# Patient Record
Sex: MALE | Race: WHITE | Employment: OTHER | URBAN - METROPOLITAN AREA
[De-identification: names, ages, dates, MRNs, and addresses within clinical notes are randomized per-mention and may not be internally consistent; named-entity substitution may affect disease eponyms.]

---

## 2017-01-31 PROBLEM — K72.00 ACUTE LIVER FAILURE WITHOUT HEPATIC COMA: Status: ACTIVE | Noted: 2017-01-31

## 2017-01-31 PROBLEM — A41.9 SEPSIS (HCC): Status: ACTIVE | Noted: 2017-01-31

## 2017-02-01 PROBLEM — B16.9 ACUTE HEPATITIS B: Status: ACTIVE | Noted: 2017-02-01

## 2018-02-11 PROBLEM — L03.90 CELLULITIS: Status: ACTIVE | Noted: 2018-02-11

## 2018-02-11 PROBLEM — L02.619 ABSCESS OF FOOT: Status: ACTIVE | Noted: 2018-02-11

## 2018-03-12 ENCOUNTER — HOSPITAL ENCOUNTER (OUTPATIENT)
Age: 53
Discharge: HOME | End: 2018-03-14
Payer: COMMERCIAL

## 2018-03-12 LAB
ALBUMIN SERPL-MCNC: 3.7 G/DL (ref 3.5–5.2)
ALP BLD-CCNC: 47 U/L (ref 40–129)
ALT SERPL-CCNC: 11 U/L (ref 0–40)
ANION GAP SERPL CALCULATED.3IONS-SCNC: 12 MMOL/L (ref 7–16)
AST SERPL-CCNC: 25 U/L (ref 0–39)
BASOPHILS ABSOLUTE: 0.06 E9/L (ref 0–0.2)
BASOPHILS RELATIVE PERCENT: 0.8 % (ref 0–2)
BILIRUB SERPL-MCNC: 0.3 MG/DL (ref 0–1.2)
BUN BLDV-MCNC: 11 MG/DL (ref 6–20)
C-REACTIVE PROTEIN: 0.7 MG/DL (ref 0–0.4)
CALCIUM SERPL-MCNC: 8.5 MG/DL (ref 8.6–10.2)
CHLORIDE BLD-SCNC: 100 MMOL/L (ref 98–107)
CO2: 25 MMOL/L (ref 22–29)
CREAT SERPL-MCNC: 0.7 MG/DL (ref 0.7–1.2)
EOSINOPHILS ABSOLUTE: 0.56 E9/L (ref 0.05–0.5)
EOSINOPHILS RELATIVE PERCENT: 7.2 % (ref 0–6)
GFR AFRICAN AMERICAN: >60
GFR NON-AFRICAN AMERICAN: >60 ML/MIN/1.73
GLUCOSE BLD-MCNC: 172 MG/DL (ref 74–109)
HCT VFR BLD CALC: 38.4 % (ref 37–54)
HEMOGLOBIN: 12.9 G/DL (ref 12.5–16.5)
IMMATURE GRANULOCYTES #: 0.05 E9/L
IMMATURE GRANULOCYTES %: 0.6 % (ref 0–5)
LYMPHOCYTES ABSOLUTE: 2.67 E9/L (ref 1.5–4)
LYMPHOCYTES RELATIVE PERCENT: 34.5 % (ref 20–42)
MCH RBC QN AUTO: 31.1 PG (ref 26–35)
MCHC RBC AUTO-ENTMCNC: 33.6 % (ref 32–34.5)
MCV RBC AUTO: 92.5 FL (ref 80–99.9)
MONOCYTES ABSOLUTE: 0.97 E9/L (ref 0.1–0.95)
MONOCYTES RELATIVE PERCENT: 12.5 % (ref 2–12)
NEUTROPHILS ABSOLUTE: 3.42 E9/L (ref 1.8–7.3)
NEUTROPHILS RELATIVE PERCENT: 44.4 % (ref 43–80)
PDW BLD-RTO: 12.8 FL (ref 11.5–15)
PLATELET # BLD: 173 E9/L (ref 130–450)
PMV BLD AUTO: 11.7 FL (ref 7–12)
POTASSIUM SERPL-SCNC: 3.9 MMOL/L (ref 3.5–5)
RBC # BLD: 4.15 E12/L (ref 3.8–5.8)
SEDIMENTATION RATE, ERYTHROCYTE: 31 MM/HR (ref 0–15)
SODIUM BLD-SCNC: 137 MMOL/L (ref 132–146)
TOTAL PROTEIN: 6.4 G/DL (ref 6.4–8.3)
WBC # BLD: 7.7 E9/L (ref 4.5–11.5)

## 2018-03-12 PROCEDURE — 86140 C-REACTIVE PROTEIN: CPT

## 2018-03-12 PROCEDURE — 85025 COMPLETE CBC W/AUTO DIFF WBC: CPT

## 2018-03-12 PROCEDURE — 85651 RBC SED RATE NONAUTOMATED: CPT

## 2018-03-12 PROCEDURE — 80053 COMPREHEN METABOLIC PANEL: CPT

## 2018-03-13 ENCOUNTER — HOSPITAL ENCOUNTER (OUTPATIENT)
Dept: WOUND CARE | Age: 53
Discharge: HOME | End: 2018-03-13
Payer: COMMERCIAL

## 2018-03-13 VITALS
SYSTOLIC BLOOD PRESSURE: 118 MMHG | RESPIRATION RATE: 18 BRPM | DIASTOLIC BLOOD PRESSURE: 80 MMHG | TEMPERATURE: 98.3 F | HEART RATE: 72 BPM

## 2018-03-13 PROCEDURE — 11042 DBRDMT SUBQ TIS 1ST 20SQCM/<: CPT

## 2018-03-13 ASSESSMENT — PAIN DESCRIPTION - LOCATION: LOCATION: TOE (COMMENT WHICH ONE)

## 2018-03-13 ASSESSMENT — PAIN DESCRIPTION - ONSET: ONSET: GRADUAL

## 2018-03-13 ASSESSMENT — PAIN DESCRIPTION - DESCRIPTORS: DESCRIPTORS: SHARP;STABBING

## 2018-03-13 ASSESSMENT — PAIN DESCRIPTION - ORIENTATION: ORIENTATION: RIGHT

## 2018-03-13 ASSESSMENT — PAIN DESCRIPTION - PAIN TYPE: TYPE: CHRONIC PAIN

## 2018-03-13 ASSESSMENT — PAIN DESCRIPTION - FREQUENCY: FREQUENCY: INTERMITTENT

## 2018-03-13 ASSESSMENT — PAIN DESCRIPTION - PROGRESSION: CLINICAL_PROGRESSION: NOT CHANGED

## 2018-03-15 ENCOUNTER — HOSPITAL ENCOUNTER (OUTPATIENT)
Age: 53
Discharge: HOME | End: 2018-03-17
Payer: COMMERCIAL

## 2018-03-15 LAB
ALBUMIN SERPL-MCNC: 4.1 G/DL (ref 3.5–5.2)
ALP BLD-CCNC: 53 U/L (ref 40–129)
ALT SERPL-CCNC: 11 U/L (ref 0–40)
ANION GAP SERPL CALCULATED.3IONS-SCNC: 13 MMOL/L (ref 7–16)
AST SERPL-CCNC: 24 U/L (ref 0–39)
BASOPHILS ABSOLUTE: 0.08 E9/L (ref 0–0.2)
BASOPHILS RELATIVE PERCENT: 0.9 % (ref 0–2)
BILIRUB SERPL-MCNC: 0.4 MG/DL (ref 0–1.2)
BUN BLDV-MCNC: 8 MG/DL (ref 6–20)
CALCIUM SERPL-MCNC: 9.3 MG/DL (ref 8.6–10.2)
CHLORIDE BLD-SCNC: 95 MMOL/L (ref 98–107)
CO2: 27 MMOL/L (ref 22–29)
CREAT SERPL-MCNC: 0.6 MG/DL (ref 0.7–1.2)
EOSINOPHILS ABSOLUTE: 0.56 E9/L (ref 0.05–0.5)
EOSINOPHILS RELATIVE PERCENT: 6.4 % (ref 0–6)
GFR AFRICAN AMERICAN: >60
GFR NON-AFRICAN AMERICAN: >60 ML/MIN/1.73
GLUCOSE BLD-MCNC: 223 MG/DL (ref 74–109)
HCT VFR BLD CALC: 38.9 % (ref 37–54)
HEMOGLOBIN: 13.2 G/DL (ref 12.5–16.5)
IMMATURE GRANULOCYTES #: 0.05 E9/L
IMMATURE GRANULOCYTES %: 0.6 % (ref 0–5)
LYMPHOCYTES ABSOLUTE: 1.36 E9/L (ref 1.5–4)
LYMPHOCYTES RELATIVE PERCENT: 15.7 % (ref 20–42)
MCH RBC QN AUTO: 30.8 PG (ref 26–35)
MCHC RBC AUTO-ENTMCNC: 33.9 % (ref 32–34.5)
MCV RBC AUTO: 90.9 FL (ref 80–99.9)
MONOCYTES ABSOLUTE: 1.11 E9/L (ref 0.1–0.95)
MONOCYTES RELATIVE PERCENT: 12.8 % (ref 2–12)
NEUTROPHILS ABSOLUTE: 5.53 E9/L (ref 1.8–7.3)
NEUTROPHILS RELATIVE PERCENT: 63.6 % (ref 43–80)
PDW BLD-RTO: 12.6 FL (ref 11.5–15)
PLATELET # BLD: 165 E9/L (ref 130–450)
PMV BLD AUTO: 11.4 FL (ref 7–12)
POTASSIUM SERPL-SCNC: 3.8 MMOL/L (ref 3.5–5)
RBC # BLD: 4.28 E12/L (ref 3.8–5.8)
SODIUM BLD-SCNC: 135 MMOL/L (ref 132–146)
TOTAL PROTEIN: 6.9 G/DL (ref 6.4–8.3)
WBC # BLD: 8.7 E9/L (ref 4.5–11.5)

## 2018-03-15 PROCEDURE — 85025 COMPLETE CBC W/AUTO DIFF WBC: CPT

## 2018-03-15 PROCEDURE — 80053 COMPREHEN METABOLIC PANEL: CPT

## 2018-03-18 NOTE — PROGRESS NOTES
Note  Indications:  Based on my examination of this patient's wound(s)/ulcer(s) today, debridement is required to promote healing and evaluate the wound base. Performed by: Ysabel Buckley DPM    Consent obtained:  Yes    Time out taken:  Yes    Pain Control:       Debridement:Excisional Debridement    Using curette the wound(s)/ulcer(s) was/were sharply debrided down through and including the removal of subcutaneous tissue. Devitalized Tissue Debrided:  fibrin, biofilm, slough and necrotic/eschar    Pre Debridement Measurements:  Are located in the [place]  Documentation Flow Sheet    Wound/Ulcer #: 1    Post Debridement Measurements:  Wound/Ulcer Descriptions are Pre Debridement except measurements:    Wound 02/27/18 Toe (Comment  which one) Right #1 RT BIG TOE SURGERY AQUIRED 2/17/18 (Active)   Wound Type Wound 2/27/2018  3:42 PM   Wound Diabetic Watkins 2 3/6/2018  9:20 AM   Dressing Status Intact; Changed 3/13/2018 10:20 AM   Dressing Changed Changed/New 3/13/2018 10:20 AM   Dressing/Treatment Dry dressing; Other (Comment) 3/6/2018  9:30 AM   Wound Cleansed Rinsed/Irrigated with saline 3/13/2018 10:20 AM   Dressing Change Due 03/07/18 3/6/2018  9:30 AM   Wound Length (cm) 0.6 cm 3/13/2018  9:31 AM   Wound Width (cm) 2.2 cm 3/13/2018  9:31 AM   Wound Depth (cm)  0.2 3/13/2018  9:31 AM   Calculated Wound Size (cm^2) (l*w) 1.32 cm^2 3/13/2018  9:31 AM   Change in Wound Size % (l*w) 24.57 3/13/2018  9:31 AM   Wound Assessment Red;Yellow 3/13/2018  9:31 AM   Drainage Amount Small 3/13/2018  9:31 AM   Drainage Description Serosanguinous 3/13/2018  9:31 AM   Odor None 3/13/2018  9:31 AM   Mima-wound Assessment Dry; Intact; Pink 3/13/2018  9:31 AM   Debridement per physician Subcutaneous 3/13/2018 10:05 AM   Time out Yes 3/13/2018 10:05 AM   Procedural Pain 0 3/13/2018 10:05 AM   Post procedural Pain 0 3/13/2018 10:05 AM   Number of days: 18       Percent of Wound/Ulcer Debrided: 100%    Total Surface Area

## 2018-03-19 ENCOUNTER — HOSPITAL ENCOUNTER (OUTPATIENT)
Age: 53
Discharge: HOME | End: 2018-03-21
Payer: COMMERCIAL

## 2018-03-19 ENCOUNTER — HOSPITAL ENCOUNTER (OUTPATIENT)
Dept: WOUND CARE | Age: 53
Discharge: HOME | End: 2018-03-19
Payer: COMMERCIAL

## 2018-03-19 VITALS
DIASTOLIC BLOOD PRESSURE: 70 MMHG | RESPIRATION RATE: 16 BRPM | SYSTOLIC BLOOD PRESSURE: 120 MMHG | HEART RATE: 92 BPM | TEMPERATURE: 98 F

## 2018-03-19 LAB
ALBUMIN SERPL-MCNC: 3.6 G/DL (ref 3.5–5.2)
ALP BLD-CCNC: 59 U/L (ref 40–129)
ALT SERPL-CCNC: 10 U/L (ref 0–40)
ANION GAP SERPL CALCULATED.3IONS-SCNC: 14 MMOL/L (ref 7–16)
AST SERPL-CCNC: 17 U/L (ref 0–39)
BASOPHILS ABSOLUTE: 0.04 E9/L (ref 0–0.2)
BASOPHILS RELATIVE PERCENT: 0.5 % (ref 0–2)
BILIRUB SERPL-MCNC: 0.4 MG/DL (ref 0–1.2)
BUN BLDV-MCNC: 12 MG/DL (ref 6–20)
C-REACTIVE PROTEIN: 1.5 MG/DL (ref 0–0.4)
CALCIUM SERPL-MCNC: 9.5 MG/DL (ref 8.6–10.2)
CHLORIDE BLD-SCNC: 93 MMOL/L (ref 98–107)
CO2: 26 MMOL/L (ref 22–29)
CREAT SERPL-MCNC: 0.6 MG/DL (ref 0.7–1.2)
EOSINOPHILS ABSOLUTE: 0.42 E9/L (ref 0.05–0.5)
EOSINOPHILS RELATIVE PERCENT: 5.3 % (ref 0–6)
GFR AFRICAN AMERICAN: >60
GFR NON-AFRICAN AMERICAN: >60 ML/MIN/1.73
GLUCOSE BLD-MCNC: 380 MG/DL (ref 74–109)
HCT VFR BLD CALC: 39.2 % (ref 37–54)
HEMOGLOBIN: 13.3 G/DL (ref 12.5–16.5)
IMMATURE GRANULOCYTES #: 0.04 E9/L
IMMATURE GRANULOCYTES %: 0.5 % (ref 0–5)
LYMPHOCYTES ABSOLUTE: 1.77 E9/L (ref 1.5–4)
LYMPHOCYTES RELATIVE PERCENT: 22.2 % (ref 20–42)
MCH RBC QN AUTO: 30.8 PG (ref 26–35)
MCHC RBC AUTO-ENTMCNC: 33.9 % (ref 32–34.5)
MCV RBC AUTO: 90.7 FL (ref 80–99.9)
MONOCYTES ABSOLUTE: 0.58 E9/L (ref 0.1–0.95)
MONOCYTES RELATIVE PERCENT: 7.3 % (ref 2–12)
NEUTROPHILS ABSOLUTE: 5.12 E9/L (ref 1.8–7.3)
NEUTROPHILS RELATIVE PERCENT: 64.2 % (ref 43–80)
PDW BLD-RTO: 12.6 FL (ref 11.5–15)
PLATELET # BLD: 153 E9/L (ref 130–450)
PMV BLD AUTO: 11.6 FL (ref 7–12)
POTASSIUM SERPL-SCNC: 4.1 MMOL/L (ref 3.5–5)
RBC # BLD: 4.32 E12/L (ref 3.8–5.8)
SEDIMENTATION RATE, ERYTHROCYTE: 48 MM/HR (ref 0–15)
SODIUM BLD-SCNC: 133 MMOL/L (ref 132–146)
TOTAL PROTEIN: 6.6 G/DL (ref 6.4–8.3)
WBC # BLD: 8 E9/L (ref 4.5–11.5)

## 2018-03-19 PROCEDURE — 85651 RBC SED RATE NONAUTOMATED: CPT

## 2018-03-19 PROCEDURE — 11042 DBRDMT SUBQ TIS 1ST 20SQCM/<: CPT

## 2018-03-19 PROCEDURE — 86140 C-REACTIVE PROTEIN: CPT

## 2018-03-19 PROCEDURE — 85025 COMPLETE CBC W/AUTO DIFF WBC: CPT

## 2018-03-19 PROCEDURE — 80053 COMPREHEN METABOLIC PANEL: CPT

## 2018-03-19 ASSESSMENT — PAIN DESCRIPTION - ORIENTATION: ORIENTATION: RIGHT

## 2018-03-19 ASSESSMENT — PAIN DESCRIPTION - PAIN TYPE: TYPE: CHRONIC PAIN

## 2018-03-19 ASSESSMENT — PAIN DESCRIPTION - LOCATION: LOCATION: TOE (COMMENT WHICH ONE)

## 2018-03-19 ASSESSMENT — PAIN DESCRIPTION - DESCRIPTORS: DESCRIPTORS: ACHING

## 2018-03-26 ENCOUNTER — HOSPITAL ENCOUNTER (OUTPATIENT)
Dept: WOUND CARE | Age: 53
Discharge: HOME | End: 2018-03-26
Payer: COMMERCIAL

## 2018-03-26 VITALS
HEART RATE: 72 BPM | RESPIRATION RATE: 18 BRPM | DIASTOLIC BLOOD PRESSURE: 64 MMHG | TEMPERATURE: 98.4 F | SYSTOLIC BLOOD PRESSURE: 124 MMHG

## 2018-03-26 PROCEDURE — 11042 DBRDMT SUBQ TIS 1ST 20SQCM/<: CPT

## 2018-03-26 NOTE — PROGRESS NOTES
Patient voiced that he \"thinks his appointment is April 20 with Dr. Pooja Goddard in her office\".  clarified that his appointment is April 18th not the 20th. Patient made aware.  Je Clement

## 2018-03-26 NOTE — PROGRESS NOTES
[facility]  History and Physical/Consultation  Podiatry    Referring Physician : Penelope Badillo MD  [facility] RECORD NUMBER:  [id number]  AGE: 48 y.o. GENDER: male  : 1965  EPISODE DATE:  3/19/2018  Subjective:     Chief Complaint   Patient presents with    Wound Check         HISTORY of PRESENT ILLNESS ALEYDA Gonzalez is a 46 y.o. male who presents today for wound/ulcer evaluation. History of Wound Context:  The patient has had a wound of Left great toe which was the result of surgical incision and drainage, bone biopsy. This has been treated for about 4 weeks. The patient has noted that the wound has been improving. The patient has not had wounds similar to this in the past. Patient states that he is eager to get back to work      Pt is currently on abx.       Wound/Ulcer Pain Timing/Severity: waxing and waning  Quality of pain: aching, throbbing  Modifying Factors: Pain worsens with pressure/palpation of the ulceration  Associated Signs/Symptoms: none     Ulcer Identification:  Ulcer Type: diabetic and non-healing surgical  Contributing Factors: diabetes     Diabetic/Pressure/Non Pressure Ulcers only:  Ulcer: Non-Pressure ulcer, fat layer exposed     If patient has diabetic lower extremity wounds  Watkins Classification of diabetic lower extremity wounds:     Grade Description   []  0 No open wound   []  1 Superficial ulcer involving the full skin thickness   []  2 Deep ulcer involves ligament, tendon, joint capsule, or fascia  No bone involvement or abscess presence   []  3 Deep Ulcer with abcess formation and/or osteomyelitis   []  4 Localized gangrene   []  5 Extensive gangrene of the foot      Wound: Surgical incision                                        PAST MEDICAL HISTORY  Past Medical History             Diagnosis Date    Atrial fibrillation [facility])      Atrial fibrillation ([facility] [address] 2018     lexiscan stress test    Diabetes mellitus ([facility] [address]      breakfast) 30 tablet 0    metoprolol (TOPROL-XL) 25 MG XL tablet Take 1 tablet by mouth daily 30 tablet 0    Multiple Vitamins-Minerals (THERAPEUTIC MULTIVITAMIN-MINERALS) tablet Take 1 tablet by mouth daily        aspirin 81 MG tablet Take 81 mg by mouth daily        glucose monitoring kit (FREESTYLE) monitoring kit 1 kit by Does not apply route daily as needed 1 kit 0      No current facility-administered medications on file prior to encounter.          REVIEW OF SYSTEMS   ROS : All others Negative if blank [], Positive if [x]  General Vascular   [] Fevers [] Claudication   [] Chills [] Rest Pain   Skin Neurologic   [] Tissue Loss [] Lower extremity neuropathy      Objective:    /80   Pulse 72   Temp 98.3 °F (36.8 °C) (Oral)   Resp 18       Wt Readings from Last 3 Encounters:   03/06/18 265 lb (120.2 kg)   02/27/18 265 lb (120.2 kg)   02/15/18 265 lb 8 oz (120.4 kg)         PHYSICAL EXAM   CONSTITUTIONAL:   Awake, alert, cooperative   PSYCHIATRIC :          Oriented to time, place and person                                       normal insight to disease process  EXTREMITIES:   R LE    Open wounds are not noted              Skin color is normal              Edema is not noted              Sensation deficit noted - mildly diminihsed to the right and left foot               Palpation of the foot does cause pain              5/5 strength DF/PF  L LE     Open wounds are not noted              Skin color is normal              Edema is not noted              Sensation intact to light touch              Palpation of the foot does not cause pain              5/5 strength DF/PF  R dorsalis pedis 2/4 L dorsalis pedis 2/4   R posterior tibial 2/4 L posterior tibial 2/4      Assessment:           Patient Active Problem List   Diagnosis Code    TIA (transient ischemic attack) G45.9    New onset atrial fibrillation (HCC) I48.91    Acute renal failure (ARF) (HCC) N17.9    Diverticulitis K57.92    Atrial

## 2018-03-26 NOTE — PLAN OF CARE
Problem: Pain:  Goal: Pain level will decrease  Pain level will decrease   Outcome: Ongoing    Goal: Control of acute pain  Control of acute pain   Outcome: Ongoing    Goal: Control of chronic pain  Control of chronic pain   Outcome: Ongoing      Problem: Wound:  Goal: Will show signs of wound healing; wound closure and no evidence of infection  Will show signs of wound healing; wound closure and no evidence of infection   Outcome: Ongoing      Problem: Nutritional:  Goal: Nutritional status will improve  Nutritional status will improve   Outcome: Ongoing

## 2018-03-26 NOTE — PROGRESS NOTES
tablet by mouth daily (with breakfast) 30 tablet 0    metoprolol (TOPROL-XL) 25 MG XL tablet Take 1 tablet by mouth daily 30 tablet 0    Multiple Vitamins-Minerals (THERAPEUTIC MULTIVITAMIN-MINERALS) tablet Take 1 tablet by mouth daily        aspirin 81 MG tablet Take 81 mg by mouth daily        glucose monitoring kit (FREESTYLE) monitoring kit 1 kit by Does not apply route daily as needed 1 kit 0      No current facility-administered medications on file prior to encounter.          REVIEW OF SYSTEMS   ROS : All others Negative if blank [], Positive if [x]  General Vascular   [] Fevers [] Claudication   [] Chills [] Rest Pain   Skin Neurologic   [] Tissue Loss [] Lower extremity neuropathy      Objective:    /80   Pulse 72   Temp 98.3 °F (36.8 °C) (Oral)   Resp 18         Wt Readings from Last 3 Encounters:   03/06/18 265 lb (120.2 kg)   02/27/18 265 lb (120.2 kg)   02/15/18 265 lb 8 oz (120.4 kg)         PHYSICAL EXAM   CONSTITUTIONAL:   Awake, alert, cooperative   PSYCHIATRIC :          Oriented to time, place and person                                       normal insight to disease process  EXTREMITIES:   R LE    Open wounds are not noted              Skin color is normal              Edema is not noted              Sensation deficit noted - mildly diminihsed to the right and left foot               Palpation of the foot does cause pain              5/5 strength DF/PF  L LE     Open wounds are not noted              Skin color is normal              Edema is not noted              Sensation intact to light touch              Palpation of the foot does not cause pain              5/5 strength DF/PF  R dorsalis pedis 2/4 L dorsalis pedis 2/4   R posterior tibial 2/4 L posterior tibial 2/4      Assessment:              Patient Active Problem List   Diagnosis Code    TIA (transient ischemic attack) G45.9    New onset atrial fibrillation (HCC) I48.91    Acute renal failure (ARF) ([facility] [address] N17.9    Serosanguinous 3/26/2018  9:30 AM   Odor None 3/26/2018  9:30 AM   Mima-wound Assessment Dry; Intact 3/26/2018  9:30 AM   Debridement per physician Subcutaneous 3/19/2018 10:03 AM   Time out Yes 3/26/2018 10:10 AM   Procedural Pain 0 3/26/2018 10:10 AM   Post procedural Pain 0 3/26/2018 10:10 AM   Number of days: 26       Percent of Wound/Ulcer Debrided: 100%    Total Surface Area Debrided:  <20 sq cm     Estimated Blood Loss:  Minimal    Hemostasis Achieved:  by pressure    Response to treatment:  Well tolerated by patient. A culture was not done. Plan:     Pt is not currently a smoker   - Discussed relationship of smoking and negative affects on wound healing   - Emphasized importance of tobacco avoidace/cessation      In my professional opinion this patient would benefit from HBO Therapy: No    Treatment Note please see attached Discharge Instructions    Written patient dismissal instructions given to patient and signed by patient or POA. Discharge Instructions       Visit Discharge/Physician Orders     Discharge condition: Stable     Assessment of pain at discharge:  NONE     Anesthetic used: 2% LIDOCAINE     Discharge to: Home     Left via:Private automobile     Accompanied by: accompanied by SELF     ECF/HHA: [facility]     Dressing Orders:  CLEANSE RIGHT FOOT WITH NORMAL SALINE.  APPLY TO WOUND WITH [place], SLIGHTLY MOISTENED WITH NORMAL SALINE. COVER WITH DRY DRESSING TO SECURE. CHANGE EVERY other DAY.    PATIENT TO GET AM-LACTIN LOTION FOR DRY INTACT SKIN TWICE A DAY OF FOOT.     Treatment Orders: NO WEIGHT BEARING RESTRICTIONS.  CONTINUE WEARING SURGICAL SHOE  Continue Keflex three times a day per Dr. Beth Vivar     Follow up with Dr. Beth Vivar  April 18th  Phone: [phone]        [address] [place] [address],[address] followup visit ____________ONE WEEK_________________  (Please note your next appointment above and if you are unable to keep, kindly give a 24 hour notice.  Thank you.)    Physician

## 2018-04-02 ENCOUNTER — HOSPITAL ENCOUNTER (OUTPATIENT)
Dept: WOUND CARE | Age: 53
Discharge: HOME | End: 2018-04-02
Payer: COMMERCIAL

## 2018-04-02 VITALS
RESPIRATION RATE: 16 BRPM | SYSTOLIC BLOOD PRESSURE: 132 MMHG | HEART RATE: 64 BPM | DIASTOLIC BLOOD PRESSURE: 90 MMHG | BODY MASS INDEX: 37.1 KG/M2 | TEMPERATURE: 98 F | HEIGHT: 71 IN | WEIGHT: 265 LBS

## 2018-04-02 PROCEDURE — 99213 OFFICE O/P EST LOW 20 MIN: CPT

## 2018-04-02 ASSESSMENT — PAIN DESCRIPTION - ONSET: ONSET: ON-GOING

## 2018-04-02 ASSESSMENT — PAIN DESCRIPTION - DESCRIPTORS: DESCRIPTORS: THROBBING;SHARP

## 2018-04-02 ASSESSMENT — PAIN DESCRIPTION - PAIN TYPE: TYPE: CHRONIC PAIN

## 2018-04-02 ASSESSMENT — PAIN DESCRIPTION - PROGRESSION: CLINICAL_PROGRESSION: NOT CHANGED

## 2018-04-02 ASSESSMENT — PAIN DESCRIPTION - ORIENTATION: ORIENTATION: RIGHT

## 2018-04-02 ASSESSMENT — PAIN DESCRIPTION - LOCATION: LOCATION: TOE (COMMENT WHICH ONE)

## 2018-04-02 ASSESSMENT — PAIN DESCRIPTION - FREQUENCY: FREQUENCY: INTERMITTENT

## 2018-04-02 ASSESSMENT — PAIN SCALES - GENERAL: PAINLEVEL_OUTOF10: 6

## 2018-06-15 ENCOUNTER — HOSPITAL ENCOUNTER (INPATIENT)
Age: 53
LOS: 6 days | Discharge: HOME | DRG: 310 | End: 2018-06-22
Attending: EMERGENCY MEDICINE | Admitting: INTERNAL MEDICINE
Payer: COMMERCIAL

## 2018-06-15 ENCOUNTER — APPOINTMENT (OUTPATIENT)
Dept: GENERAL RADIOLOGY | Age: 53
DRG: 310 | End: 2018-06-15
Payer: COMMERCIAL

## 2018-06-15 DIAGNOSIS — I48.19 PERSISTENT ATRIAL FIBRILLATION (HCC): Primary | ICD-10-CM

## 2018-06-15 LAB
ANION GAP SERPL CALCULATED.3IONS-SCNC: 14 MMOL/L (ref 7–16)
APTT: 34.6 SEC (ref 24.5–35.1)
BASOPHILS ABSOLUTE: 0.05 E9/L (ref 0–0.2)
BASOPHILS RELATIVE PERCENT: 0.4 % (ref 0–2)
BUN BLDV-MCNC: 23 MG/DL (ref 6–20)
CALCIUM SERPL-MCNC: 9.2 MG/DL (ref 8.6–10.2)
CHLORIDE BLD-SCNC: 103 MMOL/L (ref 98–107)
CO2: 20 MMOL/L (ref 22–29)
CREAT SERPL-MCNC: 1.1 MG/DL (ref 0.7–1.2)
EOSINOPHILS ABSOLUTE: 0.1 E9/L (ref 0.05–0.5)
EOSINOPHILS RELATIVE PERCENT: 0.9 % (ref 0–6)
GFR AFRICAN AMERICAN: >60
GFR NON-AFRICAN AMERICAN: >60 ML/MIN/1.73
GLUCOSE BLD-MCNC: 256 MG/DL (ref 74–109)
HCT VFR BLD CALC: 40.7 % (ref 37–54)
HEMOGLOBIN: 13.7 G/DL (ref 12.5–16.5)
IMMATURE GRANULOCYTES #: 0.09 E9/L
IMMATURE GRANULOCYTES %: 0.8 % (ref 0–5)
INR BLD: 1.5
LYMPHOCYTES ABSOLUTE: 4.11 E9/L (ref 1.5–4)
LYMPHOCYTES RELATIVE PERCENT: 35.2 % (ref 20–42)
MCH RBC QN AUTO: 31.2 PG (ref 26–35)
MCHC RBC AUTO-ENTMCNC: 33.7 % (ref 32–34.5)
MCV RBC AUTO: 92.7 FL (ref 80–99.9)
MONOCYTES ABSOLUTE: 0.84 E9/L (ref 0.1–0.95)
MONOCYTES RELATIVE PERCENT: 7.2 % (ref 2–12)
NEUTROPHILS ABSOLUTE: 6.47 E9/L (ref 1.8–7.3)
NEUTROPHILS RELATIVE PERCENT: 55.5 % (ref 43–80)
PDW BLD-RTO: 13.8 FL (ref 11.5–15)
PLATELET # BLD: 231 E9/L (ref 130–450)
PMV BLD AUTO: 11.5 FL (ref 7–12)
POTASSIUM REFLEX MAGNESIUM: 4.6 MMOL/L (ref 3.5–5)
PROTHROMBIN TIME: 17.2 SEC (ref 9.3–12.4)
RBC # BLD: 4.39 E12/L (ref 3.8–5.8)
SODIUM BLD-SCNC: 137 MMOL/L (ref 132–146)
TROPONIN: <0.01 NG/ML (ref 0–0.03)
WBC # BLD: 11.7 E9/L (ref 4.5–11.5)

## 2018-06-15 PROCEDURE — 6360000002 HC RX W HCPCS: Performed by: EMERGENCY MEDICINE

## 2018-06-15 PROCEDURE — 2580000003 HC RX 258

## 2018-06-15 PROCEDURE — 85610 PROTHROMBIN TIME: CPT

## 2018-06-15 PROCEDURE — 2580000003 HC RX 258: Performed by: EMERGENCY MEDICINE

## 2018-06-15 PROCEDURE — 85025 COMPLETE CBC W/AUTO DIFF WBC: CPT

## 2018-06-15 PROCEDURE — 99285 EMERGENCY DEPT VISIT HI MDM: CPT

## 2018-06-15 PROCEDURE — 84484 ASSAY OF TROPONIN QUANT: CPT

## 2018-06-15 PROCEDURE — 96365 THER/PROPH/DIAG IV INF INIT: CPT

## 2018-06-15 PROCEDURE — 36415 COLL VENOUS BLD VENIPUNCTURE: CPT

## 2018-06-15 PROCEDURE — 2500000003 HC RX 250 WO HCPCS

## 2018-06-15 PROCEDURE — 80048 BASIC METABOLIC PNL TOTAL CA: CPT

## 2018-06-15 PROCEDURE — 71045 X-RAY EXAM CHEST 1 VIEW: CPT

## 2018-06-15 PROCEDURE — 2500000003 HC RX 250 WO HCPCS: Performed by: EMERGENCY MEDICINE

## 2018-06-15 PROCEDURE — 85730 THROMBOPLASTIN TIME PARTIAL: CPT

## 2018-06-15 RX ORDER — DEXTROSE MONOHYDRATE 50 MG/ML
INJECTION, SOLUTION INTRAVENOUS
Status: COMPLETED
Start: 2018-06-15 | End: 2018-06-15

## 2018-06-15 RX ORDER — DILTIAZEM HYDROCHLORIDE 5 MG/ML
INJECTION INTRAVENOUS
Status: COMPLETED
Start: 2018-06-15 | End: 2018-06-15

## 2018-06-15 RX ORDER — SODIUM CHLORIDE 9 MG/ML
INJECTION, SOLUTION INTRAVENOUS
Status: DISPENSED
Start: 2018-06-15 | End: 2018-06-16

## 2018-06-15 RX ORDER — 0.9 % SODIUM CHLORIDE 0.9 %
1000 INTRAVENOUS SOLUTION INTRAVENOUS ONCE
Status: DISCONTINUED | OUTPATIENT
Start: 2018-06-15 | End: 2018-06-22 | Stop reason: HOSPADM

## 2018-06-15 RX ORDER — MORPHINE SULFATE 10 MG/ML
4 INJECTION, SOLUTION INTRAMUSCULAR; INTRAVENOUS ONCE
Status: COMPLETED | OUTPATIENT
Start: 2018-06-15 | End: 2018-06-15

## 2018-06-15 RX ORDER — DEXTROSE MONOHYDRATE 50 MG/ML
INJECTION, SOLUTION INTRAVENOUS
Status: DISPENSED
Start: 2018-06-15 | End: 2018-06-16

## 2018-06-15 RX ADMIN — DEXTROSE MONOHYDRATE 50 ML: 50 INJECTION, SOLUTION INTRAVENOUS at 21:23

## 2018-06-15 RX ADMIN — MORPHINE SULFATE 4 MG: 10 INJECTION INTRAVENOUS at 23:59

## 2018-06-15 RX ADMIN — DILTIAZEM HYDROCHLORIDE 5 MG/HR: 5 INJECTION INTRAVENOUS at 22:55

## 2018-06-15 RX ADMIN — DILTIAZEM HYDROCHLORIDE 25 MG: 5 INJECTION INTRAVENOUS at 21:22

## 2018-06-15 ASSESSMENT — PAIN DESCRIPTION - LOCATION: LOCATION: ARM;LEG

## 2018-06-15 ASSESSMENT — PAIN DESCRIPTION - DESCRIPTORS: DESCRIPTORS: SORE

## 2018-06-15 ASSESSMENT — PAIN DESCRIPTION - ORIENTATION: ORIENTATION: RIGHT;LEFT;LOWER

## 2018-06-15 ASSESSMENT — PAIN SCALES - GENERAL: PAINLEVEL_OUTOF10: 8

## 2018-06-16 PROBLEM — I48.92 EPISODIC ATRIAL FLUTTER (HCC): Status: ACTIVE | Noted: 2018-06-16

## 2018-06-16 LAB
ANION GAP SERPL CALCULATED.3IONS-SCNC: 9 MMOL/L (ref 7–16)
BUN BLDV-MCNC: 22 MG/DL (ref 6–20)
CALCIUM SERPL-MCNC: 8.9 MG/DL (ref 8.6–10.2)
CHLORIDE BLD-SCNC: 106 MMOL/L (ref 98–107)
CO2: 25 MMOL/L (ref 22–29)
CREAT SERPL-MCNC: 1 MG/DL (ref 0.7–1.2)
D DIMER: 486 NG/ML DDU
GFR AFRICAN AMERICAN: >60
GFR NON-AFRICAN AMERICAN: >60 ML/MIN/1.73
GLUCOSE BLD-MCNC: 180 MG/DL (ref 74–109)
HCT VFR BLD CALC: 41.7 % (ref 37–54)
HEMOGLOBIN: 13.7 G/DL (ref 12.5–16.5)
MCH RBC QN AUTO: 31.4 PG (ref 26–35)
MCHC RBC AUTO-ENTMCNC: 32.9 % (ref 32–34.5)
MCV RBC AUTO: 95.4 FL (ref 80–99.9)
METER GLUCOSE: 168 MG/DL (ref 70–110)
METER GLUCOSE: 178 MG/DL (ref 70–110)
METER GLUCOSE: 212 MG/DL (ref 70–110)
METER GLUCOSE: 224 MG/DL (ref 70–110)
PDW BLD-RTO: 14.3 FL (ref 11.5–15)
PLATELET # BLD: 222 E9/L (ref 130–450)
PMV BLD AUTO: 11.5 FL (ref 7–12)
POTASSIUM REFLEX MAGNESIUM: 4.8 MMOL/L (ref 3.5–5)
PRO-BNP: 1100 PG/ML (ref 0–125)
RBC # BLD: 4.37 E12/L (ref 3.8–5.8)
SODIUM BLD-SCNC: 140 MMOL/L (ref 132–146)
WBC # BLD: 12.3 E9/L (ref 4.5–11.5)

## 2018-06-16 PROCEDURE — 2060000000 HC ICU INTERMEDIATE R&B

## 2018-06-16 PROCEDURE — 36415 COLL VENOUS BLD VENIPUNCTURE: CPT

## 2018-06-16 PROCEDURE — 6360000002 HC RX W HCPCS: Performed by: INTERNAL MEDICINE

## 2018-06-16 PROCEDURE — 83880 ASSAY OF NATRIURETIC PEPTIDE: CPT

## 2018-06-16 PROCEDURE — 6370000000 HC RX 637 (ALT 250 FOR IP): Performed by: INTERNAL MEDICINE

## 2018-06-16 PROCEDURE — 2580000003 HC RX 258: Performed by: EMERGENCY MEDICINE

## 2018-06-16 PROCEDURE — 2500000003 HC RX 250 WO HCPCS: Performed by: EMERGENCY MEDICINE

## 2018-06-16 PROCEDURE — 85027 COMPLETE CBC AUTOMATED: CPT

## 2018-06-16 PROCEDURE — 85378 FIBRIN DEGRADE SEMIQUANT: CPT

## 2018-06-16 PROCEDURE — 80048 BASIC METABOLIC PNL TOTAL CA: CPT

## 2018-06-16 PROCEDURE — 82962 GLUCOSE BLOOD TEST: CPT

## 2018-06-16 PROCEDURE — 94660 CPAP INITIATION&MGMT: CPT

## 2018-06-16 RX ORDER — HYDROCHLOROTHIAZIDE 12.5 MG/1
12.5 TABLET ORAL DAILY
Status: DISCONTINUED | OUTPATIENT
Start: 2018-06-16 | End: 2018-06-22

## 2018-06-16 RX ORDER — SOTALOL HYDROCHLORIDE 80 MG/1
40 TABLET ORAL EVERY 12 HOURS
Status: DISCONTINUED | OUTPATIENT
Start: 2018-06-16 | End: 2018-06-17

## 2018-06-16 RX ORDER — NICOTINE POLACRILEX 4 MG
15 LOZENGE BUCCAL PRN
Status: DISCONTINUED | OUTPATIENT
Start: 2018-06-16 | End: 2018-06-22 | Stop reason: HOSPADM

## 2018-06-16 RX ORDER — PREGABALIN 75 MG/1
75 CAPSULE ORAL 2 TIMES DAILY
Status: DISCONTINUED | OUTPATIENT
Start: 2018-06-16 | End: 2018-06-22 | Stop reason: HOSPADM

## 2018-06-16 RX ORDER — LANOLIN ALCOHOL/MO/W.PET/CERES
1000 CREAM (GRAM) TOPICAL DAILY
Status: DISCONTINUED | OUTPATIENT
Start: 2018-06-16 | End: 2018-06-22 | Stop reason: HOSPADM

## 2018-06-16 RX ORDER — ALBUTEROL SULFATE 90 UG/1
2 AEROSOL, METERED RESPIRATORY (INHALATION) EVERY 6 HOURS PRN
Status: DISCONTINUED | OUTPATIENT
Start: 2018-06-16 | End: 2018-06-22 | Stop reason: HOSPADM

## 2018-06-16 RX ORDER — LISINOPRIL 20 MG/1
20 TABLET ORAL DAILY
Status: DISCONTINUED | OUTPATIENT
Start: 2018-06-16 | End: 2018-06-22 | Stop reason: HOSPADM

## 2018-06-16 RX ORDER — ASPIRIN 81 MG/1
81 TABLET, CHEWABLE ORAL DAILY
Status: DISCONTINUED | OUTPATIENT
Start: 2018-06-16 | End: 2018-06-22 | Stop reason: HOSPADM

## 2018-06-16 RX ORDER — DEXTROSE MONOHYDRATE 50 MG/ML
100 INJECTION, SOLUTION INTRAVENOUS PRN
Status: DISCONTINUED | OUTPATIENT
Start: 2018-06-16 | End: 2018-06-22 | Stop reason: HOSPADM

## 2018-06-16 RX ORDER — M-VIT,TX,IRON,MINS/CALC/FOLIC 27MG-0.4MG
1 TABLET ORAL DAILY
Status: DISCONTINUED | OUTPATIENT
Start: 2018-06-16 | End: 2018-06-22 | Stop reason: HOSPADM

## 2018-06-16 RX ORDER — PANTOPRAZOLE SODIUM 40 MG/1
40 TABLET, DELAYED RELEASE ORAL
Status: DISCONTINUED | OUTPATIENT
Start: 2018-06-16 | End: 2018-06-22 | Stop reason: HOSPADM

## 2018-06-16 RX ORDER — ASCORBIC ACID 500 MG
1000 TABLET ORAL DAILY
Status: DISCONTINUED | OUTPATIENT
Start: 2018-06-16 | End: 2018-06-22 | Stop reason: HOSPADM

## 2018-06-16 RX ORDER — DEXTROSE MONOHYDRATE 25 G/50ML
12.5 INJECTION, SOLUTION INTRAVENOUS PRN
Status: DISCONTINUED | OUTPATIENT
Start: 2018-06-16 | End: 2018-06-22 | Stop reason: HOSPADM

## 2018-06-16 RX ORDER — METOPROLOL SUCCINATE 50 MG/1
50 TABLET, EXTENDED RELEASE ORAL DAILY
Status: DISCONTINUED | OUTPATIENT
Start: 2018-06-16 | End: 2018-06-16

## 2018-06-16 RX ORDER — LORAZEPAM 2 MG/ML
1 INJECTION INTRAMUSCULAR EVERY 4 HOURS PRN
Status: DISCONTINUED | OUTPATIENT
Start: 2018-06-16 | End: 2018-06-18

## 2018-06-16 RX ORDER — INSULIN GLARGINE 100 [IU]/ML
10 INJECTION, SOLUTION SUBCUTANEOUS 2 TIMES DAILY
Status: DISCONTINUED | OUTPATIENT
Start: 2018-06-16 | End: 2018-06-22 | Stop reason: HOSPADM

## 2018-06-16 RX ORDER — GLYBURIDE 5 MG/1
5 TABLET ORAL
Status: DISCONTINUED | OUTPATIENT
Start: 2018-06-16 | End: 2018-06-22 | Stop reason: HOSPADM

## 2018-06-16 RX ADMIN — LISINOPRIL 20 MG: 20 TABLET ORAL at 09:11

## 2018-06-16 RX ADMIN — INSULIN LISPRO 4 UNITS: 100 INJECTION, SOLUTION INTRAVENOUS; SUBCUTANEOUS at 11:14

## 2018-06-16 RX ADMIN — ALBUTEROL SULFATE 2 PUFF: 90 AEROSOL, METERED RESPIRATORY (INHALATION) at 12:10

## 2018-06-16 RX ADMIN — INSULIN LISPRO 2 UNITS: 100 INJECTION, SOLUTION INTRAVENOUS; SUBCUTANEOUS at 09:12

## 2018-06-16 RX ADMIN — INSULIN LISPRO 4 UNITS: 100 INJECTION, SOLUTION INTRAVENOUS; SUBCUTANEOUS at 16:35

## 2018-06-16 RX ADMIN — MULTIPLE VITAMINS W/ MINERALS TAB 1 TABLET: TAB at 09:11

## 2018-06-16 RX ADMIN — ASPIRIN 81 MG 81 MG: 81 TABLET ORAL at 09:12

## 2018-06-16 RX ADMIN — INSULIN GLARGINE 10 UNITS: 100 INJECTION, SOLUTION SUBCUTANEOUS at 09:12

## 2018-06-16 RX ADMIN — METOPROLOL SUCCINATE 50 MG: 50 TABLET, EXTENDED RELEASE ORAL at 09:11

## 2018-06-16 RX ADMIN — CYANOCOBALAMIN TAB 1000 MCG 1000 MCG: 1000 TAB at 11:14

## 2018-06-16 RX ADMIN — RIVAROXABAN 20 MG: 20 TABLET, FILM COATED ORAL at 16:32

## 2018-06-16 RX ADMIN — GLYBURIDE 5 MG: 5 TABLET ORAL at 11:14

## 2018-06-16 RX ADMIN — ALBUTEROL SULFATE 2 PUFF: 90 AEROSOL, METERED RESPIRATORY (INHALATION) at 05:57

## 2018-06-16 RX ADMIN — INSULIN LISPRO 1 UNITS: 100 INJECTION, SOLUTION INTRAVENOUS; SUBCUTANEOUS at 20:47

## 2018-06-16 RX ADMIN — PREGABALIN 75 MG: 75 CAPSULE ORAL at 20:47

## 2018-06-16 RX ADMIN — SOTALOL HYDROCHLORIDE 40 MG: 80 TABLET ORAL at 16:32

## 2018-06-16 RX ADMIN — INSULIN GLARGINE 10 UNITS: 100 INJECTION, SOLUTION SUBCUTANEOUS at 20:47

## 2018-06-16 RX ADMIN — OXYCODONE HYDROCHLORIDE AND ACETAMINOPHEN 1000 MG: 500 TABLET ORAL at 09:11

## 2018-06-16 RX ADMIN — LORAZEPAM 1 MG: 2 INJECTION INTRAMUSCULAR at 20:47

## 2018-06-16 RX ADMIN — LORAZEPAM 1 MG: 2 INJECTION INTRAMUSCULAR at 16:32

## 2018-06-16 RX ADMIN — METFORMIN HYDROCHLORIDE 1000 MG: 1000 TABLET, FILM COATED ORAL at 16:31

## 2018-06-16 RX ADMIN — METFORMIN HYDROCHLORIDE 1000 MG: 1000 TABLET, FILM COATED ORAL at 09:11

## 2018-06-16 RX ADMIN — DILTIAZEM HYDROCHLORIDE 15 MG/HR: 5 INJECTION INTRAVENOUS at 14:33

## 2018-06-16 RX ADMIN — PREGABALIN 75 MG: 75 CAPSULE ORAL at 09:11

## 2018-06-16 RX ADMIN — HYDROCHLOROTHIAZIDE 12.5 MG: 12.5 TABLET ORAL at 09:11

## 2018-06-16 RX ADMIN — PANTOPRAZOLE SODIUM 40 MG: 40 TABLET, DELAYED RELEASE ORAL at 06:45

## 2018-06-16 ASSESSMENT — PAIN SCALES - GENERAL
PAINLEVEL_OUTOF10: 10
PAINLEVEL_OUTOF10: 0

## 2018-06-16 ASSESSMENT — PAIN DESCRIPTION - DESCRIPTORS: DESCRIPTORS: ACHING;CONSTANT;SORE

## 2018-06-16 ASSESSMENT — PAIN DESCRIPTION - ORIENTATION: ORIENTATION: RIGHT;LEFT;LOWER;UPPER

## 2018-06-17 PROBLEM — I48.91 ATRIAL FIBRILLATION WITH RVR (HCC): Status: ACTIVE | Noted: 2018-06-17

## 2018-06-17 LAB
ANION GAP SERPL CALCULATED.3IONS-SCNC: 9 MMOL/L (ref 7–16)
BUN BLDV-MCNC: 30 MG/DL (ref 6–20)
CALCIUM SERPL-MCNC: 9.2 MG/DL (ref 8.6–10.2)
CHLORIDE BLD-SCNC: 105 MMOL/L (ref 98–107)
CO2: 24 MMOL/L (ref 22–29)
CREAT SERPL-MCNC: 1.2 MG/DL (ref 0.7–1.2)
GFR AFRICAN AMERICAN: >60
GFR NON-AFRICAN AMERICAN: >60 ML/MIN/1.73
GLUCOSE BLD-MCNC: 155 MG/DL (ref 74–109)
HCT VFR BLD CALC: 41.5 % (ref 37–54)
HEMOGLOBIN: 13.7 G/DL (ref 12.5–16.5)
MCH RBC QN AUTO: 31.6 PG (ref 26–35)
MCHC RBC AUTO-ENTMCNC: 33 % (ref 32–34.5)
MCV RBC AUTO: 95.8 FL (ref 80–99.9)
METER GLUCOSE: 106 MG/DL (ref 70–110)
METER GLUCOSE: 110 MG/DL (ref 70–110)
METER GLUCOSE: 159 MG/DL (ref 70–110)
METER GLUCOSE: 207 MG/DL (ref 70–110)
PDW BLD-RTO: 14.3 FL (ref 11.5–15)
PLATELET # BLD: 219 E9/L (ref 130–450)
PMV BLD AUTO: 11.9 FL (ref 7–12)
POTASSIUM REFLEX MAGNESIUM: 5 MMOL/L (ref 3.5–5)
RBC # BLD: 4.33 E12/L (ref 3.8–5.8)
SODIUM BLD-SCNC: 138 MMOL/L (ref 132–146)
TSH SERPL DL<=0.05 MIU/L-ACNC: 3.69 UIU/ML (ref 0.27–4.2)
WBC # BLD: 14.5 E9/L (ref 4.5–11.5)

## 2018-06-17 PROCEDURE — 82962 GLUCOSE BLOOD TEST: CPT

## 2018-06-17 PROCEDURE — 6370000000 HC RX 637 (ALT 250 FOR IP): Performed by: INTERNAL MEDICINE

## 2018-06-17 PROCEDURE — 94660 CPAP INITIATION&MGMT: CPT

## 2018-06-17 PROCEDURE — 85027 COMPLETE CBC AUTOMATED: CPT

## 2018-06-17 PROCEDURE — 36415 COLL VENOUS BLD VENIPUNCTURE: CPT

## 2018-06-17 PROCEDURE — 2060000000 HC ICU INTERMEDIATE R&B

## 2018-06-17 PROCEDURE — 2580000003 HC RX 258: Performed by: EMERGENCY MEDICINE

## 2018-06-17 PROCEDURE — 6360000002 HC RX W HCPCS: Performed by: INTERNAL MEDICINE

## 2018-06-17 PROCEDURE — 2500000003 HC RX 250 WO HCPCS: Performed by: EMERGENCY MEDICINE

## 2018-06-17 PROCEDURE — 80048 BASIC METABOLIC PNL TOTAL CA: CPT

## 2018-06-17 PROCEDURE — 84443 ASSAY THYROID STIM HORMONE: CPT

## 2018-06-17 RX ORDER — SOTALOL HYDROCHLORIDE 80 MG/1
80 TABLET ORAL EVERY 12 HOURS
Status: DISCONTINUED | OUTPATIENT
Start: 2018-06-18 | End: 2018-06-18

## 2018-06-17 RX ADMIN — LORAZEPAM 1 MG: 2 INJECTION INTRAMUSCULAR at 05:06

## 2018-06-17 RX ADMIN — MULTIPLE VITAMINS W/ MINERALS TAB 1 TABLET: TAB at 09:02

## 2018-06-17 RX ADMIN — METFORMIN HYDROCHLORIDE 1000 MG: 1000 TABLET, FILM COATED ORAL at 08:30

## 2018-06-17 RX ADMIN — CYANOCOBALAMIN TAB 1000 MCG 1000 MCG: 1000 TAB at 09:02

## 2018-06-17 RX ADMIN — DILTIAZEM HYDROCHLORIDE 5 MG/HR: 5 INJECTION INTRAVENOUS at 06:11

## 2018-06-17 RX ADMIN — LORAZEPAM 1 MG: 2 INJECTION INTRAMUSCULAR at 21:22

## 2018-06-17 RX ADMIN — LORAZEPAM 1 MG: 2 INJECTION INTRAMUSCULAR at 16:58

## 2018-06-17 RX ADMIN — LISINOPRIL 20 MG: 20 TABLET ORAL at 09:02

## 2018-06-17 RX ADMIN — GLYBURIDE 5 MG: 5 TABLET ORAL at 09:02

## 2018-06-17 RX ADMIN — INSULIN LISPRO 2 UNITS: 100 INJECTION, SOLUTION INTRAVENOUS; SUBCUTANEOUS at 09:03

## 2018-06-17 RX ADMIN — INSULIN GLARGINE 10 UNITS: 100 INJECTION, SOLUTION SUBCUTANEOUS at 20:17

## 2018-06-17 RX ADMIN — ASPIRIN 81 MG 81 MG: 81 TABLET ORAL at 09:02

## 2018-06-17 RX ADMIN — INSULIN GLARGINE 10 UNITS: 100 INJECTION, SOLUTION SUBCUTANEOUS at 09:03

## 2018-06-17 RX ADMIN — HYDROCHLOROTHIAZIDE 12.5 MG: 12.5 TABLET ORAL at 09:02

## 2018-06-17 RX ADMIN — INSULIN LISPRO 4 UNITS: 100 INJECTION, SOLUTION INTRAVENOUS; SUBCUTANEOUS at 13:19

## 2018-06-17 RX ADMIN — DILTIAZEM HYDROCHLORIDE 12.5 MG/HR: 5 INJECTION INTRAVENOUS at 22:44

## 2018-06-17 RX ADMIN — LORAZEPAM 1 MG: 2 INJECTION INTRAMUSCULAR at 09:10

## 2018-06-17 RX ADMIN — SOTALOL HYDROCHLORIDE 40 MG: 80 TABLET ORAL at 06:11

## 2018-06-17 RX ADMIN — OXYCODONE HYDROCHLORIDE AND ACETAMINOPHEN 1000 MG: 500 TABLET ORAL at 09:02

## 2018-06-17 RX ADMIN — LORAZEPAM 1 MG: 2 INJECTION INTRAMUSCULAR at 13:19

## 2018-06-17 RX ADMIN — PREGABALIN 75 MG: 75 CAPSULE ORAL at 09:02

## 2018-06-17 RX ADMIN — RIVAROXABAN 20 MG: 20 TABLET, FILM COATED ORAL at 18:01

## 2018-06-17 RX ADMIN — SOTALOL HYDROCHLORIDE 40 MG: 80 TABLET ORAL at 16:57

## 2018-06-17 RX ADMIN — METFORMIN HYDROCHLORIDE 1000 MG: 1000 TABLET, FILM COATED ORAL at 16:57

## 2018-06-17 ASSESSMENT — PAIN SCALES - GENERAL
PAINLEVEL_OUTOF10: 0

## 2018-06-18 PROBLEM — G47.33 OSA (OBSTRUCTIVE SLEEP APNEA): Status: ACTIVE | Noted: 2018-06-18

## 2018-06-18 LAB
ANION GAP SERPL CALCULATED.3IONS-SCNC: 11 MMOL/L (ref 7–16)
BUN BLDV-MCNC: 20 MG/DL (ref 6–20)
CALCIUM SERPL-MCNC: 9.7 MG/DL (ref 8.6–10.2)
CHLORIDE BLD-SCNC: 104 MMOL/L (ref 98–107)
CO2: 25 MMOL/L (ref 22–29)
CREAT SERPL-MCNC: 0.9 MG/DL (ref 0.7–1.2)
GFR AFRICAN AMERICAN: >60
GFR NON-AFRICAN AMERICAN: >60 ML/MIN/1.73
GLUCOSE BLD-MCNC: 150 MG/DL (ref 74–109)
HCT VFR BLD CALC: 43.3 % (ref 37–54)
HEMOGLOBIN: 14.3 G/DL (ref 12.5–16.5)
MCH RBC QN AUTO: 31.2 PG (ref 26–35)
MCHC RBC AUTO-ENTMCNC: 33 % (ref 32–34.5)
MCV RBC AUTO: 94.3 FL (ref 80–99.9)
METER GLUCOSE: 105 MG/DL (ref 70–110)
METER GLUCOSE: 146 MG/DL (ref 70–110)
METER GLUCOSE: 148 MG/DL (ref 70–110)
METER GLUCOSE: 160 MG/DL (ref 70–110)
PDW BLD-RTO: 14.1 FL (ref 11.5–15)
PLATELET # BLD: 247 E9/L (ref 130–450)
PMV BLD AUTO: 11.6 FL (ref 7–12)
POTASSIUM REFLEX MAGNESIUM: 5.1 MMOL/L (ref 3.5–5)
RBC # BLD: 4.59 E12/L (ref 3.8–5.8)
SODIUM BLD-SCNC: 140 MMOL/L (ref 132–146)
WBC # BLD: 12 E9/L (ref 4.5–11.5)

## 2018-06-18 PROCEDURE — 6370000000 HC RX 637 (ALT 250 FOR IP): Performed by: INTERNAL MEDICINE

## 2018-06-18 PROCEDURE — 80048 BASIC METABOLIC PNL TOTAL CA: CPT

## 2018-06-18 PROCEDURE — 6360000002 HC RX W HCPCS: Performed by: INTERNAL MEDICINE

## 2018-06-18 PROCEDURE — 82962 GLUCOSE BLOOD TEST: CPT

## 2018-06-18 PROCEDURE — 36415 COLL VENOUS BLD VENIPUNCTURE: CPT

## 2018-06-18 PROCEDURE — 94660 CPAP INITIATION&MGMT: CPT

## 2018-06-18 PROCEDURE — 2060000000 HC ICU INTERMEDIATE R&B

## 2018-06-18 PROCEDURE — 85027 COMPLETE CBC AUTOMATED: CPT

## 2018-06-18 RX ORDER — SOTALOL HYDROCHLORIDE 80 MG/1
40 TABLET ORAL ONCE
Status: COMPLETED | OUTPATIENT
Start: 2018-06-18 | End: 2018-06-18

## 2018-06-18 RX ORDER — SOTALOL HYDROCHLORIDE 80 MG/1
120 TABLET ORAL 2 TIMES DAILY
Status: DISCONTINUED | OUTPATIENT
Start: 2018-06-19 | End: 2018-06-21

## 2018-06-18 RX ORDER — DILTIAZEM HYDROCHLORIDE 120 MG/1
120 CAPSULE, COATED, EXTENDED RELEASE ORAL DAILY
Status: DISCONTINUED | OUTPATIENT
Start: 2018-06-18 | End: 2018-06-20

## 2018-06-18 RX ORDER — DILTIAZEM HYDROCHLORIDE 120 MG/1
120 CAPSULE, COATED, EXTENDED RELEASE ORAL DAILY
Status: DISCONTINUED | OUTPATIENT
Start: 2018-06-19 | End: 2018-06-18

## 2018-06-18 RX ORDER — CLONAZEPAM 0.5 MG/1
0.5 TABLET, ORALLY DISINTEGRATING ORAL 3 TIMES DAILY PRN
Status: DISCONTINUED | OUTPATIENT
Start: 2018-06-18 | End: 2018-06-22 | Stop reason: HOSPADM

## 2018-06-18 RX ADMIN — OXYCODONE HYDROCHLORIDE AND ACETAMINOPHEN 1000 MG: 500 TABLET ORAL at 08:49

## 2018-06-18 RX ADMIN — METFORMIN HYDROCHLORIDE 1000 MG: 1000 TABLET, FILM COATED ORAL at 08:49

## 2018-06-18 RX ADMIN — LORAZEPAM 1 MG: 2 INJECTION INTRAMUSCULAR at 18:14

## 2018-06-18 RX ADMIN — HYDROCHLOROTHIAZIDE 12.5 MG: 12.5 TABLET ORAL at 08:49

## 2018-06-18 RX ADMIN — PANTOPRAZOLE SODIUM 40 MG: 40 TABLET, DELAYED RELEASE ORAL at 07:05

## 2018-06-18 RX ADMIN — GLYBURIDE 5 MG: 5 TABLET ORAL at 08:49

## 2018-06-18 RX ADMIN — PREGABALIN 75 MG: 75 CAPSULE ORAL at 08:49

## 2018-06-18 RX ADMIN — ASPIRIN 81 MG 81 MG: 81 TABLET ORAL at 08:49

## 2018-06-18 RX ADMIN — INSULIN LISPRO 2 UNITS: 100 INJECTION, SOLUTION INTRAVENOUS; SUBCUTANEOUS at 17:50

## 2018-06-18 RX ADMIN — SOTALOL HYDROCHLORIDE 80 MG: 80 TABLET ORAL at 03:58

## 2018-06-18 RX ADMIN — RIVAROXABAN 20 MG: 20 TABLET, FILM COATED ORAL at 17:47

## 2018-06-18 RX ADMIN — LISINOPRIL 20 MG: 20 TABLET ORAL at 08:49

## 2018-06-18 RX ADMIN — LORAZEPAM 1 MG: 2 INJECTION INTRAMUSCULAR at 02:24

## 2018-06-18 RX ADMIN — METFORMIN HYDROCHLORIDE 1000 MG: 1000 TABLET, FILM COATED ORAL at 17:47

## 2018-06-18 RX ADMIN — PREGABALIN 75 MG: 75 CAPSULE ORAL at 20:26

## 2018-06-18 RX ADMIN — INSULIN LISPRO 1 UNITS: 100 INJECTION, SOLUTION INTRAVENOUS; SUBCUTANEOUS at 20:26

## 2018-06-18 RX ADMIN — INSULIN LISPRO 2 UNITS: 100 INJECTION, SOLUTION INTRAVENOUS; SUBCUTANEOUS at 08:52

## 2018-06-18 RX ADMIN — INSULIN GLARGINE 10 UNITS: 100 INJECTION, SOLUTION SUBCUTANEOUS at 08:52

## 2018-06-18 RX ADMIN — LORAZEPAM 1 MG: 2 INJECTION INTRAMUSCULAR at 14:06

## 2018-06-18 RX ADMIN — SOTALOL HYDROCHLORIDE 40 MG: 80 TABLET ORAL at 20:26

## 2018-06-18 RX ADMIN — CLONAZEPAM 0.5 MG: 0.5 TABLET, ORALLY DISINTEGRATING ORAL at 20:44

## 2018-06-18 RX ADMIN — MULTIPLE VITAMINS W/ MINERALS TAB 1 TABLET: TAB at 08:49

## 2018-06-18 RX ADMIN — SOTALOL HYDROCHLORIDE 80 MG: 80 TABLET ORAL at 17:47

## 2018-06-18 RX ADMIN — LORAZEPAM 1 MG: 2 INJECTION INTRAMUSCULAR at 07:05

## 2018-06-18 RX ADMIN — INSULIN GLARGINE 10 UNITS: 100 INJECTION, SOLUTION SUBCUTANEOUS at 20:27

## 2018-06-18 RX ADMIN — DILTIAZEM HYDROCHLORIDE 120 MG: 120 CAPSULE, COATED, EXTENDED RELEASE ORAL at 22:18

## 2018-06-18 RX ADMIN — CYANOCOBALAMIN TAB 1000 MCG 1000 MCG: 1000 TAB at 08:49

## 2018-06-18 ASSESSMENT — PAIN SCALES - GENERAL
PAINLEVEL_OUTOF10: 0

## 2018-06-19 LAB
ANION GAP SERPL CALCULATED.3IONS-SCNC: 13 MMOL/L (ref 7–16)
BUN BLDV-MCNC: 19 MG/DL (ref 6–20)
CALCIUM SERPL-MCNC: 9.8 MG/DL (ref 8.6–10.2)
CHLORIDE BLD-SCNC: 104 MMOL/L (ref 98–107)
CO2: 22 MMOL/L (ref 22–29)
CREAT SERPL-MCNC: 0.8 MG/DL (ref 0.7–1.2)
D DIMER: 475 NG/ML DDU
GFR AFRICAN AMERICAN: >60
GFR NON-AFRICAN AMERICAN: >60 ML/MIN/1.73
GLUCOSE BLD-MCNC: 162 MG/DL (ref 74–109)
HCT VFR BLD CALC: 43.1 % (ref 37–54)
HEMOGLOBIN: 14.4 G/DL (ref 12.5–16.5)
MCH RBC QN AUTO: 30.8 PG (ref 26–35)
MCHC RBC AUTO-ENTMCNC: 33.4 % (ref 32–34.5)
MCV RBC AUTO: 92.3 FL (ref 80–99.9)
METER GLUCOSE: 121 MG/DL (ref 70–110)
METER GLUCOSE: 154 MG/DL (ref 70–110)
METER GLUCOSE: 201 MG/DL (ref 70–110)
METER GLUCOSE: 234 MG/DL (ref 70–110)
PDW BLD-RTO: 14 FL (ref 11.5–15)
PLATELET # BLD: 279 E9/L (ref 130–450)
PMV BLD AUTO: 11.8 FL (ref 7–12)
POTASSIUM REFLEX MAGNESIUM: 4.5 MMOL/L (ref 3.5–5)
RBC # BLD: 4.67 E12/L (ref 3.8–5.8)
SODIUM BLD-SCNC: 139 MMOL/L (ref 132–146)
WBC # BLD: 12.4 E9/L (ref 4.5–11.5)

## 2018-06-19 PROCEDURE — 94660 CPAP INITIATION&MGMT: CPT

## 2018-06-19 PROCEDURE — 85378 FIBRIN DEGRADE SEMIQUANT: CPT

## 2018-06-19 PROCEDURE — 82962 GLUCOSE BLOOD TEST: CPT

## 2018-06-19 PROCEDURE — 6370000000 HC RX 637 (ALT 250 FOR IP): Performed by: INTERNAL MEDICINE

## 2018-06-19 PROCEDURE — 85027 COMPLETE CBC AUTOMATED: CPT

## 2018-06-19 PROCEDURE — 2060000000 HC ICU INTERMEDIATE R&B

## 2018-06-19 PROCEDURE — 36415 COLL VENOUS BLD VENIPUNCTURE: CPT

## 2018-06-19 PROCEDURE — 6360000002 HC RX W HCPCS: Performed by: INTERNAL MEDICINE

## 2018-06-19 PROCEDURE — 80048 BASIC METABOLIC PNL TOTAL CA: CPT

## 2018-06-19 RX ORDER — DIGOXIN 0.25 MG/ML
125 INJECTION INTRAMUSCULAR; INTRAVENOUS ONCE
Status: COMPLETED | OUTPATIENT
Start: 2018-06-19 | End: 2018-06-19

## 2018-06-19 RX ADMIN — PREGABALIN 75 MG: 75 CAPSULE ORAL at 08:56

## 2018-06-19 RX ADMIN — GLYBURIDE 5 MG: 5 TABLET ORAL at 08:56

## 2018-06-19 RX ADMIN — ASPIRIN 81 MG 81 MG: 81 TABLET ORAL at 08:56

## 2018-06-19 RX ADMIN — INSULIN GLARGINE 10 UNITS: 100 INJECTION, SOLUTION SUBCUTANEOUS at 20:27

## 2018-06-19 RX ADMIN — CLONAZEPAM 0.5 MG: 0.5 TABLET, ORALLY DISINTEGRATING ORAL at 13:19

## 2018-06-19 RX ADMIN — PANTOPRAZOLE SODIUM 40 MG: 40 TABLET, DELAYED RELEASE ORAL at 06:12

## 2018-06-19 RX ADMIN — CLONAZEPAM 0.5 MG: 0.5 TABLET, ORALLY DISINTEGRATING ORAL at 22:31

## 2018-06-19 RX ADMIN — INSULIN LISPRO 4 UNITS: 100 INJECTION, SOLUTION INTRAVENOUS; SUBCUTANEOUS at 13:18

## 2018-06-19 RX ADMIN — HYDROCHLOROTHIAZIDE 12.5 MG: 12.5 TABLET ORAL at 08:56

## 2018-06-19 RX ADMIN — MULTIPLE VITAMINS W/ MINERALS TAB 1 TABLET: TAB at 08:56

## 2018-06-19 RX ADMIN — RIVAROXABAN 20 MG: 20 TABLET, FILM COATED ORAL at 19:15

## 2018-06-19 RX ADMIN — CLONAZEPAM 0.5 MG: 0.5 TABLET, ORALLY DISINTEGRATING ORAL at 04:54

## 2018-06-19 RX ADMIN — DIGOXIN 125 MCG: 250 INJECTION, SOLUTION INTRAMUSCULAR; INTRAVENOUS; PARENTERAL at 13:19

## 2018-06-19 RX ADMIN — OXYCODONE HYDROCHLORIDE AND ACETAMINOPHEN 1000 MG: 500 TABLET ORAL at 08:55

## 2018-06-19 RX ADMIN — INSULIN LISPRO 2 UNITS: 100 INJECTION, SOLUTION INTRAVENOUS; SUBCUTANEOUS at 09:00

## 2018-06-19 RX ADMIN — METFORMIN HYDROCHLORIDE 1000 MG: 1000 TABLET, FILM COATED ORAL at 16:58

## 2018-06-19 RX ADMIN — INSULIN GLARGINE 10 UNITS: 100 INJECTION, SOLUTION SUBCUTANEOUS at 09:01

## 2018-06-19 RX ADMIN — INSULIN LISPRO 2 UNITS: 100 INJECTION, SOLUTION INTRAVENOUS; SUBCUTANEOUS at 20:27

## 2018-06-19 RX ADMIN — CYANOCOBALAMIN TAB 1000 MCG 1000 MCG: 1000 TAB at 08:56

## 2018-06-19 RX ADMIN — DILTIAZEM HYDROCHLORIDE 120 MG: 120 CAPSULE, COATED, EXTENDED RELEASE ORAL at 08:56

## 2018-06-19 RX ADMIN — METFORMIN HYDROCHLORIDE 1000 MG: 1000 TABLET, FILM COATED ORAL at 08:55

## 2018-06-19 RX ADMIN — SOTALOL HYDROCHLORIDE 120 MG: 120 TABLET ORAL at 20:27

## 2018-06-19 RX ADMIN — LISINOPRIL 20 MG: 20 TABLET ORAL at 08:56

## 2018-06-19 RX ADMIN — SOTALOL HYDROCHLORIDE 120 MG: 120 TABLET ORAL at 04:54

## 2018-06-19 ASSESSMENT — PAIN SCALES - GENERAL
PAINLEVEL_OUTOF10: 0
PAINLEVEL_OUTOF10: 0

## 2018-06-20 ENCOUNTER — ANESTHESIA EVENT (OUTPATIENT)
Dept: POSTOP/PACU | Age: 53
DRG: 310 | End: 2018-06-20
Payer: COMMERCIAL

## 2018-06-20 ENCOUNTER — ANESTHESIA (OUTPATIENT)
Dept: POSTOP/PACU | Age: 53
DRG: 310 | End: 2018-06-20
Payer: COMMERCIAL

## 2018-06-20 LAB
ANION GAP SERPL CALCULATED.3IONS-SCNC: 14 MMOL/L (ref 7–16)
BUN BLDV-MCNC: 23 MG/DL (ref 6–20)
CALCIUM SERPL-MCNC: 9.9 MG/DL (ref 8.6–10.2)
CHLORIDE BLD-SCNC: 101 MMOL/L (ref 98–107)
CO2: 24 MMOL/L (ref 22–29)
CREAT SERPL-MCNC: 1 MG/DL (ref 0.7–1.2)
GFR AFRICAN AMERICAN: >60
GFR NON-AFRICAN AMERICAN: >60 ML/MIN/1.73
GLUCOSE BLD-MCNC: 166 MG/DL (ref 74–109)
HCT VFR BLD CALC: 43.2 % (ref 37–54)
HEMOGLOBIN: 14.6 G/DL (ref 12.5–16.5)
MCH RBC QN AUTO: 31.2 PG (ref 26–35)
MCHC RBC AUTO-ENTMCNC: 33.8 % (ref 32–34.5)
MCV RBC AUTO: 92.3 FL (ref 80–99.9)
METER GLUCOSE: 163 MG/DL (ref 70–110)
METER GLUCOSE: 189 MG/DL (ref 70–110)
PDW BLD-RTO: 13.9 FL (ref 11.5–15)
PLATELET # BLD: 284 E9/L (ref 130–450)
PMV BLD AUTO: 11.1 FL (ref 7–12)
POTASSIUM REFLEX MAGNESIUM: 4.8 MMOL/L (ref 3.5–5)
RBC # BLD: 4.68 E12/L (ref 3.8–5.8)
SODIUM BLD-SCNC: 139 MMOL/L (ref 132–146)
WBC # BLD: 13.9 E9/L (ref 4.5–11.5)

## 2018-06-20 PROCEDURE — 85027 COMPLETE CBC AUTOMATED: CPT

## 2018-06-20 PROCEDURE — 2060000000 HC ICU INTERMEDIATE R&B

## 2018-06-20 PROCEDURE — 80048 BASIC METABOLIC PNL TOTAL CA: CPT

## 2018-06-20 PROCEDURE — 6370000000 HC RX 637 (ALT 250 FOR IP): Performed by: INTERNAL MEDICINE

## 2018-06-20 PROCEDURE — 36415 COLL VENOUS BLD VENIPUNCTURE: CPT

## 2018-06-20 PROCEDURE — 82962 GLUCOSE BLOOD TEST: CPT

## 2018-06-20 PROCEDURE — 94660 CPAP INITIATION&MGMT: CPT

## 2018-06-20 PROCEDURE — 6370000000 HC RX 637 (ALT 250 FOR IP): Performed by: NURSE PRACTITIONER

## 2018-06-20 PROCEDURE — 6370000000 HC RX 637 (ALT 250 FOR IP): Performed by: SPECIALIST

## 2018-06-20 PROCEDURE — 6360000002 HC RX W HCPCS: Performed by: SPECIALIST

## 2018-06-20 PROCEDURE — 6360000002 HC RX W HCPCS: Performed by: INTERNAL MEDICINE

## 2018-06-20 RX ORDER — NADOLOL 20 MG/1
10 TABLET ORAL 2 TIMES DAILY
Status: DISCONTINUED | OUTPATIENT
Start: 2018-06-20 | End: 2018-06-21

## 2018-06-20 RX ORDER — HYDROCODONE BITARTRATE AND ACETAMINOPHEN 5; 325 MG/1; MG/1
1 TABLET ORAL ONCE
Status: COMPLETED | OUTPATIENT
Start: 2018-06-20 | End: 2018-06-20

## 2018-06-20 RX ORDER — DIGOXIN 0.25 MG/ML
250 INJECTION INTRAMUSCULAR; INTRAVENOUS ONCE
Status: COMPLETED | OUTPATIENT
Start: 2018-06-20 | End: 2018-06-20

## 2018-06-20 RX ORDER — HYDROCODONE BITARTRATE AND ACETAMINOPHEN 5; 325 MG/1; MG/1
1 TABLET ORAL EVERY 6 HOURS PRN
Status: DISCONTINUED | OUTPATIENT
Start: 2018-06-20 | End: 2018-06-22 | Stop reason: HOSPADM

## 2018-06-20 RX ORDER — DIGOXIN 0.25 MG/ML
125 INJECTION INTRAMUSCULAR; INTRAVENOUS ONCE
Status: COMPLETED | OUTPATIENT
Start: 2018-06-20 | End: 2018-06-20

## 2018-06-20 RX ORDER — DIGOXIN 0.25 MG/ML
125 INJECTION INTRAMUSCULAR; INTRAVENOUS ONCE
Status: DISCONTINUED | OUTPATIENT
Start: 2018-06-20 | End: 2018-06-20

## 2018-06-20 RX ADMIN — ASPIRIN 81 MG 81 MG: 81 TABLET ORAL at 10:08

## 2018-06-20 RX ADMIN — OXYCODONE HYDROCHLORIDE AND ACETAMINOPHEN 1000 MG: 500 TABLET ORAL at 10:08

## 2018-06-20 RX ADMIN — CLONAZEPAM 0.5 MG: 0.5 TABLET, ORALLY DISINTEGRATING ORAL at 16:27

## 2018-06-20 RX ADMIN — DILTIAZEM HYDROCHLORIDE 30 MG: 30 TABLET, FILM COATED ORAL at 20:27

## 2018-06-20 RX ADMIN — CLONAZEPAM 0.5 MG: 0.5 TABLET, ORALLY DISINTEGRATING ORAL at 06:41

## 2018-06-20 RX ADMIN — HYDROCODONE BITARTRATE AND ACETAMINOPHEN 1 TABLET: 5; 325 TABLET ORAL at 17:42

## 2018-06-20 RX ADMIN — PREGABALIN 75 MG: 75 CAPSULE ORAL at 10:08

## 2018-06-20 RX ADMIN — MULTIPLE VITAMINS W/ MINERALS TAB 1 TABLET: TAB at 10:08

## 2018-06-20 RX ADMIN — INSULIN LISPRO 2 UNITS: 100 INJECTION, SOLUTION INTRAVENOUS; SUBCUTANEOUS at 16:31

## 2018-06-20 RX ADMIN — HYDROCODONE BITARTRATE AND ACETAMINOPHEN 1 TABLET: 5; 325 TABLET ORAL at 04:27

## 2018-06-20 RX ADMIN — DIGOXIN 250 MCG: 250 INJECTION, SOLUTION INTRAMUSCULAR; INTRAVENOUS; PARENTERAL at 19:41

## 2018-06-20 RX ADMIN — LISINOPRIL 20 MG: 20 TABLET ORAL at 10:08

## 2018-06-20 RX ADMIN — NADOLOL 10 MG: 20 TABLET ORAL at 19:50

## 2018-06-20 RX ADMIN — DIGOXIN 125 MCG: 250 INJECTION, SOLUTION INTRAMUSCULAR; INTRAVENOUS; PARENTERAL at 06:36

## 2018-06-20 RX ADMIN — METFORMIN HYDROCHLORIDE 1000 MG: 1000 TABLET, FILM COATED ORAL at 16:30

## 2018-06-20 RX ADMIN — SOTALOL HYDROCHLORIDE 120 MG: 120 TABLET ORAL at 10:10

## 2018-06-20 RX ADMIN — PREGABALIN 75 MG: 75 CAPSULE ORAL at 20:27

## 2018-06-20 RX ADMIN — RIVAROXABAN 20 MG: 20 TABLET, FILM COATED ORAL at 17:42

## 2018-06-20 RX ADMIN — CYANOCOBALAMIN TAB 1000 MCG 1000 MCG: 1000 TAB at 10:10

## 2018-06-20 RX ADMIN — METFORMIN HYDROCHLORIDE 1000 MG: 1000 TABLET, FILM COATED ORAL at 10:08

## 2018-06-20 RX ADMIN — INSULIN GLARGINE 10 UNITS: 100 INJECTION, SOLUTION SUBCUTANEOUS at 20:36

## 2018-06-20 RX ADMIN — PANTOPRAZOLE SODIUM 40 MG: 40 TABLET, DELAYED RELEASE ORAL at 06:41

## 2018-06-20 RX ADMIN — DILTIAZEM HYDROCHLORIDE 120 MG: 120 CAPSULE, COATED, EXTENDED RELEASE ORAL at 10:08

## 2018-06-20 RX ADMIN — SOTALOL HYDROCHLORIDE 120 MG: 120 TABLET ORAL at 20:28

## 2018-06-20 RX ADMIN — HYDROCHLOROTHIAZIDE 12.5 MG: 12.5 TABLET ORAL at 10:08

## 2018-06-20 ASSESSMENT — PAIN SCALES - GENERAL
PAINLEVEL_OUTOF10: 10
PAINLEVEL_OUTOF10: 4
PAINLEVEL_OUTOF10: 7

## 2018-06-21 ENCOUNTER — HOSPITAL ENCOUNTER (INPATIENT)
Dept: POSTOP/PACU | Age: 53
Discharge: HOME | DRG: 310 | End: 2018-06-21
Payer: COMMERCIAL

## 2018-06-21 ENCOUNTER — APPOINTMENT (OUTPATIENT)
Dept: ULTRASOUND IMAGING | Age: 53
DRG: 310 | End: 2018-06-21
Payer: COMMERCIAL

## 2018-06-21 VITALS
OXYGEN SATURATION: 98 % | SYSTOLIC BLOOD PRESSURE: 100 MMHG | DIASTOLIC BLOOD PRESSURE: 68 MMHG | HEART RATE: 94 BPM | RESPIRATION RATE: 29 BRPM

## 2018-06-21 VITALS
SYSTOLIC BLOOD PRESSURE: 95 MMHG | OXYGEN SATURATION: 93 % | DIASTOLIC BLOOD PRESSURE: 75 MMHG | RESPIRATION RATE: 18 BRPM

## 2018-06-21 LAB
ANION GAP SERPL CALCULATED.3IONS-SCNC: 13 MMOL/L (ref 7–16)
BUN BLDV-MCNC: 26 MG/DL (ref 6–20)
CALCIUM SERPL-MCNC: 9.4 MG/DL (ref 8.6–10.2)
CHLORIDE BLD-SCNC: 101 MMOL/L (ref 98–107)
CO2: 21 MMOL/L (ref 22–29)
CREAT SERPL-MCNC: 0.8 MG/DL (ref 0.7–1.2)
EKG ATRIAL RATE: 91 BPM
EKG ATRIAL RATE: 98 BPM
EKG P AXIS: 42 DEGREES
EKG P AXIS: 42 DEGREES
EKG P-R INTERVAL: 204 MS
EKG P-R INTERVAL: 206 MS
EKG Q-T INTERVAL: 348 MS
EKG Q-T INTERVAL: 348 MS
EKG QRS DURATION: 74 MS
EKG QRS DURATION: 80 MS
EKG QTC CALCULATION (BAZETT): 428 MS
EKG QTC CALCULATION (BAZETT): 444 MS
EKG R AXIS: -17 DEGREES
EKG R AXIS: 4 DEGREES
EKG T AXIS: 75 DEGREES
EKG T AXIS: 80 DEGREES
EKG VENTRICULAR RATE: 91 BPM
EKG VENTRICULAR RATE: 98 BPM
GFR AFRICAN AMERICAN: >60
GFR NON-AFRICAN AMERICAN: >60 ML/MIN/1.73
GLUCOSE BLD-MCNC: 167 MG/DL (ref 74–109)
HCT VFR BLD CALC: 45.5 % (ref 37–54)
HEMOGLOBIN: 15.4 G/DL (ref 12.5–16.5)
MCH RBC QN AUTO: 31.2 PG (ref 26–35)
MCHC RBC AUTO-ENTMCNC: 33.8 % (ref 32–34.5)
MCV RBC AUTO: 92.1 FL (ref 80–99.9)
METER GLUCOSE: 134 MG/DL (ref 70–110)
METER GLUCOSE: 155 MG/DL (ref 70–110)
METER GLUCOSE: 174 MG/DL (ref 70–110)
METER GLUCOSE: 239 MG/DL (ref 70–110)
PDW BLD-RTO: 13.8 FL (ref 11.5–15)
PLATELET # BLD: 261 E9/L (ref 130–450)
PMV BLD AUTO: 11.4 FL (ref 7–12)
POTASSIUM REFLEX MAGNESIUM: 4.5 MMOL/L (ref 3.5–5)
RBC # BLD: 4.94 E12/L (ref 3.8–5.8)
SODIUM BLD-SCNC: 135 MMOL/L (ref 132–146)
WBC # BLD: 11.2 E9/L (ref 4.5–11.5)

## 2018-06-21 PROCEDURE — 7100000000 HC PACU RECOVERY - FIRST 15 MIN

## 2018-06-21 PROCEDURE — 6370000000 HC RX 637 (ALT 250 FOR IP): Performed by: SPECIALIST

## 2018-06-21 PROCEDURE — 6370000000 HC RX 637 (ALT 250 FOR IP): Performed by: NURSE PRACTITIONER

## 2018-06-21 PROCEDURE — 2580000003 HC RX 258: Performed by: NURSE ANESTHETIST, CERTIFIED REGISTERED

## 2018-06-21 PROCEDURE — 6370000000 HC RX 637 (ALT 250 FOR IP): Performed by: INTERNAL MEDICINE

## 2018-06-21 PROCEDURE — 93005 ELECTROCARDIOGRAM TRACING: CPT | Performed by: SPECIALIST

## 2018-06-21 PROCEDURE — 7100000001 HC PACU RECOVERY - ADDTL 15 MIN

## 2018-06-21 PROCEDURE — 3700000000 HC ANESTHESIA ATTENDED CARE

## 2018-06-21 PROCEDURE — 5A2204Z RESTORATION OF CARDIAC RHYTHM, SINGLE: ICD-10-PCS | Performed by: SPECIALIST

## 2018-06-21 PROCEDURE — 80048 BASIC METABOLIC PNL TOTAL CA: CPT

## 2018-06-21 PROCEDURE — 85027 COMPLETE CBC AUTOMATED: CPT

## 2018-06-21 PROCEDURE — 93971 EXTREMITY STUDY: CPT

## 2018-06-21 PROCEDURE — 92960 CARDIOVERSION ELECTRIC EXT: CPT

## 2018-06-21 PROCEDURE — 36415 COLL VENOUS BLD VENIPUNCTURE: CPT

## 2018-06-21 PROCEDURE — 1200000000 HC SEMI PRIVATE

## 2018-06-21 PROCEDURE — 6360000002 HC RX W HCPCS: Performed by: NURSE ANESTHETIST, CERTIFIED REGISTERED

## 2018-06-21 PROCEDURE — 82962 GLUCOSE BLOOD TEST: CPT

## 2018-06-21 RX ORDER — SOTALOL HYDROCHLORIDE 80 MG/1
120 TABLET ORAL ONCE
Status: COMPLETED | OUTPATIENT
Start: 2018-06-21 | End: 2018-06-21

## 2018-06-21 RX ORDER — SODIUM CHLORIDE, SODIUM LACTATE, POTASSIUM CHLORIDE, CALCIUM CHLORIDE 600; 310; 30; 20 MG/100ML; MG/100ML; MG/100ML; MG/100ML
INJECTION, SOLUTION INTRAVENOUS CONTINUOUS PRN
Status: DISCONTINUED | OUTPATIENT
Start: 2018-06-21 | End: 2018-06-21 | Stop reason: SDUPTHER

## 2018-06-21 RX ORDER — SOTALOL HYDROCHLORIDE 80 MG/1
120 TABLET ORAL EVERY 12 HOURS
Status: DISCONTINUED | OUTPATIENT
Start: 2018-06-22 | End: 2018-06-22 | Stop reason: HOSPADM

## 2018-06-21 RX ORDER — PROPOFOL 10 MG/ML
INJECTION, EMULSION INTRAVENOUS PRN
Status: DISCONTINUED | OUTPATIENT
Start: 2018-06-21 | End: 2018-06-21 | Stop reason: SDUPTHER

## 2018-06-21 RX ADMIN — ASPIRIN 81 MG 81 MG: 81 TABLET ORAL at 09:55

## 2018-06-21 RX ADMIN — SODIUM CHLORIDE, POTASSIUM CHLORIDE, SODIUM LACTATE AND CALCIUM CHLORIDE: 600; 310; 30; 20 INJECTION, SOLUTION INTRAVENOUS at 08:30

## 2018-06-21 RX ADMIN — SOTALOL HYDROCHLORIDE 120 MG: 120 TABLET ORAL at 09:54

## 2018-06-21 RX ADMIN — RIVAROXABAN 20 MG: 20 TABLET, FILM COATED ORAL at 16:33

## 2018-06-21 RX ADMIN — INSULIN LISPRO 2 UNITS: 100 INJECTION, SOLUTION INTRAVENOUS; SUBCUTANEOUS at 12:04

## 2018-06-21 RX ADMIN — INSULIN LISPRO 2 UNITS: 100 INJECTION, SOLUTION INTRAVENOUS; SUBCUTANEOUS at 22:32

## 2018-06-21 RX ADMIN — PREGABALIN 75 MG: 75 CAPSULE ORAL at 19:59

## 2018-06-21 RX ADMIN — PROPOFOL 120 MG: 10 INJECTION, EMULSION INTRAVENOUS at 08:30

## 2018-06-21 RX ADMIN — HYDROCODONE BITARTRATE AND ACETAMINOPHEN 1 TABLET: 5; 325 TABLET ORAL at 16:34

## 2018-06-21 RX ADMIN — HYDROCODONE BITARTRATE AND ACETAMINOPHEN 1 TABLET: 5; 325 TABLET ORAL at 09:53

## 2018-06-21 RX ADMIN — CLONAZEPAM 0.5 MG: 0.5 TABLET, ORALLY DISINTEGRATING ORAL at 09:53

## 2018-06-21 RX ADMIN — DILTIAZEM HYDROCHLORIDE 30 MG: 30 TABLET, FILM COATED ORAL at 12:02

## 2018-06-21 RX ADMIN — SOTALOL HYDROCHLORIDE 120 MG: 80 TABLET ORAL at 19:59

## 2018-06-21 RX ADMIN — MULTIPLE VITAMINS W/ MINERALS TAB 1 TABLET: TAB at 09:53

## 2018-06-21 RX ADMIN — CLONAZEPAM 0.5 MG: 0.5 TABLET, ORALLY DISINTEGRATING ORAL at 00:41

## 2018-06-21 RX ADMIN — METFORMIN HYDROCHLORIDE 1000 MG: 1000 TABLET, FILM COATED ORAL at 09:55

## 2018-06-21 RX ADMIN — INSULIN GLARGINE 10 UNITS: 100 INJECTION, SOLUTION SUBCUTANEOUS at 09:55

## 2018-06-21 RX ADMIN — OXYCODONE HYDROCHLORIDE AND ACETAMINOPHEN 1000 MG: 500 TABLET ORAL at 09:53

## 2018-06-21 RX ADMIN — METFORMIN HYDROCHLORIDE 1000 MG: 1000 TABLET, FILM COATED ORAL at 16:33

## 2018-06-21 RX ADMIN — INSULIN GLARGINE 10 UNITS: 100 INJECTION, SOLUTION SUBCUTANEOUS at 22:31

## 2018-06-21 RX ADMIN — GLYBURIDE 5 MG: 5 TABLET ORAL at 09:53

## 2018-06-21 RX ADMIN — CYANOCOBALAMIN TAB 1000 MCG 1000 MCG: 1000 TAB at 09:54

## 2018-06-21 RX ADMIN — CLONAZEPAM 0.5 MG: 0.5 TABLET, ORALLY DISINTEGRATING ORAL at 22:35

## 2018-06-21 RX ADMIN — HYDROCODONE BITARTRATE AND ACETAMINOPHEN 1 TABLET: 5; 325 TABLET ORAL at 22:35

## 2018-06-21 RX ADMIN — HYDROCHLOROTHIAZIDE 12.5 MG: 12.5 TABLET ORAL at 09:53

## 2018-06-21 RX ADMIN — INSULIN LISPRO 2 UNITS: 100 INJECTION, SOLUTION INTRAVENOUS; SUBCUTANEOUS at 09:56

## 2018-06-21 RX ADMIN — LISINOPRIL 20 MG: 20 TABLET ORAL at 09:55

## 2018-06-21 RX ADMIN — DILTIAZEM HYDROCHLORIDE 30 MG: 30 TABLET, FILM COATED ORAL at 00:41

## 2018-06-21 RX ADMIN — PREGABALIN 75 MG: 75 CAPSULE ORAL at 09:55

## 2018-06-21 RX ADMIN — PANTOPRAZOLE SODIUM 40 MG: 40 TABLET, DELAYED RELEASE ORAL at 09:55

## 2018-06-21 RX ADMIN — DILTIAZEM HYDROCHLORIDE 30 MG: 30 TABLET, FILM COATED ORAL at 16:34

## 2018-06-21 RX ADMIN — HYDROCODONE BITARTRATE AND ACETAMINOPHEN 1 TABLET: 5; 325 TABLET ORAL at 00:41

## 2018-06-21 ASSESSMENT — PAIN SCALES - GENERAL
PAINLEVEL_OUTOF10: 8
PAINLEVEL_OUTOF10: 8
PAINLEVEL_OUTOF10: 7
PAINLEVEL_OUTOF10: 0
PAINLEVEL_OUTOF10: 0
PAINLEVEL_OUTOF10: 7

## 2018-06-21 ASSESSMENT — PAIN - FUNCTIONAL ASSESSMENT: PAIN_FUNCTIONAL_ASSESSMENT: 0-10

## 2018-06-21 ASSESSMENT — PAIN DESCRIPTION - LOCATION: LOCATION: ABDOMEN;ARM

## 2018-06-21 ASSESSMENT — PAIN DESCRIPTION - DESCRIPTORS: DESCRIPTORS: ACHING;DISCOMFORT

## 2018-06-22 VITALS
DIASTOLIC BLOOD PRESSURE: 63 MMHG | HEIGHT: 71 IN | SYSTOLIC BLOOD PRESSURE: 102 MMHG | WEIGHT: 269.4 LBS | OXYGEN SATURATION: 98 % | BODY MASS INDEX: 37.72 KG/M2 | HEART RATE: 77 BPM | RESPIRATION RATE: 16 BRPM | TEMPERATURE: 98.4 F

## 2018-06-22 LAB
EKG ATRIAL RATE: 312 BPM
EKG ATRIAL RATE: 78 BPM
EKG ATRIAL RATE: 94 BPM
EKG P AXIS: 18 DEGREES
EKG P AXIS: 43 DEGREES
EKG P AXIS: 60 DEGREES
EKG P-R INTERVAL: 148 MS
EKG P-R INTERVAL: 204 MS
EKG Q-T INTERVAL: 326 MS
EKG Q-T INTERVAL: 346 MS
EKG Q-T INTERVAL: 408 MS
EKG QRS DURATION: 68 MS
EKG QRS DURATION: 74 MS
EKG QRS DURATION: 92 MS
EKG QTC CALCULATION (BAZETT): 432 MS
EKG QTC CALCULATION (BAZETT): 465 MS
EKG QTC CALCULATION (BAZETT): 516 MS
EKG R AXIS: -15 DEGREES
EKG R AXIS: -4 DEGREES
EKG T AXIS: 69 DEGREES
EKG T AXIS: 71 DEGREES
EKG T AXIS: 77 DEGREES
EKG VENTRICULAR RATE: 151 BPM
EKG VENTRICULAR RATE: 78 BPM
EKG VENTRICULAR RATE: 94 BPM
METER GLUCOSE: 155 MG/DL (ref 70–110)
METER GLUCOSE: 194 MG/DL (ref 70–110)
METER GLUCOSE: 201 MG/DL (ref 70–110)

## 2018-06-22 PROCEDURE — 6370000000 HC RX 637 (ALT 250 FOR IP): Performed by: NURSE PRACTITIONER

## 2018-06-22 PROCEDURE — 94660 CPAP INITIATION&MGMT: CPT

## 2018-06-22 PROCEDURE — 93005 ELECTROCARDIOGRAM TRACING: CPT | Performed by: SPECIALIST

## 2018-06-22 PROCEDURE — 82962 GLUCOSE BLOOD TEST: CPT

## 2018-06-22 PROCEDURE — 6370000000 HC RX 637 (ALT 250 FOR IP): Performed by: SPECIALIST

## 2018-06-22 PROCEDURE — 6370000000 HC RX 637 (ALT 250 FOR IP): Performed by: INTERNAL MEDICINE

## 2018-06-22 RX ORDER — DILTIAZEM HYDROCHLORIDE 120 MG/1
120 CAPSULE, COATED, EXTENDED RELEASE ORAL DAILY
Status: DISCONTINUED | OUTPATIENT
Start: 2018-06-22 | End: 2018-06-22 | Stop reason: HOSPADM

## 2018-06-22 RX ORDER — SOTALOL HYDROCHLORIDE 120 MG/1
120 TABLET ORAL EVERY 12 HOURS
Qty: 60 TABLET | Refills: 0 | Status: ON HOLD
Start: 2018-06-23 | End: 2020-06-07 | Stop reason: HOSPADM

## 2018-06-22 RX ORDER — DILTIAZEM HYDROCHLORIDE 120 MG/1
120 CAPSULE, COATED, EXTENDED RELEASE ORAL DAILY
Qty: 30 CAPSULE | Refills: 0 | Status: ON HOLD | OUTPATIENT
Start: 2018-06-23 | End: 2020-05-19 | Stop reason: HOSPADM

## 2018-06-22 RX ADMIN — RIVAROXABAN 20 MG: 20 TABLET, FILM COATED ORAL at 16:19

## 2018-06-22 RX ADMIN — SOTALOL HYDROCHLORIDE 120 MG: 80 TABLET ORAL at 05:56

## 2018-06-22 RX ADMIN — MULTIPLE VITAMINS W/ MINERALS TAB 1 TABLET: TAB at 09:20

## 2018-06-22 RX ADMIN — METFORMIN HYDROCHLORIDE 1000 MG: 1000 TABLET, FILM COATED ORAL at 09:20

## 2018-06-22 RX ADMIN — INSULIN LISPRO 2 UNITS: 100 INJECTION, SOLUTION INTRAVENOUS; SUBCUTANEOUS at 16:19

## 2018-06-22 RX ADMIN — PANTOPRAZOLE SODIUM 40 MG: 40 TABLET, DELAYED RELEASE ORAL at 05:56

## 2018-06-22 RX ADMIN — INSULIN LISPRO 2 UNITS: 100 INJECTION, SOLUTION INTRAVENOUS; SUBCUTANEOUS at 09:21

## 2018-06-22 RX ADMIN — CYANOCOBALAMIN TAB 1000 MCG 1000 MCG: 1000 TAB at 09:19

## 2018-06-22 RX ADMIN — METFORMIN HYDROCHLORIDE 1000 MG: 1000 TABLET, FILM COATED ORAL at 16:19

## 2018-06-22 RX ADMIN — HYDROCODONE BITARTRATE AND ACETAMINOPHEN 1 TABLET: 5; 325 TABLET ORAL at 05:02

## 2018-06-22 RX ADMIN — INSULIN LISPRO 4 UNITS: 100 INJECTION, SOLUTION INTRAVENOUS; SUBCUTANEOUS at 11:26

## 2018-06-22 RX ADMIN — DILTIAZEM HYDROCHLORIDE 30 MG: 30 TABLET, FILM COATED ORAL at 00:27

## 2018-06-22 RX ADMIN — PREGABALIN 75 MG: 75 CAPSULE ORAL at 09:20

## 2018-06-22 RX ADMIN — GLYBURIDE 5 MG: 5 TABLET ORAL at 09:20

## 2018-06-22 RX ADMIN — DILTIAZEM HYDROCHLORIDE 30 MG: 30 TABLET, FILM COATED ORAL at 11:26

## 2018-06-22 RX ADMIN — LISINOPRIL 20 MG: 20 TABLET ORAL at 09:20

## 2018-06-22 RX ADMIN — OXYCODONE HYDROCHLORIDE AND ACETAMINOPHEN 1000 MG: 500 TABLET ORAL at 09:19

## 2018-06-22 RX ADMIN — CLONAZEPAM 0.5 MG: 0.5 TABLET, ORALLY DISINTEGRATING ORAL at 07:00

## 2018-06-22 RX ADMIN — ASPIRIN 81 MG 81 MG: 81 TABLET ORAL at 09:20

## 2018-06-22 RX ADMIN — DILTIAZEM HYDROCHLORIDE 30 MG: 30 TABLET, FILM COATED ORAL at 05:55

## 2018-06-22 RX ADMIN — HYDROCHLOROTHIAZIDE 12.5 MG: 12.5 TABLET ORAL at 09:20

## 2018-06-22 RX ADMIN — DILTIAZEM HYDROCHLORIDE 120 MG: 120 CAPSULE, COATED, EXTENDED RELEASE ORAL at 16:19

## 2018-06-22 RX ADMIN — SOTALOL HYDROCHLORIDE 120 MG: 80 TABLET ORAL at 16:19

## 2018-06-22 RX ADMIN — CLONAZEPAM 0.5 MG: 0.5 TABLET, ORALLY DISINTEGRATING ORAL at 16:20

## 2018-06-22 RX ADMIN — INSULIN GLARGINE 10 UNITS: 100 INJECTION, SOLUTION SUBCUTANEOUS at 09:21

## 2018-06-22 RX ADMIN — HYDROCODONE BITARTRATE AND ACETAMINOPHEN 1 TABLET: 5; 325 TABLET ORAL at 11:28

## 2018-06-22 ASSESSMENT — PAIN SCALES - GENERAL
PAINLEVEL_OUTOF10: 8
PAINLEVEL_OUTOF10: 7

## 2018-06-22 ASSESSMENT — PAIN DESCRIPTION - DESCRIPTORS: DESCRIPTORS: ACHING;DISCOMFORT

## 2018-06-22 ASSESSMENT — PAIN DESCRIPTION - LOCATION: LOCATION: ARM

## 2018-06-23 LAB
EKG ATRIAL RATE: 140 BPM
EKG P AXIS: 71 DEGREES
EKG P-R INTERVAL: 134 MS
EKG Q-T INTERVAL: 298 MS
EKG QRS DURATION: 74 MS
EKG QTC CALCULATION (BAZETT): 454 MS
EKG R AXIS: -18 DEGREES
EKG T AXIS: 82 DEGREES
EKG VENTRICULAR RATE: 140 BPM

## 2018-07-06 ENCOUNTER — HOSPITAL ENCOUNTER (INPATIENT)
Age: 53
LOS: 2 days | Discharge: HOME | DRG: 310 | End: 2018-07-09
Attending: EMERGENCY MEDICINE | Admitting: INTERNAL MEDICINE
Payer: COMMERCIAL

## 2018-07-06 DIAGNOSIS — I48.91 ATRIAL FIBRILLATION WITH RAPID VENTRICULAR RESPONSE (HCC): Primary | ICD-10-CM

## 2018-07-06 LAB
ANION GAP SERPL CALCULATED.3IONS-SCNC: 17 MMOL/L (ref 7–16)
BASOPHILS ABSOLUTE: 0.09 E9/L (ref 0–0.2)
BASOPHILS RELATIVE PERCENT: 0.5 % (ref 0–2)
BUN BLDV-MCNC: 17 MG/DL (ref 6–20)
CALCIUM SERPL-MCNC: 9.5 MG/DL (ref 8.6–10.2)
CHLORIDE BLD-SCNC: 98 MMOL/L (ref 98–107)
CO2: 22 MMOL/L (ref 22–29)
CREAT SERPL-MCNC: 0.9 MG/DL (ref 0.7–1.2)
EOSINOPHILS ABSOLUTE: 0.12 E9/L (ref 0.05–0.5)
EOSINOPHILS RELATIVE PERCENT: 0.6 % (ref 0–6)
GFR AFRICAN AMERICAN: >60
GFR NON-AFRICAN AMERICAN: >60 ML/MIN/1.73
GLUCOSE BLD-MCNC: 235 MG/DL (ref 74–109)
HCT VFR BLD CALC: 45.9 % (ref 37–54)
HEMOGLOBIN: 15.8 G/DL (ref 12.5–16.5)
IMMATURE GRANULOCYTES #: 0.14 E9/L
IMMATURE GRANULOCYTES %: 0.7 % (ref 0–5)
LYMPHOCYTES ABSOLUTE: 5.64 E9/L (ref 1.5–4)
LYMPHOCYTES RELATIVE PERCENT: 30.1 % (ref 20–42)
MCH RBC QN AUTO: 31 PG (ref 26–35)
MCHC RBC AUTO-ENTMCNC: 34.4 % (ref 32–34.5)
MCV RBC AUTO: 90 FL (ref 80–99.9)
MONOCYTES ABSOLUTE: 1.16 E9/L (ref 0.1–0.95)
MONOCYTES RELATIVE PERCENT: 6.2 % (ref 2–12)
NEUTROPHILS ABSOLUTE: 11.61 E9/L (ref 1.8–7.3)
NEUTROPHILS RELATIVE PERCENT: 61.9 % (ref 43–80)
PDW BLD-RTO: 13 FL (ref 11.5–15)
PLATELET # BLD: 299 E9/L (ref 130–450)
PMV BLD AUTO: 11.3 FL (ref 7–12)
POTASSIUM SERPL-SCNC: 4.9 MMOL/L (ref 3.5–5)
PRO-BNP: 4347 PG/ML (ref 0–125)
RBC # BLD: 5.1 E12/L (ref 3.8–5.8)
SODIUM BLD-SCNC: 137 MMOL/L (ref 132–146)
TROPONIN: <0.01 NG/ML (ref 0–0.03)
WBC # BLD: 18.8 E9/L (ref 4.5–11.5)

## 2018-07-06 PROCEDURE — 99291 CRITICAL CARE FIRST HOUR: CPT

## 2018-07-06 PROCEDURE — 96365 THER/PROPH/DIAG IV INF INIT: CPT

## 2018-07-06 PROCEDURE — 85025 COMPLETE CBC W/AUTO DIFF WBC: CPT

## 2018-07-06 PROCEDURE — 84484 ASSAY OF TROPONIN QUANT: CPT

## 2018-07-06 PROCEDURE — 83880 ASSAY OF NATRIURETIC PEPTIDE: CPT

## 2018-07-06 PROCEDURE — 93005 ELECTROCARDIOGRAM TRACING: CPT | Performed by: EMERGENCY MEDICINE

## 2018-07-06 PROCEDURE — 2580000003 HC RX 258: Performed by: EMERGENCY MEDICINE

## 2018-07-06 PROCEDURE — 2500000003 HC RX 250 WO HCPCS: Performed by: EMERGENCY MEDICINE

## 2018-07-06 PROCEDURE — 80048 BASIC METABOLIC PNL TOTAL CA: CPT

## 2018-07-06 PROCEDURE — 36415 COLL VENOUS BLD VENIPUNCTURE: CPT

## 2018-07-06 RX ORDER — IBUPROFEN 800 MG/1
800 TABLET ORAL ONCE
Status: COMPLETED | OUTPATIENT
Start: 2018-07-07 | End: 2018-07-07

## 2018-07-06 RX ORDER — SODIUM CHLORIDE 9 MG/ML
INJECTION, SOLUTION INTRAVENOUS
Status: DISPENSED
Start: 2018-07-06 | End: 2018-07-07

## 2018-07-06 RX ORDER — 0.9 % SODIUM CHLORIDE 0.9 %
1000 INTRAVENOUS SOLUTION INTRAVENOUS ONCE
Status: COMPLETED | OUTPATIENT
Start: 2018-07-06 | End: 2018-07-06

## 2018-07-06 RX ADMIN — DILTIAZEM HYDROCHLORIDE 25 MG: 5 INJECTION INTRAVENOUS at 22:55

## 2018-07-06 RX ADMIN — SODIUM CHLORIDE 1000 ML: 9 INJECTION, SOLUTION INTRAVENOUS at 23:10

## 2018-07-07 ENCOUNTER — APPOINTMENT (OUTPATIENT)
Dept: GENERAL RADIOLOGY | Age: 53
DRG: 310 | End: 2018-07-07
Payer: COMMERCIAL

## 2018-07-07 LAB
BILIRUBIN URINE: NEGATIVE
BLOOD, URINE: NEGATIVE
CK MB: 2.8 NG/ML (ref 0–7.7)
CK MB: 2.9 NG/ML (ref 0–7.7)
CLARITY: CLEAR
COLOR: YELLOW
EKG ATRIAL RATE: 100 BPM
EKG ATRIAL RATE: 312 BPM
EKG P AXIS: 1 DEGREES
EKG P AXIS: 160 DEGREES
EKG P-R INTERVAL: 134 MS
EKG Q-T INTERVAL: 242 MS
EKG Q-T INTERVAL: 356 MS
EKG QRS DURATION: 72 MS
EKG QRS DURATION: 76 MS
EKG QTC CALCULATION (BAZETT): 390 MS
EKG QTC CALCULATION (BAZETT): 459 MS
EKG R AXIS: 85 DEGREES
EKG R AXIS: 85 DEGREES
EKG T AXIS: -2 DEGREES
EKG T AXIS: -54 DEGREES
EKG VENTRICULAR RATE: 100 BPM
EKG VENTRICULAR RATE: 156 BPM
GLUCOSE URINE: NEGATIVE MG/DL
KETONES, URINE: NEGATIVE MG/DL
LEUKOCYTE ESTERASE, URINE: NEGATIVE
METER GLUCOSE: 135 MG/DL (ref 70–110)
METER GLUCOSE: 141 MG/DL (ref 70–110)
METER GLUCOSE: 74 MG/DL (ref 70–110)
NITRITE, URINE: NEGATIVE
PH UA: 6 (ref 5–9)
PROTEIN UA: NEGATIVE MG/DL
SPECIFIC GRAVITY UA: 1.01 (ref 1–1.03)
TROPONIN: <0.01 NG/ML (ref 0–0.03)
UROBILINOGEN, URINE: 0.2 E.U./DL

## 2018-07-07 PROCEDURE — 87088 URINE BACTERIA CULTURE: CPT

## 2018-07-07 PROCEDURE — 2060000000 HC ICU INTERMEDIATE R&B

## 2018-07-07 PROCEDURE — 6370000000 HC RX 637 (ALT 250 FOR IP): Performed by: EMERGENCY MEDICINE

## 2018-07-07 PROCEDURE — 1200000000 HC SEMI PRIVATE

## 2018-07-07 PROCEDURE — 82553 CREATINE MB FRACTION: CPT

## 2018-07-07 PROCEDURE — 82962 GLUCOSE BLOOD TEST: CPT

## 2018-07-07 PROCEDURE — 6370000000 HC RX 637 (ALT 250 FOR IP): Performed by: FAMILY MEDICINE

## 2018-07-07 PROCEDURE — 81003 URINALYSIS AUTO W/O SCOPE: CPT

## 2018-07-07 PROCEDURE — 6370000000 HC RX 637 (ALT 250 FOR IP): Performed by: INTERNAL MEDICINE

## 2018-07-07 PROCEDURE — 36415 COLL VENOUS BLD VENIPUNCTURE: CPT

## 2018-07-07 PROCEDURE — 2580000003 HC RX 258: Performed by: EMERGENCY MEDICINE

## 2018-07-07 PROCEDURE — 71045 X-RAY EXAM CHEST 1 VIEW: CPT

## 2018-07-07 PROCEDURE — 2500000003 HC RX 250 WO HCPCS: Performed by: EMERGENCY MEDICINE

## 2018-07-07 PROCEDURE — 6360000002 HC RX W HCPCS: Performed by: FAMILY MEDICINE

## 2018-07-07 PROCEDURE — 93005 ELECTROCARDIOGRAM TRACING: CPT | Performed by: EMERGENCY MEDICINE

## 2018-07-07 PROCEDURE — 96366 THER/PROPH/DIAG IV INF ADDON: CPT

## 2018-07-07 PROCEDURE — 84484 ASSAY OF TROPONIN QUANT: CPT

## 2018-07-07 RX ORDER — LORAZEPAM 2 MG/ML
1 INJECTION INTRAMUSCULAR EVERY 6 HOURS PRN
Status: DISCONTINUED | OUTPATIENT
Start: 2018-07-07 | End: 2018-07-09 | Stop reason: HOSPADM

## 2018-07-07 RX ORDER — SOTALOL HYDROCHLORIDE 80 MG/1
120 TABLET ORAL 2 TIMES DAILY
Status: DISCONTINUED | OUTPATIENT
Start: 2018-07-07 | End: 2018-07-09 | Stop reason: HOSPADM

## 2018-07-07 RX ORDER — HYDROCHLOROTHIAZIDE 12.5 MG/1
12.5 TABLET ORAL DAILY
Status: DISCONTINUED | OUTPATIENT
Start: 2018-07-07 | End: 2018-07-09 | Stop reason: HOSPADM

## 2018-07-07 RX ORDER — ASPIRIN 81 MG/1
324 TABLET, CHEWABLE ORAL ONCE
Status: COMPLETED | OUTPATIENT
Start: 2018-07-07 | End: 2018-07-07

## 2018-07-07 RX ORDER — INSULIN GLARGINE 100 [IU]/ML
10 INJECTION, SOLUTION SUBCUTANEOUS 2 TIMES DAILY
Status: DISCONTINUED | OUTPATIENT
Start: 2018-07-07 | End: 2018-07-09 | Stop reason: HOSPADM

## 2018-07-07 RX ORDER — DEXTROSE MONOHYDRATE 50 MG/ML
100 INJECTION, SOLUTION INTRAVENOUS PRN
Status: DISCONTINUED | OUTPATIENT
Start: 2018-07-07 | End: 2018-07-07

## 2018-07-07 RX ORDER — ASPIRIN 81 MG/1
81 TABLET, CHEWABLE ORAL DAILY
Status: DISCONTINUED | OUTPATIENT
Start: 2018-07-07 | End: 2018-07-09 | Stop reason: HOSPADM

## 2018-07-07 RX ORDER — ALBUTEROL SULFATE 90 UG/1
2 AEROSOL, METERED RESPIRATORY (INHALATION) EVERY 6 HOURS PRN
Status: DISCONTINUED | OUTPATIENT
Start: 2018-07-07 | End: 2018-07-09 | Stop reason: HOSPADM

## 2018-07-07 RX ORDER — PREGABALIN 100 MG/1
100 CAPSULE ORAL 2 TIMES DAILY
Status: DISCONTINUED | OUTPATIENT
Start: 2018-07-07 | End: 2018-07-09 | Stop reason: HOSPADM

## 2018-07-07 RX ORDER — NICOTINE POLACRILEX 4 MG
15 LOZENGE BUCCAL PRN
Status: DISCONTINUED | OUTPATIENT
Start: 2018-07-07 | End: 2018-07-07

## 2018-07-07 RX ORDER — DEXTROSE MONOHYDRATE 25 G/50ML
12.5 INJECTION, SOLUTION INTRAVENOUS PRN
Status: DISCONTINUED | OUTPATIENT
Start: 2018-07-07 | End: 2018-07-07

## 2018-07-07 RX ORDER — LORAZEPAM 2 MG/ML
1 INJECTION INTRAMUSCULAR ONCE
Status: DISCONTINUED | OUTPATIENT
Start: 2018-07-07 | End: 2018-07-07

## 2018-07-07 RX ORDER — NICOTINE POLACRILEX 4 MG
15 LOZENGE BUCCAL PRN
Status: DISCONTINUED | OUTPATIENT
Start: 2018-07-07 | End: 2018-07-09 | Stop reason: HOSPADM

## 2018-07-07 RX ORDER — PANTOPRAZOLE SODIUM 40 MG/1
40 TABLET, DELAYED RELEASE ORAL
Status: DISCONTINUED | OUTPATIENT
Start: 2018-07-07 | End: 2018-07-09 | Stop reason: HOSPADM

## 2018-07-07 RX ORDER — DEXTROSE MONOHYDRATE 50 MG/ML
100 INJECTION, SOLUTION INTRAVENOUS PRN
Status: DISCONTINUED | OUTPATIENT
Start: 2018-07-07 | End: 2018-07-09 | Stop reason: HOSPADM

## 2018-07-07 RX ORDER — LORAZEPAM 2 MG/ML
1 INJECTION INTRAMUSCULAR ONCE
Status: COMPLETED | OUTPATIENT
Start: 2018-07-07 | End: 2018-07-07

## 2018-07-07 RX ORDER — DEXTROSE MONOHYDRATE 25 G/50ML
12.5 INJECTION, SOLUTION INTRAVENOUS PRN
Status: DISCONTINUED | OUTPATIENT
Start: 2018-07-07 | End: 2018-07-09 | Stop reason: HOSPADM

## 2018-07-07 RX ORDER — LISINOPRIL 20 MG/1
20 TABLET ORAL DAILY
Status: DISCONTINUED | OUTPATIENT
Start: 2018-07-07 | End: 2018-07-09 | Stop reason: HOSPADM

## 2018-07-07 RX ORDER — SOTALOL HYDROCHLORIDE 120 MG/1
120 TABLET ORAL EVERY 12 HOURS
Status: CANCELLED | OUTPATIENT
Start: 2018-07-07

## 2018-07-07 RX ORDER — ACETAMINOPHEN 325 MG/1
650 TABLET ORAL EVERY 4 HOURS PRN
Status: DISCONTINUED | OUTPATIENT
Start: 2018-07-07 | End: 2018-07-09 | Stop reason: HOSPADM

## 2018-07-07 RX ORDER — GLYBURIDE 5 MG/1
5 TABLET ORAL
Status: DISCONTINUED | OUTPATIENT
Start: 2018-07-07 | End: 2018-07-09 | Stop reason: HOSPADM

## 2018-07-07 RX ORDER — LISINOPRIL AND HYDROCHLOROTHIAZIDE 20; 12.5 MG/1; MG/1
1 TABLET ORAL DAILY
Status: DISCONTINUED | OUTPATIENT
Start: 2018-07-07 | End: 2018-07-07 | Stop reason: CLARIF

## 2018-07-07 RX ADMIN — LORAZEPAM 1 MG: 2 INJECTION INTRAMUSCULAR at 16:57

## 2018-07-07 RX ADMIN — LISINOPRIL 20 MG: 20 TABLET ORAL at 09:22

## 2018-07-07 RX ADMIN — ASPIRIN 81 MG 81 MG: 81 TABLET ORAL at 09:22

## 2018-07-07 RX ADMIN — SOTALOL HYDROCHLORIDE 120 MG: 80 TABLET ORAL at 13:29

## 2018-07-07 RX ADMIN — LORAZEPAM 1 MG: 2 INJECTION INTRAMUSCULAR; INTRAVENOUS at 02:33

## 2018-07-07 RX ADMIN — DILTIAZEM HYDROCHLORIDE 5 MG/HR: 5 INJECTION INTRAVENOUS at 00:10

## 2018-07-07 RX ADMIN — ASPIRIN 81 MG 324 MG: 81 TABLET ORAL at 01:43

## 2018-07-07 RX ADMIN — SOTALOL HYDROCHLORIDE 120 MG: 80 TABLET ORAL at 21:10

## 2018-07-07 RX ADMIN — IBUPROFEN 800 MG: 800 TABLET ORAL at 00:08

## 2018-07-07 RX ADMIN — LORAZEPAM 1 MG: 2 INJECTION INTRAMUSCULAR at 10:26

## 2018-07-07 RX ADMIN — INSULIN LISPRO 1 UNITS: 100 INJECTION, SOLUTION INTRAVENOUS; SUBCUTANEOUS at 21:17

## 2018-07-07 RX ADMIN — HYDROCHLOROTHIAZIDE 12.5 MG: 12.5 TABLET ORAL at 09:22

## 2018-07-07 RX ADMIN — GLYBURIDE 5 MG: 5 TABLET ORAL at 10:26

## 2018-07-07 RX ADMIN — LORAZEPAM 1 MG: 2 INJECTION INTRAMUSCULAR at 04:26

## 2018-07-07 RX ADMIN — METFORMIN HYDROCHLORIDE 1000 MG: 1000 TABLET, FILM COATED ORAL at 09:22

## 2018-07-07 RX ADMIN — ALBUTEROL SULFATE 2 PUFF: 90 AEROSOL, METERED RESPIRATORY (INHALATION) at 10:26

## 2018-07-07 RX ADMIN — LORAZEPAM 1 MG: 2 INJECTION INTRAMUSCULAR at 23:08

## 2018-07-07 RX ADMIN — PANTOPRAZOLE SODIUM 40 MG: 40 TABLET, DELAYED RELEASE ORAL at 06:32

## 2018-07-07 RX ADMIN — RIVAROXABAN 20 MG: 20 TABLET, FILM COATED ORAL at 16:56

## 2018-07-07 RX ADMIN — INSULIN GLARGINE 10 UNITS: 100 INJECTION, SOLUTION SUBCUTANEOUS at 09:35

## 2018-07-07 RX ADMIN — INSULIN GLARGINE 10 UNITS: 100 INJECTION, SOLUTION SUBCUTANEOUS at 21:11

## 2018-07-07 ASSESSMENT — PAIN DESCRIPTION - LOCATION: LOCATION: FOOT

## 2018-07-07 ASSESSMENT — ENCOUNTER SYMPTOMS
NAUSEA: 0
RHINORRHEA: 0
ABDOMINAL PAIN: 0
COUGH: 0
SHORTNESS OF BREATH: 1
WHEEZING: 0
SORE THROAT: 0
SINUS PRESSURE: 0
DIARRHEA: 0
VOMITING: 0
BACK PAIN: 0

## 2018-07-07 ASSESSMENT — PAIN DESCRIPTION - ONSET: ONSET: ON-GOING

## 2018-07-07 ASSESSMENT — PAIN SCALES - GENERAL
PAINLEVEL_OUTOF10: 0
PAINLEVEL_OUTOF10: 8
PAINLEVEL_OUTOF10: 0
PAINLEVEL_OUTOF10: 8

## 2018-07-07 ASSESSMENT — PAIN DESCRIPTION - FREQUENCY: FREQUENCY: CONTINUOUS

## 2018-07-07 ASSESSMENT — PAIN DESCRIPTION - PAIN TYPE: TYPE: CHRONIC PAIN;NEUROPATHIC PAIN

## 2018-07-07 ASSESSMENT — PAIN DESCRIPTION - DESCRIPTORS: DESCRIPTORS: ACHING;TINGLING

## 2018-07-07 ASSESSMENT — PAIN DESCRIPTION - ORIENTATION: ORIENTATION: RIGHT;LEFT

## 2018-07-07 NOTE — PROGRESS NOTES
Patient continues to ask for pain medication for his neuropathy, patient was informed Dr. Matt Frye will not be ordering pain medication, I suggested patient take the Lyrica for his neuropathy, he refused. Patient is upset, cursing at staff.      Karli Bourgeois RN, BSN

## 2018-07-07 NOTE — ED NOTES
Bed: 09  Expected date:   Expected time:   Means of arrival:   Comments:  [address], [address] [facility]  07/06/18 3413

## 2018-07-07 NOTE — PROGRESS NOTES
Dr. Mc Arroyo on unit, spoke with him regarding patient's request for pain medication for his neuropathy as well as permission to leave the floor to visit his daughter on another unit. Dr. Mc Arroyo stated he will not order pain medications, and with patient being here for A. Fib and on cardizem he is unable to leave the floor.      Tavo Davis RN, BSN

## 2018-07-07 NOTE — PROGRESS NOTES
Patient yelling for nurse. Upon entering patient's room he became belligerent, cursing stating \"My f-- pain medication was due and you never brought it in\"   I stated he has PRN tylenol that he did not request and Lyrica that he had refused. Pt stated \"No you idiot my ativan! \" Education was provided that Ativan is not for pain it is for anxiety and is PRN, patient must ask for this medication as well as implement need for it. Patient became increasingly agitated. Ativan given.      Karli Bourgeois RN, BSN

## 2018-07-07 NOTE — ED NOTES
The patient was complaining of chest tightness on the right side. Dr. Garcia López notified.      Army Mayda RN  07/07/18 0316

## 2018-07-07 NOTE — PROGRESS NOTES
organomegaly or masses. No pain on palpation    Extremities:  Peripheral pulses present. No peripheral edema. No ulcers. Neurologic:  Alert x 3. No focal deficit. Cranial nerves grossly intact. Skin:  No petechia. No hemorrhage. No wounds. CBC with Differential:    Lab Results   Component Value Date    WBC 18.8 07/06/2018    RBC 5.10 07/06/2018    HGB 15.8 07/06/2018    HCT 45.9 07/06/2018     07/06/2018    MCV 90.0 07/06/2018    MCH 31.0 07/06/2018    MCHC 34.4 07/06/2018    RDW 13.0 07/06/2018    METASPCT 1 07/15/2016    LYMPHOPCT 30.1 07/06/2018    MONOPCT 6.2 07/06/2018    BASOPCT 0.5 07/06/2018    MONOSABS 1.16 07/06/2018    LYMPHSABS 5.64 07/06/2018    EOSABS 0.12 07/06/2018    BASOSABS 0.09 07/06/2018     BMP:    Lab Results   Component Value Date     07/06/2018    K 4.9 07/06/2018    K 4.5 06/21/2018    CL 98 07/06/2018    CO2 22 07/06/2018    BUN 17 07/06/2018    LABALBU 3.6 03/19/2018    CREATININE 0.9 07/06/2018    CALCIUM 9.5 07/06/2018    GFRAA >60 07/06/2018    LABGLOM >60 07/06/2018    GLUCOSE 235 07/06/2018     Last 3 Troponin:    Lab Results   Component Value Date    TROPONINI <0.01 07/07/2018    TROPONINI <0.01 07/07/2018    TROPONINI <0.01 07/06/2018       Other Data:    No intake or output data in the 24 hours ending 07/07/18 1301      Scheduled Medications:   aspirin  81 mg Oral Daily    glyBURIDE  5 mg Oral Daily with breakfast    insulin glargine  10 Units Subcutaneous BID    metFORMIN  1,000 mg Oral BID WC    pantoprazole  40 mg Oral QAM AC    pregabalin  100 mg Oral BID    lisinopril  20 mg Oral Daily    And    hydrochlorothiazide  12.5 mg Oral Daily    rivaroxaban  20 mg Oral Daily    sotalol  120 mg Oral BID    insulin lispro  0-6 Units Subcutaneous TID WC    insulin lispro  0-3 Units Subcutaneous Nightly       Assessment:   1. Refractory atrial fibrillation with rapid ventricular response  2.  Essential

## 2018-07-07 NOTE — ED NOTES
The patient is complaining of shortness of breath and anxiety, Dr. Carlos Jim notified.       Aramis Rdz RN  07/07/18 1941

## 2018-07-07 NOTE — ED PROVIDER NOTES
Patient is a 80-year-old male presenting to the emergency department for irregular heart rate. He has a history of atrial fibrillation and atrial flutter. He was recently admitted and had to have electrocardioversion due to refractory atrial flutter. He is on Eliquis and takes metoprolol 25 mg twice a day. He states that he began to feel some fatigue and palpitations yesterday. The sensation became significantly worse today and he suspected that he was back in atrial fibrillation or atrial flutter. He denies any chest pain. He does admit to some mild shortness of breath. The history is provided by the patient. Review of Systems   Constitutional: Positive for fatigue. Negative for chills, diaphoresis and fever. HENT: Negative for congestion, rhinorrhea, sinus pressure and sore throat. Respiratory: Positive for shortness of breath. Negative for cough and wheezing. Cardiovascular: Positive for palpitations. Negative for chest pain and leg swelling. Gastrointestinal: Negative for abdominal pain, diarrhea, nausea and vomiting. Genitourinary: Negative for dysuria and hematuria. Musculoskeletal: Negative for back pain and neck pain. Skin: Negative for rash and wound. Neurological: Negative for dizziness, syncope, weakness, light-headedness and headaches. Physical Exam   Constitutional: He is oriented to person, place, and time. He appears well-developed and well-nourished. No distress. HENT:   Head: Normocephalic and atraumatic. Mouth/Throat: Oropharynx is clear and moist. No oropharyngeal exudate. Eyes: Conjunctivae and EOM are normal. Pupils are equal, round, and reactive to light. Neck: Normal range of motion. Neck supple. No JVD present. No tracheal deviation present. Cardiovascular: Normal heart sounds. An irregularly irregular rhythm present. Tachycardia present. Exam reveals no gallop and no friction rub. No murmur heard.   Pulmonary/Chest: Effort normal and breath sounds normal. No respiratory distress. He has no wheezes. He has no rales. Abdominal: Soft. He exhibits no distension. There is no tenderness. Musculoskeletal: He exhibits no edema, tenderness or deformity. Neurological: He is alert and oriented to person, place, and time. Skin: Skin is warm and dry. No rash noted. He is not diaphoretic. No erythema. No pallor. Nursing note and vitals reviewed. Procedures    MDM  Number of Diagnoses or Management Options  Diagnosis management comments: Patient presents with an irregular, rapid heart rate which appears to be atrial fibrillation versus atrial flutter. Heart rate is 150-170. He does admit to some shortness of breath and fatigue as well as a sensation of palpitations. He has required electrical cardioversion for his abnormal heart rhythm in the past. Patient is not hypotensive and will be given Cardizem in an attempt to break his rhythm or at least rate control. He will also have baseline blood work drawn. Patient will likely need admission. EKG Interpretation    Interpreted by emergency department physician    Rhythm: atrial flutter  Rate: 150-160  Axis: normal  Ectopy: none  Conduction: normal  ST Segments: nonspecific changes  T Waves: non specific changes  Q Waves: none    Clinical Impression: Atrial flutter with variable block versus atrial fibrillation with rapid ventricular response, ventricular rate of 156, normal axis, normal conduction, nonspecific ST segment and T-wave changes likely related to D ventricular rate, no specific ST segment elevations, no Q waves. No evidence of acute infarction. Brianne Kilpatrick    ED Course as of Jul 07 0143   Sat Jul 07, 2018   0030 Patient's heart rate has improved significantly after Cardizem bolus. His blood pressure initially dropped to as low as 90 systolic. He was given a liter of normal saline bolus and his blood pressure has improved. He still has a labile heart rate, between 90 and 120.  He'll be Granulocytes % 0.7 0.0 - 5.0 %    Lymphocytes % 30.1 20.0 - 42.0 %    Monocytes % 6.2 2.0 - 12.0 %    Eosinophils % 0.6 0.0 - 6.0 %    Basophils % 0.5 0.0 - 2.0 %    Neutrophils # 11.61 (H) 1.80 - 7.30 E9/L    Immature Granulocytes # 0.14 E9/L    Lymphocytes # 5.64 (H) 1.50 - 4.00 E9/L    Monocytes # 1.16 (H) 0.10 - 0.95 E9/L    Eosinophils # 0.12 0.05 - 0.50 E9/L    Basophils # 0.09 0.00 - 0.20 S8/C   Basic Metabolic Panel   Result Value Ref Range    Sodium 137 132 - 146 mmol/L    Potassium 4.9 3.5 - 5.0 mmol/L    Chloride 98 98 - 107 mmol/L    CO2 22 22 - 29 mmol/L    Anion Gap 17 (H) 7 - 16 mmol/L    Glucose 235 (H) 74 - 109 mg/dL    BUN 17 6 - 20 mg/dL    CREATININE 0.9 0.7 - 1.2 mg/dL    GFR Non-African American >60 >=60 mL/min/1.73    GFR African American >60     Calcium 9.5 8.6 - 10.2 mg/dL   Troponin   Result Value Ref Range    Troponin <0.01 0.00 - 0.03 ng/mL   Brain Natriuretic Peptide   Result Value Ref Range    Pro-BNP 4,347 (H) 0 - 125 pg/mL   EKG 12 Lead   Result Value Ref Range    Ventricular Rate 100 BPM    Atrial Rate 100 BPM    P-R Interval 134 ms    QRS Duration 76 ms    Q-T Interval 356 ms    QTc Calculation (Bazett) 459 ms    P Axis 1 degrees    R Axis 85 degrees    T Axis -54 degrees       RADIOLOGY:  XR CHEST PORTABLE    (Results Pending)     ------------------------- NURSING NOTES AND VITALS REVIEWED ---------------------------  Date / Time Roomed:  7/6/2018 10:27 PM  ED Bed Assignment:  09/09    The nursing notes within the ED encounter and vital signs as below have been reviewed.      Patient Vitals for the past 24 hrs:   BP Temp Temp src Pulse Resp SpO2 Height Weight   07/07/18 0115 124/70 - - 115 22 95 % - -   07/07/18 0102 113/77 - - 116 27 - - -   07/07/18 0045 (!) 99/55 - - 96 24 - - -   07/07/18 0030 104/61 - - 102 24 97 % - -   07/07/18 0015 116/65 - - 121 22 - - -   07/07/18 0012 115/65 98 °F (36.7 °C) Oral 108 22 95 % - -   07/06/18 2345 115/75 - - 96 20 97 % - -   07/06/18 2315

## 2018-07-07 NOTE — H&P
lexiscan stress test    Diabetes mellitus ([facility] [address]     Hyperlipidemia     Hypertension     Psychiatric problem     TIA (transient ischemic attack)     2015       Past Surgical History:   Past Surgical History:   Procedure Laterality Date    COSMETIC SURGERY      ECHOCARDIOGRAM COMPLETE WITH BUBBLE STUDY  7/7/2015         FRACTURE SURGERY      OTHER SURGICAL HISTORY  02/15/2018    incision and drainage bone biopsy of right great toe       Social History:  Social History     Social History    Marital status:      Spouse name: N/A    Number of children: N/A    Years of education: N/A     Social History Main Topics    Smoking status: Heavy Tobacco Smoker     Packs/day: 0.50     Years: 35.00     Types: Cigarettes    Smokeless tobacco: Never Used    Alcohol use No      Comment: moderate    Drug use: No    Sexual activity: Not Asked     Other Topics Concern    None     Social History Narrative    None       Family History:   Family History   Problem Relation Age of Onset    Cancer Mother     Heart Disease Father        Allergies: Allergies   Allergen Reactions    Other      pollen       Medications Prior to Admission:   Prior to Admission medications    Medication Sig Start Date End Date Taking?  Authorizing Provider   sotalol (BETAPACE) 120 MG tablet Take 1 tablet by mouth every 12 hours 6/23/18   Shea Fry MD   diltiazem (CARDIZEM CD) 120 MG extended release capsule Take 1 capsule by mouth daily 6/23/18   Shea Fry MD   metFORMIN (GLUCOPHAGE) 500 MG tablet Take 1,000 mg by mouth 2 times daily (with meals)     Historical Provider, MD   Rivaroxaban (XARELTO PO) Take by mouth    Historical Provider, MD   pregabalin (LYRICA) 100 MG capsule Take 100 mg by mouth 2 times daily    Historical Provider, MD   albuterol sulfate  (90 BASE) MCG/ACT inhaler Inhale 2 puffs into the lungs every 6 hours as needed for Wheezing or Shortness of Breath    Historical Provider, MD   insulin glargine (LANTUS) 100 UNIT/ML injection vial Inject 10 Units into the skin 2 times daily     Historical Provider, MD   pantoprazole (PROTONIX) 40 MG tablet Take 1 tablet by mouth every morning (before breakfast) 7/17/16   Colton Trotter MD   lisinopril-hydrochlorothiazide (PRINZIDE;ZESTORETIC) 20-12.5 MG per tablet Take 1 tablet by mouth daily    Historical Provider, MD   vitamin B-12 (CYANOCOBALAMIN) 1000 MCG tablet Take 1,000 mcg by mouth daily    Historical Provider, MD   Ascorbic Acid (VITAMIN C) 250 MG tablet Take 1,000 mg by mouth daily    Historical Provider, MD   glyBURIDE (DIABETA) 5 MG tablet Take 1 tablet by mouth daily (with breakfast) 7/8/15   Nadja Rabago MD   glucose monitoring kit (FREESTYLE) monitoring kit 1 kit by Does not apply route daily as needed 7/8/15   Nadja Rabago MD   Multiple Vitamins-Minerals (THERAPEUTIC MULTIVITAMIN-MINERALS) tablet Take 1 tablet by mouth daily    Historical Provider, MD   aspirin 81 MG tablet Take 81 mg by mouth daily    Historical Provider, MD       REVIEW OF SYSTEMS:  General ROS: positive for  - chills and fatigue  Hematological and Lymphatic ROS: negative  Respiratory ROS: positive for - shortness of breath  Cardiovascular ROS: positive for - chest pain, dyspnea on exertion, irregular heartbeat, palpitations, rapid heart rate and shortness of breath  Gastrointestinal ROS: negative  Genito-Urinary ROS: negative  Musculoskeletal ROS: negative  ENT ROS: negative  Endocrine ROS: negative  Dermatological ROS: negative    PHYSICAL EXAM:  Vitals:    07/07/18 0144   BP: 120/70   Pulse: 98   Resp: 28   Temp:    SpO2: 96%       General Appearance:  awake, alert, oriented, in no acute distress and obese  Skin:  Skin color, texture, turgor normal. No rashes or lesions. Head/face:  NCAT  Eyes:  No gross abnormalities.  and PERRL  Lungs:  RA, non labored, equal chest rise  Heart:  irreg irreg, no murmur, no JVD, peripheral pulses intact, no edema  Abdomen: discussed the patient's management with the resident. I reviewed the resident's note and agree with the documented findings and plan of care.     David Zaragoza D.O., [facility]  3:00 AM  7/7/2018

## 2018-07-08 LAB
ALBUMIN SERPL-MCNC: 3.9 G/DL (ref 3.5–5.2)
ALP BLD-CCNC: 52 U/L (ref 40–129)
ALT SERPL-CCNC: 20 U/L (ref 0–40)
ANION GAP SERPL CALCULATED.3IONS-SCNC: 12 MMOL/L (ref 7–16)
AST SERPL-CCNC: 27 U/L (ref 0–39)
BASOPHILS ABSOLUTE: 0.05 E9/L (ref 0–0.2)
BASOPHILS RELATIVE PERCENT: 0.4 % (ref 0–2)
BILIRUB SERPL-MCNC: 0.5 MG/DL (ref 0–1.2)
BUN BLDV-MCNC: 14 MG/DL (ref 6–20)
CALCIUM SERPL-MCNC: 9.8 MG/DL (ref 8.6–10.2)
CHLORIDE BLD-SCNC: 100 MMOL/L (ref 98–107)
CHOLESTEROL, TOTAL: 216 MG/DL (ref 0–199)
CO2: 26 MMOL/L (ref 22–29)
CREAT SERPL-MCNC: 0.8 MG/DL (ref 0.7–1.2)
EOSINOPHILS ABSOLUTE: 0.11 E9/L (ref 0.05–0.5)
EOSINOPHILS RELATIVE PERCENT: 0.8 % (ref 0–6)
GFR AFRICAN AMERICAN: >60
GFR NON-AFRICAN AMERICAN: >60 ML/MIN/1.73
GLUCOSE BLD-MCNC: 111 MG/DL (ref 74–109)
HBA1C MFR BLD: 9.3 % (ref 4–5.6)
HCT VFR BLD CALC: 44.9 % (ref 37–54)
HDLC SERPL-MCNC: 39 MG/DL
HEMOGLOBIN: 15.2 G/DL (ref 12.5–16.5)
IMMATURE GRANULOCYTES #: 0.09 E9/L
IMMATURE GRANULOCYTES %: 0.7 % (ref 0–5)
LDL CHOLESTEROL CALCULATED: 122 MG/DL (ref 0–99)
LYMPHOCYTES ABSOLUTE: 3.9 E9/L (ref 1.5–4)
LYMPHOCYTES RELATIVE PERCENT: 28.2 % (ref 20–42)
MAGNESIUM: 1.4 MG/DL (ref 1.6–2.6)
MCH RBC QN AUTO: 30.6 PG (ref 26–35)
MCHC RBC AUTO-ENTMCNC: 33.9 % (ref 32–34.5)
MCV RBC AUTO: 90.3 FL (ref 80–99.9)
METER GLUCOSE: 105 MG/DL (ref 70–110)
METER GLUCOSE: 183 MG/DL (ref 70–110)
METER GLUCOSE: 196 MG/DL (ref 70–110)
METER GLUCOSE: 218 MG/DL (ref 70–110)
MONOCYTES ABSOLUTE: 0.95 E9/L (ref 0.1–0.95)
MONOCYTES RELATIVE PERCENT: 6.9 % (ref 2–12)
NEUTROPHILS ABSOLUTE: 8.74 E9/L (ref 1.8–7.3)
NEUTROPHILS RELATIVE PERCENT: 63 % (ref 43–80)
PDW BLD-RTO: 13.1 FL (ref 11.5–15)
PLATELET # BLD: 229 E9/L (ref 130–450)
PMV BLD AUTO: 11 FL (ref 7–12)
POTASSIUM SERPL-SCNC: 4.4 MMOL/L (ref 3.5–5)
RBC # BLD: 4.97 E12/L (ref 3.8–5.8)
SODIUM BLD-SCNC: 138 MMOL/L (ref 132–146)
T4 FREE: 0.83 NG/DL (ref 0.93–1.7)
TOTAL PROTEIN: 7.3 G/DL (ref 6.4–8.3)
TRIGL SERPL-MCNC: 276 MG/DL (ref 0–149)
TSH SERPL DL<=0.05 MIU/L-ACNC: 4.2 UIU/ML (ref 0.27–4.2)
VLDLC SERPL CALC-MCNC: 55 MG/DL
WBC # BLD: 13.8 E9/L (ref 4.5–11.5)

## 2018-07-08 PROCEDURE — 83735 ASSAY OF MAGNESIUM: CPT

## 2018-07-08 PROCEDURE — 6370000000 HC RX 637 (ALT 250 FOR IP): Performed by: INTERNAL MEDICINE

## 2018-07-08 PROCEDURE — 6370000000 HC RX 637 (ALT 250 FOR IP): Performed by: FAMILY MEDICINE

## 2018-07-08 PROCEDURE — 84443 ASSAY THYROID STIM HORMONE: CPT

## 2018-07-08 PROCEDURE — 2060000000 HC ICU INTERMEDIATE R&B

## 2018-07-08 PROCEDURE — 36415 COLL VENOUS BLD VENIPUNCTURE: CPT

## 2018-07-08 PROCEDURE — 1200000000 HC SEMI PRIVATE

## 2018-07-08 PROCEDURE — 80053 COMPREHEN METABOLIC PANEL: CPT

## 2018-07-08 PROCEDURE — 85025 COMPLETE CBC W/AUTO DIFF WBC: CPT

## 2018-07-08 PROCEDURE — 6360000002 HC RX W HCPCS: Performed by: FAMILY MEDICINE

## 2018-07-08 PROCEDURE — 82962 GLUCOSE BLOOD TEST: CPT

## 2018-07-08 PROCEDURE — 83036 HEMOGLOBIN GLYCOSYLATED A1C: CPT

## 2018-07-08 PROCEDURE — 80061 LIPID PANEL: CPT

## 2018-07-08 PROCEDURE — 93005 ELECTROCARDIOGRAM TRACING: CPT | Performed by: FAMILY MEDICINE

## 2018-07-08 PROCEDURE — 84439 ASSAY OF FREE THYROXINE: CPT

## 2018-07-08 RX ORDER — LORAZEPAM 1 MG/1
1 TABLET ORAL EVERY 6 HOURS PRN
Status: DISCONTINUED | OUTPATIENT
Start: 2018-07-08 | End: 2018-07-09 | Stop reason: HOSPADM

## 2018-07-08 RX ADMIN — ASPIRIN 81 MG 81 MG: 81 TABLET ORAL at 09:27

## 2018-07-08 RX ADMIN — HYDROCHLOROTHIAZIDE 12.5 MG: 12.5 TABLET ORAL at 09:27

## 2018-07-08 RX ADMIN — METFORMIN HYDROCHLORIDE 1000 MG: 1000 TABLET, FILM COATED ORAL at 17:20

## 2018-07-08 RX ADMIN — INSULIN LISPRO 2 UNITS: 100 INJECTION, SOLUTION INTRAVENOUS; SUBCUTANEOUS at 11:24

## 2018-07-08 RX ADMIN — INSULIN GLARGINE 10 UNITS: 100 INJECTION, SOLUTION SUBCUTANEOUS at 09:27

## 2018-07-08 RX ADMIN — SOTALOL HYDROCHLORIDE 120 MG: 80 TABLET ORAL at 09:27

## 2018-07-08 RX ADMIN — LORAZEPAM 1 MG: 2 INJECTION INTRAMUSCULAR at 05:41

## 2018-07-08 RX ADMIN — PANTOPRAZOLE SODIUM 40 MG: 40 TABLET, DELAYED RELEASE ORAL at 05:41

## 2018-07-08 RX ADMIN — RIVAROXABAN 20 MG: 20 TABLET, FILM COATED ORAL at 17:20

## 2018-07-08 RX ADMIN — LORAZEPAM 1 MG: 1 TABLET ORAL at 16:24

## 2018-07-08 RX ADMIN — LISINOPRIL 20 MG: 20 TABLET ORAL at 09:27

## 2018-07-08 RX ADMIN — INSULIN LISPRO 1 UNITS: 100 INJECTION, SOLUTION INTRAVENOUS; SUBCUTANEOUS at 17:20

## 2018-07-08 RX ADMIN — METFORMIN HYDROCHLORIDE 1000 MG: 1000 TABLET, FILM COATED ORAL at 09:27

## 2018-07-08 RX ADMIN — LORAZEPAM 1 MG: 1 TABLET ORAL at 22:30

## 2018-07-08 RX ADMIN — INSULIN GLARGINE 10 UNITS: 100 INJECTION, SOLUTION SUBCUTANEOUS at 20:30

## 2018-07-08 RX ADMIN — SOTALOL HYDROCHLORIDE 120 MG: 80 TABLET ORAL at 20:27

## 2018-07-08 RX ADMIN — INSULIN LISPRO 1 UNITS: 100 INJECTION, SOLUTION INTRAVENOUS; SUBCUTANEOUS at 20:30

## 2018-07-08 ASSESSMENT — PAIN SCALES - GENERAL: PAINLEVEL_OUTOF10: 0

## 2018-07-08 NOTE — PROGRESS NOTES
Pt requested to see . RN arrived to room, pt was visibly upset and saying, \"I'm not some piece of shit you can ignore. Who are you? \" RN introduced self as , asked how she could help. Pt said, \"It's bullshit I'm not allowed off the floor. I don't care what Sedrick said, he's not my doctor. And I can make phonecalls; I don't have to talk to small people like you. \" RN explained that Dr Lyric Booth was covering Dr Juliana Fong, and that the staff must follow Dr Leanne Hodges orders while he was assigned as the pt's attending physician. Pt replied, \"Yeah, yeah. And what about the heart doctor? I went through this rodeo last time where he didn't come in for 3 days. When is he coming? \" RN explained that Dr Farooq Jones is covering Dr Chema Carrasco, and he often comes in late at night. He does not leave a schedule for when he will be rounding. RN apologized and said she could have the pt's assigned nurse talk to the covering physician about being allowed off the floor. Pt then asked to speak with the supervisor. RN contacted the supervisor about the pt's request. Supervisor said she would see the patient presently.

## 2018-07-08 NOTE — PROGRESS NOTES
Dr. Sarita Umanzor returned call, okay for po ativan, and aware of patient refusing IV access.      Jerel Mujica RN, BSN [id number]

## 2018-07-08 NOTE — CONSULTS
Family History:  family history is remarkable for father  age 80 secondary to complications of a myocardial infarction; mother  secondary to underlying cancer. Medications:  Prior to Admission medications    Medication Sig Start Date End Date Taking? Authorizing Provider   sotalol (BETAPACE) 120 MG tablet Take 1 tablet by mouth every 12 hours 18   Joy Bean MD   diltiazem (CARDIZEM CD) 120 MG extended release capsule Take 1 capsule by mouth daily 18   Joy Bean MD   metFORMIN (GLUCOPHAGE) 500 MG tablet Take 1,000 mg by mouth 2 times daily (with meals)     Historical Provider, MD   Rivaroxaban (XARELTO PO) Take by mouth    Historical Provider, MD   albuterol sulfate  (90 BASE) MCG/ACT inhaler Inhale 2 puffs into the lungs every 6 hours as needed for Wheezing or Shortness of Breath    Historical Provider, MD   insulin glargine (LANTUS) 100 UNIT/ML injection vial Inject 10 Units into the skin 2 times daily     Historical Provider, MD   pantoprazole (PROTONIX) 40 MG tablet Take 1 tablet by mouth every morning (before breakfast) 16   Loulou No MD   lisinopril-hydrochlorothiazide (PRINZIDE;ZESTORETIC) 20-12.5 MG per tablet Take 1 tablet by mouth daily    Historical Provider, MD   vitamin B-12 (CYANOCOBALAMIN) 1000 MCG tablet Take 1,000 mcg by mouth daily    Historical Provider, MD   Ascorbic Acid (VITAMIN C) 250 MG tablet Take 1,000 mg by mouth daily    Historical Provider, MD   glyBURIDE (DIABETA) 5 MG tablet Take 1 tablet by mouth daily (with breakfast) 7/8/15   Letty Kaufman MD   glucose monitoring kit (FREESTYLE) monitoring kit 1 kit by Does not apply route daily as needed 7/8/15   Letty Kaufman MD   Multiple Vitamins-Minerals (THERAPEUTIC MULTIVITAMIN-MINERALS) tablet Take 1 tablet by mouth daily    Historical Provider, MD   aspirin 81 MG tablet Take 81 mg by mouth daily    Historical Provider, MD       Allergies:   Other     Review of Cardiac Injury Profile:   Recent Labs      07/07/18   0327  07/07/18   1010  07/07/18   1534   CKMB  2.9  2.8   --    TROPONINI  <0.01  <0.01  <0.01      Lipid Profile:   Lab Results   Component Value Date    TRIG 276 07/08/2018    HDL 39 07/08/2018    LDLCALC 122 07/08/2018    CHOL 216 07/08/2018      Hemoglobin A1C: No components found for: HGBA1C   ECG:  See report    Radiology:  Xr Chest Portable    Result Date: 6/15/2018  Location:200 Exam: XR CHEST PORTABLE Comparison: February 11, 2018 History:   Chest pain, A. fib Findings: A single frontal portable view of the chest shows the mediastinum, great vessels and heart to be unremarkable. No acute pulmonary parenchymal opacity. Normal portable chest.     Assessment:    Principal Problem:    Atrial fibrillation with RVR (HCC)  Active Problems:    Atrial fibrillation (HCC)  Resolved Problems:    * No resolved hospital problems. *      Plan:  I have discussed with Mr. Wilma Stock the potential of atrial fibrillation ablation and a Vaseline discuss this further with Dr. Burke Swan. Would maintain sotalol therapy presently and would certainly maintain anticoagulation. Additionally Mr. Wilma Stock states that he does not wish to take lisinopril as this makes him feel worse. His systolic blood pressure is elevated and thus will consider losartan but will defer to Dr. Baxter to change. I have spent more than 45 minutes face to face with Minor Roque reviewing notes and laboratory data with greater than 50% of this time instructing and counseling the patient regarding my findings and recommendations and I have answered all questions as posed to me by Mr. Wilma Stock. On behalf of Dr. Burke Swan, thank you, Percy Hodge MD for allowing me to consult in the care of this patient. Damian Ivey DO, FACP, FACC, FSCAI    NOTE:  This report was transcribed using voice recognition software.   Every effort was made to ensure accuracy; however, inadvertent

## 2018-07-08 NOTE — PROGRESS NOTES
Unable to perform Vital signs and reassessment, patient walked off floor to visit daughter.      Katy Rosado RN, BSN

## 2018-07-08 NOTE — PROGRESS NOTES
Patient returned to floor holding lighter, I informed patient that he is unable to have that, he must either put it safely in his belonging or it will be locked into the med room until his discharge. Patient smelled of cigarettes and marijuana . Patient continues to deny he is going beyond the 3rd floor. Education provided on going off unit and out of hospital for the heart monitor will not accurately read his rate and rhythm. Education also provided on nicoderm patch, patient refused.      Les Dasilva RN, BSN [id number]

## 2018-07-08 NOTE — PROGRESS NOTES
IV occluded on patient, Patient requesting ativan, which is an IV medication. Patient also refusing new IV access, requesting PO form of ativan to be ordered. And doctor order for an Okay no IV access.       Dr. Lyric Huggins RN, BSN [id number]

## 2018-07-08 NOTE — PROGRESS NOTES
HPI:  Yeimy Bertrand remains extraordinarily agitated during my examination today. He has been extremely belligerent with the nursing staff and is argumentative with me during the examination today. He is now rate controlled with transition back to oral cardiac medications. He has been evaluated by Dr. Farooq Jones. The patient is demanding pain medications despite not being on pain medications at home. He admits to diffuse body aches and pains. Patient voiced no new complaints since hospital admission. Questions, answers, and tests reviewed. ROS:  Cardiovascular:   Admits to resolution of his presenting palpitations and irregular heartbeats. Respiratory:   Denies shortness of breath, coughing, sputum production, hemoptysis, or wheezing. Gastrointestinal:   Denies nausea, vomiting, diarrhea, or constipation. Denies any abdominal pain. Extremities:   Admits to diffuse pain involving bilateral upper and lower extremities. Neurology:    Denies any headache or focal neurological deficits. No weakness or paresthesia. Derm:    Denies any rashes, ulcers, or excoriations. Denies bruising. Genitourinary:    Denies any urgency, frequency, hematuria. Voiding without difficulty. Physical Exam:    Vitals:    07/08/18 0730   BP: (!) 161/98   Pulse: 80   Resp: 16   Temp: 98 °F (36.7 °C)   SpO2: 98%       HEENT:  PERRLA. EOMI. Sclera clear. Buccal mucosa moist.    Neck:  Supple. Trachea midline. No thyromegaly. No JVD. No bruits. Heart:  Atrial fibrillation with current rate control. Lungs:  Symmetrical. Clear to auscultation bilaterally. No wheezes. No rhonchi. No rales. Abdomen: Soft. Non-tender. Non-distended. Bowel sounds positive. No organomegaly or masses. No pain on palpation    Extremities:  Peripheral pulses present. No peripheral edema. No ulcers. Neurologic:  Alert x 3. No focal deficit. Cranial nerves grossly intact. Skin:  No petechia. No hemorrhage. No wounds.     CBC with Differential: cardiovascular team has provided consultation and we will await final recommendations from Dr. Nav Kathleen tomorrow. Chronic comorbidities are otherwise well controlled. The patient has been very belligerent with the nursing staff. Please refer to the nursing documentation. The patient is extremely argumentative during my examination. He exhibits obvious pain seeking tendencies. Care will return to Dr. Keyona Rollins tomorrow.     Mary Jones D.O.  2:21 PM  7/8/2018

## 2018-07-09 VITALS
OXYGEN SATURATION: 97 % | SYSTOLIC BLOOD PRESSURE: 134 MMHG | HEIGHT: 71 IN | BODY MASS INDEX: 36.82 KG/M2 | DIASTOLIC BLOOD PRESSURE: 72 MMHG | WEIGHT: 263 LBS | RESPIRATION RATE: 18 BRPM | TEMPERATURE: 98 F | HEART RATE: 77 BPM

## 2018-07-09 LAB
METER GLUCOSE: 163 MG/DL (ref 70–110)
METER GLUCOSE: 173 MG/DL (ref 70–110)
METER GLUCOSE: 221 MG/DL (ref 70–110)
URINE CULTURE, ROUTINE: NORMAL

## 2018-07-09 PROCEDURE — 6370000000 HC RX 637 (ALT 250 FOR IP): Performed by: INTERNAL MEDICINE

## 2018-07-09 PROCEDURE — 82962 GLUCOSE BLOOD TEST: CPT

## 2018-07-09 PROCEDURE — 6370000000 HC RX 637 (ALT 250 FOR IP): Performed by: FAMILY MEDICINE

## 2018-07-09 RX ADMIN — ASPIRIN 81 MG 81 MG: 81 TABLET ORAL at 09:00

## 2018-07-09 RX ADMIN — LORAZEPAM 1 MG: 1 TABLET ORAL at 04:24

## 2018-07-09 RX ADMIN — RIVAROXABAN 20 MG: 20 TABLET, FILM COATED ORAL at 18:04

## 2018-07-09 RX ADMIN — HYDROCHLOROTHIAZIDE 12.5 MG: 12.5 TABLET ORAL at 09:00

## 2018-07-09 RX ADMIN — SOTALOL HYDROCHLORIDE 120 MG: 80 TABLET ORAL at 09:00

## 2018-07-09 RX ADMIN — GLYBURIDE 5 MG: 5 TABLET ORAL at 08:04

## 2018-07-09 RX ADMIN — LISINOPRIL 20 MG: 20 TABLET ORAL at 09:00

## 2018-07-09 RX ADMIN — LORAZEPAM 1 MG: 1 TABLET ORAL at 18:08

## 2018-07-09 RX ADMIN — PANTOPRAZOLE SODIUM 40 MG: 40 TABLET, DELAYED RELEASE ORAL at 06:20

## 2018-07-09 RX ADMIN — INSULIN LISPRO 2 UNITS: 100 INJECTION, SOLUTION INTRAVENOUS; SUBCUTANEOUS at 18:08

## 2018-07-09 RX ADMIN — METFORMIN HYDROCHLORIDE 1000 MG: 1000 TABLET, FILM COATED ORAL at 08:04

## 2018-07-09 RX ADMIN — INSULIN GLARGINE 10 UNITS: 100 INJECTION, SOLUTION SUBCUTANEOUS at 09:00

## 2018-07-09 RX ADMIN — METFORMIN HYDROCHLORIDE 1000 MG: 1000 TABLET, FILM COATED ORAL at 18:04

## 2018-07-09 RX ADMIN — INSULIN LISPRO 1 UNITS: 100 INJECTION, SOLUTION INTRAVENOUS; SUBCUTANEOUS at 08:04

## 2018-07-09 RX ADMIN — LORAZEPAM 1 MG: 1 TABLET ORAL at 11:32

## 2018-07-09 RX ADMIN — PREGABALIN 100 MG: 100 CAPSULE ORAL at 09:00

## 2018-07-09 ASSESSMENT — PAIN SCALES - GENERAL
PAINLEVEL_OUTOF10: 0

## 2018-07-09 NOTE — PROGRESS NOTES
RN in room to complete 1600 vitals. Pt unavailable. Roommate reports \"he went to go and see his daughter\". Per nursing communication pt.allowed to visit. Will await pts. Return to attempt to continue nursing care.

## 2018-07-09 NOTE — PROGRESS NOTES
Dr. Chema Carrasco states pt is ok from his standpoint for d/c. Will provide information for follow up.

## 2018-07-09 NOTE — PROGRESS NOTES
07/07/2018    CKMB 2.9 07/07/2018    CKMB 1.6 07/07/2015    TROPONINI <0.01 07/07/2018    TROPONINI <0.01 07/07/2018    TROPONINI <0.01 07/07/2018     Lab Results   Component Value Date    WBC 13.8 07/08/2018    WBC 18.8 07/06/2018    WBC 11.2 06/21/2018    HGB 15.2 07/08/2018    HGB 15.8 07/06/2018    HGB 15.4 06/21/2018    HCT 44.9 07/08/2018    HCT 45.9 07/06/2018    HCT 45.5 06/21/2018    MCV 90.3 07/08/2018    MCV 90.0 07/06/2018    MCV 92.1 06/21/2018     07/08/2018     07/06/2018     06/21/2018     Lab Results   Component Value Date    CHOL 216 07/08/2018    CHOL 220 07/07/2015    TRIG 276 07/08/2018    TRIG 635 07/07/2015    HDL 39 07/08/2018    HDL 41 12/04/2017    HDL 41 08/17/2017     I have personally reviewed the laboratory, cardiac diagnostic and radiographic testing as outlined above:    Assessment:       Patient Active Problem List    Diagnosis Date Noted    HUMERA (obstructive sleep apnea) 06/18/2018    Atrial fibrillation with RVR (HCC) 06/17/2018    Episodic atrial flutter ([address] 06/16/2018    Cellulitis 02/11/2018    Abscess of foot 02/11/2018    Acute hepatitis B 02/01/2017    Acute liver failure without hepatic coma 01/31/2017    Sepsis ([address] 01/31/2017    Diabetes mellitus ([address]     Acute renal failure (ARF) ([address] 07/15/2016    Diverticulitis 07/15/2016    Atrial fibrillation ([address] 07/15/2016    Type 2 diabetes mellitus ([address] 07/15/2016    Essential hypertension 07/15/2016    Hyperlipidemia 07/15/2016    TIA (transient ischemic attack) 07/07/2015    New onset atrial fibrillation ([address] 07/07/2015     IMPRESSION:   1. Atrial flutter with rapid ventricular response: Paroxsymal. Is currently in Sinus Rhythm. Patient did present to hospital in a.fib with RVR, and was started on Cardizem drip and Sotalol, which he then converted to SR. Cardizem drip has been weaned off and Sotalol will be continued. QTc has remained less than 500.  [id number] score of 4, is anticoagulated with Xarelto for secondary prevention against thromboembolic event. Will continue current treatment. 3. TIA: Secondary to atrial fibrillation, is chronicly anticoagulated with xarelto   4. Hypertension: Essential. Controled at this time with current therapy, will continue  5. Tobacco abuse   6. Diabetes mellitus  7. Obesity  8. Generalized Malaise: Etiology? Patient reports upper and lower extremity muscle  Soreness. Recommendations:   1. Will continue current treatment   2. Patient may benefit from ablation, Will arrange  3. Further cardiac recommendations forthcoming pending patients clinical progression and diagnostic therapy     Discussed with Patient   Discussed with Dr. Debbie Lopes     Electronically signed by ANGIE Rolon CNP on 7/9/2018 at 2:37 PM  I have personally participated in a face-to-face history and physical exam on the date of service. Reviewed chart, vitals, labs and radiologic studies. I also participated in medical decision making with Marcus Wilson CNP on the date of service and I agree with all of the pertinent clinical information, assessment and treatment plan. I have reviewed and edited the note above based on my findings during my history, exam, and decision making. CONSTITUTIONAL: awake, alert, cooperative  HEAD: normocepalic, without obvious abnormality, atraumatic  NECK: Supple, no JVD  LUNGS: CTA  CARDIOVASCULAR: RRR. ABDOMEN: Soft, nontender, BS+  NEUROLOGIC: Alert, awake, oriented ×3  As above  NOTE: This report was transcribed using voice recognition software.  Every effort was made to ensure accuracy; however, inadvertent computerized transcription errors may be present

## 2018-07-09 NOTE — DISCHARGE SUMMARY
Discharge Summary    Saman Gonzalez  :  1965  MRN:  [id number]    Admit date:  2018  Discharge date:  2018    Admitting Physician:    Emmy Davis MD    Discharge Diagnoses:    Principal Problem:    Atrial fibrillation with RVR [facility])  Active Problems:    Atrial fibrillation [facility])  Resolved Problems:    * No resolved hospital problems. *    Consults:   IP CONSULT TO INTERNAL MEDICINE  IP CONSULT TO CARDIOLOGY     HPI/Hospital Course: 48 y.o. male presents with palpitations, dyspnea on exertion, shortness of breath and fatigue. He states his symptoms are very similar to his last admission in  for Atrial fibrillation/flutter, which he required a cardioversion. He has not followed up with his cardiologist, Dr. Néstor Shetty, since his last admission. He was started on sotalol during his last admission. Patient was readmitted and started on a Cardizem drip which converted patient's A. fib with RVR back to an appropriate rate and the drip was eventually weaned off. He was seen by cardiology who recommended outpatient ablation. Today on day of discharge pt feels better with no further complaints. Vitals and Labs are stable. All consultants involved during this admission are agreeable to d/c. Patient to restart CPAP nightly as he states he just received the appropriate tubing for it. Physical Exam  /67   Pulse 71   Temp 97.8 °F (36.6 °C) (Oral)   Resp 16   Ht 5' 11\" (1.803 m)   Wt 263 lb (119.3 kg)   SpO2 97%   BMI 36.68 kg/m²   RRR   CTA bilaterally, no wheeze, rales or rhonchi   bowel sounds present, nontender, nondistended   No clubbing, cyanosis, or edema   Neuro - at baseline     Pertinent Results during this Hospital Course:  Xr Chest Portable    Result Date: 2018  Reading location:  100 CLINICAL STATEMENT: Shortness of breath and chest pain. FINDINGS: A portable frontal upright view of the chest at 0059 hours reveals no change since Shante 15, 2018.  TECHNIQUE: The heart is INR 1.5 06/15/2018    INR 1.6 02/20/2018    INR 1.1 02/19/2018     Discharge Medications:    Current Discharge Medication List           Details   sotalol (BETAPACE) 120 MG tablet Take 1 tablet by mouth every 12 hours  Qty: 60 tablet, Refills: 0      diltiazem (CARDIZEM CD) 120 MG extended release capsule Take 1 capsule by mouth daily  Qty: 30 capsule, Refills: 0      metFORMIN (GLUCOPHAGE) 500 MG tablet Take 1,000 mg by mouth 2 times daily (with meals)       Rivaroxaban (XARELTO PO) Take by mouth      albuterol sulfate  (90 BASE) MCG/ACT inhaler Inhale 2 puffs into the lungs every 6 hours as needed for Wheezing or Shortness of Breath      insulin glargine (LANTUS) 100 UNIT/ML injection vial Inject 10 Units into the skin 2 times daily       pantoprazole (PROTONIX) 40 MG tablet Take 1 tablet by mouth every morning (before breakfast)  Qty: 30 tablet, Refills: 3      lisinopril-hydrochlorothiazide (PRINZIDE;ZESTORETIC) 20-12.5 MG per tablet Take 1 tablet by mouth daily      vitamin B-12 (CYANOCOBALAMIN) 1000 MCG tablet Take 1,000 mcg by mouth daily      Ascorbic Acid (VITAMIN C) 250 MG tablet Take 1,000 mg by mouth daily      glyBURIDE (DIABETA) 5 MG tablet Take 1 tablet by mouth daily (with breakfast)  Qty: 30 tablet, Refills: 0      glucose monitoring kit (FREESTYLE) monitoring kit 1 kit by Does not apply route daily as needed  Qty: 1 kit, Refills: 0      Multiple Vitamins-Minerals (THERAPEUTIC MULTIVITAMIN-MINERALS) tablet Take 1 tablet by mouth daily      aspirin 81 MG tablet Take 81 mg by mouth daily                        Current Discharge Medication List      STOP taking these medications       pregabalin (LYRICA) 100 MG capsule Comments:   Reason for Stopping:             Disposition:   home    Follow up with cardiology - will need a referral to EP for possible ablation  Start CPAP as advised in the past  Follow-up in office in next 1-2 weeks. Patient advised to bring all meds to appointment.     Note that over 30 minutes was spent in preparing discharge papers, discussing discharge with patient, nursing and ancillary staff, medication review, etc.    Signed:  Ana Erwin MD  7/9/2018, 7:10 PM

## 2018-07-10 LAB
EKG ATRIAL RATE: 86 BPM
EKG P AXIS: 43 DEGREES
EKG P-R INTERVAL: 160 MS
EKG Q-T INTERVAL: 396 MS
EKG QRS DURATION: 82 MS
EKG QTC CALCULATION (BAZETT): 473 MS
EKG R AXIS: 0 DEGREES
EKG T AXIS: 63 DEGREES
EKG VENTRICULAR RATE: 86 BPM

## 2019-06-30 ENCOUNTER — APPOINTMENT (OUTPATIENT)
Dept: GENERAL RADIOLOGY | Age: 54
End: 2019-06-30
Payer: OTHER GOVERNMENT

## 2019-06-30 ENCOUNTER — HOSPITAL ENCOUNTER (EMERGENCY)
Age: 54
Discharge: HOME | End: 2019-06-30
Payer: OTHER GOVERNMENT

## 2019-06-30 VITALS
OXYGEN SATURATION: 95 % | DIASTOLIC BLOOD PRESSURE: 76 MMHG | TEMPERATURE: 98.4 F | WEIGHT: 263 LBS | RESPIRATION RATE: 18 BRPM | SYSTOLIC BLOOD PRESSURE: 148 MMHG | BODY MASS INDEX: 36.82 KG/M2 | HEART RATE: 92 BPM | HEIGHT: 71 IN

## 2019-06-30 DIAGNOSIS — M79.674 PAIN OF TOE OF RIGHT FOOT: ICD-10-CM

## 2019-06-30 DIAGNOSIS — M25.561 ACUTE PAIN OF RIGHT KNEE: Primary | ICD-10-CM

## 2019-06-30 PROCEDURE — 99283 EMERGENCY DEPT VISIT LOW MDM: CPT

## 2019-06-30 PROCEDURE — 6370000000 HC RX 637 (ALT 250 FOR IP): Performed by: NURSE PRACTITIONER

## 2019-06-30 PROCEDURE — 73630 X-RAY EXAM OF FOOT: CPT

## 2019-06-30 PROCEDURE — 73562 X-RAY EXAM OF KNEE 3: CPT

## 2019-06-30 RX ORDER — HYDROCODONE BITARTRATE AND ACETAMINOPHEN 5; 325 MG/1; MG/1
1 TABLET ORAL ONCE
Status: COMPLETED | OUTPATIENT
Start: 2019-06-30 | End: 2019-06-30

## 2019-06-30 RX ADMIN — HYDROCODONE BITARTRATE AND ACETAMINOPHEN 1 TABLET: 5; 325 TABLET ORAL at 16:31

## 2019-06-30 ASSESSMENT — PAIN DESCRIPTION - PAIN TYPE: TYPE: ACUTE PAIN

## 2019-06-30 ASSESSMENT — PAIN SCALES - GENERAL
PAINLEVEL_OUTOF10: 8
PAINLEVEL_OUTOF10: 10
PAINLEVEL_OUTOF10: 10

## 2019-06-30 ASSESSMENT — PAIN DESCRIPTION - ORIENTATION: ORIENTATION: RIGHT

## 2019-06-30 ASSESSMENT — PAIN DESCRIPTION - LOCATION: LOCATION: LEG;KNEE;FOOT

## 2019-06-30 NOTE — ED PROVIDER NOTES
6/30/19 1631)        Consults:   None    Procedure(s):   none    MDM:      X-ray of the right knee and right great toe were negative for acute fracture dislocation. Plan is subsequently for symptom control, limited use and  immobilization with appropriate outpatient follow-up. He is instructed to return to emergency department any new or worsening symptoms. Counseling: The emergency provider has spoken with the patient and spouse/SO and discussed todays results, in addition to providing specific details for the plan of care and counseling regarding the diagnosis and prognosis. Questions are answered at this time and they are agreeable with the plan. Assessment      1. Acute pain of right knee    2. Pain of toe of right foot      Plan   Discharge to home  Patient condition is good    New Medications     New Prescriptions    No medications on file     Electronically signed by ANGIE Miller CNP   DD: 6/30/19  **This report was transcribed using voice recognition software. Every effort was made to ensure accuracy; however, inadvertent computerized transcription errors may be present.   END OF ED PROVIDER NOTE         ANGIE Rutledge CNP  06/30/19 8722

## 2019-08-07 ENCOUNTER — HOSPITAL ENCOUNTER (EMERGENCY)
Age: 54
Discharge: HOME | End: 2019-08-07
Attending: EMERGENCY MEDICINE
Payer: COMMERCIAL

## 2019-08-07 VITALS
OXYGEN SATURATION: 97 % | BODY MASS INDEX: 38.22 KG/M2 | WEIGHT: 273 LBS | RESPIRATION RATE: 16 BRPM | SYSTOLIC BLOOD PRESSURE: 151 MMHG | HEIGHT: 71 IN | TEMPERATURE: 98.3 F | HEART RATE: 102 BPM | DIASTOLIC BLOOD PRESSURE: 90 MMHG

## 2019-08-07 DIAGNOSIS — L02.91 ABSCESS: Primary | ICD-10-CM

## 2019-08-07 PROCEDURE — 99282 EMERGENCY DEPT VISIT SF MDM: CPT

## 2019-08-07 PROCEDURE — 6370000000 HC RX 637 (ALT 250 FOR IP): Performed by: EMERGENCY MEDICINE

## 2019-08-07 PROCEDURE — 6370000000 HC RX 637 (ALT 250 FOR IP)

## 2019-08-07 RX ORDER — LIDOCAINE AND PRILOCAINE 25; 25 MG/G; MG/G
CREAM TOPICAL ONCE
Status: DISCONTINUED | OUTPATIENT
Start: 2019-08-07 | End: 2019-08-07 | Stop reason: SDUPTHER

## 2019-08-07 RX ORDER — CLINDAMYCIN HYDROCHLORIDE 150 MG/1
300 CAPSULE ORAL ONCE
Status: COMPLETED | OUTPATIENT
Start: 2019-08-07 | End: 2019-08-07

## 2019-08-07 RX ORDER — LIDOCAINE AND PRILOCAINE 25; 25 MG/G; MG/G
CREAM TOPICAL
Status: COMPLETED
Start: 2019-08-07 | End: 2019-08-07

## 2019-08-07 RX ORDER — OXYCODONE HYDROCHLORIDE AND ACETAMINOPHEN 5; 325 MG/1; MG/1
1 TABLET ORAL EVERY 6 HOURS PRN
Qty: 20 TABLET | Refills: 0 | Status: SHIPPED | OUTPATIENT
Start: 2019-08-07 | End: 2019-08-12

## 2019-08-07 RX ORDER — LIDOCAINE AND PRILOCAINE 25; 25 MG/G; MG/G
CREAM TOPICAL ONCE
Status: COMPLETED | OUTPATIENT
Start: 2019-08-07 | End: 2019-08-07

## 2019-08-07 RX ORDER — OXYCODONE HYDROCHLORIDE AND ACETAMINOPHEN 5; 325 MG/1; MG/1
1 TABLET ORAL ONCE
Status: COMPLETED | OUTPATIENT
Start: 2019-08-07 | End: 2019-08-07

## 2019-08-07 RX ORDER — CLINDAMYCIN HYDROCHLORIDE 300 MG/1
300 CAPSULE ORAL 4 TIMES DAILY
Qty: 40 CAPSULE | Refills: 0 | Status: SHIPPED | OUTPATIENT
Start: 2019-08-07 | End: 2019-08-17

## 2019-08-07 RX ADMIN — LIDOCAINE AND PRILOCAINE: 25; 25 CREAM TOPICAL at 03:55

## 2019-08-07 RX ADMIN — CLINDAMYCIN HYDROCHLORIDE 300 MG: 150 CAPSULE ORAL at 04:47

## 2019-08-07 RX ADMIN — OXYCODONE HYDROCHLORIDE AND ACETAMINOPHEN 1 TABLET: 5; 325 TABLET ORAL at 04:47

## 2019-08-07 ASSESSMENT — PAIN SCALES - GENERAL
PAINLEVEL_OUTOF10: 7
PAINLEVEL_OUTOF10: 10

## 2019-08-07 ASSESSMENT — PAIN DESCRIPTION - LOCATION: LOCATION: BUTTOCKS

## 2019-08-07 ASSESSMENT — PAIN DESCRIPTION - ORIENTATION: ORIENTATION: INNER;RIGHT

## 2019-08-07 ASSESSMENT — PAIN DESCRIPTION - FREQUENCY: FREQUENCY: CONTINUOUS

## 2019-10-13 ENCOUNTER — HOSPITAL ENCOUNTER (EMERGENCY)
Age: 54
Discharge: HOME | End: 2019-10-13
Attending: EMERGENCY MEDICINE
Payer: COMMERCIAL

## 2019-10-13 VITALS
OXYGEN SATURATION: 95 % | TEMPERATURE: 97.7 F | WEIGHT: 270 LBS | RESPIRATION RATE: 14 BRPM | SYSTOLIC BLOOD PRESSURE: 124 MMHG | BODY MASS INDEX: 37.8 KG/M2 | DIASTOLIC BLOOD PRESSURE: 72 MMHG | HEIGHT: 71 IN | HEART RATE: 64 BPM

## 2019-10-13 DIAGNOSIS — L73.9 FOLLICULITIS: Primary | ICD-10-CM

## 2019-10-13 PROCEDURE — 99282 EMERGENCY DEPT VISIT SF MDM: CPT

## 2019-10-13 PROCEDURE — 6370000000 HC RX 637 (ALT 250 FOR IP): Performed by: EMERGENCY MEDICINE

## 2019-10-13 RX ORDER — HYDROCODONE BITARTRATE AND ACETAMINOPHEN 5; 325 MG/1; MG/1
1 TABLET ORAL ONCE
Status: COMPLETED | OUTPATIENT
Start: 2019-10-13 | End: 2019-10-13

## 2019-10-13 RX ORDER — CEPHALEXIN 250 MG/1
500 CAPSULE ORAL 4 TIMES DAILY
Qty: 80 CAPSULE | Refills: 0 | Status: ON HOLD | OUTPATIENT
Start: 2019-10-13 | End: 2020-05-16 | Stop reason: ALTCHOICE

## 2019-10-13 RX ORDER — SULFAMETHOXAZOLE AND TRIMETHOPRIM 800; 160 MG/1; MG/1
1 TABLET ORAL ONCE
Status: COMPLETED | OUTPATIENT
Start: 2019-10-13 | End: 2019-10-13

## 2019-10-13 RX ORDER — CEPHALEXIN 250 MG/1
500 CAPSULE ORAL ONCE
Status: COMPLETED | OUTPATIENT
Start: 2019-10-13 | End: 2019-10-13

## 2019-10-13 RX ORDER — SULFAMETHOXAZOLE AND TRIMETHOPRIM 800; 160 MG/1; MG/1
1 TABLET ORAL 2 TIMES DAILY
Qty: 20 TABLET | Refills: 0 | Status: SHIPPED | OUTPATIENT
Start: 2019-10-13 | End: 2019-10-23

## 2019-10-13 RX ADMIN — SULFAMETHOXAZOLE AND TRIMETHOPRIM 1 TABLET: 800; 160 TABLET ORAL at 04:33

## 2019-10-13 RX ADMIN — CEPHALEXIN 500 MG: 250 CAPSULE ORAL at 04:33

## 2019-10-13 RX ADMIN — HYDROCODONE BITARTRATE AND ACETAMINOPHEN 1 TABLET: 5; 325 TABLET ORAL at 04:33

## 2019-10-13 ASSESSMENT — PAIN DESCRIPTION - LOCATION: LOCATION: LEG

## 2019-10-13 ASSESSMENT — ENCOUNTER SYMPTOMS
WHEEZING: 0
SORE THROAT: 0
EYE REDNESS: 0
BACK PAIN: 0
VOMITING: 0
EYE PAIN: 0
COUGH: 0
SHORTNESS OF BREATH: 0
EYE DISCHARGE: 0
NAUSEA: 0
ABDOMINAL PAIN: 0
SINUS PRESSURE: 0
DIARRHEA: 0

## 2019-10-13 ASSESSMENT — PAIN DESCRIPTION - ORIENTATION: ORIENTATION: RIGHT;INNER;UPPER

## 2019-10-13 ASSESSMENT — PAIN SCALES - GENERAL: PAINLEVEL_OUTOF10: 10

## 2019-10-13 ASSESSMENT — PAIN DESCRIPTION - PAIN TYPE: TYPE: ACUTE PAIN

## 2020-05-16 ENCOUNTER — HOSPITAL ENCOUNTER (INPATIENT)
Age: 55
LOS: 1 days | Discharge: ANOTHER HOSPITAL | DRG: 303 | End: 2020-05-20
Attending: EMERGENCY MEDICINE | Admitting: INTERNAL MEDICINE
Payer: OTHER GOVERNMENT

## 2020-05-16 ENCOUNTER — APPOINTMENT (OUTPATIENT)
Dept: GENERAL RADIOLOGY | Age: 55
DRG: 303 | End: 2020-05-16
Payer: OTHER GOVERNMENT

## 2020-05-16 PROBLEM — R07.9 CHEST PAIN: Status: ACTIVE | Noted: 2020-05-16

## 2020-05-16 LAB
ANION GAP SERPL CALCULATED.3IONS-SCNC: 13 MMOL/L (ref 7–16)
BASOPHILS ABSOLUTE: 0 E9/L (ref 0–0.2)
BASOPHILS RELATIVE PERCENT: 0.5 % (ref 0–2)
BUN BLDV-MCNC: 18 MG/DL (ref 6–20)
CALCIUM SERPL-MCNC: 9.3 MG/DL (ref 8.6–10.2)
CHLORIDE BLD-SCNC: 102 MMOL/L (ref 98–107)
CO2: 23 MMOL/L (ref 22–29)
CREAT SERPL-MCNC: 0.9 MG/DL (ref 0.7–1.2)
EOSINOPHILS ABSOLUTE: 0 E9/L (ref 0.05–0.5)
EOSINOPHILS RELATIVE PERCENT: 0.7 % (ref 0–6)
GFR AFRICAN AMERICAN: >60
GFR NON-AFRICAN AMERICAN: >60 ML/MIN/1.73
GLUCOSE BLD-MCNC: 116 MG/DL (ref 74–99)
HCT VFR BLD CALC: 41.2 % (ref 37–54)
HEMOGLOBIN: 13.6 G/DL (ref 12.5–16.5)
LYMPHOCYTES ABSOLUTE: 6.47 E9/L (ref 1.5–4)
LYMPHOCYTES RELATIVE PERCENT: 53.4 % (ref 20–42)
MCH RBC QN AUTO: 30.5 PG (ref 26–35)
MCHC RBC AUTO-ENTMCNC: 33 % (ref 32–34.5)
MCV RBC AUTO: 92.4 FL (ref 80–99.9)
METER GLUCOSE: 165 MG/DL (ref 74–99)
MONOCYTES ABSOLUTE: 1.22 E9/L (ref 0.1–0.95)
MONOCYTES RELATIVE PERCENT: 10.3 % (ref 2–12)
NEUTROPHILS ABSOLUTE: 4.39 E9/L (ref 1.8–7.3)
NEUTROPHILS RELATIVE PERCENT: 36.2 % (ref 43–80)
PDW BLD-RTO: 14.2 FL (ref 11.5–15)
PLATELET # BLD: 240 E9/L (ref 130–450)
PMV BLD AUTO: 10 FL (ref 7–12)
POTASSIUM REFLEX MAGNESIUM: 4.8 MMOL/L (ref 3.5–5)
RBC # BLD: 4.46 E12/L (ref 3.8–5.8)
SODIUM BLD-SCNC: 138 MMOL/L (ref 132–146)
TROPONIN: <0.01 NG/ML (ref 0–0.03)
WBC # BLD: 12.2 E9/L (ref 4.5–11.5)

## 2020-05-16 PROCEDURE — 84484 ASSAY OF TROPONIN QUANT: CPT

## 2020-05-16 PROCEDURE — 2580000003 HC RX 258: Performed by: EMERGENCY MEDICINE

## 2020-05-16 PROCEDURE — 99285 EMERGENCY DEPT VISIT HI MDM: CPT

## 2020-05-16 PROCEDURE — G0378 HOSPITAL OBSERVATION PER HR: HCPCS

## 2020-05-16 PROCEDURE — 82962 GLUCOSE BLOOD TEST: CPT

## 2020-05-16 PROCEDURE — 36415 COLL VENOUS BLD VENIPUNCTURE: CPT

## 2020-05-16 PROCEDURE — 82553 CREATINE MB FRACTION: CPT

## 2020-05-16 PROCEDURE — 71046 X-RAY EXAM CHEST 2 VIEWS: CPT

## 2020-05-16 PROCEDURE — 6370000000 HC RX 637 (ALT 250 FOR IP): Performed by: INTERNAL MEDICINE

## 2020-05-16 PROCEDURE — 85025 COMPLETE CBC W/AUTO DIFF WBC: CPT

## 2020-05-16 PROCEDURE — 93005 ELECTROCARDIOGRAM TRACING: CPT | Performed by: EMERGENCY MEDICINE

## 2020-05-16 PROCEDURE — 6370000000 HC RX 637 (ALT 250 FOR IP): Performed by: EMERGENCY MEDICINE

## 2020-05-16 PROCEDURE — 80048 BASIC METABOLIC PNL TOTAL CA: CPT

## 2020-05-16 RX ORDER — SODIUM CHLORIDE 0.9 % (FLUSH) 0.9 %
10 SYRINGE (ML) INJECTION PRN
Status: DISCONTINUED | OUTPATIENT
Start: 2020-05-16 | End: 2020-05-20 | Stop reason: HOSPADM

## 2020-05-16 RX ORDER — ACETAMINOPHEN 325 MG/1
650 TABLET ORAL EVERY 6 HOURS PRN
Status: DISCONTINUED | OUTPATIENT
Start: 2020-05-16 | End: 2020-05-20 | Stop reason: HOSPADM

## 2020-05-16 RX ORDER — NITROGLYCERIN 0.4 MG/1
0.4 TABLET SUBLINGUAL EVERY 5 MIN PRN
Status: DISCONTINUED | OUTPATIENT
Start: 2020-05-16 | End: 2020-05-20 | Stop reason: HOSPADM

## 2020-05-16 RX ORDER — ASPIRIN 81 MG/1
324 TABLET, CHEWABLE ORAL ONCE
Status: COMPLETED | OUTPATIENT
Start: 2020-05-16 | End: 2020-05-16

## 2020-05-16 RX ORDER — NICOTINE POLACRILEX 4 MG
15 LOZENGE BUCCAL PRN
Status: DISCONTINUED | OUTPATIENT
Start: 2020-05-16 | End: 2020-05-20 | Stop reason: HOSPADM

## 2020-05-16 RX ORDER — ACETAMINOPHEN 325 MG/1
650 TABLET ORAL EVERY 4 HOURS PRN
Status: DISCONTINUED | OUTPATIENT
Start: 2020-05-16 | End: 2020-05-16 | Stop reason: SDUPTHER

## 2020-05-16 RX ORDER — DEXTROSE MONOHYDRATE 50 MG/ML
100 INJECTION, SOLUTION INTRAVENOUS PRN
Status: DISCONTINUED | OUTPATIENT
Start: 2020-05-16 | End: 2020-05-20 | Stop reason: HOSPADM

## 2020-05-16 RX ORDER — 0.9 % SODIUM CHLORIDE 0.9 %
1000 INTRAVENOUS SOLUTION INTRAVENOUS ONCE
Status: COMPLETED | OUTPATIENT
Start: 2020-05-16 | End: 2020-05-16

## 2020-05-16 RX ORDER — SODIUM CHLORIDE 0.9 % (FLUSH) 0.9 %
10 SYRINGE (ML) INJECTION EVERY 12 HOURS SCHEDULED
Status: DISCONTINUED | OUTPATIENT
Start: 2020-05-16 | End: 2020-05-20 | Stop reason: HOSPADM

## 2020-05-16 RX ORDER — DEXTROSE MONOHYDRATE 25 G/50ML
12.5 INJECTION, SOLUTION INTRAVENOUS PRN
Status: DISCONTINUED | OUTPATIENT
Start: 2020-05-16 | End: 2020-05-20 | Stop reason: HOSPADM

## 2020-05-16 RX ORDER — ONDANSETRON 2 MG/ML
4 INJECTION INTRAMUSCULAR; INTRAVENOUS EVERY 6 HOURS PRN
Status: DISCONTINUED | OUTPATIENT
Start: 2020-05-16 | End: 2020-05-20 | Stop reason: HOSPADM

## 2020-05-16 RX ADMIN — SODIUM CHLORIDE 1000 ML: 9 INJECTION, SOLUTION INTRAVENOUS at 19:32

## 2020-05-16 RX ADMIN — ASPIRIN 81 MG 324 MG: 81 TABLET ORAL at 19:32

## 2020-05-16 RX ADMIN — ACETAMINOPHEN 650 MG: 325 TABLET, FILM COATED ORAL at 23:38

## 2020-05-16 ASSESSMENT — ENCOUNTER SYMPTOMS
SHORTNESS OF BREATH: 1
ABDOMINAL PAIN: 0
COLOR CHANGE: 0
COUGH: 0
VOMITING: 0
NAUSEA: 0
SORE THROAT: 0

## 2020-05-16 ASSESSMENT — PAIN DESCRIPTION - PAIN TYPE
TYPE: ACUTE PAIN
TYPE: ACUTE PAIN

## 2020-05-16 ASSESSMENT — PAIN DESCRIPTION - ORIENTATION: ORIENTATION: RIGHT;LEFT

## 2020-05-16 ASSESSMENT — PAIN SCALES - GENERAL
PAINLEVEL_OUTOF10: 7
PAINLEVEL_OUTOF10: 4
PAINLEVEL_OUTOF10: 4
PAINLEVEL_OUTOF10: 0
PAINLEVEL_OUTOF10: 7

## 2020-05-16 ASSESSMENT — PAIN DESCRIPTION - DESCRIPTORS
DESCRIPTORS: DISCOMFORT
DESCRIPTORS: CONSTANT

## 2020-05-16 ASSESSMENT — PAIN DESCRIPTION - LOCATION
LOCATION: CHEST;FLANK
LOCATION: CHEST;FLANK

## 2020-05-16 NOTE — ED PROVIDER NOTES
Eosinophils Absolute 0.00 (L) 0.05 - 0.50 E9/L    Basophils Absolute 0.00 0.00 - 0.20 F3/P   Basic Metabolic Panel w/ Reflex to MG   Result Value Ref Range    Sodium 138 132 - 146 mmol/L    Potassium reflex Magnesium 4.8 3.5 - 5.0 mmol/L    Chloride 102 98 - 107 mmol/L    CO2 23 22 - 29 mmol/L    Anion Gap 13 7 - 16 mmol/L    Glucose 116 (H) 74 - 99 mg/dL    BUN 18 6 - 20 mg/dL    CREATININE 0.9 0.7 - 1.2 mg/dL    GFR Non-African American >60 >=60 mL/min/1.73    GFR African American >60     Calcium 9.3 8.6 - 10.2 mg/dL   Troponin   Result Value Ref Range    Troponin <0.01 0.00 - 0.03 ng/mL   Troponin   Result Value Ref Range    Troponin <0.01 0.00 - 0.03 ng/mL   CK-MB   Result Value Ref Range    CK-MB 2.1 0.0 - 7.7 ng/mL   POCT Glucose   Result Value Ref Range    Meter Glucose 165 (H) 74 - 99 mg/dL   EKG 12 Lead   Result Value Ref Range    Ventricular Rate 73 BPM    Atrial Rate 73 BPM    P-R Interval 166 ms    QRS Duration 74 ms    Q-T Interval 380 ms    QTc Calculation (Bazett) 418 ms    P Axis 11 degrees    R Axis -21 degrees    T Axis 51 degrees       RADIOLOGY:    All Radiology results interpreted by Radiologist unless otherwise noted. XR CHEST STANDARD (2 VW)   Final Result   No acute process. EKG:  This EKG is signed and interpreted by ED Physician. Time:  1925   Rate: 73  Rhythm: Sinus. Interpretation: Normal sinus rhythm, left axis deviation low no ST elevation, no QTC prolongation  Comparison: stable as compared to patient's most recent EKG.    ---------------------------- NURSING NOTES AND VITALS REVIEWED -------------------------   The nursing notes within the ED encounter and vital signs as below have been reviewed.    /80   Pulse 80   Temp 97.8 °F (36.6 °C) (Oral)   Resp 16   Ht 6' (1.829 m)   Wt 269 lb 12.8 oz (122.4 kg)   SpO2 100%   BMI 36.59 kg/m²   Oxygen Saturation Interpretation: Normal      ------------------------------------------PROGRESS NOTES

## 2020-05-17 LAB
ANION GAP SERPL CALCULATED.3IONS-SCNC: 13 MMOL/L (ref 7–16)
BUN BLDV-MCNC: 12 MG/DL (ref 6–20)
CALCIUM SERPL-MCNC: 8.4 MG/DL (ref 8.6–10.2)
CHLORIDE BLD-SCNC: 101 MMOL/L (ref 98–107)
CHOLESTEROL, TOTAL: 180 MG/DL (ref 0–199)
CK MB: 2 NG/ML (ref 0–7.7)
CK MB: 2.1 NG/ML (ref 0–7.7)
CO2: 22 MMOL/L (ref 22–29)
CREAT SERPL-MCNC: 0.8 MG/DL (ref 0.7–1.2)
EKG ATRIAL RATE: 67 BPM
EKG ATRIAL RATE: 73 BPM
EKG P AXIS: 11 DEGREES
EKG P AXIS: 18 DEGREES
EKG P-R INTERVAL: 166 MS
EKG P-R INTERVAL: 168 MS
EKG Q-T INTERVAL: 380 MS
EKG Q-T INTERVAL: 396 MS
EKG QRS DURATION: 74 MS
EKG QRS DURATION: 78 MS
EKG QTC CALCULATION (BAZETT): 418 MS
EKG QTC CALCULATION (BAZETT): 418 MS
EKG R AXIS: -21 DEGREES
EKG R AXIS: -4 DEGREES
EKG T AXIS: 51 DEGREES
EKG T AXIS: 54 DEGREES
EKG VENTRICULAR RATE: 67 BPM
EKG VENTRICULAR RATE: 73 BPM
GFR AFRICAN AMERICAN: >60
GFR NON-AFRICAN AMERICAN: >60 ML/MIN/1.73
GLUCOSE BLD-MCNC: 217 MG/DL (ref 74–99)
HDLC SERPL-MCNC: 24 MG/DL
LDL CHOLESTEROL CALCULATED: ABNORMAL MG/DL (ref 0–99)
METER GLUCOSE: 115 MG/DL (ref 74–99)
METER GLUCOSE: 179 MG/DL (ref 74–99)
METER GLUCOSE: 180 MG/DL (ref 74–99)
METER GLUCOSE: 185 MG/DL (ref 74–99)
POTASSIUM SERPL-SCNC: 4.3 MMOL/L (ref 3.5–5)
SODIUM BLD-SCNC: 136 MMOL/L (ref 132–146)
TRIGL SERPL-MCNC: 447 MG/DL (ref 0–149)
TROPONIN: <0.01 NG/ML (ref 0–0.03)
TROPONIN: <0.01 NG/ML (ref 0–0.03)
VLDLC SERPL CALC-MCNC: ABNORMAL MG/DL

## 2020-05-17 PROCEDURE — 80061 LIPID PANEL: CPT

## 2020-05-17 PROCEDURE — 80048 BASIC METABOLIC PNL TOTAL CA: CPT

## 2020-05-17 PROCEDURE — 36415 COLL VENOUS BLD VENIPUNCTURE: CPT

## 2020-05-17 PROCEDURE — 2580000003 HC RX 258: Performed by: EMERGENCY MEDICINE

## 2020-05-17 PROCEDURE — G0378 HOSPITAL OBSERVATION PER HR: HCPCS

## 2020-05-17 PROCEDURE — 93005 ELECTROCARDIOGRAM TRACING: CPT | Performed by: INTERNAL MEDICINE

## 2020-05-17 PROCEDURE — 84484 ASSAY OF TROPONIN QUANT: CPT

## 2020-05-17 PROCEDURE — 83036 HEMOGLOBIN GLYCOSYLATED A1C: CPT

## 2020-05-17 PROCEDURE — 6370000000 HC RX 637 (ALT 250 FOR IP): Performed by: EMERGENCY MEDICINE

## 2020-05-17 PROCEDURE — 99222 1ST HOSP IP/OBS MODERATE 55: CPT | Performed by: INTERNAL MEDICINE

## 2020-05-17 PROCEDURE — 6370000000 HC RX 637 (ALT 250 FOR IP): Performed by: INTERNAL MEDICINE

## 2020-05-17 PROCEDURE — 82962 GLUCOSE BLOOD TEST: CPT

## 2020-05-17 PROCEDURE — 93017 CV STRESS TEST TRACING ONLY: CPT

## 2020-05-17 PROCEDURE — 94660 CPAP INITIATION&MGMT: CPT

## 2020-05-17 PROCEDURE — 93016 CV STRESS TEST SUPVJ ONLY: CPT | Performed by: INTERNAL MEDICINE

## 2020-05-17 PROCEDURE — 82553 CREATINE MB FRACTION: CPT

## 2020-05-17 PROCEDURE — 93018 CV STRESS TEST I&R ONLY: CPT | Performed by: INTERNAL MEDICINE

## 2020-05-17 RX ORDER — GLYBURIDE 5 MG/1
5 TABLET ORAL NIGHTLY
Status: DISCONTINUED | OUTPATIENT
Start: 2020-05-17 | End: 2020-05-20 | Stop reason: HOSPADM

## 2020-05-17 RX ORDER — PANTOPRAZOLE SODIUM 40 MG/1
40 TABLET, DELAYED RELEASE ORAL
Status: DISCONTINUED | OUTPATIENT
Start: 2020-05-17 | End: 2020-05-20 | Stop reason: HOSPADM

## 2020-05-17 RX ORDER — ASCORBIC ACID 500 MG
1000 TABLET ORAL DAILY
Status: DISCONTINUED | OUTPATIENT
Start: 2020-05-17 | End: 2020-05-20 | Stop reason: HOSPADM

## 2020-05-17 RX ORDER — DILTIAZEM HYDROCHLORIDE 120 MG/1
120 CAPSULE, COATED, EXTENDED RELEASE ORAL DAILY
Status: DISCONTINUED | OUTPATIENT
Start: 2020-05-17 | End: 2020-05-17

## 2020-05-17 RX ORDER — ALBUTEROL SULFATE 2.5 MG/3ML
2.5 SOLUTION RESPIRATORY (INHALATION) EVERY 6 HOURS PRN
Status: DISCONTINUED | OUTPATIENT
Start: 2020-05-17 | End: 2020-05-20 | Stop reason: HOSPADM

## 2020-05-17 RX ORDER — ASPIRIN 81 MG/1
81 TABLET, CHEWABLE ORAL DAILY
Status: DISCONTINUED | OUTPATIENT
Start: 2020-05-17 | End: 2020-05-20 | Stop reason: HOSPADM

## 2020-05-17 RX ORDER — HYDROCHLOROTHIAZIDE 12.5 MG/1
12.5 TABLET ORAL DAILY
Status: DISCONTINUED | OUTPATIENT
Start: 2020-05-17 | End: 2020-05-20 | Stop reason: HOSPADM

## 2020-05-17 RX ORDER — LACTOBACILLUS RHAMNOSUS GG 10B CELL
1 CAPSULE ORAL DAILY
Status: DISCONTINUED | OUTPATIENT
Start: 2020-05-17 | End: 2020-05-20 | Stop reason: HOSPADM

## 2020-05-17 RX ORDER — LANOLIN ALCOHOL/MO/W.PET/CERES
1000 CREAM (GRAM) TOPICAL DAILY
Status: DISCONTINUED | OUTPATIENT
Start: 2020-05-17 | End: 2020-05-20 | Stop reason: HOSPADM

## 2020-05-17 RX ORDER — DILTIAZEM HYDROCHLORIDE 180 MG/1
180 CAPSULE, COATED, EXTENDED RELEASE ORAL DAILY
Status: DISCONTINUED | OUTPATIENT
Start: 2020-05-18 | End: 2020-05-20 | Stop reason: HOSPADM

## 2020-05-17 RX ORDER — SOTALOL HYDROCHLORIDE 80 MG/1
120 TABLET ORAL 2 TIMES DAILY
Status: DISCONTINUED | OUTPATIENT
Start: 2020-05-17 | End: 2020-05-20 | Stop reason: HOSPADM

## 2020-05-17 RX ORDER — LISINOPRIL 20 MG/1
20 TABLET ORAL DAILY
Status: DISCONTINUED | OUTPATIENT
Start: 2020-05-17 | End: 2020-05-20 | Stop reason: HOSPADM

## 2020-05-17 RX ORDER — M-VIT,TX,IRON,MINS/CALC/FOLIC 27MG-0.4MG
1 TABLET ORAL DAILY
Status: DISCONTINUED | OUTPATIENT
Start: 2020-05-17 | End: 2020-05-20 | Stop reason: HOSPADM

## 2020-05-17 RX ADMIN — DILTIAZEM HYDROCHLORIDE 120 MG: 120 CAPSULE, COATED, EXTENDED RELEASE ORAL at 08:49

## 2020-05-17 RX ADMIN — NITROGLYCERIN 0.4 MG: 0.4 TABLET SUBLINGUAL at 15:15

## 2020-05-17 RX ADMIN — MULTIPLE VITAMINS W/ MINERALS TAB 1 TABLET: TAB at 08:49

## 2020-05-17 RX ADMIN — INSULIN LISPRO 2 UNITS: 100 INJECTION, SOLUTION INTRAVENOUS; SUBCUTANEOUS at 08:50

## 2020-05-17 RX ADMIN — PANTOPRAZOLE SODIUM 40 MG: 40 TABLET, DELAYED RELEASE ORAL at 06:25

## 2020-05-17 RX ADMIN — INSULIN LISPRO 2 UNITS: 100 INJECTION, SOLUTION INTRAVENOUS; SUBCUTANEOUS at 12:52

## 2020-05-17 RX ADMIN — SOTALOL HYDROCHLORIDE 120 MG: 80 TABLET ORAL at 20:02

## 2020-05-17 RX ADMIN — Medication 10 ML: at 08:45

## 2020-05-17 RX ADMIN — LISINOPRIL 20 MG: 20 TABLET ORAL at 08:49

## 2020-05-17 RX ADMIN — METFORMIN HYDROCHLORIDE 1000 MG: 1000 TABLET ORAL at 16:55

## 2020-05-17 RX ADMIN — CYANOCOBALAMIN TAB 1000 MCG 1000 MCG: 1000 TAB at 08:49

## 2020-05-17 RX ADMIN — GLYBURIDE 5 MG: 5 TABLET ORAL at 20:02

## 2020-05-17 RX ADMIN — Medication 1 CAPSULE: at 20:02

## 2020-05-17 RX ADMIN — SOTALOL HYDROCHLORIDE 120 MG: 80 TABLET ORAL at 08:48

## 2020-05-17 RX ADMIN — INSULIN LISPRO 1 UNITS: 100 INJECTION, SOLUTION INTRAVENOUS; SUBCUTANEOUS at 20:03

## 2020-05-17 RX ADMIN — ASPIRIN 81 MG 81 MG: 81 TABLET ORAL at 08:49

## 2020-05-17 RX ADMIN — METFORMIN HYDROCHLORIDE 1000 MG: 1000 TABLET ORAL at 08:49

## 2020-05-17 RX ADMIN — ACETAMINOPHEN 650 MG: 325 TABLET, FILM COATED ORAL at 06:25

## 2020-05-17 RX ADMIN — ACETAMINOPHEN 650 MG: 325 TABLET, FILM COATED ORAL at 21:11

## 2020-05-17 RX ADMIN — Medication 10 ML: at 00:02

## 2020-05-17 RX ADMIN — OXYCODONE HYDROCHLORIDE AND ACETAMINOPHEN 1000 MG: 500 TABLET ORAL at 08:49

## 2020-05-17 RX ADMIN — NITROGLYCERIN 0.4 MG: 0.4 TABLET SUBLINGUAL at 21:13

## 2020-05-17 RX ADMIN — HYDROCHLOROTHIAZIDE 12.5 MG: 12.5 TABLET ORAL at 08:49

## 2020-05-17 RX ADMIN — RIVAROXABAN 20 MG: 20 TABLET, FILM COATED ORAL at 16:55

## 2020-05-17 ASSESSMENT — PAIN DESCRIPTION - PAIN TYPE: TYPE: ACUTE PAIN

## 2020-05-17 ASSESSMENT — PAIN DESCRIPTION - LOCATION: LOCATION: CHEST;FLANK

## 2020-05-17 ASSESSMENT — PAIN SCALES - GENERAL
PAINLEVEL_OUTOF10: 0
PAINLEVEL_OUTOF10: 8
PAINLEVEL_OUTOF10: 0
PAINLEVEL_OUTOF10: 4
PAINLEVEL_OUTOF10: 4
PAINLEVEL_OUTOF10: 0
PAINLEVEL_OUTOF10: 0

## 2020-05-17 ASSESSMENT — PAIN DESCRIPTION - DESCRIPTORS: DESCRIPTORS: DISCOMFORT

## 2020-05-17 NOTE — CONSULTS
at 05/17/20 0849    sotalol (BETAPACE) tablet 120 mg, 120 mg, Oral, BID, Krysta Strauss MD, 120 mg at 05/17/20 0848    dilTIAZem (CARDIZEM CD) extended release capsule 120 mg, 120 mg, Oral, Daily, Krysta Strauss MD, 120 mg at 05/17/20 0849    vitamin B-12 (CYANOCOBALAMIN) tablet 1,000 mcg, 1,000 mcg, Oral, Daily, Krysta Strauss MD, 1,000 mcg at 05/17/20 0849    therapeutic multivitamin-minerals 1 tablet, 1 tablet, Oral, Daily, Krysta Strauss MD, 1 tablet at 05/17/20 0849    pantoprazole (PROTONIX) tablet 40 mg, 40 mg, Oral, QAM AC, Krysta Strauss MD, 40 mg at 05/17/20 3785    vitamin C (ASCORBIC ACID) tablet 1,000 mg, 1,000 mg, Oral, Daily, Krysta Strauss MD, 1,000 mg at 05/17/20 0849    lisinopril (PRINIVIL;ZESTRIL) tablet 20 mg, 20 mg, Oral, Daily, Krysta Strauss MD, 20 mg at 05/17/20 0849    hydrochlorothiazide (HYDRODIURIL) tablet 12.5 mg, 12.5 mg, Oral, Daily, Krysta Strauss MD, 12.5 mg at 05/17/20 0849    regadenoson (LEXISCAN) injection 0.4 mg, 0.4 mg, Intravenous, ONCE PRN, Nolberto George MD    nitroGLYCERIN (NITROSTAT) SL tablet 0.4 mg, 0.4 mg, Sublingual, Q5 Min PRN, Sandie Carrillo DO, Stopped at 05/16/20 1932    sodium chloride flush 0.9 % injection 10 mL, 10 mL, Intravenous, 2 times per day, Sandie Carrillo DO, 10 mL at 05/17/20 0002    sodium chloride flush 0.9 % injection 10 mL, 10 mL, Intravenous, PRN, Sandie Carrillo DO    glucose (GLUTOSE) 40 % oral gel 15 g, 15 g, Oral, PRN, Krysta Strauss MD    dextrose 50 % IV solution, 12.5 g, Intravenous, PRN, Krysta Strauss MD    glucagon (rDNA) injection 1 mg, 1 mg, Intramuscular, PRN, Krysta Strauss MD    dextrose 5 % solution, 100 mL/hr, Intravenous, PRN, Krysta Strauss MD    insulin lispro (HUMALOG) injection vial 0-12 Units, 0-12 Units, Subcutaneous, TID WC, Krysta Strauss MD, 2 Units at 05/17/20 0850    insulin lispro (HUMALOG) injection vial 0-6 Units, 0-6 Units, Subcutaneous, Nightly, Krysta Strauss MD    acetaminophen (TYLENOL) tablet 650 mg, 650 06/15/2018     PTT Heparin:  No components found for: APTTHEP  Magnesium:    Lab Results   Component Value Date    MG 1.4 07/08/2018     TSH:    Lab Results   Component Value Date    TSH 4.200 07/08/2018     TROPONIN:  No components found for: TROP  BNP:  No results found for: BNP  FASTING LIPID PANEL:    Lab Results   Component Value Date    CHOL 216 07/08/2018    HDL 39 07/08/2018    TRIG 276 07/08/2018     XR CHEST STANDARD (2 VW)   Final Result   No acute process. I have personally reviewed the laboratory, cardiac diagnostic and radiographic testing as outlined above:      IMPRESSION:  1. Chest pain: Atypical, negative cardiac enzymes, no acute EKG changes, will schedule for regular exercise stress test  2. Paroxysmal atrial flutter: Now in sinus rhythm, continue current treatment, continue chronic anticoagulation with Xarelto  3. History of TIA:   4. Hypertension: Controlled will continue current treatment  5. Tobacco abuse: Patient was counseled regarding smoking cessation  6. Diabetes mellitus  7. Obesity    RECOMMENDATIONS:   1. Regular exercise stress test  2. Continue current treatment  3. Further cardiac recommendations will be forthcoming pending his clinical course and diagnostic test findings    I have reviewed my findings and recommendations with patient    Thank you for the consult  Electronically signed by Prema Sherwood MD on 5/17/2020 at 1:53 PM  NOTE: This report was transcribed using voice recognition software.  Every effort was made to ensure accuracy; however, inadvertent computerized transcription errors may be present

## 2020-05-17 NOTE — PROGRESS NOTES
Pt. Had burst of Afib RVR [id number]. pt asymptomatic. Dr. Jacqui Coffey noified. Call if sustaining for >30 mins, or if pt develops s/s. Will cont to monitor. See orders.

## 2020-05-17 NOTE — H&P
alert, oriented X 3. Well developed, well nourished, well groomed. No apparent distress. HEENT:  Normocephalic, atraumatic. Pupils equal, round, reactive to light. No scleral icterus. No conjunctival injection. Normal lips, teeth, and gums. No nasal discharge. Neck:  Supple  Heart:  RRR, pos S1S2, reproducible left pectoral muscle tenderness  Lungs:  CTA bilaterally, bilat symmetrical expansion, no wheeze, rales, or rhonchi  Abdomen:   Bowel sounds present, soft, nontender, no peritoneal signs  Extremities:  No clubbing, cyanosis, or edema  Skin:  Warm and dry, no open lesions or rash  Neuro:  Cranial nerves 2-12 intact, no focal deficits  Breast: deferred  Rectal: deferred  Genitalia:  deferred    LABS:  Lab Results   Component Value Date    WBC 12.2 05/16/2020    RBC 4.46 05/16/2020    HGB 13.6 05/16/2020    HCT 41.2 05/16/2020    MCV 92.4 05/16/2020    MCH 30.5 05/16/2020    MCHC 33.0 05/16/2020    RDW 14.2 05/16/2020     05/16/2020    MPV 10.0 05/16/2020     Lab Results   Component Value Date     05/16/2020    K 4.8 05/16/2020     05/16/2020    CO2 23 05/16/2020    BUN 18 05/16/2020    CREATININE 0.9 05/16/2020    GFRAA >60 05/16/2020    LABGLOM >60 05/16/2020    GLUCOSE 116 05/16/2020    PROT 7.3 07/08/2018    LABALBU 3.9 07/08/2018    CALCIUM 9.3 05/16/2020    BILITOT 0.5 07/08/2018    ALKPHOS 52 07/08/2018    AST 27 07/08/2018    ALT 20 07/08/2018     Lab Results   Component Value Date    PROTIME 17.2 06/15/2018    INR 1.5 06/15/2018     Recent Labs     05/16/20  1916 05/16/20  2340   CKMB  --  2.1   TROPONINI <0.01 <0.01     Lab Results   Component Value Date    NITRU Negative 07/07/2018    COLORU Yellow 07/07/2018    PHUR 6.0 07/07/2018    WBCUA 10-20 01/30/2017    RBCUA 0-1 01/30/2017    BACTERIA MODERATE 01/30/2017    CLARITYU Clear 07/07/2018    SPECGRAV 1.015 07/07/2018    LEUKOCYTESUR Negative 07/07/2018    UROBILINOGEN 0.2 07/07/2018    BILIRUBINUR Negative 07/07/2018

## 2020-05-18 ENCOUNTER — APPOINTMENT (OUTPATIENT)
Dept: NUCLEAR MEDICINE | Age: 55
DRG: 303 | End: 2020-05-18
Payer: OTHER GOVERNMENT

## 2020-05-18 LAB
HBA1C MFR BLD: 7 % (ref 4–5.6)
LV EF: 50 %
LVEF MODALITY: NORMAL
METER GLUCOSE: 134 MG/DL (ref 74–99)
METER GLUCOSE: 154 MG/DL (ref 74–99)
METER GLUCOSE: 191 MG/DL (ref 74–99)
METER GLUCOSE: 247 MG/DL (ref 74–99)

## 2020-05-18 PROCEDURE — 6370000000 HC RX 637 (ALT 250 FOR IP): Performed by: INTERNAL MEDICINE

## 2020-05-18 PROCEDURE — 78452 HT MUSCLE IMAGE SPECT MULT: CPT

## 2020-05-18 PROCEDURE — 94660 CPAP INITIATION&MGMT: CPT

## 2020-05-18 PROCEDURE — G0378 HOSPITAL OBSERVATION PER HR: HCPCS

## 2020-05-18 PROCEDURE — 93017 CV STRESS TEST TRACING ONLY: CPT

## 2020-05-18 PROCEDURE — 96374 THER/PROPH/DIAG INJ IV PUSH: CPT

## 2020-05-18 PROCEDURE — 2580000003 HC RX 258: Performed by: EMERGENCY MEDICINE

## 2020-05-18 PROCEDURE — A9500 TC99M SESTAMIBI: HCPCS | Performed by: RADIOLOGY

## 2020-05-18 PROCEDURE — 6360000002 HC RX W HCPCS: Performed by: INTERNAL MEDICINE

## 2020-05-18 PROCEDURE — 82962 GLUCOSE BLOOD TEST: CPT

## 2020-05-18 PROCEDURE — 93018 CV STRESS TEST I&R ONLY: CPT | Performed by: INTERNAL MEDICINE

## 2020-05-18 PROCEDURE — 99232 SBSQ HOSP IP/OBS MODERATE 35: CPT | Performed by: INTERNAL MEDICINE

## 2020-05-18 PROCEDURE — 3430000000 HC RX DIAGNOSTIC RADIOPHARMACEUTICAL: Performed by: RADIOLOGY

## 2020-05-18 PROCEDURE — 93016 CV STRESS TEST SUPVJ ONLY: CPT | Performed by: INTERNAL MEDICINE

## 2020-05-18 RX ORDER — DIGOXIN 0.25 MG/ML
250 INJECTION INTRAMUSCULAR; INTRAVENOUS ONCE
Status: DISCONTINUED | OUTPATIENT
Start: 2020-05-18 | End: 2020-05-18 | Stop reason: ALTCHOICE

## 2020-05-18 RX ORDER — DIGOXIN 0.25 MG/ML
INJECTION INTRAMUSCULAR; INTRAVENOUS
Status: DISPENSED
Start: 2020-05-18 | End: 2020-05-18

## 2020-05-18 RX ORDER — HYDROCODONE BITARTRATE AND ACETAMINOPHEN 7.5; 325 MG/1; MG/1
1 TABLET ORAL EVERY 6 HOURS PRN
Status: DISCONTINUED | OUTPATIENT
Start: 2020-05-18 | End: 2020-05-20 | Stop reason: HOSPADM

## 2020-05-18 RX ADMIN — GLYBURIDE 5 MG: 5 TABLET ORAL at 21:36

## 2020-05-18 RX ADMIN — DIGOXIN 250 MCG: 250 INJECTION, SOLUTION INTRAMUSCULAR; INTRAVENOUS; PARENTERAL at 00:46

## 2020-05-18 RX ADMIN — HYDROCHLOROTHIAZIDE 12.5 MG: 12.5 TABLET ORAL at 08:23

## 2020-05-18 RX ADMIN — ASPIRIN 81 MG 81 MG: 81 TABLET ORAL at 08:24

## 2020-05-18 RX ADMIN — INSULIN LISPRO 1 UNITS: 100 INJECTION, SOLUTION INTRAVENOUS; SUBCUTANEOUS at 21:40

## 2020-05-18 RX ADMIN — OXYCODONE HYDROCHLORIDE AND ACETAMINOPHEN 1000 MG: 500 TABLET ORAL at 08:23

## 2020-05-18 RX ADMIN — INSULIN LISPRO 4 UNITS: 100 INJECTION, SOLUTION INTRAVENOUS; SUBCUTANEOUS at 17:10

## 2020-05-18 RX ADMIN — HYDROCODONE BITARTRATE AND ACETAMINOPHEN 1 TABLET: 7.5; 325 TABLET ORAL at 18:03

## 2020-05-18 RX ADMIN — Medication 1 CAPSULE: at 08:23

## 2020-05-18 RX ADMIN — REGADENOSON 0.4 MG: 0.08 INJECTION, SOLUTION INTRAVENOUS at 14:34

## 2020-05-18 RX ADMIN — DILTIAZEM HYDROCHLORIDE 180 MG: 180 CAPSULE, COATED, EXTENDED RELEASE ORAL at 08:23

## 2020-05-18 RX ADMIN — CYANOCOBALAMIN TAB 1000 MCG 1000 MCG: 1000 TAB at 08:23

## 2020-05-18 RX ADMIN — MULTIPLE VITAMINS W/ MINERALS TAB 1 TABLET: TAB at 08:24

## 2020-05-18 RX ADMIN — LISINOPRIL 20 MG: 20 TABLET ORAL at 08:24

## 2020-05-18 RX ADMIN — Medication 10 ML: at 09:57

## 2020-05-18 RX ADMIN — SOTALOL HYDROCHLORIDE 120 MG: 80 TABLET ORAL at 21:36

## 2020-05-18 RX ADMIN — Medication 30 MILLICURIE: at 15:23

## 2020-05-18 RX ADMIN — Medication 10 MILLICURIE: at 08:59

## 2020-05-18 RX ADMIN — METFORMIN HYDROCHLORIDE 1000 MG: 1000 TABLET ORAL at 17:04

## 2020-05-18 RX ADMIN — Medication 10 ML: at 21:38

## 2020-05-18 RX ADMIN — SOTALOL HYDROCHLORIDE 120 MG: 80 TABLET ORAL at 08:24

## 2020-05-18 ASSESSMENT — PAIN SCALES - GENERAL
PAINLEVEL_OUTOF10: 9
PAINLEVEL_OUTOF10: 0
PAINLEVEL_OUTOF10: 0
PAINLEVEL_OUTOF10: 8
PAINLEVEL_OUTOF10: 0
PAINLEVEL_OUTOF10: 3

## 2020-05-18 NOTE — PROCEDURES
Procedure: Mounika Santos stress test     Ordering physician: Javi Madison  Referring physician:Dr.Sanjay Billingsley    Indication: Chest Pain     Pretest evaluation no chest pain, no shortness of breath    Resting EKG showed: sinus rhythm     Protocol: Patient was given 0.4mg of Lexiscan followed by Cardiolite injection    Heart rate response:   Resting heart rate: 61 BPM   Stress heart rate: 88 BPM     Blood pressure response:   Resting blood pressure:115/67 mmHg   Stress blood pressure: 108/67 mmHg     Symptoms and signs:shortness of breath and headache     EKG changes:  Resting EKG: nonischemic   Stress EKG : nonischemic     Impression:   Clinical: nonischemic   EKG: nonischemic     Cardiolite injected and nuclear images are pending

## 2020-05-18 NOTE — PROGRESS NOTES
Patient is seen in follow-up for chest pain    Subjective:     Mr. Ninfa Kirkpatrick feels okay today, did not have chest pain today  Laying in bed no apparent distress    ROS:  CONSTITUTIONAL:  negative for  fevers, chills  HEENT:  negative for earaches, nasal congestion and epistaxis  RESPIRATORY:  negative for  dry cough, cough with sputum,wheezing and hemoptysis  GASTROINTESTINAL:  negative for nausea, vomiting  MUSCULOSKELETAL:  negative for  myalgias, arthralgias  NEUROLOGICAL:  negative for visual disturbance, dysphagia    Medication side effects:none    Scheduled Meds:   aspirin  81 mg Oral Daily    rivaroxaban  20 mg Oral Daily    glyBURIDE  5 mg Oral Nightly    metFORMIN  1,000 mg Oral BID WC    sotalol  120 mg Oral BID    vitamin B-12  1,000 mcg Oral Daily    therapeutic multivitamin-minerals  1 tablet Oral Daily    pantoprazole  40 mg Oral QAM AC    vitamin C  1,000 mg Oral Daily    lisinopril  20 mg Oral Daily    hydroCHLOROthiazide  12.5 mg Oral Daily    lactobacillus  1 capsule Oral Daily    dilTIAZem  180 mg Oral Daily    sodium chloride flush  10 mL Intravenous 2 times per day    insulin lispro  0-12 Units Subcutaneous TID WC    insulin lispro  0-6 Units Subcutaneous Nightly     Continuous Infusions:   dextrose       PRN Meds:albuterol, nitroGLYCERIN, sodium chloride flush, glucose, dextrose, glucagon (rDNA), dextrose, acetaminophen, ondansetron      Objective:      Physical Exam:   /82   Pulse 77   Temp 98.1 °F (36.7 °C) (Oral)   Resp 20   Ht 6' (1.829 m)   Wt 269 lb 12.8 oz (122.4 kg)   SpO2 97%   BMI 36.59 kg/m²   CONSTITUTIONAL:  awake, alert, cooperative, no apparent distress, and appears stated age  HEAD:  normocepalic, without obvious abnormality, atraumatic  NECK:  Supple, symmetrical, trachea midline, no adenopathy, thyroid symmetric, not enlarged and no tenderness, skin normal  LUNGS:  No increased work of breathing, No accessory muscle use or intercostal retractions,

## 2020-05-18 NOTE — PROGRESS NOTES
Stress test came back abnormal showing anterior and apical wall reversible defect, will hold Xarelto, the discussed with him need for cardiac catheterization, procedure, alternatives, risks, benefits, discussed with him, is willing to proceed, will plan for Wednesday 48 hours after stopping Xarelto

## 2020-05-18 NOTE — PROCEDURES
Dr.U. Carol Salazar present, Completed Lexiscan Protocol 0.4 mg IV per LEFT  antecubital vein.   Patient  Experienced mild increased breathing which returned to normal .

## 2020-05-18 NOTE — PROGRESS NOTES
Spoke with Dr Roque Isidro about the pt heart remaining in in Afib RVR for longer then 30 min and a BP of 93/64.  He stated to gibe 250 mcg of Iv Dig now and another 250 in an hour if he doesn't convert back to SR.

## 2020-05-19 LAB
METER GLUCOSE: 132 MG/DL (ref 74–99)
METER GLUCOSE: 178 MG/DL (ref 74–99)
METER GLUCOSE: 184 MG/DL (ref 74–99)
METER GLUCOSE: 217 MG/DL (ref 74–99)

## 2020-05-19 PROCEDURE — 99232 SBSQ HOSP IP/OBS MODERATE 35: CPT | Performed by: INTERNAL MEDICINE

## 2020-05-19 PROCEDURE — 6370000000 HC RX 637 (ALT 250 FOR IP): Performed by: INTERNAL MEDICINE

## 2020-05-19 PROCEDURE — G0378 HOSPITAL OBSERVATION PER HR: HCPCS

## 2020-05-19 PROCEDURE — 82962 GLUCOSE BLOOD TEST: CPT

## 2020-05-19 PROCEDURE — 2580000003 HC RX 258: Performed by: EMERGENCY MEDICINE

## 2020-05-19 PROCEDURE — 94660 CPAP INITIATION&MGMT: CPT

## 2020-05-19 PROCEDURE — 2060000000 HC ICU INTERMEDIATE R&B

## 2020-05-19 RX ORDER — ALPRAZOLAM 0.5 MG/1
0.5 TABLET ORAL NIGHTLY PRN
Status: DISCONTINUED | OUTPATIENT
Start: 2020-05-19 | End: 2020-05-20 | Stop reason: HOSPADM

## 2020-05-19 RX ORDER — DILTIAZEM HYDROCHLORIDE 180 MG/1
180 CAPSULE, COATED, EXTENDED RELEASE ORAL DAILY
Qty: 30 CAPSULE | Refills: 0 | Status: ON HOLD
Start: 2020-05-20 | End: 2020-06-07 | Stop reason: HOSPADM

## 2020-05-19 RX ADMIN — DILTIAZEM HYDROCHLORIDE 180 MG: 180 CAPSULE, COATED, EXTENDED RELEASE ORAL at 09:26

## 2020-05-19 RX ADMIN — HYDROCODONE BITARTRATE AND ACETAMINOPHEN 1 TABLET: 7.5; 325 TABLET ORAL at 06:24

## 2020-05-19 RX ADMIN — METFORMIN HYDROCHLORIDE 1000 MG: 1000 TABLET ORAL at 16:37

## 2020-05-19 RX ADMIN — INSULIN LISPRO 1 UNITS: 100 INJECTION, SOLUTION INTRAVENOUS; SUBCUTANEOUS at 20:42

## 2020-05-19 RX ADMIN — HYDROCODONE BITARTRATE AND ACETAMINOPHEN 1 TABLET: 7.5; 325 TABLET ORAL at 12:32

## 2020-05-19 RX ADMIN — INSULIN LISPRO 4 UNITS: 100 INJECTION, SOLUTION INTRAVENOUS; SUBCUTANEOUS at 11:56

## 2020-05-19 RX ADMIN — INSULIN LISPRO 2 UNITS: 100 INJECTION, SOLUTION INTRAVENOUS; SUBCUTANEOUS at 17:40

## 2020-05-19 RX ADMIN — ASPIRIN 81 MG 81 MG: 81 TABLET ORAL at 09:26

## 2020-05-19 RX ADMIN — SOTALOL HYDROCHLORIDE 120 MG: 80 TABLET ORAL at 09:24

## 2020-05-19 RX ADMIN — ALPRAZOLAM 0.5 MG: 0.5 TABLET ORAL at 22:38

## 2020-05-19 RX ADMIN — CYANOCOBALAMIN TAB 1000 MCG 1000 MCG: 1000 TAB at 09:25

## 2020-05-19 RX ADMIN — METFORMIN HYDROCHLORIDE 1000 MG: 1000 TABLET ORAL at 09:00

## 2020-05-19 RX ADMIN — HYDROCHLOROTHIAZIDE 12.5 MG: 12.5 TABLET ORAL at 09:26

## 2020-05-19 RX ADMIN — GLYBURIDE 5 MG: 5 TABLET ORAL at 20:31

## 2020-05-19 RX ADMIN — Medication 10 ML: at 09:28

## 2020-05-19 RX ADMIN — MULTIPLE VITAMINS W/ MINERALS TAB 1 TABLET: TAB at 09:26

## 2020-05-19 RX ADMIN — HYDROCODONE BITARTRATE AND ACETAMINOPHEN 1 TABLET: 7.5; 325 TABLET ORAL at 18:47

## 2020-05-19 RX ADMIN — Medication 1 CAPSULE: at 12:32

## 2020-05-19 RX ADMIN — OXYCODONE HYDROCHLORIDE AND ACETAMINOPHEN 1000 MG: 500 TABLET ORAL at 09:25

## 2020-05-19 RX ADMIN — SOTALOL HYDROCHLORIDE 120 MG: 80 TABLET ORAL at 20:30

## 2020-05-19 RX ADMIN — HYDROCODONE BITARTRATE AND ACETAMINOPHEN 1 TABLET: 7.5; 325 TABLET ORAL at 00:08

## 2020-05-19 RX ADMIN — PANTOPRAZOLE SODIUM 40 MG: 40 TABLET, DELAYED RELEASE ORAL at 06:24

## 2020-05-19 RX ADMIN — LISINOPRIL 20 MG: 20 TABLET ORAL at 09:24

## 2020-05-19 RX ADMIN — Medication 10 ML: at 20:31

## 2020-05-19 ASSESSMENT — PAIN SCALES - GENERAL
PAINLEVEL_OUTOF10: 8
PAINLEVEL_OUTOF10: 7
PAINLEVEL_OUTOF10: 8
PAINLEVEL_OUTOF10: 7
PAINLEVEL_OUTOF10: 3
PAINLEVEL_OUTOF10: 0
PAINLEVEL_OUTOF10: 3
PAINLEVEL_OUTOF10: 2
PAINLEVEL_OUTOF10: 7
PAINLEVEL_OUTOF10: 0
PAINLEVEL_OUTOF10: 1
PAINLEVEL_OUTOF10: 3
PAINLEVEL_OUTOF10: 0

## 2020-05-19 ASSESSMENT — PAIN DESCRIPTION - ORIENTATION
ORIENTATION: MID
ORIENTATION: MID
ORIENTATION: RIGHT;LEFT
ORIENTATION: MID

## 2020-05-19 ASSESSMENT — PAIN DESCRIPTION - LOCATION
LOCATION: CHEST
LOCATION: CHEST;HAND
LOCATION: CHEST
LOCATION: CHEST

## 2020-05-19 ASSESSMENT — PAIN DESCRIPTION - DESCRIPTORS
DESCRIPTORS: DISCOMFORT
DESCRIPTORS: DISCOMFORT
DESCRIPTORS: ACHING;DISCOMFORT

## 2020-05-19 ASSESSMENT — PAIN DESCRIPTION - PAIN TYPE
TYPE: ACUTE PAIN

## 2020-05-19 ASSESSMENT — PAIN SCALES - WONG BAKER
WONGBAKER_NUMERICALRESPONSE: 0

## 2020-05-19 NOTE — PROGRESS NOTES
exchange, clear to auscultation bilaterally, no crackles or wheezing  CARDIOVASCULAR:  Normal apical impulse, regular rate and rhythm, normal S1 and S2, no S3 or S4, and no murmur noted, no edema, no JVD, no carotid bruit. ABDOMEN:  Soft, nontender, no masses, no hepatomegaly, no splenomegaly, BS+  MUSCULOSKELETAL:  No clubbing no cyanosis. there is no redness, warmth, or swelling of the joints  full range of motion noted  NEUROLOGIC:  Alert, awake,oriented x3  SKIN:  no bruising or bleeding, normal skin color, texture, turgor and no redness, warmth, or swelling      Cardiographics  I personally reviewed the telemetry monitor strips with the following interpretation: Sinus rhythm, heart short runs of a flutter with RVR last night    Echocardiogram: 2/12/2018, summary   No previous echo for comparison. Technically adequate study.   Moderate asymmetric hypertrophy.   Ejection fraction is visually estimated at 55%.   No regional wall motion abnormalities seen.   No evidence of left ventricular mass or thrombus noted.   There is doppler evidence of stage II diastolic dysfunction.   The left atrium is borderline dilated.   Physiologic and/or trace tricuspid regurgitation.  RVSP is normal.  Luster Schlichter is a trivial pericardial effusion noted. Imaging  NM Cardiac Stress Test Nuclear Imaging   Preliminary Result   1. Moderate-sized reversible defect in the mid and basal anterior segments. 2. Ejection fraction of 50%. 3. No focal wall motion abnormality. The findings were sent to the Radiology Results [address] at 4:28   pm on 5/18/2020to be communicated to a licensed caregiver. XR CHEST STANDARD (2 VW)   Final Result   No acute process.              Lab Review   Lab Results   Component Value Date     05/17/2020    K 4.3 05/17/2020    K 4.8 05/16/2020     05/17/2020    CO2 22 05/17/2020    BUN 12 05/17/2020    CREATININE 0.8 05/17/2020    GLUCOSE 217 05/17/2020    CALCIUM 8.4 05/17/2020 pain    Subjective:     Mr. Colt Baca feels okay today, did not have chest pain today  Laying in bed no apparent distress    ROS:  CONSTITUTIONAL:  negative for  fevers, chills  HEENT:  negative for earaches, nasal congestion and epistaxis  RESPIRATORY:  negative for  dry cough, cough with sputum,wheezing and hemoptysis  GASTROINTESTINAL:  negative for nausea, vomiting  MUSCULOSKELETAL:  negative for  myalgias, arthralgias  NEUROLOGICAL:  negative for visual disturbance, dysphagia    Medication side effects:none    Scheduled Meds:   aspirin  81 mg Oral Daily    glyBURIDE  5 mg Oral Nightly    metFORMIN  1,000 mg Oral BID WC    sotalol  120 mg Oral BID    vitamin B-12  1,000 mcg Oral Daily    therapeutic multivitamin-minerals  1 tablet Oral Daily    pantoprazole  40 mg Oral QAM AC    vitamin C  1,000 mg Oral Daily    lisinopril  20 mg Oral Daily    hydroCHLOROthiazide  12.5 mg Oral Daily    lactobacillus  1 capsule Oral Daily    dilTIAZem  180 mg Oral Daily    sodium chloride flush  10 mL Intravenous 2 times per day    insulin lispro  0-12 Units Subcutaneous TID WC    insulin lispro  0-6 Units Subcutaneous Nightly     Continuous Infusions:   dextrose       PRN Meds:HYDROcodone-acetaminophen, albuterol, nitroGLYCERIN, sodium chloride flush, glucose, dextrose, glucagon (rDNA), dextrose, acetaminophen, ondansetron      Objective:      Physical Exam:   /67   Pulse 90   Temp 97.4 °F (36.3 °C) (Oral)   Resp 18   Ht 6' (1.829 m)   Wt 269 lb 12.8 oz (122.4 kg)   SpO2 98%   BMI 36.59 kg/m²   CONSTITUTIONAL:  awake, alert, cooperative, no apparent distress, and appears stated age  HEAD:  normocepalic, without obvious abnormality, atraumatic  NECK:  Supple, symmetrical, trachea midline, no adenopathy, thyroid symmetric, not enlarged and no tenderness, skin normal  LUNGS:  No increased work of breathing, No accessory muscle use or intercostal retractions, good air exchange, clear to auscultation Date    WBC 12.2 05/16/2020    HGB 13.6 05/16/2020    HCT 41.2 05/16/2020    MCV 92.4 05/16/2020     05/16/2020     I have personally reviewed the laboratory, cardiac diagnostic and radiographic testing as outlined above:    Assessment:     1.  atypical chest pain with abnormal stress test, will need cardiac catheterization  2. Paroxysmal atrial flutter: Now in sinus rhythm, continue current treatment, Xarelto is on hold in preparation for cardiac catheter  3. History of TIA:   4. Hypertension: Controlled will continue current treatment  5. Tobacco abuse:   6. Diabetes mellitus  7. Obesity    Recommendations:     1. Cardiac catheterization: Indications, procedures, risks and benefits of cardiac catheterization were discussed with the patient with risks including, but not limited to, infection, vessel damage, bleeding, dye allergy, nephrotoxicity, dysrhythmia, MI, CVA and death as well as the potential need for emergent cardiac surgery. Patient verbalized understanding and is agreeable to proceed. Scheduled for tomorrow  2. Continue current treatment    Discussed with patient  Electronically signed by Destinee Giron MD on 5/19/2020 at 4:08 PM  NOTE: This report was transcribed using voice recognition software.  Every effort was made to ensure accuracy; however, inadvertent computerized transcription errors may be present

## 2020-05-19 NOTE — PROGRESS NOTES
BILITOT, ALKPHOS, AST, ALT in the last 72 hours. No results for input(s): INR in the last 72 hours. Invalid input(s): PT  Recent Labs     05/16/20  1916 05/16/20  2340 05/17/20  2109   CKMB  --  2.1 2.0   TROPONINI <0.01 <0.01 <0.01         Xr Chest Standard (2 Vw)    Result Date: 5/16/2020  EXAMINATION: TWO XRAY VIEWS OF THE CHEST 5/16/2020 7:39 pm COMPARISON: Chest radiograph dated July 7, 2018. HISTORY: ORDERING SYSTEM PROVIDED HISTORY: chest pain TECHNOLOGIST PROVIDED HISTORY: Reason for exam:->chest pain FINDINGS: The lungs are without acute focal process. There is no effusion or pneumothorax. The cardiomediastinal silhouette is without acute process. The osseous structures are without acute process. No acute process. Assessment:  Amairani Forbes is a 54y.o. year old male who presented on 5/16/2020 and is being treated for:  Principal Problem:    Chest pain  Active Problems:    Atrial fibrillation ([address]    Essential hypertension    Diabetes mellitus ([address]    History of TIA (transient ischemic attack)  Resolved Problems:    * No resolved hospital problems. *    Plan  · Cardiac cath on wedn  · Hold eliquis in the meantime  · Hold metformin after am dose  · Otherwise continue current therapy   · Please see orders for further management and care.     Ivette Celestin MD  8:12 PM  5/18/2020

## 2020-05-19 NOTE — CARE COORDINATION
CM Note: 5/19/2020 at 10:20am: Noted that pt is a [employer]. CM office received a VM from [facility] at the [address] who states that this pt is 50% service connected and does have travel benefits. They state he also has private insurance. Per Mr. Anjelica Lujan, he states he wants to stay here at [facility] under his primary insurance of [employer].  Andrew Gimenez RN

## 2020-05-19 NOTE — PROGRESS NOTES
Pt up to bathroom, HR-145, Afib RVR approx. 10 mins. Denies pain. Pt at rest HR-80 A-fib, Dr. Lettie Aase, no new orders. Pt decided to put CPAP on at this time.

## 2020-05-20 ENCOUNTER — HOSPITAL ENCOUNTER (INPATIENT)
Age: 55
LOS: 2 days | Discharge: HOME | DRG: 287 | End: 2020-05-22
Attending: INTERNAL MEDICINE | Admitting: INTERNAL MEDICINE
Payer: COMMERCIAL

## 2020-05-20 VITALS
OXYGEN SATURATION: 100 % | SYSTOLIC BLOOD PRESSURE: 131 MMHG | WEIGHT: 269.8 LBS | HEART RATE: 70 BPM | HEIGHT: 72 IN | DIASTOLIC BLOOD PRESSURE: 73 MMHG | BODY MASS INDEX: 36.54 KG/M2 | RESPIRATION RATE: 18 BRPM | TEMPERATURE: 98.4 F

## 2020-05-20 PROBLEM — I24.9 ACUTE CORONARY SYNDROME (HCC): Status: ACTIVE | Noted: 2020-05-20

## 2020-05-20 PROBLEM — I20.0 UNSTABLE ANGINA (HCC): Status: ACTIVE | Noted: 2020-05-20

## 2020-05-20 LAB
ABO/RH: NORMAL
ANION GAP SERPL CALCULATED.3IONS-SCNC: 9 MMOL/L (ref 7–16)
ANTIBODY SCREEN: NORMAL
BUN BLDV-MCNC: 20 MG/DL (ref 6–20)
CALCIUM SERPL-MCNC: 8.5 MG/DL (ref 8.6–10.2)
CHLORIDE BLD-SCNC: 103 MMOL/L (ref 98–107)
CO2: 26 MMOL/L (ref 22–29)
CREAT SERPL-MCNC: 1 MG/DL (ref 0.7–1.2)
GFR AFRICAN AMERICAN: >60
GFR NON-AFRICAN AMERICAN: >60 ML/MIN/1.73
GLUCOSE BLD-MCNC: 129 MG/DL (ref 74–99)
HCT VFR BLD CALC: 38.5 % (ref 37–54)
HEMOGLOBIN: 12.7 G/DL (ref 12.5–16.5)
MCH RBC QN AUTO: 31.3 PG (ref 26–35)
MCHC RBC AUTO-ENTMCNC: 33 % (ref 32–34.5)
MCV RBC AUTO: 94.8 FL (ref 80–99.9)
METER GLUCOSE: 107 MG/DL (ref 74–99)
METER GLUCOSE: 278 MG/DL (ref 74–99)
PDW BLD-RTO: 14.1 FL (ref 11.5–15)
PLATELET # BLD: 200 E9/L (ref 130–450)
PMV BLD AUTO: 10.1 FL (ref 7–12)
POTASSIUM SERPL-SCNC: 4.4 MMOL/L (ref 3.5–5)
RBC # BLD: 4.06 E12/L (ref 3.8–5.8)
SODIUM BLD-SCNC: 138 MMOL/L (ref 132–146)
WBC # BLD: 10.7 E9/L (ref 4.5–11.5)

## 2020-05-20 PROCEDURE — 2140000000 HC CCU INTERMEDIATE R&B

## 2020-05-20 PROCEDURE — 6360000002 HC RX W HCPCS

## 2020-05-20 PROCEDURE — 6370000000 HC RX 637 (ALT 250 FOR IP): Performed by: INTERNAL MEDICINE

## 2020-05-20 PROCEDURE — 86850 RBC ANTIBODY SCREEN: CPT

## 2020-05-20 PROCEDURE — 85027 COMPLETE CBC AUTOMATED: CPT

## 2020-05-20 PROCEDURE — B2151ZZ FLUOROSCOPY OF LEFT HEART USING LOW OSMOLAR CONTRAST: ICD-10-PCS | Performed by: INTERNAL MEDICINE

## 2020-05-20 PROCEDURE — 4A023N7 MEASUREMENT OF CARDIAC SAMPLING AND PRESSURE, LEFT HEART, PERCUTANEOUS APPROACH: ICD-10-PCS | Performed by: INTERNAL MEDICINE

## 2020-05-20 PROCEDURE — 94660 CPAP INITIATION&MGMT: CPT

## 2020-05-20 PROCEDURE — B2111ZZ FLUOROSCOPY OF MULTIPLE CORONARY ARTERIES USING LOW OSMOLAR CONTRAST: ICD-10-PCS | Performed by: INTERNAL MEDICINE

## 2020-05-20 PROCEDURE — 2580000003 HC RX 258: Performed by: INTERNAL MEDICINE

## 2020-05-20 PROCEDURE — 2500000003 HC RX 250 WO HCPCS

## 2020-05-20 PROCEDURE — C1725 CATH, TRANSLUMIN NON-LASER: HCPCS

## 2020-05-20 PROCEDURE — C1769 GUIDE WIRE: HCPCS

## 2020-05-20 PROCEDURE — C1894 INTRO/SHEATH, NON-LASER: HCPCS

## 2020-05-20 PROCEDURE — 82962 GLUCOSE BLOOD TEST: CPT

## 2020-05-20 PROCEDURE — 93458 L HRT ARTERY/VENTRICLE ANGIO: CPT | Performed by: INTERNAL MEDICINE

## 2020-05-20 PROCEDURE — 36415 COLL VENOUS BLD VENIPUNCTURE: CPT

## 2020-05-20 PROCEDURE — 2709999900 HC NON-CHARGEABLE SUPPLY

## 2020-05-20 PROCEDURE — C1887 CATHETER, GUIDING: HCPCS

## 2020-05-20 PROCEDURE — 86901 BLOOD TYPING SEROLOGIC RH(D): CPT

## 2020-05-20 PROCEDURE — 80048 BASIC METABOLIC PNL TOTAL CA: CPT

## 2020-05-20 PROCEDURE — 86900 BLOOD TYPING SEROLOGIC ABO: CPT

## 2020-05-20 RX ORDER — ASPIRIN 81 MG/1
81 TABLET ORAL DAILY
Status: DISCONTINUED | OUTPATIENT
Start: 2020-05-20 | End: 2020-05-22 | Stop reason: HOSPADM

## 2020-05-20 RX ORDER — ASPIRIN 81 MG/1
243 TABLET, CHEWABLE ORAL ONCE
Status: COMPLETED | OUTPATIENT
Start: 2020-05-20 | End: 2020-05-20

## 2020-05-20 RX ORDER — SODIUM CHLORIDE 0.9 % (FLUSH) 0.9 %
10 SYRINGE (ML) INJECTION EVERY 12 HOURS SCHEDULED
Status: DISCONTINUED | OUTPATIENT
Start: 2020-05-20 | End: 2020-05-20 | Stop reason: SDUPTHER

## 2020-05-20 RX ORDER — NITROGLYCERIN 0.4 MG/1
0.4 TABLET SUBLINGUAL EVERY 5 MIN PRN
Status: DISCONTINUED | OUTPATIENT
Start: 2020-05-20 | End: 2020-05-22 | Stop reason: HOSPADM

## 2020-05-20 RX ORDER — NICOTINE POLACRILEX 4 MG
15 LOZENGE BUCCAL PRN
Status: DISCONTINUED | OUTPATIENT
Start: 2020-05-20 | End: 2020-05-22 | Stop reason: HOSPADM

## 2020-05-20 RX ORDER — ONDANSETRON 2 MG/ML
4 INJECTION INTRAMUSCULAR; INTRAVENOUS EVERY 6 HOURS PRN
Status: DISCONTINUED | OUTPATIENT
Start: 2020-05-20 | End: 2020-05-22 | Stop reason: HOSPADM

## 2020-05-20 RX ORDER — HYDROCODONE BITARTRATE AND ACETAMINOPHEN 7.5; 325 MG/1; MG/1
1 TABLET ORAL EVERY 6 HOURS PRN
Status: DISCONTINUED | OUTPATIENT
Start: 2020-05-20 | End: 2020-05-22 | Stop reason: HOSPADM

## 2020-05-20 RX ORDER — HYDROCODONE BITARTRATE AND ACETAMINOPHEN 5; 325 MG/1; MG/1
1 TABLET ORAL ONCE
Status: COMPLETED | OUTPATIENT
Start: 2020-05-20 | End: 2020-05-20

## 2020-05-20 RX ORDER — ALBUTEROL SULFATE 2.5 MG/3ML
2.5 SOLUTION RESPIRATORY (INHALATION) EVERY 6 HOURS PRN
Status: DISCONTINUED | OUTPATIENT
Start: 2020-05-20 | End: 2020-05-22 | Stop reason: HOSPADM

## 2020-05-20 RX ORDER — LANOLIN ALCOHOL/MO/W.PET/CERES
1000 CREAM (GRAM) TOPICAL DAILY
Status: DISCONTINUED | OUTPATIENT
Start: 2020-05-20 | End: 2020-05-22 | Stop reason: HOSPADM

## 2020-05-20 RX ORDER — LISINOPRIL 20 MG/1
20 TABLET ORAL DAILY
Status: DISCONTINUED | OUTPATIENT
Start: 2020-05-20 | End: 2020-05-20 | Stop reason: SDUPTHER

## 2020-05-20 RX ORDER — LISINOPRIL 20 MG/1
20 TABLET ORAL DAILY
Status: DISCONTINUED | OUTPATIENT
Start: 2020-05-20 | End: 2020-05-22 | Stop reason: HOSPADM

## 2020-05-20 RX ORDER — PANTOPRAZOLE SODIUM 40 MG/1
40 TABLET, DELAYED RELEASE ORAL
Status: DISCONTINUED | OUTPATIENT
Start: 2020-05-21 | End: 2020-05-20 | Stop reason: SDUPTHER

## 2020-05-20 RX ORDER — M-VIT,TX,IRON,MINS/CALC/FOLIC 27MG-0.4MG
1 TABLET ORAL DAILY
Status: DISCONTINUED | OUTPATIENT
Start: 2020-05-20 | End: 2020-05-22 | Stop reason: HOSPADM

## 2020-05-20 RX ORDER — SODIUM CHLORIDE 0.9 % (FLUSH) 0.9 %
10 SYRINGE (ML) INJECTION PRN
Status: DISCONTINUED | OUTPATIENT
Start: 2020-05-20 | End: 2020-05-22 | Stop reason: HOSPADM

## 2020-05-20 RX ORDER — ALPRAZOLAM 0.25 MG/1
0.5 TABLET ORAL NIGHTLY PRN
Status: DISCONTINUED | OUTPATIENT
Start: 2020-05-20 | End: 2020-05-22 | Stop reason: HOSPADM

## 2020-05-20 RX ORDER — SOTALOL HYDROCHLORIDE 120 MG/1
120 TABLET ORAL 2 TIMES DAILY
Status: DISCONTINUED | OUTPATIENT
Start: 2020-05-20 | End: 2020-05-20 | Stop reason: SDUPTHER

## 2020-05-20 RX ORDER — GLYBURIDE 5 MG/1
5 TABLET ORAL NIGHTLY
Status: DISCONTINUED | OUTPATIENT
Start: 2020-05-20 | End: 2020-05-22 | Stop reason: HOSPADM

## 2020-05-20 RX ORDER — M-VIT,TX,IRON,MINS/CALC/FOLIC 27MG-0.4MG
1 TABLET ORAL DAILY
Status: DISCONTINUED | OUTPATIENT
Start: 2020-05-20 | End: 2020-05-20 | Stop reason: SDUPTHER

## 2020-05-20 RX ORDER — SOTALOL HYDROCHLORIDE 120 MG/1
120 TABLET ORAL EVERY 12 HOURS
Status: DISCONTINUED | OUTPATIENT
Start: 2020-05-20 | End: 2020-05-22 | Stop reason: HOSPADM

## 2020-05-20 RX ORDER — HYDROCHLOROTHIAZIDE 12.5 MG/1
12.5 TABLET ORAL DAILY
Status: DISCONTINUED | OUTPATIENT
Start: 2020-05-20 | End: 2020-05-20 | Stop reason: SDUPTHER

## 2020-05-20 RX ORDER — DILTIAZEM HYDROCHLORIDE 180 MG/1
180 CAPSULE, COATED, EXTENDED RELEASE ORAL DAILY
Status: DISCONTINUED | OUTPATIENT
Start: 2020-05-20 | End: 2020-05-20 | Stop reason: SDUPTHER

## 2020-05-20 RX ORDER — HYDROCHLOROTHIAZIDE 12.5 MG/1
12.5 TABLET ORAL DAILY
Status: DISCONTINUED | OUTPATIENT
Start: 2020-05-20 | End: 2020-05-22 | Stop reason: HOSPADM

## 2020-05-20 RX ORDER — GLYBURIDE 5 MG/1
5 TABLET ORAL
Status: DISCONTINUED | OUTPATIENT
Start: 2020-05-21 | End: 2020-05-20 | Stop reason: SDUPTHER

## 2020-05-20 RX ORDER — LACTOBACILLUS RHAMNOSUS GG 10B CELL
1 CAPSULE ORAL DAILY
Status: DISCONTINUED | OUTPATIENT
Start: 2020-05-20 | End: 2020-05-22 | Stop reason: HOSPADM

## 2020-05-20 RX ORDER — SODIUM CHLORIDE 0.9 % (FLUSH) 0.9 %
10 SYRINGE (ML) INJECTION PRN
Status: DISCONTINUED | OUTPATIENT
Start: 2020-05-20 | End: 2020-05-20 | Stop reason: SDUPTHER

## 2020-05-20 RX ORDER — ACETAMINOPHEN 325 MG/1
650 TABLET ORAL EVERY 6 HOURS PRN
Status: DISCONTINUED | OUTPATIENT
Start: 2020-05-20 | End: 2020-05-22 | Stop reason: HOSPADM

## 2020-05-20 RX ORDER — SODIUM CHLORIDE 9 MG/ML
INJECTION, SOLUTION INTRAVENOUS CONTINUOUS
Status: DISCONTINUED | OUTPATIENT
Start: 2020-05-20 | End: 2020-05-20 | Stop reason: HOSPADM

## 2020-05-20 RX ORDER — PANTOPRAZOLE SODIUM 40 MG/1
40 TABLET, DELAYED RELEASE ORAL
Status: DISCONTINUED | OUTPATIENT
Start: 2020-05-21 | End: 2020-05-22 | Stop reason: HOSPADM

## 2020-05-20 RX ORDER — ASCORBIC ACID 500 MG
1000 TABLET ORAL DAILY
Status: DISCONTINUED | OUTPATIENT
Start: 2020-05-20 | End: 2020-05-20 | Stop reason: SDUPTHER

## 2020-05-20 RX ORDER — SODIUM CHLORIDE 9 MG/ML
INJECTION, SOLUTION INTRAVENOUS CONTINUOUS
Status: DISCONTINUED | OUTPATIENT
Start: 2020-05-20 | End: 2020-05-22 | Stop reason: HOSPADM

## 2020-05-20 RX ORDER — DEXTROSE MONOHYDRATE 25 G/50ML
12.5 INJECTION, SOLUTION INTRAVENOUS PRN
Status: DISCONTINUED | OUTPATIENT
Start: 2020-05-20 | End: 2020-05-22 | Stop reason: HOSPADM

## 2020-05-20 RX ORDER — DILTIAZEM HYDROCHLORIDE 180 MG/1
180 CAPSULE, COATED, EXTENDED RELEASE ORAL DAILY
Status: DISCONTINUED | OUTPATIENT
Start: 2020-05-20 | End: 2020-05-22 | Stop reason: HOSPADM

## 2020-05-20 RX ORDER — SODIUM CHLORIDE 0.9 % (FLUSH) 0.9 %
10 SYRINGE (ML) INJECTION EVERY 12 HOURS SCHEDULED
Status: DISCONTINUED | OUTPATIENT
Start: 2020-05-20 | End: 2020-05-22 | Stop reason: HOSPADM

## 2020-05-20 RX ORDER — ASCORBIC ACID 500 MG
1000 TABLET ORAL DAILY
Status: DISCONTINUED | OUTPATIENT
Start: 2020-05-20 | End: 2020-05-22 | Stop reason: HOSPADM

## 2020-05-20 RX ORDER — LANOLIN ALCOHOL/MO/W.PET/CERES
1000 CREAM (GRAM) TOPICAL DAILY
Status: DISCONTINUED | OUTPATIENT
Start: 2020-05-20 | End: 2020-05-20 | Stop reason: SDUPTHER

## 2020-05-20 RX ORDER — ACETAMINOPHEN 325 MG/1
650 TABLET ORAL EVERY 4 HOURS PRN
Status: DISCONTINUED | OUTPATIENT
Start: 2020-05-20 | End: 2020-05-20 | Stop reason: ALTCHOICE

## 2020-05-20 RX ORDER — ASPIRIN 81 MG/1
81 TABLET, CHEWABLE ORAL DAILY
Status: DISCONTINUED | OUTPATIENT
Start: 2020-05-20 | End: 2020-05-20 | Stop reason: SDUPTHER

## 2020-05-20 RX ORDER — LISINOPRIL AND HYDROCHLOROTHIAZIDE 20; 12.5 MG/1; MG/1
1 TABLET ORAL DAILY
Status: DISCONTINUED | OUTPATIENT
Start: 2020-05-20 | End: 2020-05-20 | Stop reason: SDUPTHER

## 2020-05-20 RX ORDER — DEXTROSE MONOHYDRATE 50 MG/ML
100 INJECTION, SOLUTION INTRAVENOUS PRN
Status: DISCONTINUED | OUTPATIENT
Start: 2020-05-20 | End: 2020-05-22 | Stop reason: HOSPADM

## 2020-05-20 RX ADMIN — GLYBURIDE 5 MG: 5 TABLET ORAL at 21:50

## 2020-05-20 RX ADMIN — ASPIRIN 81 MG 81 MG: 81 TABLET ORAL at 08:11

## 2020-05-20 RX ADMIN — SODIUM CHLORIDE, PRESERVATIVE FREE 10 ML: 5 INJECTION INTRAVENOUS at 21:49

## 2020-05-20 RX ADMIN — OXYCODONE HYDROCHLORIDE AND ACETAMINOPHEN 1000 MG: 500 TABLET ORAL at 08:11

## 2020-05-20 RX ADMIN — CYANOCOBALAMIN TAB 1000 MCG 1000 MCG: 1000 TAB at 08:11

## 2020-05-20 RX ADMIN — SODIUM CHLORIDE: 9 INJECTION, SOLUTION INTRAVENOUS at 11:29

## 2020-05-20 RX ADMIN — SOTALOL HYDROCHLORIDE 120 MG: 120 TABLET ORAL at 21:49

## 2020-05-20 RX ADMIN — SODIUM CHLORIDE 75 ML/HR: 9 INJECTION, SOLUTION INTRAVENOUS at 21:54

## 2020-05-20 RX ADMIN — ASPIRIN 81 MG 243 MG: 81 TABLET ORAL at 11:23

## 2020-05-20 RX ADMIN — PANTOPRAZOLE SODIUM 40 MG: 40 TABLET, DELAYED RELEASE ORAL at 05:54

## 2020-05-20 RX ADMIN — HYDROCODONE BITARTRATE AND ACETAMINOPHEN 1 TABLET: 5; 325 TABLET ORAL at 15:41

## 2020-05-20 RX ADMIN — SODIUM CHLORIDE: 9 INJECTION, SOLUTION INTRAVENOUS at 05:54

## 2020-05-20 RX ADMIN — HYDROCODONE BITARTRATE AND ACETAMINOPHEN 1 TABLET: 7.5; 325 TABLET ORAL at 01:02

## 2020-05-20 RX ADMIN — Medication 1 CAPSULE: at 08:11

## 2020-05-20 RX ADMIN — LISINOPRIL 20 MG: 20 TABLET ORAL at 08:11

## 2020-05-20 RX ADMIN — HYDROCODONE BITARTRATE AND ACETAMINOPHEN 1 TABLET: 7.5; 325 TABLET ORAL at 08:10

## 2020-05-20 RX ADMIN — MULTIPLE VITAMINS W/ MINERALS TAB 1 TABLET: TAB at 08:11

## 2020-05-20 RX ADMIN — HYDROCODONE BITARTRATE AND ACETAMINOPHEN 1 TABLET: 7.5; 325 TABLET ORAL at 20:23

## 2020-05-20 RX ADMIN — SOTALOL HYDROCHLORIDE 120 MG: 80 TABLET ORAL at 08:10

## 2020-05-20 RX ADMIN — ALPRAZOLAM 0.5 MG: 0.25 TABLET ORAL at 23:10

## 2020-05-20 RX ADMIN — DILTIAZEM HYDROCHLORIDE 180 MG: 180 CAPSULE, COATED, EXTENDED RELEASE ORAL at 08:11

## 2020-05-20 RX ADMIN — INSULIN LISPRO 3 UNITS: 100 INJECTION, SOLUTION INTRAVENOUS; SUBCUTANEOUS at 21:46

## 2020-05-20 ASSESSMENT — PAIN DESCRIPTION - ONSET: ONSET: ON-GOING

## 2020-05-20 ASSESSMENT — PAIN DESCRIPTION - PAIN TYPE
TYPE: CHRONIC PAIN
TYPE: ACUTE PAIN
TYPE: ACUTE PAIN

## 2020-05-20 ASSESSMENT — PAIN SCALES - GENERAL
PAINLEVEL_OUTOF10: 7
PAINLEVEL_OUTOF10: 2
PAINLEVEL_OUTOF10: 0
PAINLEVEL_OUTOF10: 0
PAINLEVEL_OUTOF10: 7
PAINLEVEL_OUTOF10: 8
PAINLEVEL_OUTOF10: 0
PAINLEVEL_OUTOF10: 8
PAINLEVEL_OUTOF10: 8

## 2020-05-20 ASSESSMENT — PAIN DESCRIPTION - LOCATION
LOCATION: CHEST

## 2020-05-20 ASSESSMENT — PAIN - FUNCTIONAL ASSESSMENT: PAIN_FUNCTIONAL_ASSESSMENT: ACTIVITIES ARE NOT PREVENTED

## 2020-05-20 ASSESSMENT — PAIN DESCRIPTION - ORIENTATION
ORIENTATION: MID
ORIENTATION: MID

## 2020-05-20 ASSESSMENT — PAIN DESCRIPTION - DESCRIPTORS
DESCRIPTORS: DISCOMFORT
DESCRIPTORS: ACHING

## 2020-05-20 ASSESSMENT — PAIN SCALES - WONG BAKER: WONGBAKER_NUMERICALRESPONSE: 0

## 2020-05-20 ASSESSMENT — PAIN DESCRIPTION - FREQUENCY: FREQUENCY: INTERMITTENT

## 2020-05-20 NOTE — DISCHARGE SUMMARY
1.6 02/20/2018    INR 1.1 02/19/2018     Discharge Medications:    Discharge Medication List as of 5/20/2020  9:13 AM           Details   dilTIAZem (CARDIZEM CD) 180 MG extended release capsule Take 1 capsule by mouth daily, Disp-30 capsule, R-0Normal              Details   sotalol (BETAPACE) 120 MG tablet Take 1 tablet by mouth every 12 hours, Disp-60 tablet, R-0Normal      metFORMIN (GLUCOPHAGE) 500 MG tablet Take 1,000 mg by mouth 2 times daily (with meals) Historical Med      Rivaroxaban (XARELTO PO) Take 20 mg by mouth nightly Historical Med      albuterol sulfate  (90 BASE) MCG/ACT inhaler Inhale 2 puffs into the lungs every 6 hours as needed for Wheezing or Shortness of Breath      pantoprazole (PROTONIX) 40 MG tablet Take 1 tablet by mouth every morning (before breakfast), Disp-30 tablet, R-3      lisinopril-hydrochlorothiazide (PRINZIDE;ZESTORETIC) 20-12.5 MG per tablet Take 1 tablet by mouth daily      vitamin B-12 (CYANOCOBALAMIN) 1000 MCG tablet Take 1,000 mcg by mouth daily      Ascorbic Acid (VITAMIN C) 250 MG tablet Take 1,000 mg by mouth daily      glyBURIDE (DIABETA) 5 MG tablet Take 1 tablet by mouth daily (with breakfast), Disp-30 tablet, R-0      glucose monitoring kit (FREESTYLE) monitoring kit DAILY PRN Starting 7/8/2015, Until Discontinued, Disp-1 kit, R-0, Normal      Multiple Vitamins-Minerals (THERAPEUTIC MULTIVITAMIN-MINERALS) tablet Take 1 tablet by mouth daily      aspirin 81 MG tablet Take 81 mg by mouth daily                        Discharge Medication List as of 5/20/2020  9:13 AM      STOP taking these medications       insulin glargine (LANTUS) 100 UNIT/ML injection vial Comments:   Reason for Stopping:             Disposition:   [facility] for cardiac cath    Follow-up in office after d/c from [facility]  Patient advised to bring all meds to appointment.     Note that over 30 minutes was spent in preparing discharge papers, discussing discharge with patient, nursing and ancillary staff, medication review, etc.    Signed:  Starla Lainez MD  5/20/2020, 3:45 PM

## 2020-05-20 NOTE — PROGRESS NOTES
Patient arrived to unit from cath lab, perfect serve message sent to Dr. Good Hanks for admission orders. Arrived with armboard in place, site clean, right radial pulse +2, fingers warm.

## 2020-05-21 ENCOUNTER — APPOINTMENT (OUTPATIENT)
Dept: CT IMAGING | Age: 55
DRG: 287 | End: 2020-05-21
Attending: INTERNAL MEDICINE
Payer: COMMERCIAL

## 2020-05-21 ENCOUNTER — APPOINTMENT (OUTPATIENT)
Dept: INTERVENTIONAL RADIOLOGY/VASCULAR | Age: 55
DRG: 287 | End: 2020-05-21
Attending: INTERNAL MEDICINE
Payer: COMMERCIAL

## 2020-05-21 ENCOUNTER — APPOINTMENT (OUTPATIENT)
Dept: ULTRASOUND IMAGING | Age: 55
DRG: 287 | End: 2020-05-21
Attending: INTERNAL MEDICINE
Payer: COMMERCIAL

## 2020-05-21 LAB
ANION GAP SERPL CALCULATED.3IONS-SCNC: 8 MMOL/L (ref 7–16)
APTT: 30.1 SEC (ref 24.5–35.1)
APTT: 44.4 SEC (ref 24.5–35.1)
BUN BLDV-MCNC: 15 MG/DL (ref 6–20)
CALCIUM SERPL-MCNC: 8.9 MG/DL (ref 8.6–10.2)
CHLORIDE BLD-SCNC: 104 MMOL/L (ref 98–107)
CO2: 26 MMOL/L (ref 22–29)
CREAT SERPL-MCNC: 0.8 MG/DL (ref 0.7–1.2)
GFR AFRICAN AMERICAN: >60
GFR NON-AFRICAN AMERICAN: >60 ML/MIN/1.73
GLUCOSE BLD-MCNC: 139 MG/DL (ref 74–99)
HCT VFR BLD CALC: 38.8 % (ref 37–54)
HEMOGLOBIN: 12.5 G/DL (ref 12.5–16.5)
MCH RBC QN AUTO: 30.3 PG (ref 26–35)
MCHC RBC AUTO-ENTMCNC: 32.2 % (ref 32–34.5)
MCV RBC AUTO: 94.2 FL (ref 80–99.9)
METER GLUCOSE: 134 MG/DL (ref 74–99)
METER GLUCOSE: 192 MG/DL (ref 74–99)
METER GLUCOSE: 207 MG/DL (ref 74–99)
METER GLUCOSE: 211 MG/DL (ref 74–99)
PDW BLD-RTO: 14.3 FL (ref 11.5–15)
PLATELET # BLD: 224 E9/L (ref 130–450)
PMV BLD AUTO: 10.7 FL (ref 7–12)
POTASSIUM SERPL-SCNC: 4.6 MMOL/L (ref 3.5–5)
RBC # BLD: 4.12 E12/L (ref 3.8–5.8)
SODIUM BLD-SCNC: 138 MMOL/L (ref 132–146)
TROPONIN: <0.01 NG/ML (ref 0–0.03)
WBC # BLD: 9.5 E9/L (ref 4.5–11.5)

## 2020-05-21 PROCEDURE — 84484 ASSAY OF TROPONIN QUANT: CPT

## 2020-05-21 PROCEDURE — 2140000000 HC CCU INTERMEDIATE R&B

## 2020-05-21 PROCEDURE — 82962 GLUCOSE BLOOD TEST: CPT

## 2020-05-21 PROCEDURE — 71250 CT THORAX DX C-: CPT

## 2020-05-21 PROCEDURE — 94660 CPAP INITIATION&MGMT: CPT

## 2020-05-21 PROCEDURE — 80048 BASIC METABOLIC PNL TOTAL CA: CPT

## 2020-05-21 PROCEDURE — 93970 EXTREMITY STUDY: CPT

## 2020-05-21 PROCEDURE — 2580000003 HC RX 258: Performed by: INTERNAL MEDICINE

## 2020-05-21 PROCEDURE — 93880 EXTRACRANIAL BILAT STUDY: CPT

## 2020-05-21 PROCEDURE — 99222 1ST HOSP IP/OBS MODERATE 55: CPT | Performed by: THORACIC SURGERY (CARDIOTHORACIC VASCULAR SURGERY)

## 2020-05-21 PROCEDURE — 85730 THROMBOPLASTIN TIME PARTIAL: CPT

## 2020-05-21 PROCEDURE — 6360000002 HC RX W HCPCS: Performed by: NURSE PRACTITIONER

## 2020-05-21 PROCEDURE — 36415 COLL VENOUS BLD VENIPUNCTURE: CPT

## 2020-05-21 PROCEDURE — 99233 SBSQ HOSP IP/OBS HIGH 50: CPT | Performed by: INTERNAL MEDICINE

## 2020-05-21 PROCEDURE — 93922 UPR/L XTREMITY ART 2 LEVELS: CPT

## 2020-05-21 PROCEDURE — APPSS45 APP SPLIT SHARED TIME 31-45 MINUTES: Performed by: NURSE PRACTITIONER

## 2020-05-21 PROCEDURE — 6370000000 HC RX 637 (ALT 250 FOR IP): Performed by: INTERNAL MEDICINE

## 2020-05-21 PROCEDURE — 85027 COMPLETE CBC AUTOMATED: CPT

## 2020-05-21 RX ORDER — HEPARIN SODIUM 1000 [USP'U]/ML
2000 INJECTION, SOLUTION INTRAVENOUS; SUBCUTANEOUS PRN
Status: DISCONTINUED | OUTPATIENT
Start: 2020-05-21 | End: 2020-05-22 | Stop reason: HOSPADM

## 2020-05-21 RX ORDER — ISOSORBIDE MONONITRATE 30 MG/1
30 TABLET, EXTENDED RELEASE ORAL DAILY
Status: DISCONTINUED | OUTPATIENT
Start: 2020-05-21 | End: 2020-05-22 | Stop reason: HOSPADM

## 2020-05-21 RX ORDER — HEPARIN SODIUM 1000 [USP'U]/ML
4000 INJECTION, SOLUTION INTRAVENOUS; SUBCUTANEOUS PRN
Status: DISCONTINUED | OUTPATIENT
Start: 2020-05-21 | End: 2020-05-22 | Stop reason: HOSPADM

## 2020-05-21 RX ORDER — RANOLAZINE 500 MG/1
500 TABLET, EXTENDED RELEASE ORAL 2 TIMES DAILY
Status: DISCONTINUED | OUTPATIENT
Start: 2020-05-21 | End: 2020-05-22 | Stop reason: HOSPADM

## 2020-05-21 RX ORDER — HEPARIN SODIUM 10000 [USP'U]/100ML
8.19 INJECTION, SOLUTION INTRAVENOUS CONTINUOUS
Status: DISCONTINUED | OUTPATIENT
Start: 2020-05-21 | End: 2020-05-22 | Stop reason: HOSPADM

## 2020-05-21 RX ORDER — HEPARIN SODIUM 1000 [USP'U]/ML
4000 INJECTION, SOLUTION INTRAVENOUS; SUBCUTANEOUS ONCE
Status: COMPLETED | OUTPATIENT
Start: 2020-05-21 | End: 2020-05-21

## 2020-05-21 RX ADMIN — SOTALOL HYDROCHLORIDE 120 MG: 120 TABLET ORAL at 18:52

## 2020-05-21 RX ADMIN — ASPIRIN 81 MG: 81 TABLET, COATED ORAL at 08:43

## 2020-05-21 RX ADMIN — OXYCODONE HYDROCHLORIDE AND ACETAMINOPHEN 1000 MG: 500 TABLET ORAL at 08:44

## 2020-05-21 RX ADMIN — INSULIN LISPRO 4 UNITS: 100 INJECTION, SOLUTION INTRAVENOUS; SUBCUTANEOUS at 16:20

## 2020-05-21 RX ADMIN — HEPARIN SODIUM 2000 UNITS: 1000 INJECTION, SOLUTION INTRAVENOUS; SUBCUTANEOUS at 21:29

## 2020-05-21 RX ADMIN — HEPARIN SODIUM 4000 UNITS: 1000 INJECTION, SOLUTION INTRAVENOUS; SUBCUTANEOUS at 12:11

## 2020-05-21 RX ADMIN — HYDROCODONE BITARTRATE AND ACETAMINOPHEN 1 TABLET: 7.5; 325 TABLET ORAL at 10:46

## 2020-05-21 RX ADMIN — Medication 1000 MCG: at 08:43

## 2020-05-21 RX ADMIN — HYDROCHLOROTHIAZIDE 12.5 MG: 12.5 TABLET ORAL at 08:43

## 2020-05-21 RX ADMIN — ALPRAZOLAM 0.5 MG: 0.25 TABLET ORAL at 22:00

## 2020-05-21 RX ADMIN — HYDROCODONE BITARTRATE AND ACETAMINOPHEN 1 TABLET: 7.5; 325 TABLET ORAL at 04:20

## 2020-05-21 RX ADMIN — INSULIN LISPRO 2 UNITS: 100 INJECTION, SOLUTION INTRAVENOUS; SUBCUTANEOUS at 12:19

## 2020-05-21 RX ADMIN — HYDROCODONE BITARTRATE AND ACETAMINOPHEN 1 TABLET: 7.5; 325 TABLET ORAL at 22:00

## 2020-05-21 RX ADMIN — HYDROCODONE BITARTRATE AND ACETAMINOPHEN 1 TABLET: 7.5; 325 TABLET ORAL at 16:18

## 2020-05-21 RX ADMIN — LISINOPRIL 20 MG: 20 TABLET ORAL at 08:43

## 2020-05-21 RX ADMIN — ISOSORBIDE MONONITRATE 30 MG: 30 TABLET ORAL at 12:11

## 2020-05-21 RX ADMIN — GLYBURIDE 5 MG: 5 TABLET ORAL at 20:49

## 2020-05-21 RX ADMIN — RANOLAZINE 500 MG: 500 TABLET, FILM COATED, EXTENDED RELEASE ORAL at 13:21

## 2020-05-21 RX ADMIN — DILTIAZEM HYDROCHLORIDE 180 MG: 180 CAPSULE, EXTENDED RELEASE ORAL at 08:43

## 2020-05-21 RX ADMIN — INSULIN LISPRO 2 UNITS: 100 INJECTION, SOLUTION INTRAVENOUS; SUBCUTANEOUS at 20:49

## 2020-05-21 RX ADMIN — RANOLAZINE 500 MG: 500 TABLET, FILM COATED, EXTENDED RELEASE ORAL at 23:47

## 2020-05-21 RX ADMIN — PANTOPRAZOLE SODIUM 40 MG: 40 TABLET, DELAYED RELEASE ORAL at 06:12

## 2020-05-21 RX ADMIN — SODIUM CHLORIDE: 9 INJECTION, SOLUTION INTRAVENOUS at 18:52

## 2020-05-21 RX ADMIN — SOTALOL HYDROCHLORIDE 120 MG: 120 TABLET ORAL at 08:43

## 2020-05-21 RX ADMIN — HEPARIN SODIUM 8.19 UNITS/KG/HR: 10000 INJECTION, SOLUTION INTRAVENOUS at 12:04

## 2020-05-21 RX ADMIN — MULTIPLE VITAMINS W/ MINERALS TAB 1 TABLET: TAB at 08:43

## 2020-05-21 RX ADMIN — Medication 1 CAPSULE: at 08:43

## 2020-05-21 ASSESSMENT — PAIN SCALES - GENERAL
PAINLEVEL_OUTOF10: 6
PAINLEVEL_OUTOF10: 0
PAINLEVEL_OUTOF10: 7
PAINLEVEL_OUTOF10: 0
PAINLEVEL_OUTOF10: 6
PAINLEVEL_OUTOF10: 6
PAINLEVEL_OUTOF10: 7
PAINLEVEL_OUTOF10: 7

## 2020-05-21 ASSESSMENT — PAIN DESCRIPTION - PAIN TYPE: TYPE: ACUTE PAIN

## 2020-05-21 ASSESSMENT — PAIN DESCRIPTION - LOCATION: LOCATION: HEAD

## 2020-05-21 NOTE — H&P
(PRINZIDE;ZESTORETIC) 20-12.5 MG per tablet, Take 1 tablet by mouth daily  vitamin B-12 (CYANOCOBALAMIN) 1000 MCG tablet, Take 1,000 mcg by mouth daily  Ascorbic Acid (VITAMIN C) 250 MG tablet, Take 1,000 mg by mouth daily  glyBURIDE (DIABETA) 5 MG tablet, Take 1 tablet by mouth daily (with breakfast) (Patient taking differently: Take 5 mg by mouth nightly )  glucose monitoring kit (FREESTYLE) monitoring kit, 1 kit by Does not apply route daily as needed  Multiple Vitamins-Minerals (THERAPEUTIC MULTIVITAMIN-MINERALS) tablet, Take 1 tablet by mouth daily  aspirin 81 MG tablet, Take 81 mg by mouth daily    Allergies: Other    Social History:   Social History     Socioeconomic History    Marital status:       Spouse name: Not on file    Number of children: Not on file    Years of education: Not on file    Highest education level: Not on file   Occupational History    Not on file   Social Needs    Financial resource strain: Not on file    Food insecurity     Worry: Not on file     Inability: Not on file    Transportation needs     Medical: Not on file     Non-medical: Not on file   Tobacco Use    Smoking status: Heavy Tobacco Smoker     Packs/day: 0.50     Years: 35.00     Pack years: 17.50     Types: Cigarettes    Smokeless tobacco: Never Used   Substance and Sexual Activity    Alcohol use: No     Comment: moderate    Drug use: No    Sexual activity: Not Currently   Lifestyle    Physical activity     Days per week: Not on file     Minutes per session: Not on file    Stress: Not on file   Relationships    Social connections     Talks on phone: Not on file     Gets together: Not on file     Attends Rastafari service: Not on file     Active member of club or organization: Not on file     Attends meetings of clubs or organizations: Not on file     Relationship status: Not on file    Intimate partner violence     Fear of current or ex partner: Not on file     Emotionally abused: Not on file 98 - 107 mmol/L    CO2 26 22 - 29 mmol/L    Anion Gap 8 7 - 16 mmol/L    Glucose 139 (H) 74 - 99 mg/dL    BUN 15 6 - 20 mg/dL    CREATININE 0.8 0.7 - 1.2 mg/dL    GFR Non-African American >60 >=60 mL/min/1.73    GFR African American >60     Calcium 8.9 8.6 - 10.2 mg/dL       US CAROTID ARTERY BILATERAL    (Results Pending)   US DUP LOWER EXTREMITY MAPPING BILAT VENOUS    (Results Pending)   CT Chest WO Contrast    (Results Pending)   VL MYAH BILATERAL LIMITED 1-2 LEVELS    (Results Pending)           ASSESSMENT :      Active Problems:    Unstable angina (HCC)    Acute coronary syndrome (HCC)  Resolved Problems:    * No resolved hospital problems. *  Multivessel coronary artery disease  Obesity  Atrial fibrillation by history  Diabetes mellitus type 2  Hyperlipidemia  Hypertension   history of TIA    Plan :    Cardiothoracic surgery has been consulted  Heart cath details noted        Electronically signed by Santa Edwards MD on 5/21/2020 at 11:29 AM    NOTE: This report was transcribed using voice recognition software.  Every effort was made to ensure accuracy; however, inadvertent transcription errors may be present

## 2020-05-21 NOTE — PROGRESS NOTES
PROGRESS NOTE     CARDIOLOGY    Chief complaint: Seen today for follow up, management & recommendations for unstable angina    He denies shortness of breath today. He was lying flat in bed. He was comfortable and in no distress. He states that he did have intermittent episodes of chest discomfort this morning at rest.    Wt Readings from Last 3 Encounters:   05/20/20 270 lb (122.5 kg)   05/16/20 269 lb 12.8 oz (122.4 kg)   10/13/19 270 lb (122.5 kg)     Temp Readings from Last 3 Encounters:   05/21/20 98.1 °F (36.7 °C) (Temporal)   05/20/20 98.4 °F (36.9 °C) (Oral)   10/13/19 97.7 °F (36.5 °C) (Oral)     BP Readings from Last 3 Encounters:   05/21/20 132/77   05/20/20 131/73   10/13/19 124/72     Pulse Readings from Last 3 Encounters:   05/21/20 71   05/20/20 70   10/13/19 64         Intake/Output Summary (Last 24 hours) at 5/21/2020 1215  Last data filed at 5/20/2020 2315  Gross per 24 hour   Intake --   Output 350 ml   Net -350 ml       Recent Labs     05/20/20  0542   WBC 10.7   HGB 12.7   HCT 38.5   MCV 94.8        Recent Labs     05/20/20  0542 05/21/20  0651    138   K 4.4 4.6    104   CO2 26 26   BUN 20 15   CREATININE 1.0 0.8     No results for input(s): PROTIME, INR in the last 72 hours. No results for input(s): CKTOTAL, CKMB, CKMBINDEX, TROPONINI in the last 72 hours. No results for input(s): BNP in the last 72 hours. No results for input(s): CHOL, HDL, TRIG in the last 72 hours.     Invalid input(s): CHOLHDLR, LDLCALCU      heparin (porcine) injection 4,000 Units, Once  heparin (porcine) injection 4,000 Units, PRN  heparin (porcine) injection 2,000 Units, PRN  heparin 25,000 units in dextrose 5% 250 mL infusion, Continuous  isosorbide mononitrate (IMDUR) extended release tablet 30 mg, Daily  ranolazine (RANEXA) extended release tablet 500 mg, BID  0.9 % sodium chloride infusion, Continuous  therapeutic multivitamin-minerals 1 tablet, Daily  aspirin EC tablet 81 mg,

## 2020-05-21 NOTE — CONSULTS
appears stated age   Head:    Normocephalic, without obvious abnormality, atraumatic   Eyes:    PERRL, conjunctiva/corneas clear, EOM's intact, fundi     benign, both eyes        Ears:    Normal TM's and external ear canals, both ears   Nose:   Nares normal, septum midline, mucosa normal, no drainage    or sinus tenderness   Throat:   Lips, mucosa, and tongue normal; teeth and gums normal   Neck:   Supple, symmetrical, trachea midline, no adenopathy;        thyroid:  No enlargement/tenderness/nodules; no carotid    bruit or JVD   Back:     Symmetric, no curvature, ROM normal, no CVA tenderness   Lungs:     Clear to auscultation bilaterally, respirations unlabored   Chest wall:    No tenderness or deformity   Heart:    Regular rate and rhythm, S1 and S2 normal, no murmur, rub   or gallop   Abdomen:     Soft, non-tender, bowel sounds active all four quadrants,     no masses, no organomegaly           Extremities:   Extremities normal, atraumatic, no cyanosis or edema   Pulses:   2+ and symmetric all extremities   Skin:   Skin color, texture, turgor normal, no rashes or lesions   Lymph nodes:   Cervical, supraclavicular, and axillary nodes normal   Neurologic:   CNII-XII intact. Normal strength, sensation and reflexes       throughout       Data Review  CBC:   Lab Results   Component Value Date    WBC 9.5 05/21/2020    RBC 4.12 05/21/2020     BMP:   Lab Results   Component Value Date    GLUCOSE 139 05/21/2020    CO2 26 05/21/2020    BUN 15 05/21/2020    CREATININE 0.8 05/21/2020    CALCIUM 8.9 05/21/2020     [facility] (Dr. No Bolaños)    Findings:  Left main: 0% 0 stenosis  LAD: 100. %  stenosis  Circumflex: 75, 80, 50. %   stenosis  RCA: Dominant. 75-80, 70 % PDA, 90% PLB stenosis      Assessment/Plan:     Surgery was recommended. I discussed the risks, benefits, and alternatives for surgery including the chance of mortality, and incidence of stroke.  We also discussed the risks of infection, bleeding, transfusion risks,

## 2020-05-21 NOTE — PROGRESS NOTES
Date: 5/20/2020    Time: 10:22 PM    Patient Placed On BIPAP/CPAP/ Non-Invasive Ventilation? No     Comments:    bipap in room, patient is not wanting to go on bipap at this time. States after 11 he will go on for only 4 hours.   Will forward request to RT in report     Te Darnell

## 2020-05-21 NOTE — PROGRESS NOTES
Inpatient Cardiology Progress note     PATIENT IS BEING FOLLOWED FOR: Chest pain, CAD    Camelia Primrose is a 54year old male who follows with Dr. Radha Murray. SUBJECTIVE: Complains of chest pain occurring throughout the night with rest \"like I've been having it comes and goes\". Denies HERNANDEZ, orthopnea and PND. Denies pain to right wrist cath site. OBJECTIVE: No apparent distress     ROS:  Consist: Denies fevers, chills or night sweats  Heart: Denies palpitations, lightheadedness, dizziness or syncope  Lungs: Denies SOB, cough, wheezing, orthopnea or PND  GI: Denies abdominal pain, vomiting or diarrhea    PHYSICAL EXAM:   /77   Pulse 71   Temp 98.1 °F (36.7 °C) (Temporal)   Resp 16   Ht 6' (1.829 m)   Wt 270 lb (122.5 kg)   SpO2 99%   BMI 36.62 kg/m²    CONST: Well developed, obese middle aged male who appears stated age. Awake, alert and cooperative. No apparent distress  HEENT:   Head- Normocephalic, atraumatic   Eyes- Conjunctivae pink, anicteric  Throat- Oral mucosa pink and moist  Neck-  No stridor, trachea midline, no jugular venous distention. Thick neck. No carotid bruit  CHEST: Chest symmetrical and non-tender to palpation. No accessory muscle use or intercostal retractions  RESPIRATORY:  Lung sounds - clear throughout fields   CARDIOVASCULAR:     Heart Inspection- shows no noted pulsations  Heart Palpation- no heaves or thrills; PMI is non-displaced   Heart Ausculation- Regular rate and rhythm, no murmur. No s3, or rub   PV: No lower extremity edema. No varicosities. Pedal pulses palpable, no clubbing or cyanosis. Right wrist with arm board and DSD in place. Right radial pulse noted, no active bleeding or hematoma. ABDOMEN: Soft, obese, non-tender to light palpation. Bowel sounds present. No palpable masses no organomegaly; no abdominal bruit  MS: Good muscle strength and tone. No atrophy or abnormal movements.    : Deferred  SKIN: Warm and dry no statis dermatitis or ulcers   NEURO / motion abnormalities seen. No evidence of left ventricular mass or thrombus noted. There is doppler evidence of stage II diastolic dysfunction. The left atrium is borderline dilated. Physiologic and/or trace tricuspid regurgitation. RVSP is normal.   There is a trivial pericardial effusion noted. 05/16/2020 CXR:   No acute process. 05/18/2020 Lexiscan MPS:  1. Moderate-sized reversible defect in the mid and basal anterior segments. 2. Ejection fraction of 50%. 3. No focal wall motion abnormality. 05/20/2020 Cardiac Catheterization (Dr. Kellee Harrison):   Findings:  Left main: 0% 0 stenosis  LAD: 100. %  stenosis  Circumflex: 75, 80, 50. %   stenosis  RCA: Dominant. 75-80, 70 % PDA, 90% PLB stenosis  Hemodynamics:  LV: 109 mmHg. EDP: 14 No gradient across AV. Ao: 98/64. Telemetry: SR with HR now 61  12 lead EKG: NA      ASSESSMENT:   1. Chest pain, recurrent with abnormal stress test s/p Cardiac catheterization 05/20/2020 with multivessel CAD. On ASA, statin   2. PAF, now in  On Sotalol  3. Chronic anticoagulation with Xarelto (remains on hold)  4. Remote CVA without residual   5. HTN: Stable   6. HLD, on statin   7. DM. HA1C 7 this admission   8. Obesity   9. Tobacco abuse: Tobacco cessation advised         PLAN:  1. Awaiting CT Surgery consult  2. Monitor BP/HR; Continue current cardiac medications. Consider Imdur  3. Hold Xarelto for now pending CT Surgery recommendations; Consider IV Heparin or SQ Lovenox   4. Will discuss case with Dr. Kellee Harrison     Electronically signed by ANGIE Lin CNP on 5/21/2020 at 9:56 AM    Addendum:  Case discussed with Dr. Kellee Harrison. Will start Heparin gtt with risk of CVA with known PAF and CHADs2 Vasc Score 4, awaiting CT Surgery recommendations. Electronically signed by ANGIE Lin CNP on 5/21/20 at 11:00 AM EDT     I have personally seen and evaluated the patient.   I personally obtained the history and performed the physical exam.  They are currently pain free. I personally reviewed all of the above labs and data. All of the above assessments and recommendations are from me. All of the above cardiac medical decisions are from me. Agree with above. Please see my note from today. Assessment/Recommendations  1. Unstable angina. Having atypical chest pains this morning. We will start him on Imdur and Ranexa today. 2. CAD. Severe, multivessel CAD. CT surgery has been consulted. 3. Morbid obesity  4. Paroxysmal atrial fibrillation. He was on chronic Xarelto therapy. This is on hold planning revascularization procedure. I will start him on heparin low-dose protocol to bridge. 5. Prior CVA  6. Hypertension  7. Hypercholesterolemia  8. Diabetes  9. I told him to stop smoking.     Electronically signed by Nina Sanches DO on 5/21/2020 at 12:18 PM

## 2020-05-22 VITALS
RESPIRATION RATE: 16 BRPM | SYSTOLIC BLOOD PRESSURE: 110 MMHG | WEIGHT: 270 LBS | TEMPERATURE: 98.5 F | HEART RATE: 62 BPM | OXYGEN SATURATION: 96 % | DIASTOLIC BLOOD PRESSURE: 59 MMHG | BODY MASS INDEX: 36.57 KG/M2 | HEIGHT: 72 IN

## 2020-05-22 LAB
ANION GAP SERPL CALCULATED.3IONS-SCNC: 13 MMOL/L (ref 7–16)
APTT: 56.2 SEC (ref 24.5–35.1)
BUN BLDV-MCNC: 14 MG/DL (ref 6–20)
CALCIUM SERPL-MCNC: 8.7 MG/DL (ref 8.6–10.2)
CHLORIDE BLD-SCNC: 101 MMOL/L (ref 98–107)
CO2: 23 MMOL/L (ref 22–29)
CREAT SERPL-MCNC: 0.8 MG/DL (ref 0.7–1.2)
GFR AFRICAN AMERICAN: >60
GFR NON-AFRICAN AMERICAN: >60 ML/MIN/1.73
GLUCOSE BLD-MCNC: 213 MG/DL (ref 74–99)
HCT VFR BLD CALC: 36.6 % (ref 37–54)
HEMOGLOBIN: 11.9 G/DL (ref 12.5–16.5)
LV EF: 60 %
LVEF MODALITY: NORMAL
MCH RBC QN AUTO: 30.6 PG (ref 26–35)
MCHC RBC AUTO-ENTMCNC: 32.5 % (ref 32–34.5)
MCV RBC AUTO: 94.1 FL (ref 80–99.9)
METER GLUCOSE: 245 MG/DL (ref 74–99)
PDW BLD-RTO: 14 FL (ref 11.5–15)
PLATELET # BLD: 190 E9/L (ref 130–450)
PMV BLD AUTO: 10.5 FL (ref 7–12)
POTASSIUM SERPL-SCNC: 4.2 MMOL/L (ref 3.5–5)
RBC # BLD: 3.89 E12/L (ref 3.8–5.8)
SODIUM BLD-SCNC: 137 MMOL/L (ref 132–146)
WBC # BLD: 7.8 E9/L (ref 4.5–11.5)

## 2020-05-22 PROCEDURE — 93306 TTE W/DOPPLER COMPLETE: CPT

## 2020-05-22 PROCEDURE — 94375 RESPIRATORY FLOW VOLUME LOOP: CPT

## 2020-05-22 PROCEDURE — 36415 COLL VENOUS BLD VENIPUNCTURE: CPT

## 2020-05-22 PROCEDURE — 6370000000 HC RX 637 (ALT 250 FOR IP): Performed by: INTERNAL MEDICINE

## 2020-05-22 PROCEDURE — 94660 CPAP INITIATION&MGMT: CPT

## 2020-05-22 PROCEDURE — 94729 DIFFUSING CAPACITY: CPT

## 2020-05-22 PROCEDURE — 85027 COMPLETE CBC AUTOMATED: CPT

## 2020-05-22 PROCEDURE — 85730 THROMBOPLASTIN TIME PARTIAL: CPT

## 2020-05-22 PROCEDURE — 80048 BASIC METABOLIC PNL TOTAL CA: CPT

## 2020-05-22 PROCEDURE — 82962 GLUCOSE BLOOD TEST: CPT

## 2020-05-22 PROCEDURE — 99231 SBSQ HOSP IP/OBS SF/LOW 25: CPT | Performed by: INTERNAL MEDICINE

## 2020-05-22 RX ORDER — ISOSORBIDE MONONITRATE 30 MG/1
30 TABLET, EXTENDED RELEASE ORAL DAILY
Qty: 30 TABLET | Refills: 3 | Status: ON HOLD
Start: 2020-05-23 | End: 2020-06-07 | Stop reason: HOSPADM

## 2020-05-22 RX ORDER — RANOLAZINE 500 MG/1
500 TABLET, EXTENDED RELEASE ORAL 2 TIMES DAILY
Qty: 60 TABLET | Refills: 3 | Status: ON HOLD
Start: 2020-05-22 | End: 2020-06-07 | Stop reason: HOSPADM

## 2020-05-22 RX ORDER — HYDROCODONE BITARTRATE AND ACETAMINOPHEN 7.5; 325 MG/1; MG/1
1 TABLET ORAL EVERY 6 HOURS PRN
Qty: 10 TABLET | Refills: 0 | Status: SHIPPED | OUTPATIENT
Start: 2020-05-22 | End: 2020-05-25

## 2020-05-22 RX ORDER — NITROGLYCERIN 0.4 MG/1
TABLET SUBLINGUAL
Qty: 25 TABLET | Refills: 3 | Status: ON HOLD
Start: 2020-05-22 | End: 2020-06-07 | Stop reason: HOSPADM

## 2020-05-22 RX ADMIN — HYDROCODONE BITARTRATE AND ACETAMINOPHEN 1 TABLET: 7.5; 325 TABLET ORAL at 16:23

## 2020-05-22 RX ADMIN — OXYCODONE HYDROCHLORIDE AND ACETAMINOPHEN 1000 MG: 500 TABLET ORAL at 08:55

## 2020-05-22 RX ADMIN — RANOLAZINE 500 MG: 500 TABLET, FILM COATED, EXTENDED RELEASE ORAL at 08:55

## 2020-05-22 RX ADMIN — DILTIAZEM HYDROCHLORIDE 180 MG: 180 CAPSULE, EXTENDED RELEASE ORAL at 08:55

## 2020-05-22 RX ADMIN — MULTIPLE VITAMINS W/ MINERALS TAB 1 TABLET: TAB at 08:55

## 2020-05-22 RX ADMIN — HYDROCHLOROTHIAZIDE 12.5 MG: 12.5 TABLET ORAL at 08:55

## 2020-05-22 RX ADMIN — Medication 1000 MCG: at 08:55

## 2020-05-22 RX ADMIN — SOTALOL HYDROCHLORIDE 120 MG: 120 TABLET ORAL at 06:42

## 2020-05-22 RX ADMIN — LISINOPRIL 20 MG: 20 TABLET ORAL at 08:55

## 2020-05-22 RX ADMIN — PANTOPRAZOLE SODIUM 40 MG: 40 TABLET, DELAYED RELEASE ORAL at 06:42

## 2020-05-22 RX ADMIN — ASPIRIN 81 MG: 81 TABLET, COATED ORAL at 08:55

## 2020-05-22 RX ADMIN — INSULIN LISPRO 4 UNITS: 100 INJECTION, SOLUTION INTRAVENOUS; SUBCUTANEOUS at 12:13

## 2020-05-22 RX ADMIN — HYDROCODONE BITARTRATE AND ACETAMINOPHEN 1 TABLET: 7.5; 325 TABLET ORAL at 04:02

## 2020-05-22 RX ADMIN — INSULIN LISPRO 2 UNITS: 100 INJECTION, SOLUTION INTRAVENOUS; SUBCUTANEOUS at 08:56

## 2020-05-22 RX ADMIN — ISOSORBIDE MONONITRATE 30 MG: 30 TABLET ORAL at 08:55

## 2020-05-22 RX ADMIN — Medication 1 CAPSULE: at 08:56

## 2020-05-22 RX ADMIN — HYDROCODONE BITARTRATE AND ACETAMINOPHEN 1 TABLET: 7.5; 325 TABLET ORAL at 10:22

## 2020-05-22 ASSESSMENT — PAIN SCALES - GENERAL
PAINLEVEL_OUTOF10: 6
PAINLEVEL_OUTOF10: 6
PAINLEVEL_OUTOF10: 5
PAINLEVEL_OUTOF10: 2
PAINLEVEL_OUTOF10: 0
PAINLEVEL_OUTOF10: 5

## 2020-05-22 ASSESSMENT — PAIN DESCRIPTION - PAIN TYPE
TYPE: CHRONIC PAIN
TYPE: CHRONIC PAIN

## 2020-05-22 ASSESSMENT — PAIN DESCRIPTION - LOCATION
LOCATION: BACK
LOCATION: BACK

## 2020-05-22 ASSESSMENT — PAIN DESCRIPTION - PROGRESSION: CLINICAL_PROGRESSION: GRADUALLY IMPROVING
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
## 2020-05-22 NOTE — PROGRESS NOTES
glucose (GLUTOSE) 40 % oral gel 15 g, 15 g, Oral, PRN, Ruddy Silver MD    dextrose 50 % IV solution, 12.5 g, Intravenous, PRN, Ruddy Silver MD    glucagon (rDNA) injection 1 mg, 1 mg, Intramuscular, PRN, Ruddy Silver MD    dextrose 5 % solution, 100 mL/hr, Intravenous, PRN, Ruddy Silver MD    insulin lispro (HUMALOG) injection vial 0-12 Units, 0-12 Units, Subcutaneous, TID WC, Ruddy Silver MD, 4 Units at 05/22/20 1213    insulin lispro (HUMALOG) injection vial 0-6 Units, 0-6 Units, Subcutaneous, Nightly, Ruddy Silver MD, 2 Units at 05/21/20 2049    acetaminophen (TYLENOL) tablet 650 mg, 650 mg, Oral, Q6H PRN, Ruddy Silver MD    ondansetron (ZOFRAN) injection 4 mg, 4 mg, Intravenous, Q6H PRN, Ruddy Silver MD    albuterol (PROVENTIL) nebulizer solution 2.5 mg, 2.5 mg, Nebulization, Q6H PRN, Ruddy Silver MD    glyBURIDE (DIABETA) tablet 5 mg, 5 mg, Oral, Nightly, Ruddy Silver MD, 5 mg at 05/21/20 2049    lactobacillus (CULTURELLE) capsule 1 capsule, 1 capsule, Oral, Daily, Ruddy Silver MD, 1 capsule at 05/22/20 0856    HYDROcodone-acetaminophen (NORCO) 7.5-325 MG per tablet 1 tablet, 1 tablet, Oral, Q6H PRN, Ruddy Silver MD, 1 tablet at 05/22/20 1022    0.9 % sodium chloride infusion, , Intravenous, Continuous, Ruddy Silver MD, Last Rate: 75 mL/hr at 05/21/20 1852    ALPRAZolam (XANAX) tablet 0.5 mg, 0.5 mg, Oral, Nightly PRN, Ruddy Silver MD, 0.5 mg at 05/21/20 2200    DIET CARDIAC;    US DUP LOWER EXTREMITY MAPPING BILAT VENOUS   Final Result   As above. US CAROTID ARTERY BILATERAL   Final Result   Moderate left-sided plaque along the bulbs. No hemodynamically   significant stenosis is identified   Estimated stenosis by NASCET criteria in the proximal right carotid   artery is between 0% and 49%.    Estimated stenosis by NASCET criteria in the proximal left carotid

## 2020-05-22 NOTE — PROGRESS NOTES
Pt to PFT at this time, pt upset because he is tired of waiting, demanded IV Heparin be disconnected at this time, so he can go for his test. Instructed patient that we can do that if he is refusing the medication, but the medication is important for keeping his blood thinned to maintain perfusion through his narrow arteries. Pt verbalized understanding.

## 2020-05-23 ENCOUNTER — HOSPITAL ENCOUNTER (OUTPATIENT)
Age: 55
Discharge: HOME | End: 2020-05-25
Payer: COMMERCIAL

## 2020-05-23 PROCEDURE — U0003 INFECTIOUS AGENT DETECTION BY NUCLEIC ACID (DNA OR RNA); SEVERE ACUTE RESPIRATORY SYNDROME CORONAVIRUS 2 (SARS-COV-2) (CORONAVIRUS DISEASE [COVID-19]), AMPLIFIED PROBE TECHNIQUE, MAKING USE OF HIGH THROUGHPUT TECHNOLOGIES AS DESCRIBED BY CMS-2020-01-R: HCPCS

## 2020-05-26 ENCOUNTER — ANESTHESIA EVENT (OUTPATIENT)
Dept: OPERATING ROOM | Age: 55
DRG: 235 | End: 2020-05-26
Payer: COMMERCIAL

## 2020-05-26 ENCOUNTER — TELEPHONE (OUTPATIENT)
Dept: ADMINISTRATIVE | Age: 55
End: 2020-05-26

## 2020-05-26 ENCOUNTER — PREP FOR PROCEDURE (OUTPATIENT)
Dept: CARDIOTHORACIC SURGERY | Age: 55
End: 2020-05-26

## 2020-05-26 DIAGNOSIS — I48.91 ATRIAL FIBRILLATION, UNSPECIFIED TYPE (HCC): Primary | Chronic | ICD-10-CM

## 2020-05-26 DIAGNOSIS — I20.0 UNSTABLE ANGINA (HCC): ICD-10-CM

## 2020-05-26 LAB
SARS-COV-2: NOT DETECTED
SOURCE: NORMAL

## 2020-05-26 RX ORDER — CHLORHEXIDINE GLUCONATE 4 G/100ML
SOLUTION TOPICAL ONCE
Status: CANCELLED | OUTPATIENT
Start: 2020-05-26 | End: 2020-05-26

## 2020-05-26 RX ORDER — SODIUM CHLORIDE 0.9 % (FLUSH) 0.9 %
10 SYRINGE (ML) INJECTION PRN
Status: CANCELLED | OUTPATIENT
Start: 2020-05-26

## 2020-05-26 RX ORDER — SODIUM CHLORIDE 9 MG/ML
INJECTION, SOLUTION INTRAVENOUS CONTINUOUS
Status: CANCELLED | OUTPATIENT
Start: 2020-05-26

## 2020-05-26 RX ORDER — SODIUM CHLORIDE 0.9 % (FLUSH) 0.9 %
10 SYRINGE (ML) INJECTION EVERY 12 HOURS SCHEDULED
Status: CANCELLED | OUTPATIENT
Start: 2020-05-26

## 2020-05-26 RX ORDER — CHLORHEXIDINE GLUCONATE ORAL RINSE 1.2 MG/ML
15 SOLUTION DENTAL ONCE
Status: CANCELLED | OUTPATIENT
Start: 2020-05-26 | End: 2020-05-26

## 2020-05-26 NOTE — PROGRESS NOTES
Time:__0600___________  [x] PLEASE ARRIVE TO [facility] [place] ENTRANCE THE DAY OF SURGERY. YOU WILL BE DIRECTED TO PRE OP           [x] To reach the [facility], upon entering the hospital, take elevator B to the 3rd floor. EDUCATION INSTRUCTIONS:      [] Knee or hip replacement booklet & exercise pamphlets given. [x] [address] placed in chart. [x] Pre-admission Testing educational folder given  [x] Incentive Spirometry,coughing & deep breathing exercises reviewed. [x]Medication information sheet(s)   [x]Fluoroscopy-Xray used in surgery reviewed with patient. Educational pamphlet placed in chart. [x]Pain: Post-op pain is normal and to be expected. You will be asked to rate your pain from 0-10(a zero is not acceptable-education is needed). Your post-op pain goal is:  [x] Ask your nurse for your pain medication. [] Joint camp offered. [] Joint replacement booklets given. [] Other:___________________________    MEDICATION INSTRUCTIONS:   [x]Bring a complete list of your medications, please write the last time you took the medicine, give this list to the nurse. [x] Take the following medications the morning of surgery with 1-2 ounces of water: SEE LIST  [x] Stop herbal supplements and vitamins 5 days before your surgery. [x] DO NOT take any diabetic medicine the morning of surgery. Follow instructions for insulin the day before surgery. [x] If you are diabetic and your blood sugar is low or you feel symptomatic, you may drink 1-2 ounces of apple juice or take a glucose tablet. The morning of your procedure, you may call the pre-op area if you have concerns about your blood sugar [phone]. [x] Use your inhalers the morning of surgery. Bring your emergency inhaler with you day of surgery. [x] Follow physician instructions regarding any blood thinners you may be taking.     WHAT TO EXPECT:  [x] The day of surgery you will be greeted and

## 2020-05-26 NOTE — H&P
CC: angina     HPI:  Patient is a 54 y.o. male who underwent Lexiscan stress test which showed anterior and apical reversible defect. Pt was transferred here from [address] [place], [place] [address] to undergo C. He did not have a troponin elevation. He does complain of exertional chest discomfort which is exacerbated by working.    Cath done and showed MVCAD.          Patient Active Problem List     Diagnosis Date Noted    Unstable angina ([address] 05/20/2020    Acute coronary syndrome ([address] 05/20/2020    History of TIA (transient ischemic attack)      Chest pain 05/16/2020    HUMERA (obstructive sleep apnea) 06/18/2018    Atrial fibrillation with RVR ([address] 06/17/2018    Atrial flutter ([address] 06/16/2018    Cellulitis 02/11/2018    Abscess of foot 02/11/2018    Acute hepatitis B 02/01/2017    Acute liver failure without hepatic coma 01/31/2017    Sepsis ([address] 01/31/2017    Diabetes mellitus ([address]      Acute renal failure (ARF) ([address] 07/15/2016    Diverticulitis 07/15/2016    Atrial fibrillation ([address] 07/15/2016    Type 2 diabetes mellitus ([address] 07/15/2016    Essential hypertension 07/15/2016    Hyperlipidemia 07/15/2016    TIA (transient ischemic attack) 07/07/2015    New onset atrial fibrillation ([address] 07/07/2015      Past Medical History        Past Medical History:   Diagnosis Date    Atrial fibrillation (HCC)      Atrial fibrillation (HCC) 02/20/2018     lexiscan stress test    Cerebral artery occlusion with cerebral infarction ([address]      Diabetes mellitus ([address]      Hyperlipidemia      Hypertension      Psychiatric problem      TIA (transient ischemic attack)       2015         Past Surgical History         Past Surgical History:   Procedure Laterality Date    COSMETIC SURGERY        ECHOCARDIOGRAM COMPLETE WITH BUBBLE STUDY   7/7/2015          FRACTURE SURGERY        OTHER SURGICAL HISTORY   02/15/2018     incision and drainage bone biopsy of right great toe           Prescriptions Prior to Admission Medications Prior to Admission: dilTIAZem (CARDIZEM CD) 180 MG extended release capsule, Take 1 capsule by mouth daily  sotalol (BETAPACE) 120 MG tablet, Take 1 tablet by mouth every 12 hours  metFORMIN (GLUCOPHAGE) 500 MG tablet, Take 1,000 mg by mouth 2 times daily (with meals)   Rivaroxaban (XARELTO PO), Take 20 mg by mouth nightly   albuterol sulfate  (90 BASE) MCG/ACT inhaler, Inhale 2 puffs into the lungs every 6 hours as needed for Wheezing or Shortness of Breath  pantoprazole (PROTONIX) 40 MG tablet, Take 1 tablet by mouth every morning (before breakfast)  lisinopril-hydrochlorothiazide (PRINZIDE;ZESTORETIC) 20-12.5 MG per tablet, Take 1 tablet by mouth daily  vitamin B-12 (CYANOCOBALAMIN) 1000 MCG tablet, Take 1,000 mcg by mouth daily  Ascorbic Acid (VITAMIN C) 250 MG tablet, Take 1,000 mg by mouth daily  glyBURIDE (DIABETA) 5 MG tablet, Take 1 tablet by mouth daily (with breakfast) (Patient taking differently: Take 5 mg by mouth nightly )  glucose monitoring kit (FREESTYLE) monitoring kit, 1 kit by Does not apply route daily as needed  Multiple Vitamins-Minerals (THERAPEUTIC MULTIVITAMIN-MINERALS) tablet, Take 1 tablet by mouth daily  aspirin 81 MG tablet, Take 81 mg by mouth daily           Allergies   Allergen Reactions    Other         pollen      Social History            Tobacco Use    Smoking status: Heavy Tobacco Smoker       Packs/day: 0.50       Years: 35.00       Pack years: 17.50       Types: Cigarettes    Smokeless tobacco: Never Used   Substance Use Topics    Alcohol use:  No       Comment: moderate      Family History         Family History   Problem Relation Age of Onset    Cancer Mother      Heart Disease Father           Review of Systems  Pertinent items are noted in HPI.     Objective:      Patient Vitals for the past 8 hrs:    BP Temp Temp src Pulse Resp SpO2   05/21/20 1236 102/70 97.2 °F (36.2 °C) Temporal 69 18 100 %   05/21/20 0845 132/77 98.1 °F (36.7 °C) Temporal 71 16 99 %      /70   Pulse 69   Temp 97.2 °F (36.2 °C) (Temporal)   Resp 18   Ht 6' (1.829 m)   Wt 270 lb (122.5 kg)   SpO2 100%   BMI 36.62 kg/m²      General Appearance:    Alert, cooperative, no distress, appears stated age   Head:    Normocephalic, without obvious abnormality, atraumatic   Eyes:    PERRL, conjunctiva/corneas clear, EOM's intact, fundi     benign, both eyes        Ears:    Normal TM's and external ear canals, both ears   Nose:   Nares normal, septum midline, mucosa normal, no drainage    or sinus tenderness   Throat:   Lips, mucosa, and tongue normal; teeth and gums normal   Neck:   Supple, symmetrical, trachea midline, no adenopathy;        thyroid:  No enlargement/tenderness/nodules; no carotid    bruit or JVD   Back:     Symmetric, no curvature, ROM normal, no CVA tenderness   Lungs:     Clear to auscultation bilaterally, respirations unlabored   Chest wall:    No tenderness or deformity   Heart:    Regular rate and rhythm, S1 and S2 normal, no murmur, rub   or gallop   Abdomen:     Soft, non-tender, bowel sounds active all four quadrants,     no masses, no organomegaly               Extremities:   Extremities normal, atraumatic, no cyanosis or edema   Pulses:   2+ and symmetric all extremities   Skin:   Skin color, texture, turgor normal, no rashes or lesions   Lymph nodes:   Cervical, supraclavicular, and axillary nodes normal   Neurologic:   CNII-XII intact.  Normal strength, sensation and reflexes       throughout         Data Review  CBC:         Lab Results   Component Value Date     WBC 9.5 05/21/2020     RBC 4.12 05/21/2020      BMP:         Lab Results   Component Value Date     GLUCOSE 139 05/21/2020     CO2 26 05/21/2020     BUN 15 05/21/2020     CREATININE 0.8 05/21/2020     CALCIUM 8.9 05/21/2020      [facility] (Dr. Lobo Mills)     Findings:  Left main: 0% 0 stenosis  LAD: 100. %  stenosis  Circumflex: 75, 80, 50. %   stenosis  RCA: Dominant.  75-80, 70 % PDA, 90% PLB stenosis        Assessment/Plan:      Surgery was recommended. I discussed the risks, benefits, and alternatives for surgery including the chance of mortality, and incidence of stroke. We also discussed the risks of infection, bleeding, transfusion risks, postoperative arrhythmias, and postoperative recovery. The patient understood the risks and agrees to proceed with surgery. plan for CABG (tentatively 5/28/2020).   advised to hold xarelto

## 2020-05-27 ENCOUNTER — HOSPITAL ENCOUNTER (OUTPATIENT)
Dept: PREADMISSION TESTING | Age: 55
Discharge: HOME | DRG: 235 | End: 2020-05-27
Payer: COMMERCIAL

## 2020-05-27 ENCOUNTER — TELEPHONE (OUTPATIENT)
Dept: CARDIOTHORACIC SURGERY | Age: 55
End: 2020-05-27

## 2020-05-27 VITALS
RESPIRATION RATE: 20 BRPM | HEIGHT: 72 IN | SYSTOLIC BLOOD PRESSURE: 116 MMHG | BODY MASS INDEX: 36.57 KG/M2 | DIASTOLIC BLOOD PRESSURE: 59 MMHG | HEART RATE: 72 BPM | TEMPERATURE: 97.3 F | OXYGEN SATURATION: 95 % | WEIGHT: 270 LBS

## 2020-05-27 LAB
ABO/RH: NORMAL
ANTIBODY SCREEN: NORMAL
BILIRUBIN URINE: NEGATIVE
BLOOD, URINE: NEGATIVE
CLARITY: CLEAR
COLOR: YELLOW
GLUCOSE URINE: NEGATIVE MG/DL
HCT VFR BLD CALC: 43.4 % (ref 37–54)
HEMOGLOBIN: 14.2 G/DL (ref 12.5–16.5)
INR BLD: 1.2
KETONES, URINE: NEGATIVE MG/DL
LEUKOCYTE ESTERASE, URINE: NEGATIVE
MCH RBC QN AUTO: 30.5 PG (ref 26–35)
MCHC RBC AUTO-ENTMCNC: 32.7 % (ref 32–34.5)
MCV RBC AUTO: 93.1 FL (ref 80–99.9)
NITRITE, URINE: NEGATIVE
PDW BLD-RTO: 14.1 FL (ref 11.5–15)
PH UA: 6 (ref 5–9)
PLATELET # BLD: 239 E9/L (ref 130–450)
PMV BLD AUTO: 10.1 FL (ref 7–12)
PROTEIN UA: NEGATIVE MG/DL
PROTHROMBIN TIME: 13.1 SEC (ref 9.3–12.4)
RBC # BLD: 4.66 E12/L (ref 3.8–5.8)
SPECIFIC GRAVITY UA: 1.02 (ref 1–1.03)
UROBILINOGEN, URINE: 0.2 E.U./DL
WBC # BLD: 12.4 E9/L (ref 4.5–11.5)

## 2020-05-27 PROCEDURE — 87088 URINE BACTERIA CULTURE: CPT

## 2020-05-27 PROCEDURE — 36415 COLL VENOUS BLD VENIPUNCTURE: CPT

## 2020-05-27 PROCEDURE — 86923 COMPATIBILITY TEST ELECTRIC: CPT

## 2020-05-27 PROCEDURE — 86901 BLOOD TYPING SEROLOGIC RH(D): CPT

## 2020-05-27 PROCEDURE — 87081 CULTURE SCREEN ONLY: CPT

## 2020-05-27 PROCEDURE — 86900 BLOOD TYPING SEROLOGIC ABO: CPT

## 2020-05-27 PROCEDURE — 81003 URINALYSIS AUTO W/O SCOPE: CPT

## 2020-05-27 PROCEDURE — 85027 COMPLETE CBC AUTOMATED: CPT

## 2020-05-27 PROCEDURE — 86850 RBC ANTIBODY SCREEN: CPT

## 2020-05-27 PROCEDURE — 85610 PROTHROMBIN TIME: CPT

## 2020-05-27 RX ORDER — INSULIN GLARGINE 300 U/ML
INJECTION, SOLUTION SUBCUTANEOUS
COMMUNITY
End: 2022-06-03 | Stop reason: ALTCHOICE

## 2020-05-28 ENCOUNTER — ANESTHESIA (OUTPATIENT)
Dept: OPERATING ROOM | Age: 55
DRG: 235 | End: 2020-05-28
Payer: COMMERCIAL

## 2020-05-28 ENCOUNTER — HOSPITAL ENCOUNTER (INPATIENT)
Age: 55
LOS: 11 days | Discharge: HOME | DRG: 235 | End: 2020-06-08
Attending: THORACIC SURGERY (CARDIOTHORACIC VASCULAR SURGERY) | Admitting: THORACIC SURGERY (CARDIOTHORACIC VASCULAR SURGERY)
Payer: COMMERCIAL

## 2020-05-28 ENCOUNTER — APPOINTMENT (OUTPATIENT)
Dept: GENERAL RADIOLOGY | Age: 55
DRG: 235 | End: 2020-05-28
Attending: THORACIC SURGERY (CARDIOTHORACIC VASCULAR SURGERY)
Payer: COMMERCIAL

## 2020-05-28 VITALS
RESPIRATION RATE: 10 BRPM | OXYGEN SATURATION: 91 % | DIASTOLIC BLOOD PRESSURE: 77 MMHG | SYSTOLIC BLOOD PRESSURE: 113 MMHG

## 2020-05-28 PROBLEM — I25.10 CAD IN NATIVE ARTERY: Status: ACTIVE | Noted: 2020-05-28

## 2020-05-28 LAB
AADO2: 164.8 MMHG
AADO2: 220.9 MMHG
ACTIVATED CLOTTING TIME: 106 SECONDS (ref 99–130)
ACTIVATED CLOTTING TIME: 109 SECONDS (ref 99–130)
ACTIVATED CLOTTING TIME: 135 SECONDS (ref 99–130)
ACTIVATED CLOTTING TIME: 404 SECONDS (ref 99–130)
ACTIVATED CLOTTING TIME: 422 SECONDS (ref 99–130)
ACTIVATED CLOTTING TIME: 430 SECONDS (ref 99–130)
ACTIVATED CLOTTING TIME: 537 SECONDS (ref 99–130)
ANION GAP SERPL CALCULATED.3IONS-SCNC: 13 MMOL/L (ref 7–16)
APTT: 34.4 SEC (ref 24.5–35.1)
B.E.: -0.7 MMOL/L (ref -3–0)
B.E.: -1.7 MMOL/L (ref -3–0)
B.E.: -2.9 MMOL/L (ref -3–0)
B.E.: -5.2 MMOL/L (ref -3–3)
B.E.: -5.2 MMOL/L (ref -3–3)
B.E.: -5.5 MMOL/L (ref -3–3)
BUN BLDV-MCNC: 18 MG/DL (ref 6–20)
CALCIUM SERPL-MCNC: 8.5 MG/DL (ref 8.6–10.2)
CARDIOPULMONARY BYPASS: NO
CARDIOPULMONARY BYPASS: YES
CARDIOPULMONARY BYPASS: YES
CHLORIDE BLD-SCNC: 104 MMOL/L (ref 98–107)
CHP ED QC CHECK: NORMAL
CO2: 22 MMOL/L (ref 22–29)
COHB: 0.8 % (ref 0–1.5)
COHB: 0.9 % (ref 0–1.5)
COHB: 1.1 % (ref 0–1.5)
CREAT SERPL-MCNC: 1.1 MG/DL (ref 0.7–1.2)
CRITICAL: ABNORMAL
DATE ANALYZED: ABNORMAL
DATE OF COLLECTION: ABNORMAL
DEVICE: ABNORMAL
FIO2 ARTERIAL: 80
FIO2 ARTERIAL: 80
FIO2: 50 %
FIO2: 50 %
GFR AFRICAN AMERICAN: >60
GFR NON-AFRICAN AMERICAN: >60 ML/MIN/1.73
GLUCOSE BLD-MCNC: 131 MG/DL (ref 74–99)
GLUCOSE BLD-MCNC: 146 MG/DL
HCO3 ARTERIAL: 22 MMOL/L (ref 22–26)
HCO3 ARTERIAL: 24.7 MMOL/L (ref 22–26)
HCO3 ARTERIAL: 24.8 MMOL/L (ref 22–26)
HCO3: 18.9 MMOL/L (ref 22–26)
HCO3: 19.3 MMOL/L (ref 22–26)
HCO3: 19.5 MMOL/L (ref 22–26)
HCT (EST): 30 % (ref 37–54)
HCT (EST): 31 % (ref 37–54)
HCT (EST): 33 % (ref 37–54)
HCT VFR BLD CALC: 33.9 % (ref 37–54)
HEMOGLOBIN: 11.3 G/DL (ref 12.5–16.5)
HGB, (EST): 10.1 G/DL (ref 12.5–15.5)
HGB, (EST): 10.6 G/DL (ref 12.5–15.5)
HGB, (EST): 11.3 G/DL (ref 12.5–15.5)
HHB: 1.8 % (ref 0–5)
HHB: 4.7 % (ref 0–5)
HHB: 9.5 % (ref 0–5)
INR BLD: 1.2
LAB: ABNORMAL
MAGNESIUM: 2.1 MG/DL (ref 1.6–2.6)
MCH RBC QN AUTO: 30.7 PG (ref 26–35)
MCHC RBC AUTO-ENTMCNC: 33.3 % (ref 32–34.5)
MCV RBC AUTO: 92.1 FL (ref 80–99.9)
METER GLUCOSE: 113 MG/DL (ref 74–99)
METER GLUCOSE: 140 MG/DL (ref 74–99)
METER GLUCOSE: 144 MG/DL (ref 74–99)
METER GLUCOSE: 146 MG/DL (ref 74–99)
METER GLUCOSE: 146 MG/DL (ref 74–99)
METER GLUCOSE: 175 MG/DL (ref 74–99)
METER GLUCOSE: 176 MG/DL (ref 74–99)
METER GLUCOSE: 180 MG/DL (ref 74–99)
METER GLUCOSE: 201 MG/DL (ref 74–99)
METHB: 0.2 % (ref 0–1.5)
METHB: 0.3 % (ref 0–1.5)
METHB: 0.3 % (ref 0–1.5)
MODE: ABNORMAL
MODE: ABNORMAL
MODE: AC
MRSA CULTURE ONLY: NORMAL
O2 CONTENT: 15.7 ML/DL
O2 CONTENT: 16 ML/DL
O2 CONTENT: 16.5 ML/DL
O2 SATURATION: 100 % (ref 92–98.5)
O2 SATURATION: 100 % (ref 92–98.5)
O2 SATURATION: 90.4 % (ref 92–98.5)
O2 SATURATION: 95.2 % (ref 92–98.5)
O2 SATURATION: 98.2 % (ref 92–98.5)
O2 SATURATION: 98.8 % (ref 92–98.5)
O2HB: 89.3 % (ref 94–97)
O2HB: 94.2 % (ref 94–97)
O2HB: 96.9 % (ref 94–97)
OPERATOR ID: 1868
OPERATOR ID: 4510
OPERATOR ID: 467
OPERATOR ID: 925
PATIENT TEMP: 37 C
PCO2 ARTERIAL: 37.7 MMHG (ref 35–45)
PCO2 ARTERIAL: 42.9 MMHG (ref 35–45)
PCO2 ARTERIAL: 48.5 MMHG (ref 35–45)
PCO2: 32.2 MMHG (ref 35–45)
PCO2: 33.9 MMHG (ref 35–45)
PCO2: 36.8 MMHG (ref 35–45)
PDW BLD-RTO: 14.2 FL (ref 11.5–15)
PEEP/CPAP: 5 CMH2O
PEEP/CPAP: 7 CMH2O
PFO2: 1.7 MMHG/%
PFO2: 2.86 MMHG/%
PH BLOOD GAS: 7.32 (ref 7.35–7.45)
PH BLOOD GAS: 7.34 (ref 7.35–7.45)
PH BLOOD GAS: 7.37 (ref 7.35–7.45)
PH BLOOD GAS: 7.39 (ref 7.35–7.45)
PLATELET # BLD: 169 E9/L (ref 130–450)
PMV BLD AUTO: 10 FL (ref 7–12)
PO2 ARTERIAL: 127.6 MMHG (ref 80–100)
PO2 ARTERIAL: 385.4 MMHG (ref 80–100)
PO2 ARTERIAL: 482.9 MMHG (ref 80–100)
PO2: 143 MMHG (ref 75–100)
PO2: 62.2 MMHG (ref 75–100)
PO2: 85 MMHG (ref 75–100)
POTASSIUM SERPL-SCNC: 3.87 MMOL/L (ref 3.5–5)
POTASSIUM SERPL-SCNC: 4.02 MMOL/L (ref 3.5–5)
POTASSIUM SERPL-SCNC: 4.1 MMOL/L (ref 3.5–5.5)
POTASSIUM SERPL-SCNC: 4.4 MMOL/L (ref 3.5–5)
POTASSIUM SERPL-SCNC: 4.5 MMOL/L (ref 3.5–5)
POTASSIUM SERPL-SCNC: 4.7 MMOL/L (ref 3.5–5.5)
POTASSIUM SERPL-SCNC: 5.1 MMOL/L (ref 3.5–5)
POTASSIUM SERPL-SCNC: 5.2 MMOL/L (ref 3.5–5.5)
PROTHROMBIN TIME: 13.3 SEC (ref 9.3–12.4)
PS: 10 CMH20
RBC # BLD: 3.68 E12/L (ref 3.8–5.8)
RI(T): 1.15
RI(T): 2.6
RR MECHANICAL: 20 B/MIN
SODIUM BLD-SCNC: 139 MMOL/L (ref 132–146)
SOURCE, BLOOD GAS: ABNORMAL
THB: 11.8 G/DL (ref 11.5–16.5)
THB: 11.9 G/DL (ref 11.5–16.5)
THB: 12.7 G/DL (ref 11.5–16.5)
TIME ANALYZED: 1324
TIME ANALYZED: 1423
TIME ANALYZED: 1705
VT MECHANICAL: 500 ML
WBC # BLD: 18.2 E9/L (ref 4.5–11.5)

## 2020-05-28 PROCEDURE — 94002 VENT MGMT INPAT INIT DAY: CPT

## 2020-05-28 PROCEDURE — 94660 CPAP INITIATION&MGMT: CPT

## 2020-05-28 PROCEDURE — 2500000003 HC RX 250 WO HCPCS

## 2020-05-28 PROCEDURE — 6360000002 HC RX W HCPCS: Performed by: PHYSICIAN ASSISTANT

## 2020-05-28 PROCEDURE — 82803 BLOOD GASES ANY COMBINATION: CPT

## 2020-05-28 PROCEDURE — 2580000003 HC RX 258: Performed by: PHYSICIAN ASSISTANT

## 2020-05-28 PROCEDURE — 85610 PROTHROMBIN TIME: CPT

## 2020-05-28 PROCEDURE — 85730 THROMBOPLASTIN TIME PARTIAL: CPT

## 2020-05-28 PROCEDURE — 85027 COMPLETE CBC AUTOMATED: CPT

## 2020-05-28 PROCEDURE — 6370000000 HC RX 637 (ALT 250 FOR IP): Performed by: THORACIC SURGERY (CARDIOTHORACIC VASCULAR SURGERY)

## 2020-05-28 PROCEDURE — 2700000000 HC OXYGEN THERAPY PER DAY

## 2020-05-28 PROCEDURE — 6360000002 HC RX W HCPCS: Performed by: NURSE ANESTHETIST, CERTIFIED REGISTERED

## 2020-05-28 PROCEDURE — 6370000000 HC RX 637 (ALT 250 FOR IP): Performed by: PHYSICIAN ASSISTANT

## 2020-05-28 PROCEDURE — C1713 ANCHOR/SCREW BN/BN,TIS/BN: HCPCS | Performed by: THORACIC SURGERY (CARDIOTHORACIC VASCULAR SURGERY)

## 2020-05-28 PROCEDURE — 2709999900 HC NON-CHARGEABLE SUPPLY: Performed by: THORACIC SURGERY (CARDIOTHORACIC VASCULAR SURGERY)

## 2020-05-28 PROCEDURE — 6360000002 HC RX W HCPCS: Performed by: THORACIC SURGERY (CARDIOTHORACIC VASCULAR SURGERY)

## 2020-05-28 PROCEDURE — 94640 AIRWAY INHALATION TREATMENT: CPT

## 2020-05-28 PROCEDURE — 3600000008 HC SURGERY OHS BASE: Performed by: THORACIC SURGERY (CARDIOTHORACIC VASCULAR SURGERY)

## 2020-05-28 PROCEDURE — 33533 CABG ARTERIAL SINGLE: CPT | Performed by: THORACIC SURGERY (CARDIOTHORACIC VASCULAR SURGERY)

## 2020-05-28 PROCEDURE — 80048 BASIC METABOLIC PNL TOTAL CA: CPT

## 2020-05-28 PROCEDURE — 82962 GLUCOSE BLOOD TEST: CPT

## 2020-05-28 PROCEDURE — 2580000003 HC RX 258: Performed by: THORACIC SURGERY (CARDIOTHORACIC VASCULAR SURGERY)

## 2020-05-28 PROCEDURE — 83735 ASSAY OF MAGNESIUM: CPT

## 2020-05-28 PROCEDURE — 71045 X-RAY EXAM CHEST 1 VIEW: CPT

## 2020-05-28 PROCEDURE — 3700000000 HC ANESTHESIA ATTENDED CARE: Performed by: THORACIC SURGERY (CARDIOTHORACIC VASCULAR SURGERY)

## 2020-05-28 PROCEDURE — 36415 COLL VENOUS BLD VENIPUNCTURE: CPT

## 2020-05-28 PROCEDURE — 84132 ASSAY OF SERUM POTASSIUM: CPT

## 2020-05-28 PROCEDURE — 3600000018 HC SURGERY OHS ADDTL 15MIN: Performed by: THORACIC SURGERY (CARDIOTHORACIC VASCULAR SURGERY)

## 2020-05-28 PROCEDURE — APPSS45 APP SPLIT SHARED TIME 31-45 MINUTES: Performed by: NURSE PRACTITIONER

## 2020-05-28 PROCEDURE — 02L70CK OCCLUSION OF LEFT ATRIAL APPENDAGE WITH EXTRALUMINAL DEVICE, OPEN APPROACH: ICD-10-PCS | Performed by: THORACIC SURGERY (CARDIOTHORACIC VASCULAR SURGERY)

## 2020-05-28 PROCEDURE — 2500000003 HC RX 250 WO HCPCS: Performed by: NURSE ANESTHETIST, CERTIFIED REGISTERED

## 2020-05-28 PROCEDURE — 2000000000 HC ICU R&B

## 2020-05-28 PROCEDURE — 37799 UNLISTED PX VASCULAR SURGERY: CPT

## 2020-05-28 PROCEDURE — 99233 SBSQ HOSP IP/OBS HIGH 50: CPT | Performed by: NURSE PRACTITIONER

## 2020-05-28 PROCEDURE — 33257 ABLATE ATRIA LMTD ADD-ON: CPT | Performed by: THORACIC SURGERY (CARDIOTHORACIC VASCULAR SURGERY)

## 2020-05-28 PROCEDURE — C9113 INJ PANTOPRAZOLE SODIUM, VIA: HCPCS | Performed by: THORACIC SURGERY (CARDIOTHORACIC VASCULAR SURGERY)

## 2020-05-28 PROCEDURE — 5A1221Z PERFORMANCE OF CARDIAC OUTPUT, CONTINUOUS: ICD-10-PCS | Performed by: THORACIC SURGERY (CARDIOTHORACIC VASCULAR SURGERY)

## 2020-05-28 PROCEDURE — 7100000000 HC PACU RECOVERY - FIRST 15 MIN

## 2020-05-28 PROCEDURE — P9041 ALBUMIN (HUMAN),5%, 50ML: HCPCS | Performed by: NURSE ANESTHETIST, CERTIFIED REGISTERED

## 2020-05-28 PROCEDURE — 85347 COAGULATION TIME ACTIVATED: CPT

## 2020-05-28 PROCEDURE — P9045 ALBUMIN (HUMAN), 5%, 250 ML: HCPCS | Performed by: THORACIC SURGERY (CARDIOTHORACIC VASCULAR SURGERY)

## 2020-05-28 PROCEDURE — 7100000001 HC PACU RECOVERY - ADDTL 15 MIN

## 2020-05-28 PROCEDURE — B246ZZ4 ULTRASONOGRAPHY OF RIGHT AND LEFT HEART, TRANSESOPHAGEAL: ICD-10-PCS | Performed by: THORACIC SURGERY (CARDIOTHORACIC VASCULAR SURGERY)

## 2020-05-28 PROCEDURE — 2780000010 HC IMPLANT OTHER: Performed by: THORACIC SURGERY (CARDIOTHORACIC VASCULAR SURGERY)

## 2020-05-28 PROCEDURE — 82805 BLOOD GASES W/O2 SATURATION: CPT

## 2020-05-28 PROCEDURE — 021109W BYPASS CORONARY ARTERY, TWO ARTERIES FROM AORTA WITH AUTOLOGOUS VENOUS TISSUE, OPEN APPROACH: ICD-10-PCS | Performed by: THORACIC SURGERY (CARDIOTHORACIC VASCULAR SURGERY)

## 2020-05-28 PROCEDURE — 33518 CABG ARTERY-VEIN TWO: CPT | Performed by: THORACIC SURGERY (CARDIOTHORACIC VASCULAR SURGERY)

## 2020-05-28 PROCEDURE — 02100Z9 BYPASS CORONARY ARTERY, ONE ARTERY FROM LEFT INTERNAL MAMMARY, OPEN APPROACH: ICD-10-PCS | Performed by: THORACIC SURGERY (CARDIOTHORACIC VASCULAR SURGERY)

## 2020-05-28 PROCEDURE — 99221 1ST HOSP IP/OBS SF/LOW 40: CPT | Performed by: INTERNAL MEDICINE

## 2020-05-28 PROCEDURE — 06BY4ZZ EXCISION OF LOWER VEIN, PERCUTANEOUS ENDOSCOPIC APPROACH: ICD-10-PCS | Performed by: THORACIC SURGERY (CARDIOTHORACIC VASCULAR SURGERY)

## 2020-05-28 PROCEDURE — 94664 DEMO&/EVAL PT USE INHALER: CPT

## 2020-05-28 PROCEDURE — 2720000010 HC SURG SUPPLY STERILE: Performed by: THORACIC SURGERY (CARDIOTHORACIC VASCULAR SURGERY)

## 2020-05-28 PROCEDURE — 6370000000 HC RX 637 (ALT 250 FOR IP): Performed by: NURSE ANESTHETIST, CERTIFIED REGISTERED

## 2020-05-28 PROCEDURE — 2580000003 HC RX 258: Performed by: NURSE ANESTHETIST, CERTIFIED REGISTERED

## 2020-05-28 PROCEDURE — 6360000002 HC RX W HCPCS

## 2020-05-28 PROCEDURE — 3700000001 HC ADD 15 MINUTES (ANESTHESIA): Performed by: THORACIC SURGERY (CARDIOTHORACIC VASCULAR SURGERY)

## 2020-05-28 DEVICE — SCREW BNE L11MM DIA2.3MM THOR STRNL TI LOK DRL FREE LEV 1: Type: IMPLANTABLE DEVICE | Site: STERNUM | Status: FUNCTIONAL

## 2020-05-28 DEVICE — SCREW BNE L13MM DIA2.3MM THOR STRNL TI LOK DRL FREE LEV 1: Type: IMPLANTABLE DEVICE | Site: STERNUM | Status: FUNCTIONAL

## 2020-05-28 DEVICE — PLATE LCK STRNL 8-H LAD T 1.8MM: Type: IMPLANTABLE DEVICE | Site: STERNUM | Status: FUNCTIONAL

## 2020-05-28 DEVICE — ZINACTIVE USE 2540328 DEVICE OCCL CLP L45MM PLUNG GRP FLX SHFT FOR GILLINOV: Type: IMPLANTABLE DEVICE | Site: HEART | Status: FUNCTIONAL

## 2020-05-28 DEVICE — PLATE BONE 1.8MM THICKNESS STRNL LCK 6 H CP TI: Type: IMPLANTABLE DEVICE | Site: STERNUM | Status: FUNCTIONAL

## 2020-05-28 RX ORDER — NEOSTIGMINE METHYLSULFATE 1 MG/ML
INJECTION, SOLUTION INTRAVENOUS PRN
Status: DISCONTINUED | OUTPATIENT
Start: 2020-05-28 | End: 2020-05-28 | Stop reason: SDUPTHER

## 2020-05-28 RX ORDER — VECURONIUM BROMIDE 1 MG/ML
INJECTION, POWDER, LYOPHILIZED, FOR SOLUTION INTRAVENOUS PRN
Status: DISCONTINUED | OUTPATIENT
Start: 2020-05-28 | End: 2020-05-28 | Stop reason: SDUPTHER

## 2020-05-28 RX ORDER — NICOTINE POLACRILEX 4 MG
15 LOZENGE BUCCAL PRN
Status: DISCONTINUED | OUTPATIENT
Start: 2020-05-28 | End: 2020-06-08 | Stop reason: HOSPADM

## 2020-05-28 RX ORDER — PANTOPRAZOLE SODIUM 40 MG/10ML
40 INJECTION, POWDER, LYOPHILIZED, FOR SOLUTION INTRAVENOUS DAILY
Status: COMPLETED | OUTPATIENT
Start: 2020-05-28 | End: 2020-05-28

## 2020-05-28 RX ORDER — ACETAMINOPHEN 650 MG/1
650 SUPPOSITORY RECTAL EVERY 4 HOURS PRN
Status: DISCONTINUED | OUTPATIENT
Start: 2020-05-28 | End: 2020-05-30

## 2020-05-28 RX ORDER — SODIUM CHLORIDE, SODIUM LACTATE, POTASSIUM CHLORIDE, CALCIUM CHLORIDE 600; 310; 30; 20 MG/100ML; MG/100ML; MG/100ML; MG/100ML
INJECTION, SOLUTION INTRAVENOUS CONTINUOUS PRN
Status: DISCONTINUED | OUTPATIENT
Start: 2020-05-28 | End: 2020-05-28 | Stop reason: SDUPTHER

## 2020-05-28 RX ORDER — ASPIRIN 300 MG/1
300 SUPPOSITORY RECTAL ONCE
Status: COMPLETED | OUTPATIENT
Start: 2020-05-28 | End: 2020-05-28

## 2020-05-28 RX ORDER — PROPOFOL 10 MG/ML
10 INJECTION, EMULSION INTRAVENOUS CONTINUOUS PRN
Status: DISCONTINUED | OUTPATIENT
Start: 2020-05-28 | End: 2020-05-28

## 2020-05-28 RX ORDER — CHLORHEXIDINE GLUCONATE 4 G/100ML
SOLUTION TOPICAL ONCE
Status: COMPLETED | OUTPATIENT
Start: 2020-05-28 | End: 2020-05-28

## 2020-05-28 RX ORDER — AMINOCAPROIC ACID 250 MG/ML
INJECTION, SOLUTION INTRAVENOUS PRN
Status: DISCONTINUED | OUTPATIENT
Start: 2020-05-28 | End: 2020-05-28 | Stop reason: SDUPTHER

## 2020-05-28 RX ORDER — ONDANSETRON 2 MG/ML
4 INJECTION INTRAMUSCULAR; INTRAVENOUS EVERY 8 HOURS PRN
Status: DISCONTINUED | OUTPATIENT
Start: 2020-05-28 | End: 2020-05-30

## 2020-05-28 RX ORDER — SODIUM CHLORIDE 9 MG/ML
INJECTION, SOLUTION INTRAVENOUS CONTINUOUS
Status: DISCONTINUED | OUTPATIENT
Start: 2020-05-28 | End: 2020-05-28

## 2020-05-28 RX ORDER — PROTAMINE SULFATE 10 MG/ML
INJECTION, SOLUTION INTRAVENOUS PRN
Status: DISCONTINUED | OUTPATIENT
Start: 2020-05-28 | End: 2020-05-28 | Stop reason: SDUPTHER

## 2020-05-28 RX ORDER — SODIUM CHLORIDE 9 MG/ML
10 INJECTION INTRAVENOUS DAILY
Status: COMPLETED | OUTPATIENT
Start: 2020-05-28 | End: 2020-05-28

## 2020-05-28 RX ORDER — ALBUMIN, HUMAN INJ 5% 5 %
SOLUTION INTRAVENOUS PRN
Status: DISCONTINUED | OUTPATIENT
Start: 2020-05-28 | End: 2020-05-28 | Stop reason: SDUPTHER

## 2020-05-28 RX ORDER — TAMSULOSIN HYDROCHLORIDE 0.4 MG/1
0.4 CAPSULE ORAL DAILY
Status: DISCONTINUED | OUTPATIENT
Start: 2020-05-29 | End: 2020-06-08 | Stop reason: HOSPADM

## 2020-05-28 RX ORDER — SODIUM CHLORIDE 0.9 % (FLUSH) 0.9 %
10 SYRINGE (ML) INJECTION EVERY 12 HOURS SCHEDULED
Status: DISCONTINUED | OUTPATIENT
Start: 2020-05-28 | End: 2020-06-08 | Stop reason: HOSPADM

## 2020-05-28 RX ORDER — PROPOFOL 10 MG/ML
INJECTION, EMULSION INTRAVENOUS
Status: COMPLETED
Start: 2020-05-28 | End: 2020-05-28

## 2020-05-28 RX ORDER — CLOPIDOGREL BISULFATE 75 MG/1
75 TABLET ORAL DAILY
Status: CANCELLED | OUTPATIENT
Start: 2020-05-29

## 2020-05-28 RX ORDER — MEPERIDINE HYDROCHLORIDE 25 MG/ML
25 INJECTION INTRAMUSCULAR; INTRAVENOUS; SUBCUTANEOUS
Status: ACTIVE | OUTPATIENT
Start: 2020-05-28 | End: 2020-05-28

## 2020-05-28 RX ORDER — PROPOFOL 10 MG/ML
INJECTION, EMULSION INTRAVENOUS PRN
Status: DISCONTINUED | OUTPATIENT
Start: 2020-05-28 | End: 2020-05-28 | Stop reason: SDUPTHER

## 2020-05-28 RX ORDER — CALCIUM CHLORIDE 100 MG/ML
INJECTION INTRAVENOUS; INTRAVENTRICULAR PRN
Status: DISCONTINUED | OUTPATIENT
Start: 2020-05-28 | End: 2020-05-28 | Stop reason: SDUPTHER

## 2020-05-28 RX ORDER — IPRATROPIUM BROMIDE AND ALBUTEROL SULFATE 2.5; .5 MG/3ML; MG/3ML
1 SOLUTION RESPIRATORY (INHALATION)
Status: DISCONTINUED | OUTPATIENT
Start: 2020-05-28 | End: 2020-06-05

## 2020-05-28 RX ORDER — DEXTROSE MONOHYDRATE 50 MG/ML
100 INJECTION, SOLUTION INTRAVENOUS PRN
Status: DISCONTINUED | OUTPATIENT
Start: 2020-05-28 | End: 2020-06-08 | Stop reason: HOSPADM

## 2020-05-28 RX ORDER — POTASSIUM CHLORIDE 29.8 MG/ML
20 INJECTION INTRAVENOUS PRN
Status: DISCONTINUED | OUTPATIENT
Start: 2020-05-28 | End: 2020-05-30

## 2020-05-28 RX ORDER — SODIUM CHLORIDE 9 MG/ML
30 INJECTION, SOLUTION INTRAVENOUS CONTINUOUS
Status: DISCONTINUED | OUTPATIENT
Start: 2020-05-28 | End: 2020-05-30

## 2020-05-28 RX ORDER — HEPARIN SODIUM 10000 [USP'U]/ML
INJECTION, SOLUTION INTRAVENOUS; SUBCUTANEOUS PRN
Status: DISCONTINUED | OUTPATIENT
Start: 2020-05-28 | End: 2020-05-28 | Stop reason: SDUPTHER

## 2020-05-28 RX ORDER — CHLORHEXIDINE GLUCONATE 0.12 MG/ML
15 RINSE ORAL 2 TIMES DAILY
Status: DISCONTINUED | OUTPATIENT
Start: 2020-05-28 | End: 2020-05-28

## 2020-05-28 RX ORDER — EPHEDRINE SULFATE/0.9% NACL/PF 50 MG/5 ML
SYRINGE (ML) INTRAVENOUS PRN
Status: DISCONTINUED | OUTPATIENT
Start: 2020-05-28 | End: 2020-05-28 | Stop reason: SDUPTHER

## 2020-05-28 RX ORDER — ASPIRIN 81 MG/1
81 TABLET ORAL DAILY
Status: DISCONTINUED | OUTPATIENT
Start: 2020-05-29 | End: 2020-05-30

## 2020-05-28 RX ORDER — OXYCODONE HYDROCHLORIDE 5 MG/1
10 TABLET ORAL EVERY 4 HOURS PRN
Status: DISCONTINUED | OUTPATIENT
Start: 2020-05-28 | End: 2020-05-30

## 2020-05-28 RX ORDER — ALBUMIN, HUMAN INJ 5% 5 %
SOLUTION INTRAVENOUS
Status: COMPLETED
Start: 2020-05-28 | End: 2020-05-28

## 2020-05-28 RX ORDER — PANTOPRAZOLE SODIUM 40 MG/1
40 TABLET, DELAYED RELEASE ORAL DAILY
Status: DISCONTINUED | OUTPATIENT
Start: 2020-05-29 | End: 2020-05-30

## 2020-05-28 RX ORDER — SODIUM CHLORIDE 0.9 % (FLUSH) 0.9 %
10 SYRINGE (ML) INJECTION PRN
Status: DISCONTINUED | OUTPATIENT
Start: 2020-05-28 | End: 2020-05-28 | Stop reason: HOSPADM

## 2020-05-28 RX ORDER — OXYCODONE HYDROCHLORIDE 5 MG/1
5 TABLET ORAL EVERY 4 HOURS PRN
Status: DISCONTINUED | OUTPATIENT
Start: 2020-05-28 | End: 2020-05-30

## 2020-05-28 RX ORDER — DEXTROSE MONOHYDRATE 25 G/50ML
12.5 INJECTION, SOLUTION INTRAVENOUS PRN
Status: DISCONTINUED | OUTPATIENT
Start: 2020-05-28 | End: 2020-06-08 | Stop reason: HOSPADM

## 2020-05-28 RX ORDER — ACETAMINOPHEN 325 MG/1
650 TABLET ORAL EVERY 4 HOURS PRN
Status: DISCONTINUED | OUTPATIENT
Start: 2020-05-28 | End: 2020-05-30

## 2020-05-28 RX ORDER — CHLORHEXIDINE GLUCONATE 0.12 MG/ML
15 RINSE ORAL ONCE
Status: COMPLETED | OUTPATIENT
Start: 2020-05-28 | End: 2020-05-28

## 2020-05-28 RX ORDER — ALBUMIN, HUMAN INJ 5% 5 %
25 SOLUTION INTRAVENOUS PRN
Status: DISCONTINUED | OUTPATIENT
Start: 2020-05-28 | End: 2020-05-30

## 2020-05-28 RX ORDER — FENTANYL CITRATE 50 UG/ML
50 INJECTION, SOLUTION INTRAMUSCULAR; INTRAVENOUS
Status: DISCONTINUED | OUTPATIENT
Start: 2020-05-28 | End: 2020-05-30

## 2020-05-28 RX ORDER — GLYCOPYRROLATE 1 MG/5 ML
SYRINGE (ML) INTRAVENOUS PRN
Status: DISCONTINUED | OUTPATIENT
Start: 2020-05-28 | End: 2020-05-28 | Stop reason: SDUPTHER

## 2020-05-28 RX ORDER — SENNA PLUS 8.6 MG/1
1 TABLET ORAL NIGHTLY
Status: DISCONTINUED | OUTPATIENT
Start: 2020-05-29 | End: 2020-05-30

## 2020-05-28 RX ORDER — SUFENTANIL CITRATE 50 UG/ML
INJECTION EPIDURAL; INTRAVENOUS PRN
Status: DISCONTINUED | OUTPATIENT
Start: 2020-05-28 | End: 2020-05-28 | Stop reason: SDUPTHER

## 2020-05-28 RX ORDER — NITROGLYCERIN 20 MG/100ML
10 INJECTION INTRAVENOUS CONTINUOUS PRN
Status: DISCONTINUED | OUTPATIENT
Start: 2020-05-28 | End: 2020-05-30

## 2020-05-28 RX ORDER — SODIUM CHLORIDE 0.9 % (FLUSH) 0.9 %
10 SYRINGE (ML) INJECTION EVERY 12 HOURS SCHEDULED
Status: DISCONTINUED | OUTPATIENT
Start: 2020-05-28 | End: 2020-05-28 | Stop reason: HOSPADM

## 2020-05-28 RX ORDER — SODIUM CHLORIDE 0.9 % (FLUSH) 0.9 %
10 SYRINGE (ML) INJECTION PRN
Status: DISCONTINUED | OUTPATIENT
Start: 2020-05-28 | End: 2020-06-08 | Stop reason: HOSPADM

## 2020-05-28 RX ORDER — SENNA AND DOCUSATE SODIUM 50; 8.6 MG/1; MG/1
1 TABLET, FILM COATED ORAL 2 TIMES DAILY
Status: DISCONTINUED | OUTPATIENT
Start: 2020-05-28 | End: 2020-05-30

## 2020-05-28 RX ORDER — FENTANYL CITRATE 50 UG/ML
25 INJECTION, SOLUTION INTRAMUSCULAR; INTRAVENOUS
Status: DISCONTINUED | OUTPATIENT
Start: 2020-05-28 | End: 2020-05-30

## 2020-05-28 RX ORDER — 0.9 % SODIUM CHLORIDE 0.9 %
250 INTRAVENOUS SOLUTION INTRAVENOUS CONTINUOUS PRN
Status: DISCONTINUED | OUTPATIENT
Start: 2020-05-28 | End: 2020-05-30

## 2020-05-28 RX ORDER — PHENYLEPHRINE HYDROCHLORIDE 10 MG/ML
INJECTION INTRAVENOUS PRN
Status: DISCONTINUED | OUTPATIENT
Start: 2020-05-28 | End: 2020-05-28 | Stop reason: SDUPTHER

## 2020-05-28 RX ORDER — MIDAZOLAM HYDROCHLORIDE 1 MG/ML
INJECTION INTRAMUSCULAR; INTRAVENOUS PRN
Status: DISCONTINUED | OUTPATIENT
Start: 2020-05-28 | End: 2020-05-28 | Stop reason: SDUPTHER

## 2020-05-28 RX ORDER — MAGNESIUM SULFATE IN WATER 40 MG/ML
2 INJECTION, SOLUTION INTRAVENOUS PRN
Status: DISCONTINUED | OUTPATIENT
Start: 2020-05-28 | End: 2020-05-30

## 2020-05-28 RX ORDER — INSULIN GLARGINE 100 [IU]/ML
0.15 INJECTION, SOLUTION SUBCUTANEOUS NIGHTLY
Status: DISCONTINUED | OUTPATIENT
Start: 2020-05-29 | End: 2020-05-30

## 2020-05-28 RX ADMIN — SODIUM CHLORIDE: 9 INJECTION, SOLUTION INTRAVENOUS at 06:38

## 2020-05-28 RX ADMIN — FENTANYL CITRATE 50 MCG: 50 INJECTION INTRAMUSCULAR; INTRAVENOUS at 16:11

## 2020-05-28 RX ADMIN — PHENYLEPHRINE HYDROCHLORIDE 100 MCG: 10 INJECTION INTRAVENOUS at 08:57

## 2020-05-28 RX ADMIN — SODIUM CHLORIDE, POTASSIUM CHLORIDE, SODIUM LACTATE AND CALCIUM CHLORIDE: 600; 310; 30; 20 INJECTION, SOLUTION INTRAVENOUS at 07:46

## 2020-05-28 RX ADMIN — MUPIROCIN: 20 OINTMENT TOPICAL at 15:40

## 2020-05-28 RX ADMIN — PHENYLEPHRINE HYDROCHLORIDE 50 MCG: 10 INJECTION INTRAVENOUS at 07:51

## 2020-05-28 RX ADMIN — ALBUMIN (HUMAN) 12.5 G: 12.5 INJECTION, SOLUTION INTRAVENOUS at 13:16

## 2020-05-28 RX ADMIN — IPRATROPIUM BROMIDE AND ALBUTEROL SULFATE 1 AMPULE: 2.5; .5 SOLUTION RESPIRATORY (INHALATION) at 13:08

## 2020-05-28 RX ADMIN — Medication 0.2 MCG/KG/HR: at 08:10

## 2020-05-28 RX ADMIN — PHENYLEPHRINE HYDROCHLORIDE 100 MCG: 10 INJECTION INTRAVENOUS at 12:10

## 2020-05-28 RX ADMIN — PROPOFOL 1000 MG: 10 INJECTION, EMULSION INTRAVENOUS at 13:06

## 2020-05-28 RX ADMIN — IPRATROPIUM BROMIDE AND ALBUTEROL SULFATE 1 AMPULE: 2.5; .5 SOLUTION RESPIRATORY (INHALATION) at 20:10

## 2020-05-28 RX ADMIN — FENTANYL CITRATE 50 MCG: 50 INJECTION INTRAMUSCULAR; INTRAVENOUS at 13:07

## 2020-05-28 RX ADMIN — PROPOFOL 20 MG: 10 INJECTION, EMULSION INTRAVENOUS at 07:51

## 2020-05-28 RX ADMIN — SENNOSIDES AND DOCUSATE SODIUM 1 TABLET: 8.6; 5 TABLET ORAL at 21:13

## 2020-05-28 RX ADMIN — FENTANYL CITRATE 50 MCG: 50 INJECTION INTRAMUSCULAR; INTRAVENOUS at 22:26

## 2020-05-28 RX ADMIN — HEPARIN SODIUM 50000 UNITS: 10000 INJECTION, SOLUTION INTRAVENOUS; SUBCUTANEOUS at 08:59

## 2020-05-28 RX ADMIN — INSULIN LISPRO 3 UNITS: 100 INJECTION, SOLUTION INTRAVENOUS; SUBCUTANEOUS at 16:54

## 2020-05-28 RX ADMIN — POTASSIUM CHLORIDE 20 MEQ: 400 INJECTION, SOLUTION INTRAVENOUS at 23:09

## 2020-05-28 RX ADMIN — PHENYLEPHRINE HYDROCHLORIDE 100 MCG: 10 INJECTION INTRAVENOUS at 12:02

## 2020-05-28 RX ADMIN — PHENYLEPHRINE HYDROCHLORIDE 100 MCG: 10 INJECTION INTRAVENOUS at 08:10

## 2020-05-28 RX ADMIN — SODIUM CHLORIDE 30 ML/HR: 9 INJECTION, SOLUTION INTRAVENOUS at 13:06

## 2020-05-28 RX ADMIN — SUFENTANIL CITRATE 10 MCG: 50 INJECTION EPIDURAL; INTRAVENOUS at 11:49

## 2020-05-28 RX ADMIN — CEFAZOLIN SODIUM 2 G: 10 INJECTION, POWDER, FOR SOLUTION INTRAVENOUS at 11:30

## 2020-05-28 RX ADMIN — PHENYLEPHRINE HYDROCHLORIDE 100 MCG: 10 INJECTION INTRAVENOUS at 11:47

## 2020-05-28 RX ADMIN — VECURONIUM BROMIDE 20 MG: 10 INJECTION, POWDER, LYOPHILIZED, FOR SOLUTION INTRAVENOUS at 07:51

## 2020-05-28 RX ADMIN — PHENYLEPHRINE HYDROCHLORIDE 100 MCG: 10 INJECTION INTRAVENOUS at 09:34

## 2020-05-28 RX ADMIN — MIDAZOLAM 2 MG: 1 INJECTION INTRAMUSCULAR; INTRAVENOUS at 07:29

## 2020-05-28 RX ADMIN — MIDAZOLAM 2 MG: 1 INJECTION INTRAMUSCULAR; INTRAVENOUS at 07:38

## 2020-05-28 RX ADMIN — CHLORHEXIDINE GLUCONATE: 213 SOLUTION TOPICAL at 06:20

## 2020-05-28 RX ADMIN — PHENYLEPHRINE HYDROCHLORIDE 50 MCG: 10 INJECTION INTRAVENOUS at 08:07

## 2020-05-28 RX ADMIN — PHENYLEPHRINE HYDROCHLORIDE 100 MCG: 10 INJECTION INTRAVENOUS at 11:54

## 2020-05-28 RX ADMIN — CEFAZOLIN SODIUM 3 G: 10 INJECTION, POWDER, FOR SOLUTION INTRAVENOUS at 07:59

## 2020-05-28 RX ADMIN — CHLORHEXIDINE GLUCONATE 15 ML: 1.2 RINSE ORAL at 14:00

## 2020-05-28 RX ADMIN — ALBUMIN (HUMAN) 500 ML: 12.5 INJECTION, SOLUTION INTRAVENOUS at 11:34

## 2020-05-28 RX ADMIN — Medication 5 MG: at 08:14

## 2020-05-28 RX ADMIN — FENTANYL CITRATE 50 MCG: 50 INJECTION INTRAMUSCULAR; INTRAVENOUS at 20:21

## 2020-05-28 RX ADMIN — PROTAMINE SULFATE 350 MG: 10 INJECTION, SOLUTION INTRAVENOUS at 11:26

## 2020-05-28 RX ADMIN — POTASSIUM CHLORIDE 20 MEQ: 400 INJECTION, SOLUTION INTRAVENOUS at 14:49

## 2020-05-28 RX ADMIN — FENTANYL CITRATE 50 MCG: 50 INJECTION INTRAMUSCULAR; INTRAVENOUS at 17:15

## 2020-05-28 RX ADMIN — PHENYLEPHRINE HYDROCHLORIDE 100 MCG: 10 INJECTION INTRAVENOUS at 09:28

## 2020-05-28 RX ADMIN — SUFENTANIL CITRATE 100 MCG: 50 INJECTION EPIDURAL; INTRAVENOUS at 07:51

## 2020-05-28 RX ADMIN — PHENYLEPHRINE HYDROCHLORIDE 100 MCG: 10 INJECTION INTRAVENOUS at 09:25

## 2020-05-28 RX ADMIN — AMINOCAPROIC ACID 1 G/HR: 250 INJECTION, SOLUTION INTRAVENOUS at 07:51

## 2020-05-28 RX ADMIN — AMINOCAPROIC ACID 5000 MG: 250 INJECTION, SOLUTION INTRAVENOUS at 07:51

## 2020-05-28 RX ADMIN — ALBUMIN (HUMAN) 500 ML: 12.5 INJECTION, SOLUTION INTRAVENOUS at 12:10

## 2020-05-28 RX ADMIN — IPRATROPIUM BROMIDE AND ALBUTEROL SULFATE 1 AMPULE: 2.5; .5 SOLUTION RESPIRATORY (INHALATION) at 16:13

## 2020-05-28 RX ADMIN — PHENYLEPHRINE HYDROCHLORIDE 100 MCG: 10 INJECTION INTRAVENOUS at 08:00

## 2020-05-28 RX ADMIN — Medication 10 ML: at 21:13

## 2020-05-28 RX ADMIN — SODIUM CHLORIDE, POTASSIUM CHLORIDE, SODIUM LACTATE AND CALCIUM CHLORIDE: 600; 310; 30; 20 INJECTION, SOLUTION INTRAVENOUS at 07:50

## 2020-05-28 RX ADMIN — VECURONIUM BROMIDE 3 MG: 10 INJECTION, POWDER, LYOPHILIZED, FOR SOLUTION INTRAVENOUS at 09:52

## 2020-05-28 RX ADMIN — Medication 3 MG: at 13:00

## 2020-05-28 RX ADMIN — PHENYLEPHRINE HYDROCHLORIDE 100 MCG: 10 INJECTION INTRAVENOUS at 08:12

## 2020-05-28 RX ADMIN — FENTANYL CITRATE 50 MCG: 50 INJECTION INTRAMUSCULAR; INTRAVENOUS at 23:25

## 2020-05-28 RX ADMIN — MUPIROCIN: 20 OINTMENT TOPICAL at 21:13

## 2020-05-28 RX ADMIN — FENTANYL CITRATE 50 MCG: 50 INJECTION INTRAMUSCULAR; INTRAVENOUS at 19:15

## 2020-05-28 RX ADMIN — Medication 5 MG: at 08:18

## 2020-05-28 RX ADMIN — FENTANYL CITRATE 50 MCG: 50 INJECTION INTRAMUSCULAR; INTRAVENOUS at 21:21

## 2020-05-28 RX ADMIN — INSULIN LISPRO 3 UNITS: 100 INJECTION, SOLUTION INTRAVENOUS; SUBCUTANEOUS at 21:14

## 2020-05-28 RX ADMIN — CALCIUM CHLORIDE 0.5 G: 100 INJECTION, SOLUTION INTRAVENOUS at 12:42

## 2020-05-28 RX ADMIN — CEFAZOLIN 3 G: 10 INJECTION, POWDER, FOR SOLUTION INTRAVENOUS at 19:44

## 2020-05-28 RX ADMIN — SODIUM CHLORIDE: 9 INJECTION, SOLUTION INTRAVENOUS at 07:29

## 2020-05-28 RX ADMIN — Medication 0.6 MG: at 13:00

## 2020-05-28 RX ADMIN — SODIUM CHLORIDE, PRESERVATIVE FREE 10 ML: 5 INJECTION INTRAVENOUS at 14:49

## 2020-05-28 RX ADMIN — PANTOPRAZOLE SODIUM 40 MG: 40 INJECTION, POWDER, FOR SOLUTION INTRAVENOUS at 14:49

## 2020-05-28 RX ADMIN — SODIUM CHLORIDE 5 UNITS/HR: 9 INJECTION, SOLUTION INTRAVENOUS at 09:41

## 2020-05-28 RX ADMIN — CHLORHEXIDINE GLUCONATE 15 ML: 1.2 RINSE ORAL at 06:20

## 2020-05-28 RX ADMIN — ASPIRIN 300 MG: 300 SUPPOSITORY RECTAL at 14:48

## 2020-05-28 ASSESSMENT — PULMONARY FUNCTION TESTS
PIF_VALUE: 26
PIF_VALUE: 28
PIF_VALUE: 0
PIF_VALUE: 1
PIF_VALUE: 1
PIF_VALUE: 29
PIF_VALUE: 26
PIF_VALUE: 28
PIF_VALUE: 30
PIF_VALUE: 28
PIF_VALUE: 1
PIF_VALUE: 29
PIF_VALUE: 29
PIF_VALUE: 28
PIF_VALUE: 27
PIF_VALUE: 28
PIF_VALUE: 1
PIF_VALUE: 24
PIF_VALUE: 29
PIF_VALUE: 26
PIF_VALUE: 26
PIF_VALUE: 27
PIF_VALUE: 31
PIF_VALUE: 27
PIF_VALUE: 27
PIF_VALUE: 30
PIF_VALUE: 24
PIF_VALUE: 1
PIF_VALUE: 29
PIF_VALUE: 1
PIF_VALUE: 30
PIF_VALUE: 29
PIF_VALUE: 27
PIF_VALUE: 28
PIF_VALUE: 1
PIF_VALUE: 30
PIF_VALUE: 28
PIF_VALUE: 1
PIF_VALUE: 27
PIF_VALUE: 27
PIF_VALUE: 31
PIF_VALUE: 1
PIF_VALUE: 31
PIF_VALUE: 1
PIF_VALUE: 30
PIF_VALUE: 28
PIF_VALUE: 28
PIF_VALUE: 1
PIF_VALUE: 29
PIF_VALUE: 24
PIF_VALUE: 1
PIF_VALUE: 26
PIF_VALUE: 27
PIF_VALUE: 27
PIF_VALUE: 24
PIF_VALUE: 27
PIF_VALUE: 28
PIF_VALUE: 27
PIF_VALUE: 1
PIF_VALUE: 1
PIF_VALUE: 0
PIF_VALUE: 27
PIF_VALUE: 24
PIF_VALUE: 1
PIF_VALUE: 33
PIF_VALUE: 1
PIF_VALUE: 29
PIF_VALUE: 1
PIF_VALUE: 27
PIF_VALUE: 27
PIF_VALUE: 1
PIF_VALUE: 0
PIF_VALUE: 26
PIF_VALUE: 28
PIF_VALUE: 31
PIF_VALUE: 0
PIF_VALUE: 1
PIF_VALUE: 1
PIF_VALUE: 25
PIF_VALUE: 29
PIF_VALUE: 28
PIF_VALUE: 28
PIF_VALUE: 30
PIF_VALUE: 1
PIF_VALUE: 31
PIF_VALUE: 31
PIF_VALUE: 27
PIF_VALUE: 26
PIF_VALUE: 1
PIF_VALUE: 28
PIF_VALUE: 1
PIF_VALUE: 1
PIF_VALUE: 32
PIF_VALUE: 29
PIF_VALUE: 29
PIF_VALUE: 1
PIF_VALUE: 27
PIF_VALUE: 25
PIF_VALUE: 28
PIF_VALUE: 28
PIF_VALUE: 27
PIF_VALUE: 1
PIF_VALUE: 27
PIF_VALUE: 27
PIF_VALUE: 29
PIF_VALUE: 27
PIF_VALUE: 29
PIF_VALUE: 27
PIF_VALUE: 26
PIF_VALUE: 1
PIF_VALUE: 1
PIF_VALUE: 27
PIF_VALUE: 1
PIF_VALUE: 27
PIF_VALUE: 1
PIF_VALUE: 29
PIF_VALUE: 27
PIF_VALUE: 27
PIF_VALUE: 28
PIF_VALUE: 24
PIF_VALUE: 1
PIF_VALUE: 1
PIF_VALUE: 26
PIF_VALUE: 29
PIF_VALUE: 32
PIF_VALUE: 1
PIF_VALUE: 0
PIF_VALUE: 28
PIF_VALUE: 1
PIF_VALUE: 28
PIF_VALUE: 1
PIF_VALUE: 28
PIF_VALUE: 0
PIF_VALUE: 29
PIF_VALUE: 27
PIF_VALUE: 25
PIF_VALUE: 27
PIF_VALUE: 1
PIF_VALUE: 27
PIF_VALUE: 0
PIF_VALUE: 30
PIF_VALUE: 0
PIF_VALUE: 0
PIF_VALUE: 28
PIF_VALUE: 1
PIF_VALUE: 27
PIF_VALUE: 1
PIF_VALUE: 28
PIF_VALUE: 1
PIF_VALUE: 25
PIF_VALUE: 3
PIF_VALUE: 1
PIF_VALUE: 28
PIF_VALUE: 26
PIF_VALUE: 1
PIF_VALUE: 26
PIF_VALUE: 1
PIF_VALUE: 28
PIF_VALUE: 31
PIF_VALUE: 27
PIF_VALUE: 26
PIF_VALUE: 1
PIF_VALUE: 8
PIF_VALUE: 27
PIF_VALUE: 1
PIF_VALUE: 25
PIF_VALUE: 27
PIF_VALUE: 1
PIF_VALUE: 1
PIF_VALUE: 29
PIF_VALUE: 28
PIF_VALUE: 29
PIF_VALUE: 28
PIF_VALUE: 0
PIF_VALUE: 28
PIF_VALUE: 0
PIF_VALUE: 31
PIF_VALUE: 33
PIF_VALUE: 1
PIF_VALUE: 24
PIF_VALUE: 25
PIF_VALUE: 1
PIF_VALUE: 27
PIF_VALUE: 1
PIF_VALUE: 32
PIF_VALUE: 30
PIF_VALUE: 27
PIF_VALUE: 28
PIF_VALUE: 1
PIF_VALUE: 1
PIF_VALUE: 25
PIF_VALUE: 1
PIF_VALUE: 29
PIF_VALUE: 28
PIF_VALUE: 1
PIF_VALUE: 26
PIF_VALUE: 31
PIF_VALUE: 1
PIF_VALUE: 33
PIF_VALUE: 1
PIF_VALUE: 22
PIF_VALUE: 1
PIF_VALUE: 25
PIF_VALUE: 28
PIF_VALUE: 27
PIF_VALUE: 28
PIF_VALUE: 27
PIF_VALUE: 0
PIF_VALUE: 1
PIF_VALUE: 1
PIF_VALUE: 28
PIF_VALUE: 1
PIF_VALUE: 29
PIF_VALUE: 26
PIF_VALUE: 1
PIF_VALUE: 16
PIF_VALUE: 27
PIF_VALUE: 1
PIF_VALUE: 27
PIF_VALUE: 1
PIF_VALUE: 28
PIF_VALUE: 27
PIF_VALUE: 32
PIF_VALUE: 28
PIF_VALUE: 1
PIF_VALUE: 26
PIF_VALUE: 28
PIF_VALUE: 1
PIF_VALUE: 1
PIF_VALUE: 4
PIF_VALUE: 1
PIF_VALUE: 27
PIF_VALUE: 25
PIF_VALUE: 28
PIF_VALUE: 28
PIF_VALUE: 0
PIF_VALUE: 27
PIF_VALUE: 27
PIF_VALUE: 31
PIF_VALUE: 0
PIF_VALUE: 29
PIF_VALUE: 30
PIF_VALUE: 31
PIF_VALUE: 1
PIF_VALUE: 0
PIF_VALUE: 30
PIF_VALUE: 1
PIF_VALUE: 0
PIF_VALUE: 29
PIF_VALUE: 23
PIF_VALUE: 27
PIF_VALUE: 25
PIF_VALUE: 31
PIF_VALUE: 1
PIF_VALUE: 1
PIF_VALUE: 26
PIF_VALUE: 27
PIF_VALUE: 23
PIF_VALUE: 1
PIF_VALUE: 29
PIF_VALUE: 26
PIF_VALUE: 1
PIF_VALUE: 33
PIF_VALUE: 23
PIF_VALUE: 1
PIF_VALUE: 27
PIF_VALUE: 1
PIF_VALUE: 26
PIF_VALUE: 1
PIF_VALUE: 1
PIF_VALUE: 31
PIF_VALUE: 1
PIF_VALUE: 28
PIF_VALUE: 25
PIF_VALUE: 31
PIF_VALUE: 31
PIF_VALUE: 1
PIF_VALUE: 27
PIF_VALUE: 28
PIF_VALUE: 32
PIF_VALUE: 1
PIF_VALUE: 27
PIF_VALUE: 26
PIF_VALUE: 27
PIF_VALUE: 1
PIF_VALUE: 1
PIF_VALUE: 26
PIF_VALUE: 28
PIF_VALUE: 28
PIF_VALUE: 27
PIF_VALUE: 1
PIF_VALUE: 1
PIF_VALUE: 29
PIF_VALUE: 1

## 2020-05-28 ASSESSMENT — PAIN SCALES - GENERAL
PAINLEVEL_OUTOF10: 7
PAINLEVEL_OUTOF10: 8
PAINLEVEL_OUTOF10: 6
PAINLEVEL_OUTOF10: 7
PAINLEVEL_OUTOF10: 7
PAINLEVEL_OUTOF10: 10
PAINLEVEL_OUTOF10: 9
PAINLEVEL_OUTOF10: 7

## 2020-05-28 ASSESSMENT — PAIN - FUNCTIONAL ASSESSMENT: PAIN_FUNCTIONAL_ASSESSMENT: 0-10

## 2020-05-28 ASSESSMENT — PAIN DESCRIPTION - LOCATION
LOCATION: CHEST

## 2020-05-28 NOTE — CONSULTS
% 100 mL IVPB, 1 g, Intravenous, PRN    Allergies: Other    Social History: Current tobacco user, no alcohol or illicit drugs      Family History:   Family History   Problem Relation Age of Onset    Cancer Mother     Heart Disease Father        REVIEW OF SYSTEMS:   UNABLE  · Constitutional: Denies fatigue, fevers, chills or night sweats  · Eyes: Denies visual changes or drainage  · ENT: Denies headaches or hearing loss. No mouth sores or sore throat. No epistaxis   · Cardiovascular: Denies chest pain, pressure or palpitations. No lower extremity swelling. · Respiratory: Denies HERNANDEZ, cough, orthopnea or PND. No hemoptysis   · Gastrointestinal: Denies hematemesis or anorexia. No hematochezia or melena    · Genitourinary: Denies urgency, dysuria or hematuria. · Musculoskeletal: Denies gait disturbance, weakness or joint complaints  · Integumentary: Denies rash, hives or pruritis   · Neurological: Denies dizziness, headaches or seizures. No numbness or tingling  · Psychiatric: Denies anxiety or depression. · Endocrine: Denies temperature intolerance. No recent weight change. .  · Hematologic/Lymphatic: Denies abnormal bruising or bleeding. No swollen lymph nodes    PHYSICAL EXAM:   /71   Pulse 65   Temp 97.5 °F (36.4 °C) (Axillary)   Resp 28   Ht 6' (1.829 m)   Wt 270 lb (122.5 kg)   SpO2 99%   BMI 36.62 kg/m²   CONST:  Well developed, well nourished who appears of stated age. Drowsy and cooperative. No apparent distress. HEENT:   Head- Normocephalic, atraumatic   Eyes- Conjunctivae pink, anicteric  Throat- Oral mucosa pink and moist, orally intubated and oral gastric tube  Neck-  No stridor, trachea midline, no jugular venous distention. No carotid bruit. CHEST: Chest symmetrical and non-tender to palpation.  No accessory muscle use or intercostal retractions, mid sternotomy dressing dry and intact  RESPIRATORY: Lung sounds - clear throughout fields   CARDIOVASCULAR:     Heart Inspection- shows APTTHEP  Magnesium:    Lab Results   Component Value Date    MG 2.1 05/28/2020     TSH:    Lab Results   Component Value Date    TSH 4.200 07/08/2018     TROPONIN:  No components found for: TROP  BNP:  No results found for: BNP  FASTING LIPID PANEL:    Lab Results   Component Value Date    CHOL 180 05/17/2020    HDL 24 05/17/2020    TRIG 447 05/17/2020     XR CHEST PORTABLE   Final Result      1. New postoperative changes from CABG. 2. Multiple support tubes. 3. New mild mediastinal widening and moderate central congestive   changes. 4. No obvious effusion. XR CHEST PORTABLE    (Results Pending)     I have personally reviewed the laboratory, cardiac diagnostic and radiographic testing as outlined above:      IMPRESSION:  1.  CAD: S/p CABG on 5/28/2020 with a LIMA to LAD, SVG to diagonal, SVG to posterior lateral branch, doing fine, will continue current treatment  2. Paroxysmal atrial flutter: Now in sinus rhythm, continue current treatment, continue chronic anticoagulation with Xarelto  3. History of TIA:   4. Hypertension:   5. Tobacco abuse: Patient was counseled regarding smoking cessation  6. Diabetes mellitus  7. Obesity    RECOMMENDATIONS:   1. We will continue current treatment as per CT surgery  2. Further cardiac recommendations will be forthcoming pending his clinical course and diagnostic test findings    I have reviewed my findings and recommendations with patient    Thank you for the consult  Electronically signed by Ang Lemus MD on 5/28/2020 at 8:40 PM  NOTE: This report was transcribed using voice recognition software.  Every effort was made to ensure accuracy; however, inadvertent computerized transcription errors may be present

## 2020-05-28 NOTE — PROGRESS NOTES
Patient admitted to 200. All hemodynamic lines connected and leveled. Report received from anesthesia person.

## 2020-05-28 NOTE — H&P
H&P Update     Patient's History and Physical from 5/26/2020 was reviewed. Patient examined.      There has been no changes

## 2020-05-28 NOTE — PROGRESS NOTES
CVICU Admission Note    Name: Inessa Jara  MRN: [id number]    CC: Postoperative Critical Care Management     Indication for Surgery/Procedure: CAD, Afib   LVEF:  NML    RVF:  NML    Important/Relevant PMH/PSH: Current smoker, HTN, HLD, DMII, TIA    Procedure/Surgeries:    5/28/2020 CABG x3 (LIMA-LAD, SVG-Diag, SVG-PL), MAZE, LAAL, KLS Plating        Pacing wires: Ventricular       Physical Exam:    /75   Pulse 74   Temp 97.5 °F (36.4 °C) (Axillary)   Resp 20   Ht 6' (1.829 m)   Wt 270 lb (122.5 kg)   SpO2 98%   BMI 36.62 kg/m²       Post operative CXR:   Atelectasis Bilateral, No pneumothorax noted, Mildly increased vascular markings bilateral, No significant pleural effusion. ETT/lines/drains appear to be in proper position. Final Radiology report pending. General Appearance: Arrived to icu intubated on ventilator, hemodynamically stable   Eyes: PERRL  Pulmonary: Diminished bibasilar. No wheezes, no accessory muscle use noted   Ventilator: Mode: AC/VC, 50 FiO2, 7 PEEP, 500 Vt   Cardiovascular: RRR, no heaves or thrills palpated   Telemetry: SR  Abdomen: Soft, OG to LIWS   Extremities: Palpable pulses all extremities, no edema   Neurologic/Psych: Sedated   Skin: Warm and dry   Incision: MSI with krystin dressing intact, RLE SVG sites with ace wrap on       Assessment/Plan: Day of Surgery     1. CAD S/p CABGx3   -ASA, statin  -Ancef   -Monitor chest tube output and hemodynamics closely    2. Afib s/p MAZE/LAAL  -home rx: Xarelto and sotalol   -Arrived to ICU in NSR, monitor rhythm/lytes     3. Acute Postoperative Respiratory Insufficiency  - 2/2 surgery  - Intubated on lung protective ventilation  - Wean vent settings and extubate patient once awake, following commands, MCELROY, and no signs of bleeding  - ABGs per protocol and PRN     4. Postoperative Hypotension   -Albumin running, levophed ordered if needed for target maps>65    5.  Acute Post Operative Pain   - PRN fentanyl for pain management until able to take PO       Electronically signed by ANGIE Nj CNP on 5/28/2020 at 1:35 PM

## 2020-05-28 NOTE — ANESTHESIA PRE PROCEDURE
05/26/20 270 lb (122.5 kg)   05/20/20 270 lb (122.5 kg)     Body mass index is 36.62 kg/m². CBC:   Lab Results   Component Value Date    WBC 12.4 05/27/2020    RBC 4.66 05/27/2020    HGB 14.2 05/27/2020    HCT 43.4 05/27/2020    MCV 93.1 05/27/2020    RDW 14.1 05/27/2020     05/27/2020       CMP:   Lab Results   Component Value Date     05/22/2020    K 4.2 05/22/2020    K 4.8 05/16/2020     05/22/2020    CO2 23 05/22/2020    BUN 14 05/22/2020    CREATININE 0.8 05/22/2020    GFRAA >60 05/22/2020    LABGLOM >60 05/22/2020    GLUCOSE 146 05/28/2020    PROT 7.3 07/08/2018    CALCIUM 8.7 05/22/2020    BILITOT 0.5 07/08/2018    ALKPHOS 52 07/08/2018    AST 27 07/08/2018    ALT 20 07/08/2018       POC Tests: No results for input(s): POCGLU, POCNA, POCK, POCCL, POCBUN, POCHEMO, POCHCT in the last 72 hours.     Coags:   Lab Results   Component Value Date    PROTIME 13.1 05/27/2020    INR 1.2 05/27/2020    APTT 56.2 05/22/2020       HCG (If Applicable): No results found for: PREGTESTUR, PREGSERUM, HCG, HCGQUANT     ABGs: No results found for: PHART, PO2ART, CNF9UKV, CBJ7RZX, BEART, D4CFYUCG     Type & Screen (If Applicable):  No results found for: LABABO, LABRH    Drug/Infectious Status (If Applicable):  No results found for: HIV, HEPCAB    COVID-19 Screening (If Applicable):   Lab Results   Component Value Date    COVID19 Not Detected 05/23/2020     EKG  5/17/20   Ventricular Rate 67  BPM Final 05/17/2020  7:05 AM HMHPEAPM   Atrial Rate 67  BPM Final 05/17/2020  7:05 AM [facility]APM   P-R Interval 168  ms Final 05/17/2020  7:05 AM [facility]M   QRS Duration 78  ms Final 05/17/2020  7:05 AM [facility]M   Q-T Interval 396  ms Final 05/17/2020  7:05 AM [facility]M   QTc Calculation ([place]) 418  ms Final 05/17/2020  7:05 AM [facility]APM   P Clubb 18  degrees Final 05/17/2020  7:05 AM [facility]APM   R Clubb -4  degrees Final 05/17/2020  7:05 AM [facility]M   T Clubb 54  degrees Final 05/17/2020  7:05 AM [facility]APM   Testing Performed By     Lab - [address] Name Director Address Valid Date Range   [id number] MUSE Unknown Unknown 04/18/16 0721-Present   Narrative & Impression     Normal sinus rhythm     Low voltage QRS  Borderline ECG  When compared with ECG of 16-MAY-2020 19:25,  No significant change was found  Confirmed by Sahra Torrez ([id number]) on 5/17/2020 5:00:14 PM     Echo 5/22/20  Findings      Left Ventricle   Left ventricular internal dimensions were normal in diastole and systole. Mild left ventricular concentric hypertrophy noted. No regional wall motion abnormalities seen. Normal left ventricular ejection fraction. Ejection fraction is visually estimated at 60%. Indeterminate diastolic function. Right Ventricle   Normal right ventricular size and function. Left Atrium   Normal sized left atrium. Interatrial septum appears intact. Right Atrium   Normal right atrium size. Mitral Valve   Structurally normal mitral valve. Physiologic and/or trace mitral regurgitation is present. Tricuspid Valve   The tricuspid valve appears structurally normal.      Aortic Valve   Structurally normal aortic valve. Pulmonic Valve   Pulmonic valve is structurally normal.      Pericardial Effusion   No evidence of pericardial effusion. Aorta   Aortic root dimension within normal limits. Conclusions      Summary   Left ventricular internal dimensions were normal in diastole and systole. Mild left ventricular concentric hypertrophy noted. No regional wall motion abnormalities seen. Normal left ventricular ejection fraction. Physiologic and/or trace mitral regurgitation is present.       Signature      ----------------------------------------------------------------   Electronically signed by Hernandez Roberson MD(Interpreting   physician) on 05/22/2020 04:21 PM   ----------------------------------------------------------------    Cath 5/20/20  PROCEDURE:  Left heart Evaluation    Airway: Mallampati: III  TM distance: >3 FB   Neck ROM: full  Mouth opening: > = 3 FB Dental:          Pulmonary: breath sounds clear to auscultation  (+) COPD:  sleep apnea: on CPAP,            Patient did not smoke on day of surgery. ROS comment: Current smoker - 1 pk/day - last smoked 5/27/20   Cardiovascular:    (+) hypertension:, angina: with exertion, CAD:, dysrhythmias: atrial fibrillation and atrial flutter, hyperlipidemia      ECG reviewed  Rhythm: regular    Echocardiogram reviewed  Stress test reviewed       Beta Blocker:  Dose within 24 Hrs         Neuro/Psych:   (+) CVA:, TIA (TIA - 2015), psychiatric history:depression/anxiety             GI/Hepatic/Renal:   (+) GERD: well controlled, hepatitis (history of acute hep B): B,           Endo/Other:    (+) DiabetesType II DM, , .                 Abdominal:           Vascular:                                        Anesthesia Plan      general     ASA 4     (# 18 R hand)  Induction: intravenous. central line, BERTHA, CVP, arterial line and BIS  MIPS: Postoperative opioids intended and Prophylactic antiemetics administered. Anesthetic plan and risks discussed with patient. Use of blood products discussed with patient whom consented to blood products. Plan discussed with attending and CRNA.                 Melissa Martinez DO   5/28/2020

## 2020-05-28 NOTE — ANESTHESIA PROCEDURE NOTES
Arterial Line:    An arterial line was placed using surface landmarks, in the OR for the following indication(s): continuous blood pressure monitoring and blood sampling needed. A 20 gauge (size), 5 cm (length), Arrow (type) catheter was placed, Seldinger technique not used, into the right brachial artery, secured by tape. Anesthesia type: Local  Local infiltration: Injection    Events:  patient tolerated procedure well with no complications. Anesthesiologist: Lamine Linares DO  Other anesthesia staff: Kandice Luis RN  Performed:  Other anesthesia staff   Preanesthetic Checklist  Completed: patient identified, site marked, surgical consent, pre-op evaluation, timeout performed, IV checked, risks and benefits discussed, monitors and equipment checked, anesthesia consent given, oxygen available and patient being monitored

## 2020-05-29 ENCOUNTER — APPOINTMENT (OUTPATIENT)
Dept: GENERAL RADIOLOGY | Age: 55
DRG: 235 | End: 2020-05-29
Attending: THORACIC SURGERY (CARDIOTHORACIC VASCULAR SURGERY)
Payer: COMMERCIAL

## 2020-05-29 LAB
ANION GAP SERPL CALCULATED.3IONS-SCNC: 13 MMOL/L (ref 7–16)
BUN BLDV-MCNC: 15 MG/DL (ref 6–20)
CALCIUM SERPL-MCNC: 8.5 MG/DL (ref 8.6–10.2)
CHLORIDE BLD-SCNC: 103 MMOL/L (ref 98–107)
CO2: 21 MMOL/L (ref 22–29)
CREAT SERPL-MCNC: 0.9 MG/DL (ref 0.7–1.2)
GFR AFRICAN AMERICAN: >60
GFR NON-AFRICAN AMERICAN: >60 ML/MIN/1.73
GLUCOSE BLD-MCNC: 159 MG/DL (ref 74–99)
HCT VFR BLD CALC: 33.7 % (ref 37–54)
HEMOGLOBIN: 10.9 G/DL (ref 12.5–16.5)
MAGNESIUM: 1.6 MG/DL (ref 1.6–2.6)
MAGNESIUM: 2.1 MG/DL (ref 1.6–2.6)
MCH RBC QN AUTO: 30.2 PG (ref 26–35)
MCHC RBC AUTO-ENTMCNC: 32.3 % (ref 32–34.5)
MCV RBC AUTO: 93.4 FL (ref 80–99.9)
METER GLUCOSE: 156 MG/DL (ref 74–99)
METER GLUCOSE: 167 MG/DL (ref 74–99)
METER GLUCOSE: 206 MG/DL (ref 74–99)
METER GLUCOSE: 210 MG/DL (ref 74–99)
METER GLUCOSE: 242 MG/DL (ref 74–99)
METER GLUCOSE: 289 MG/DL (ref 74–99)
METER GLUCOSE: 299 MG/DL (ref 74–99)
PDW BLD-RTO: 14.4 FL (ref 11.5–15)
PLATELET # BLD: 160 E9/L (ref 130–450)
PMV BLD AUTO: 10.3 FL (ref 7–12)
POTASSIUM SERPL-SCNC: 4.5 MMOL/L (ref 3.5–5)
POTASSIUM SERPL-SCNC: 4.8 MMOL/L (ref 3.5–5)
POTASSIUM SERPL-SCNC: 4.9 MMOL/L (ref 3.5–5)
RBC # BLD: 3.61 E12/L (ref 3.8–5.8)
SODIUM BLD-SCNC: 137 MMOL/L (ref 132–146)
URINE CULTURE, ROUTINE: NORMAL
WBC # BLD: 14.1 E9/L (ref 4.5–11.5)

## 2020-05-29 PROCEDURE — 94660 CPAP INITIATION&MGMT: CPT

## 2020-05-29 PROCEDURE — 97530 THERAPEUTIC ACTIVITIES: CPT

## 2020-05-29 PROCEDURE — 36415 COLL VENOUS BLD VENIPUNCTURE: CPT

## 2020-05-29 PROCEDURE — 97162 PT EVAL MOD COMPLEX 30 MIN: CPT

## 2020-05-29 PROCEDURE — 97166 OT EVAL MOD COMPLEX 45 MIN: CPT

## 2020-05-29 PROCEDURE — 6360000002 HC RX W HCPCS: Performed by: NURSE PRACTITIONER

## 2020-05-29 PROCEDURE — 6370000000 HC RX 637 (ALT 250 FOR IP): Performed by: INTERNAL MEDICINE

## 2020-05-29 PROCEDURE — 97535 SELF CARE MNGMENT TRAINING: CPT

## 2020-05-29 PROCEDURE — 6370000000 HC RX 637 (ALT 250 FOR IP): Performed by: NURSE PRACTITIONER

## 2020-05-29 PROCEDURE — 82962 GLUCOSE BLOOD TEST: CPT

## 2020-05-29 PROCEDURE — 85027 COMPLETE CBC AUTOMATED: CPT

## 2020-05-29 PROCEDURE — 33518 CABG ARTERY-VEIN TWO: CPT | Performed by: THORACIC SURGERY (CARDIOTHORACIC VASCULAR SURGERY)

## 2020-05-29 PROCEDURE — 33257 ABLATE ATRIA LMTD ADD-ON: CPT | Performed by: THORACIC SURGERY (CARDIOTHORACIC VASCULAR SURGERY)

## 2020-05-29 PROCEDURE — 6370000000 HC RX 637 (ALT 250 FOR IP): Performed by: THORACIC SURGERY (CARDIOTHORACIC VASCULAR SURGERY)

## 2020-05-29 PROCEDURE — 2140000000 HC CCU INTERMEDIATE R&B

## 2020-05-29 PROCEDURE — 33508 ENDOSCOPIC VEIN HARVEST: CPT | Performed by: THORACIC SURGERY (CARDIOTHORACIC VASCULAR SURGERY)

## 2020-05-29 PROCEDURE — 84132 ASSAY OF SERUM POTASSIUM: CPT

## 2020-05-29 PROCEDURE — 2580000003 HC RX 258: Performed by: THORACIC SURGERY (CARDIOTHORACIC VASCULAR SURGERY)

## 2020-05-29 PROCEDURE — 94640 AIRWAY INHALATION TREATMENT: CPT

## 2020-05-29 PROCEDURE — 33533 CABG ARTERIAL SINGLE: CPT | Performed by: THORACIC SURGERY (CARDIOTHORACIC VASCULAR SURGERY)

## 2020-05-29 PROCEDURE — 80048 BASIC METABOLIC PNL TOTAL CA: CPT

## 2020-05-29 PROCEDURE — 37799 UNLISTED PX VASCULAR SURGERY: CPT

## 2020-05-29 PROCEDURE — 71045 X-RAY EXAM CHEST 1 VIEW: CPT

## 2020-05-29 PROCEDURE — 83735 ASSAY OF MAGNESIUM: CPT

## 2020-05-29 PROCEDURE — 6360000002 HC RX W HCPCS: Performed by: THORACIC SURGERY (CARDIOTHORACIC VASCULAR SURGERY)

## 2020-05-29 PROCEDURE — 99232 SBSQ HOSP IP/OBS MODERATE 35: CPT | Performed by: INTERNAL MEDICINE

## 2020-05-29 RX ORDER — KETOROLAC TROMETHAMINE 30 MG/ML
15 INJECTION, SOLUTION INTRAMUSCULAR; INTRAVENOUS EVERY 6 HOURS PRN
Status: DISCONTINUED | OUTPATIENT
Start: 2020-05-29 | End: 2020-06-01

## 2020-05-29 RX ORDER — AMIODARONE HYDROCHLORIDE 200 MG/1
400 TABLET ORAL 2 TIMES DAILY
Status: DISCONTINUED | OUTPATIENT
Start: 2020-05-29 | End: 2020-05-31

## 2020-05-29 RX ORDER — ATORVASTATIN CALCIUM 40 MG/1
40 TABLET, FILM COATED ORAL NIGHTLY
Status: DISCONTINUED | OUTPATIENT
Start: 2020-05-29 | End: 2020-06-08 | Stop reason: HOSPADM

## 2020-05-29 RX ORDER — FUROSEMIDE 10 MG/ML
20 INJECTION INTRAMUSCULAR; INTRAVENOUS ONCE
Status: COMPLETED | OUTPATIENT
Start: 2020-05-29 | End: 2020-05-29

## 2020-05-29 RX ORDER — ALBUMIN (HUMAN) 12.5 G/50ML
25 SOLUTION INTRAVENOUS ONCE
Status: DISCONTINUED | OUTPATIENT
Start: 2020-05-29 | End: 2020-05-29

## 2020-05-29 RX ADMIN — BENZOCAINE AND MENTHOL 1 LOZENGE: 15; 3.6 LOZENGE ORAL at 15:08

## 2020-05-29 RX ADMIN — OXYCODONE 10 MG: 5 TABLET ORAL at 11:28

## 2020-05-29 RX ADMIN — FENTANYL CITRATE 50 MCG: 50 INJECTION INTRAMUSCULAR; INTRAVENOUS at 19:08

## 2020-05-29 RX ADMIN — FENTANYL CITRATE 50 MCG: 50 INJECTION INTRAMUSCULAR; INTRAVENOUS at 00:27

## 2020-05-29 RX ADMIN — KETOROLAC TROMETHAMINE 15 MG: 30 INJECTION, SOLUTION INTRAMUSCULAR at 12:18

## 2020-05-29 RX ADMIN — FENTANYL CITRATE 50 MCG: 50 INJECTION INTRAMUSCULAR; INTRAVENOUS at 18:02

## 2020-05-29 RX ADMIN — INSULIN LISPRO 3 UNITS: 100 INJECTION, SOLUTION INTRAVENOUS; SUBCUTANEOUS at 01:15

## 2020-05-29 RX ADMIN — FENTANYL CITRATE 50 MCG: 50 INJECTION INTRAMUSCULAR; INTRAVENOUS at 06:03

## 2020-05-29 RX ADMIN — FENTANYL CITRATE 50 MCG: 50 INJECTION INTRAMUSCULAR; INTRAVENOUS at 07:06

## 2020-05-29 RX ADMIN — CEFAZOLIN 3 G: 10 INJECTION, POWDER, FOR SOLUTION INTRAVENOUS at 11:07

## 2020-05-29 RX ADMIN — AMIODARONE HYDROCHLORIDE 400 MG: 200 TABLET ORAL at 08:41

## 2020-05-29 RX ADMIN — BENZOCAINE AND MENTHOL 1 LOZENGE: 15; 3.6 LOZENGE ORAL at 17:03

## 2020-05-29 RX ADMIN — OXYCODONE 10 MG: 5 TABLET ORAL at 20:00

## 2020-05-29 RX ADMIN — TAMSULOSIN HYDROCHLORIDE 0.4 MG: 0.4 CAPSULE ORAL at 08:41

## 2020-05-29 RX ADMIN — SENNOSIDES 8.6 MG: 8.6 TABLET, FILM COATED ORAL at 19:47

## 2020-05-29 RX ADMIN — CEFAZOLIN 3 G: 10 INJECTION, POWDER, FOR SOLUTION INTRAVENOUS at 03:09

## 2020-05-29 RX ADMIN — SENNOSIDES AND DOCUSATE SODIUM 1 TABLET: 8.6; 5 TABLET ORAL at 08:44

## 2020-05-29 RX ADMIN — INSULIN LISPRO 3 UNITS: 100 INJECTION, SOLUTION INTRAVENOUS; SUBCUTANEOUS at 05:08

## 2020-05-29 RX ADMIN — OXYCODONE 10 MG: 5 TABLET ORAL at 07:38

## 2020-05-29 RX ADMIN — Medication 10 ML: at 19:08

## 2020-05-29 RX ADMIN — MUPIROCIN: 20 OINTMENT TOPICAL at 19:48

## 2020-05-29 RX ADMIN — FUROSEMIDE 20 MG: 10 INJECTION, SOLUTION INTRAMUSCULAR; INTRAVENOUS at 08:02

## 2020-05-29 RX ADMIN — INSULIN LISPRO 9 UNITS: 100 INJECTION, SOLUTION INTRAVENOUS; SUBCUTANEOUS at 14:43

## 2020-05-29 RX ADMIN — INSULIN GLARGINE 18 UNITS: 100 INJECTION, SOLUTION SUBCUTANEOUS at 22:33

## 2020-05-29 RX ADMIN — INSULIN LISPRO 9 UNITS: 100 INJECTION, SOLUTION INTRAVENOUS; SUBCUTANEOUS at 10:38

## 2020-05-29 RX ADMIN — PANTOPRAZOLE SODIUM 40 MG: 40 TABLET, DELAYED RELEASE ORAL at 08:41

## 2020-05-29 RX ADMIN — OXYCODONE 10 MG: 5 TABLET ORAL at 16:00

## 2020-05-29 RX ADMIN — FENTANYL CITRATE 50 MCG: 50 INJECTION INTRAMUSCULAR; INTRAVENOUS at 14:28

## 2020-05-29 RX ADMIN — FENTANYL CITRATE 50 MCG: 50 INJECTION INTRAMUSCULAR; INTRAVENOUS at 04:04

## 2020-05-29 RX ADMIN — FENTANYL CITRATE 50 MCG: 50 INJECTION INTRAMUSCULAR; INTRAVENOUS at 05:01

## 2020-05-29 RX ADMIN — MAGNESIUM SULFATE HEPTAHYDRATE 2 G: 40 INJECTION, SOLUTION INTRAVENOUS at 05:50

## 2020-05-29 RX ADMIN — MUPIROCIN: 20 OINTMENT TOPICAL at 08:43

## 2020-05-29 RX ADMIN — KETOROLAC TROMETHAMINE 15 MG: 30 INJECTION, SOLUTION INTRAMUSCULAR at 19:47

## 2020-05-29 RX ADMIN — METOPROLOL TARTRATE 25 MG: 25 TABLET, FILM COATED ORAL at 08:41

## 2020-05-29 RX ADMIN — ASPIRIN 81 MG: 81 TABLET, COATED ORAL at 08:42

## 2020-05-29 RX ADMIN — AMIODARONE HYDROCHLORIDE 400 MG: 200 TABLET ORAL at 19:48

## 2020-05-29 RX ADMIN — FENTANYL CITRATE 50 MCG: 50 INJECTION INTRAMUSCULAR; INTRAVENOUS at 13:20

## 2020-05-29 RX ADMIN — IPRATROPIUM BROMIDE AND ALBUTEROL SULFATE 1 AMPULE: 2.5; .5 SOLUTION RESPIRATORY (INHALATION) at 16:17

## 2020-05-29 RX ADMIN — METOPROLOL TARTRATE 25 MG: 25 TABLET, FILM COATED ORAL at 19:48

## 2020-05-29 RX ADMIN — INSULIN LISPRO 6 UNITS: 100 INJECTION, SOLUTION INTRAVENOUS; SUBCUTANEOUS at 18:47

## 2020-05-29 RX ADMIN — BENZOCAINE AND MENTHOL 1 LOZENGE: 15; 3.6 LOZENGE ORAL at 20:07

## 2020-05-29 RX ADMIN — FENTANYL CITRATE 50 MCG: 50 INJECTION INTRAMUSCULAR; INTRAVENOUS at 22:47

## 2020-05-29 RX ADMIN — BENZOCAINE AND MENTHOL 1 LOZENGE: 15; 3.6 LOZENGE ORAL at 12:34

## 2020-05-29 RX ADMIN — CEFAZOLIN 3 G: 10 INJECTION, POWDER, FOR SOLUTION INTRAVENOUS at 18:50

## 2020-05-29 RX ADMIN — Medication 10 ML: at 18:02

## 2020-05-29 RX ADMIN — POTASSIUM CHLORIDE 20 MEQ: 400 INJECTION, SOLUTION INTRAVENOUS at 05:50

## 2020-05-29 RX ADMIN — Medication 10 ML: at 19:49

## 2020-05-29 RX ADMIN — FENTANYL CITRATE 50 MCG: 50 INJECTION INTRAMUSCULAR; INTRAVENOUS at 08:42

## 2020-05-29 RX ADMIN — INSULIN LISPRO 6 UNITS: 100 INJECTION, SOLUTION INTRAVENOUS; SUBCUTANEOUS at 22:33

## 2020-05-29 RX ADMIN — FENTANYL CITRATE 50 MCG: 50 INJECTION INTRAMUSCULAR; INTRAVENOUS at 03:04

## 2020-05-29 RX ADMIN — FENTANYL CITRATE 50 MCG: 50 INJECTION INTRAMUSCULAR; INTRAVENOUS at 01:26

## 2020-05-29 RX ADMIN — Medication 10 ML: at 08:03

## 2020-05-29 RX ADMIN — FENTANYL CITRATE 50 MCG: 50 INJECTION INTRAMUSCULAR; INTRAVENOUS at 17:01

## 2020-05-29 RX ADMIN — IPRATROPIUM BROMIDE AND ALBUTEROL SULFATE 1 AMPULE: 2.5; .5 SOLUTION RESPIRATORY (INHALATION) at 08:00

## 2020-05-29 RX ADMIN — ATORVASTATIN CALCIUM 40 MG: 40 TABLET, FILM COATED ORAL at 19:48

## 2020-05-29 RX ADMIN — MAGNESIUM GLUCONATE 500 MG ORAL TABLET 400 MG: 500 TABLET ORAL at 08:42

## 2020-05-29 ASSESSMENT — PAIN DESCRIPTION - LOCATION
LOCATION: CHEST

## 2020-05-29 ASSESSMENT — PAIN DESCRIPTION - DESCRIPTORS
DESCRIPTORS: ACHING;DISCOMFORT
DESCRIPTORS: ACHING;DISCOMFORT
DESCRIPTORS: ACHING;CONSTANT
DESCRIPTORS: ACHING;DISCOMFORT

## 2020-05-29 ASSESSMENT — PAIN DESCRIPTION - ORIENTATION
ORIENTATION: LEFT;MID
ORIENTATION: MID

## 2020-05-29 ASSESSMENT — PAIN SCALES - GENERAL
PAINLEVEL_OUTOF10: 9
PAINLEVEL_OUTOF10: 5
PAINLEVEL_OUTOF10: 7
PAINLEVEL_OUTOF10: 10
PAINLEVEL_OUTOF10: 8
PAINLEVEL_OUTOF10: 4
PAINLEVEL_OUTOF10: 10
PAINLEVEL_OUTOF10: 6
PAINLEVEL_OUTOF10: 6
PAINLEVEL_OUTOF10: 8
PAINLEVEL_OUTOF10: 6
PAINLEVEL_OUTOF10: 7
PAINLEVEL_OUTOF10: 9
PAINLEVEL_OUTOF10: 10
PAINLEVEL_OUTOF10: 7
PAINLEVEL_OUTOF10: 9
PAINLEVEL_OUTOF10: 9
PAINLEVEL_OUTOF10: 7
PAINLEVEL_OUTOF10: 5
PAINLEVEL_OUTOF10: 8
PAINLEVEL_OUTOF10: 5
PAINLEVEL_OUTOF10: 7
PAINLEVEL_OUTOF10: 2
PAINLEVEL_OUTOF10: 8
PAINLEVEL_OUTOF10: 10
PAINLEVEL_OUTOF10: 7
PAINLEVEL_OUTOF10: 8
PAINLEVEL_OUTOF10: 6
PAINLEVEL_OUTOF10: 4
PAINLEVEL_OUTOF10: 4
PAINLEVEL_OUTOF10: 7

## 2020-05-29 ASSESSMENT — PAIN DESCRIPTION - PROGRESSION
CLINICAL_PROGRESSION: NOT CHANGED
CLINICAL_PROGRESSION: GRADUALLY WORSENING
CLINICAL_PROGRESSION: NOT CHANGED
CLINICAL_PROGRESSION: GRADUALLY WORSENING

## 2020-05-29 ASSESSMENT — PAIN DESCRIPTION - PAIN TYPE
TYPE: SURGICAL PAIN

## 2020-05-29 ASSESSMENT — PAIN DESCRIPTION - FREQUENCY
FREQUENCY: CONTINUOUS

## 2020-05-29 ASSESSMENT — PAIN - FUNCTIONAL ASSESSMENT
PAIN_FUNCTIONAL_ASSESSMENT: PREVENTS OR INTERFERES SOME ACTIVE ACTIVITIES AND ADLS

## 2020-05-29 ASSESSMENT — PAIN DESCRIPTION - ONSET
ONSET: ON-GOING

## 2020-05-29 ASSESSMENT — PAIN SCALES - WONG BAKER: WONGBAKER_NUMERICALRESPONSE: 0

## 2020-05-29 NOTE — PROGRESS NOTES
OCCUPATIONAL THERAPY INITIAL EVALUATION      Date:2020  Patient Name: Lambert Bauman  MRN: [id number]  : 1965  Room: [address] [place] [address]    Referring Provider:  ESTEPHANIA Alvarado      Evaluating OT: Ale Melendez OTR/L [id number]    AM-PAC Daily Activity Raw Score:     Recommended Adaptive Equipment:  LB dressing AE, raised commode as needed; shower seat for energy conservation      Diagnosis: CAD  Surgery:  CABG x 3     Pertinent Medical History: Afib, DM, HTN, TIA    Precautions:  Falls, sternal, chest tube x 2, REJI drains, JADIEL     Home Living: Pt lives in a mother in law suite per chart (first floor) with 5 steps down to kitchen & living area. Bathroom set up:walk in shower; no rail or seat. Lower commode. Prior Level of Function: IND with ADLs;  IND with IADLs. No devcie for ambulation. Driving: yes  Occupation: no    Pain Level: pt c/o 10/10  pain  this session      Cognition: A&O: 4/4    Follows 1-2 step commands appropriately.     Memory: G   Comprehension F   Problem solving: F   Judgement/safety: F               Communication skills: WFL           Vision: WFL               Glasses:glasses                                                   Hearing: WFL     RASS: 0  CAM-ICU: (NT) Delirium    UE Assessment:  Hand Dominance: Right []  Left []     ROM Strength STM goal: PRN   RUE  Grossly WFL within precautions Not formally tested; grossly WFL              WNL for ADLS     LUE Grossly WFL within precautions Not formally tested; grossly WFL              WNL for ADLS       Sensation: No c/o numbness or tingling in extremities   Tone: WNL   Edema: [facility]     Functional Assessment   Initial Eval Status  Date:  Treatment Status  Date: STG=LTG  5-7 days   Feeding S; set up                       IND  while seated up in chair to increase activity tolerance        Grooming S; set up                       IND   while standing sink level requiring no device for balance and demonstrating G tolerance UB dressing/bathing Mod A                       Mod I   demonstrating G knowledge of precautions during tasks     LB dressing/bathing Dep    Max A  after instruction on reacher/sock aid to maintain precautions                       Mod I  using AE as needed for safe reach/ energy conservation       Toileting NT                       Mod i     Bed Mobility  Supine to sit: Max A+2    Sit to supine: NT                       Mod I  in prep of ADL tasks & transfers   Functional Transfers Sit to stand: Min A from higher bed/lower chair surface    Stand to sit: Mod A                       Mod I  sit<>stand/functional bathroom transfers using AD/DME as needed for balance and safety   Functional Mobility Min A   No device                        Mod I   functional/bathroom mobility using AD as needed & demonstrating F safety     Balance Sitting:     Static:  SBA    Dynamic:Min A  Standing: Min A  Mod I dynamic sitting balance; Mod I dynamic standing balance  during ADL tasks & transfers   Endurance/Activity Tolerance   F tolerance with moderate activity. Pt sat EOB > 5 min with min c/o chest pain. G   tolerance with moderate activity/self care routine   Visual/  Perceptual               WFL                            Vitals:   HR at rest: 94 bpm HR at end of session: 101 bpm   Spo2 at rest:93% Spo2 at end of session 93%   BP at rest:142/77 mmHg BP at end of session 127/66 mmHg       Treatment: OT services provided include: instruction on sternal precautions to facilitate safe bed mobility & functional transfers; education on postural awareness/positioning and breathing techniques to improve overall tolerance and participation in ADLs; instruction on adapted dressing techniques/equipment to maintain sternal precautions with self care tasks; education on recommended DME for fall prevention & bathroom safety.   Pt/family provided with handouts on energy conservation and sternal precautions during functional activities for

## 2020-05-29 NOTE — PROGRESS NOTES
Date: 5/29/2020    Time: 12:13 AM    Patient Placed On BIPAP/CPAP/ Non-Invasive Ventilation? Yes    If no must comment. Facial area red/color change? No           If YES are Blister/Lesion present? No   If yes must notify nursing staff  BIPAP/CPAP skin barrier?   Yes    Skin barrier type:mepilex       Comments:        Karmen Ferrera

## 2020-05-29 NOTE — OP NOTE
sternotomy incision was carried out. The subcutaneous  tissues were dissected and the sternum was divided with a sternal saw. Simultaneously, the lower extremity vein harvest was carried out in an  endoscopic fashion. The left internal mammary artery was dissected free  from the undersurface of the left chest wall with a pedicled technique. All branches were clipped and divided. The patient was administered  systemic heparin for an adequate ACT for bypass and the distal portion  of the artery was clipped and divided. The mammary retractor was  removed. A standard sternal retractor was placed and the pericardium  was opened. The ascending aorta was palpated and noted to be free of  atherosclerotic disease. Suitable areas for cannulation and cross  clamping and proximal anastomosis were apparent. Next, central  cannulation was commenced in the normal fashion with the ascending aorta  and right atrial cannulas. Now, this was followed by antegrade and  retrograde cardioplegia lines. The ACT is verified. The patient was  placed on cardiopulmonary bypass. The distal targets were inspected. The circumflex distribution was noted to be quite atretic and small. There was one visible obtuse marginal branch, which was far too small  for bypassing. There was a large diagonal branch, very high diagonal  branch that was supplying some of the lateral wall and that was chosen  as one target. The mid LAD was also adequate for bypass and the PL was  the most suitable target for the right coronary artery system. First,  the right-sided superior and inferior pulmonary veins were encircled and  these underwent ablation with the AtriCure RFA clamp. Next, the cross-clamp  was applied. The heart was arrested with combination of antegrade and  retrograde cardioplegia. We achieved an excellent arrest and  maintenance doses of retrograde cardioplegia were administered every 50  minutes throughout the case.   Next, our attention was turned to the  left-sided pulmonary veins superior and inferior. These were once again  encircled and underwent RFA ablation with the AtriCure RFA clamp. Next,  the left atrial appendage was sized to be 45 mm. A 45-mm AtriCure  appendage clip was chosen and applied in the standard fashion. Next,  our attention was turned to the diagonal branch after arteriotomy and  end-to-side vein graft anastomosis was created here with a running 7-0  Prolene suture. This was tested and found to have excellent flow and  was hemostatic. Next, our attention was turned to the PL branch of the right coronary  artery system after arteriotomy and end-to-side vein graft anastomosis  was created here with a running 7-0 Prolene suture. This was tested and  had excellent flow and was hemostatic as well. Finally, the midportion  of the LAD was identified and opened with a standard arteriotomy and  end-to-side LIMA to LAD anastomosis was created here with a running 7-0  Prolene suture. This was tested by removing the bulldog clamp from the  mammary and we noted hemostasis of this anastomosis as well as excellent  runoff to the distal vessel. Finally, our attention was turned to the  proximal anastomosis, two standard aortotomies were fashioned in the  ascending aorta with a 4.8-mm punch. Vein grafts were then measured to  length and two proximal anastomoses were created here with running 6-0  Prolene suture. When this was completed, a warm dose of blood was given  via the retrograde cardioplegia cannula as a \"hot shot. \"  Once this was  completed, the bulldog clamp was removed from the left internal mammary  artery and the cross clamp was removed from the ascending aorta. The  heart began to reanimate immediately. Temporary ventricular pacing wire  was placed though not used secondary to the patient being in normal  sinus rhythm.   Once all evidence of intracardiac air was gone, the  aortic root vent and retrograde

## 2020-05-29 NOTE — PROGRESS NOTES
Physical Therapy  Physical Therapy Initial Assessment     Name: Jessica Hammond  : 1965  MRN: [id number]    Referring Provider:  Michelle Jung DO    Date of Service: 2020    Evaluating PT:  Tee Duong, PT, DPT [id number]    Room #:  7967/6376-M  Diagnosis:  CAD  Precautions: Falls, Sternal, Chest tube x 2, O2  Procedure/Surgery:   CABG x 3  PMHx/PSHx:  CVA, DM, HLD, HTN, TIA  Equipment Needs:  None    SUBJECTIVE:    Pt lives alone in a mother-in-law suite, 1st floor setup with 5 steps and 1 rail down to living area. Pt ambulated with SPC PRN and was independent PTA. OBJECTIVE:   Initial Evaluation  Date: 20 Treatment Short Term/ Long Term   Goals   AM-PAC 6 Clicks 96/75     Was pt agreeable to Eval/treatment? Yes     Does pt have pain?  10/10 chest pain - RN aware     Bed Mobility  Rolling: NT  Supine to sit: ModA x 2  Sit to supine: NT  Scooting: MaxA  Dilcia   Transfers Sit to stand: Dilcia  Stand to sit: ModA  Stand pivot: Dilcia no device  Independent   Ambulation   20 feet x 2 reps with Dilcia no device  >400 feet Independently   Stair negotiation: ascended and descended NT  >4 steps with 1 rail Mod Independent   ROM BUE:  Defer to OT note  BLE:  WNL     Strength BUE:  Defer to OT note  BLE:  4/5  Increase by 1/3 MMT grade   Balance Sitting EOB:  Dilcia  Dynamic Standing:  Dilcia no device  Sitting EOB:  Independent  Dynamic Standing:  Independent     Pt is A & O x 4  CAM-ICU: NT  RASS: 0  Sensation:  WNL  Edema:  None    Vitals:  Heart Rate at rest 92 bpm Heart Rate post session 101 bpm   SpO2 at rest 94% SpO2 post session 93%   Blood Pressure at rest 146/77 mmHg Blood Pressure post session 127/66 mmHg     Functional Status Score-Intensive Care Unit (FSS-ICU)   Rolling -/   Supine to sit transfer 2/7   Unsupported sitting  4/   Sit to stand transfers 4/7   Ambulation /   Total         Therapeutic Exercises:  NA    Patient education  Pt educated on safety    Patient response to

## 2020-05-29 NOTE — PROGRESS NOTES
Patient is seen in follow-up for CAD status post CABG    Subjective:     Mr. Sukhi Vargas still complaining of incisional chest pain, however, is better than yesterday  Sitting up in bed no apparent distress    ROS:  CONSTITUTIONAL:  negative for  fevers, chills  HEENT:  negative for earaches, nasal congestion and epistaxis  RESPIRATORY:  negative for  dry cough, cough with sputum,wheezing and hemoptysis  GASTROINTESTINAL:  negative for nausea, vomiting  MUSCULOSKELETAL:  negative for  myalgias, arthralgias  NEUROLOGICAL:  negative for visual disturbance, dysphagia    Medication side effects: none      Scheduled Meds:   metoprolol tartrate  25 mg Oral BID    amiodarone  400 mg Oral BID    sodium chloride flush  10 mL Intravenous 2 times per day    ceFAZolin (ANCEF) IVPB  3 g Intravenous Q8H    sennosides-docusate sodium  1 tablet Oral BID    pantoprazole  40 mg Oral Daily    magnesium oxide  400 mg Oral Daily    mupirocin   Nasal BID    aspirin  81 mg Oral Daily    ipratropium-albuterol  1 ampule Inhalation Q4H WA    insulin glargine  0.15 Units/kg Subcutaneous Nightly    senna  1 tablet Oral Nightly    tamsulosin  0.4 mg Oral Daily    insulin lispro  0-18 Units Subcutaneous Q4H    insulin lispro  0-18 Units Subcutaneous Q4H     Continuous Infusions:   sodium chloride 30 mL/hr (05/28/20 1306)    sodium chloride      norepinephrine      insulin      dextrose      nitroGLYCERIN       PRN Meds:benzocaine-menthol, sodium chloride flush, potassium chloride, magnesium sulfate, acetaminophen, acetaminophen, oxyCODONE **OR** oxyCODONE, fentanNYL **OR** fentanNYL, magnesium hydroxide, ondansetron, albumin human, sodium chloride, norepinephrine, insulin, glucose, dextrose, glucagon (rDNA), dextrose, nitroGLYCERIN, calcium gluconate IVPB      Objective:      Physical Exam:   /71   Pulse 87   Temp 99.2 °F (37.3 °C) (Axillary)   Resp 23   Ht 6' (1.829 m)   Wt 281 lb 12 oz (127.8 kg)   SpO2 93%   BMI

## 2020-05-29 NOTE — ANESTHESIA POSTPROCEDURE EVALUATION
Department of Anesthesiology  Postprocedure Note    Patient: Hermila Armando  MRN: [id number]  YOB: 1965  Date of evaluation: 5/28/2020  Time:  8:33 PM     Procedure Summary     Date:  05/28/20 Room / Location:  [place] [address] / [facility]    Anesthesia Start:  8135 Anesthesia Stop:  6329    Procedure:  CORONARY ARTERY BYPASS, MAZE PROCEDURE, BERTHA (N/A ) Diagnosis:  (CORONARY ARTERY DISEASE, ATRIAL FIB)    Surgeon:  Caro Stallings DO Responsible Provider:  Ann-Marie Elizabeth DO    Anesthesia Type:  general ASA Status:  4          Anesthesia Type: general    Ryan Phase I: Ryan Score: 10    Ryan Phase II:      Last vitals: Reviewed and per EMR flowsheets.        Anesthesia Post Evaluation    Patient location during evaluation: ICU  Patient participation: complete - patient cannot participate  Level of consciousness: sedated and ventilated  Airway patency: patent  Nausea & Vomiting: no nausea and no vomiting  Complications: no  Cardiovascular status: blood pressure returned to baseline  Respiratory status: acceptable  Hydration status: euvolemic

## 2020-05-30 ENCOUNTER — APPOINTMENT (OUTPATIENT)
Dept: GENERAL RADIOLOGY | Age: 55
DRG: 235 | End: 2020-05-30
Attending: THORACIC SURGERY (CARDIOTHORACIC VASCULAR SURGERY)
Payer: COMMERCIAL

## 2020-05-30 LAB
ANION GAP SERPL CALCULATED.3IONS-SCNC: 14 MMOL/L (ref 7–16)
BUN BLDV-MCNC: 20 MG/DL (ref 6–20)
CALCIUM SERPL-MCNC: 8.3 MG/DL (ref 8.6–10.2)
CHLORIDE BLD-SCNC: 96 MMOL/L (ref 98–107)
CO2: 22 MMOL/L (ref 22–29)
CREAT SERPL-MCNC: 0.9 MG/DL (ref 0.7–1.2)
GFR AFRICAN AMERICAN: >60
GFR NON-AFRICAN AMERICAN: >60 ML/MIN/1.73
GLUCOSE BLD-MCNC: 198 MG/DL (ref 74–99)
HCT VFR BLD CALC: 29.9 % (ref 37–54)
HEMOGLOBIN: 9.7 G/DL (ref 12.5–16.5)
MAGNESIUM: 1.9 MG/DL (ref 1.6–2.6)
MCH RBC QN AUTO: 30.5 PG (ref 26–35)
MCHC RBC AUTO-ENTMCNC: 32.4 % (ref 32–34.5)
MCV RBC AUTO: 94 FL (ref 80–99.9)
METER GLUCOSE: 197 MG/DL (ref 74–99)
METER GLUCOSE: 200 MG/DL (ref 74–99)
METER GLUCOSE: 217 MG/DL (ref 74–99)
METER GLUCOSE: 259 MG/DL (ref 74–99)
METER GLUCOSE: 267 MG/DL (ref 74–99)
PDW BLD-RTO: 14.4 FL (ref 11.5–15)
PLATELET # BLD: 149 E9/L (ref 130–450)
PMV BLD AUTO: 10.3 FL (ref 7–12)
POTASSIUM SERPL-SCNC: 4.3 MMOL/L (ref 3.5–5)
RBC # BLD: 3.18 E12/L (ref 3.8–5.8)
SODIUM BLD-SCNC: 132 MMOL/L (ref 132–146)
WBC # BLD: 14.2 E9/L (ref 4.5–11.5)

## 2020-05-30 PROCEDURE — 93798 PHYS/QHP OP CAR RHAB W/ECG: CPT

## 2020-05-30 PROCEDURE — 6370000000 HC RX 637 (ALT 250 FOR IP): Performed by: NURSE PRACTITIONER

## 2020-05-30 PROCEDURE — 83735 ASSAY OF MAGNESIUM: CPT

## 2020-05-30 PROCEDURE — 2140000000 HC CCU INTERMEDIATE R&B

## 2020-05-30 PROCEDURE — 6360000002 HC RX W HCPCS: Performed by: NURSE PRACTITIONER

## 2020-05-30 PROCEDURE — 94640 AIRWAY INHALATION TREATMENT: CPT

## 2020-05-30 PROCEDURE — 85027 COMPLETE CBC AUTOMATED: CPT

## 2020-05-30 PROCEDURE — 6370000000 HC RX 637 (ALT 250 FOR IP): Performed by: THORACIC SURGERY (CARDIOTHORACIC VASCULAR SURGERY)

## 2020-05-30 PROCEDURE — 80048 BASIC METABOLIC PNL TOTAL CA: CPT

## 2020-05-30 PROCEDURE — 94660 CPAP INITIATION&MGMT: CPT

## 2020-05-30 PROCEDURE — 82962 GLUCOSE BLOOD TEST: CPT

## 2020-05-30 PROCEDURE — 36415 COLL VENOUS BLD VENIPUNCTURE: CPT

## 2020-05-30 PROCEDURE — 2700000000 HC OXYGEN THERAPY PER DAY

## 2020-05-30 PROCEDURE — 71045 X-RAY EXAM CHEST 1 VIEW: CPT

## 2020-05-30 PROCEDURE — 2580000003 HC RX 258: Performed by: NURSE PRACTITIONER

## 2020-05-30 PROCEDURE — 6370000000 HC RX 637 (ALT 250 FOR IP): Performed by: INTERNAL MEDICINE

## 2020-05-30 RX ORDER — ASPIRIN 81 MG/1
81 TABLET ORAL DAILY
Status: DISCONTINUED | OUTPATIENT
Start: 2020-05-30 | End: 2020-06-08 | Stop reason: HOSPADM

## 2020-05-30 RX ORDER — HYDROCODONE BITARTRATE AND ACETAMINOPHEN 5; 325 MG/1; MG/1
1 TABLET ORAL EVERY 4 HOURS PRN
Status: DISCONTINUED | OUTPATIENT
Start: 2020-05-30 | End: 2020-06-02

## 2020-05-30 RX ORDER — BISACODYL 10 MG
10 SUPPOSITORY, RECTAL RECTAL PRN
Status: DISCONTINUED | OUTPATIENT
Start: 2020-05-30 | End: 2020-06-08 | Stop reason: HOSPADM

## 2020-05-30 RX ORDER — FOLIC ACID 1 MG/1
1 TABLET ORAL DAILY
Status: DISCONTINUED | OUTPATIENT
Start: 2020-05-30 | End: 2020-06-08 | Stop reason: HOSPADM

## 2020-05-30 RX ORDER — INSULIN GLARGINE 100 [IU]/ML
30 INJECTION, SOLUTION SUBCUTANEOUS NIGHTLY
Status: DISCONTINUED | OUTPATIENT
Start: 2020-05-30 | End: 2020-06-05

## 2020-05-30 RX ORDER — ONDANSETRON 2 MG/ML
4 INJECTION INTRAMUSCULAR; INTRAVENOUS EVERY 8 HOURS PRN
Status: DISCONTINUED | OUTPATIENT
Start: 2020-05-30 | End: 2020-06-08 | Stop reason: HOSPADM

## 2020-05-30 RX ORDER — PANTOPRAZOLE SODIUM 40 MG/1
40 TABLET, DELAYED RELEASE ORAL DAILY
Status: DISCONTINUED | OUTPATIENT
Start: 2020-05-30 | End: 2020-06-08 | Stop reason: HOSPADM

## 2020-05-30 RX ORDER — POTASSIUM CHLORIDE 20 MEQ/1
20 TABLET, EXTENDED RELEASE ORAL PRN
Status: DISCONTINUED | OUTPATIENT
Start: 2020-05-30 | End: 2020-06-08 | Stop reason: HOSPADM

## 2020-05-30 RX ORDER — FUROSEMIDE 10 MG/ML
20 INJECTION INTRAMUSCULAR; INTRAVENOUS 2 TIMES DAILY
Status: COMPLETED | OUTPATIENT
Start: 2020-05-30 | End: 2020-05-31

## 2020-05-30 RX ORDER — FERROUS SULFATE 325(65) MG
325 TABLET ORAL 2 TIMES DAILY WITH MEALS
Status: DISCONTINUED | OUTPATIENT
Start: 2020-05-30 | End: 2020-06-08 | Stop reason: HOSPADM

## 2020-05-30 RX ORDER — ACETAMINOPHEN 325 MG/1
650 TABLET ORAL EVERY 4 HOURS PRN
Status: DISCONTINUED | OUTPATIENT
Start: 2020-05-30 | End: 2020-06-08 | Stop reason: HOSPADM

## 2020-05-30 RX ORDER — ASCORBIC ACID 500 MG
500 TABLET ORAL 2 TIMES DAILY
Status: DISCONTINUED | OUTPATIENT
Start: 2020-05-30 | End: 2020-06-08 | Stop reason: HOSPADM

## 2020-05-30 RX ORDER — HYDROCODONE BITARTRATE AND ACETAMINOPHEN 5; 325 MG/1; MG/1
2 TABLET ORAL EVERY 4 HOURS PRN
Status: DISCONTINUED | OUTPATIENT
Start: 2020-05-30 | End: 2020-06-02

## 2020-05-30 RX ORDER — SENNA AND DOCUSATE SODIUM 50; 8.6 MG/1; MG/1
1 TABLET, FILM COATED ORAL 2 TIMES DAILY
Status: DISCONTINUED | OUTPATIENT
Start: 2020-05-30 | End: 2020-06-08 | Stop reason: HOSPADM

## 2020-05-30 RX ADMIN — IPRATROPIUM BROMIDE AND ALBUTEROL SULFATE 1 AMPULE: 2.5; .5 SOLUTION RESPIRATORY (INHALATION) at 20:02

## 2020-05-30 RX ADMIN — SENNOSIDES AND DOCUSATE SODIUM 1 TABLET: 8.6; 5 TABLET ORAL at 02:08

## 2020-05-30 RX ADMIN — FERROUS SULFATE TAB 325 MG (65 MG ELEMENTAL FE) 325 MG: 325 (65 FE) TAB at 08:19

## 2020-05-30 RX ADMIN — INSULIN LISPRO 3 UNITS: 100 INJECTION, SOLUTION INTRAVENOUS; SUBCUTANEOUS at 11:47

## 2020-05-30 RX ADMIN — HYDROCODONE BITARTRATE AND ACETAMINOPHEN 2 TABLET: 5; 325 TABLET ORAL at 04:19

## 2020-05-30 RX ADMIN — MUPIROCIN: 20 OINTMENT TOPICAL at 20:17

## 2020-05-30 RX ADMIN — INSULIN LISPRO 1 UNITS: 100 INJECTION, SOLUTION INTRAVENOUS; SUBCUTANEOUS at 06:24

## 2020-05-30 RX ADMIN — PANTOPRAZOLE SODIUM 40 MG: 40 TABLET, DELAYED RELEASE ORAL at 08:19

## 2020-05-30 RX ADMIN — FUROSEMIDE 20 MG: 20 INJECTION, SOLUTION INTRAMUSCULAR; INTRAVENOUS at 08:20

## 2020-05-30 RX ADMIN — HYDROCODONE BITARTRATE AND ACETAMINOPHEN 2 TABLET: 5; 325 TABLET ORAL at 20:28

## 2020-05-30 RX ADMIN — ATORVASTATIN CALCIUM 40 MG: 40 TABLET, FILM COATED ORAL at 20:16

## 2020-05-30 RX ADMIN — INSULIN GLARGINE 30 UNITS: 100 INJECTION, SOLUTION SUBCUTANEOUS at 20:20

## 2020-05-30 RX ADMIN — IPRATROPIUM BROMIDE AND ALBUTEROL SULFATE 1 AMPULE: 2.5; .5 SOLUTION RESPIRATORY (INHALATION) at 16:32

## 2020-05-30 RX ADMIN — BENZOCAINE AND MENTHOL 1 LOZENGE: 15; 3.6 LOZENGE ORAL at 21:04

## 2020-05-30 RX ADMIN — Medication 500 MG: at 08:19

## 2020-05-30 RX ADMIN — BISACODYL 10 MG: 10 SUPPOSITORY RECTAL at 16:21

## 2020-05-30 RX ADMIN — MUPIROCIN: 20 OINTMENT TOPICAL at 08:21

## 2020-05-30 RX ADMIN — HYDROCODONE BITARTRATE AND ACETAMINOPHEN 2 TABLET: 5; 325 TABLET ORAL at 08:17

## 2020-05-30 RX ADMIN — OXYCODONE 10 MG: 5 TABLET ORAL at 00:13

## 2020-05-30 RX ADMIN — Medication 10 ML: at 08:20

## 2020-05-30 RX ADMIN — AMIODARONE HYDROCHLORIDE 400 MG: 200 TABLET ORAL at 08:19

## 2020-05-30 RX ADMIN — METOPROLOL TARTRATE 25 MG: 25 TABLET, FILM COATED ORAL at 20:16

## 2020-05-30 RX ADMIN — KETOROLAC TROMETHAMINE 15 MG: 30 INJECTION, SOLUTION INTRAMUSCULAR at 02:09

## 2020-05-30 RX ADMIN — SENNOSIDES AND DOCUSATE SODIUM 1 TABLET: 8.6; 5 TABLET ORAL at 08:19

## 2020-05-30 RX ADMIN — TAMSULOSIN HYDROCHLORIDE 0.4 MG: 0.4 CAPSULE ORAL at 08:19

## 2020-05-30 RX ADMIN — SENNOSIDES AND DOCUSATE SODIUM 1 TABLET: 8.6; 5 TABLET ORAL at 20:16

## 2020-05-30 RX ADMIN — BISACODYL 5 MG: 5 TABLET, COATED ORAL at 12:22

## 2020-05-30 RX ADMIN — Medication 10 ML: at 16:21

## 2020-05-30 RX ADMIN — ENOXAPARIN SODIUM 40 MG: 40 INJECTION SUBCUTANEOUS at 08:20

## 2020-05-30 RX ADMIN — INSULIN LISPRO 1 UNITS: 100 INJECTION, SOLUTION INTRAVENOUS; SUBCUTANEOUS at 02:11

## 2020-05-30 RX ADMIN — Medication 500 MG: at 02:08

## 2020-05-30 RX ADMIN — CEFAZOLIN 3 G: 10 INJECTION, POWDER, FOR SOLUTION INTRAVENOUS at 05:31

## 2020-05-30 RX ADMIN — MAGNESIUM GLUCONATE 500 MG ORAL TABLET 400 MG: 500 TABLET ORAL at 08:19

## 2020-05-30 RX ADMIN — FUROSEMIDE 20 MG: 20 INJECTION, SOLUTION INTRAMUSCULAR; INTRAVENOUS at 16:20

## 2020-05-30 RX ADMIN — METOPROLOL TARTRATE 25 MG: 25 TABLET, FILM COATED ORAL at 08:19

## 2020-05-30 RX ADMIN — HYDROCODONE BITARTRATE AND ACETAMINOPHEN 2 TABLET: 5; 325 TABLET ORAL at 12:23

## 2020-05-30 RX ADMIN — FOLIC ACID 1 MG: 1 TABLET ORAL at 08:19

## 2020-05-30 RX ADMIN — INSULIN LISPRO 3 UNITS: 100 INJECTION, SOLUTION INTRAVENOUS; SUBCUTANEOUS at 20:20

## 2020-05-30 RX ADMIN — MAGNESIUM HYDROXIDE 30 ML: 2400 SUSPENSION ORAL at 11:47

## 2020-05-30 RX ADMIN — ASPIRIN 81 MG: 81 TABLET, COATED ORAL at 08:19

## 2020-05-30 RX ADMIN — HYDROCODONE BITARTRATE AND ACETAMINOPHEN 2 TABLET: 5; 325 TABLET ORAL at 16:21

## 2020-05-30 RX ADMIN — AMIODARONE HYDROCHLORIDE 400 MG: 200 TABLET ORAL at 20:16

## 2020-05-30 RX ADMIN — Medication 500 MG: at 20:16

## 2020-05-30 RX ADMIN — METFORMIN HYDROCHLORIDE 500 MG: 500 TABLET ORAL at 17:57

## 2020-05-30 RX ADMIN — Medication 10 ML: at 20:17

## 2020-05-30 ASSESSMENT — PAIN DESCRIPTION - ORIENTATION
ORIENTATION: MID

## 2020-05-30 ASSESSMENT — PAIN DESCRIPTION - LOCATION
LOCATION: CHEST

## 2020-05-30 ASSESSMENT — PAIN - FUNCTIONAL ASSESSMENT: PAIN_FUNCTIONAL_ASSESSMENT: PREVENTS OR INTERFERES SOME ACTIVE ACTIVITIES AND ADLS

## 2020-05-30 ASSESSMENT — PAIN SCALES - GENERAL
PAINLEVEL_OUTOF10: 4
PAINLEVEL_OUTOF10: 7
PAINLEVEL_OUTOF10: 3
PAINLEVEL_OUTOF10: 2
PAINLEVEL_OUTOF10: 7
PAINLEVEL_OUTOF10: 10
PAINLEVEL_OUTOF10: 8
PAINLEVEL_OUTOF10: 10

## 2020-05-30 ASSESSMENT — PAIN DESCRIPTION - PAIN TYPE
TYPE: SURGICAL PAIN
TYPE: CHRONIC PAIN
TYPE: SURGICAL PAIN

## 2020-05-30 ASSESSMENT — PAIN SCALES - WONG BAKER: WONGBAKER_NUMERICALRESPONSE: 0

## 2020-05-30 ASSESSMENT — PAIN DESCRIPTION - PROGRESSION
CLINICAL_PROGRESSION: NOT CHANGED

## 2020-05-30 ASSESSMENT — PAIN DESCRIPTION - DESCRIPTORS
DESCRIPTORS: ACHING;DISCOMFORT;PRESSURE
DESCRIPTORS: ACHING;DISCOMFORT
DESCRIPTORS: ACHING;DISCOMFORT;SORE
DESCRIPTORS: ACHING;DISCOMFORT;PRESSURE

## 2020-05-30 ASSESSMENT — PAIN DESCRIPTION - FREQUENCY
FREQUENCY: CONTINUOUS

## 2020-05-30 ASSESSMENT — PAIN DESCRIPTION - ONSET
ONSET: ON-GOING

## 2020-05-30 NOTE — PROGRESS NOTES
Nutrition Therapies:  · Oral Diet Orders: Cardiac, Carb Control 5 Carbs/Meal   · Oral Diet intake: %(per flowsheets since admission )  · Oral Nutrition Supplement (ONS) Orders: Diabetic Oral Supplement  · ONS intake: %     · Anthropometric Measures:  · Ht: 6' (182.9 cm)   · Current Body Wt: 281 lb (127.5 kg)(5/29/20, bedscale )  · Admission Body Wt: 281 lb (127.5 kg)(5/29/20, bedscale )  · Usual Body Wt: 273 lb (123.8 kg)(8/7/19, no method )  ·  EMR shows past weights of 269# bedscale on 5/16/20 and 273# no method on 8/7/19   · Ideal Body Wt: 178 lb (80.7 kg), % Ideal Body 158%  · BMI Classification: BMI 35.0 - 39.9 Obese Class II    Nutrition Interventions:   Continue current diet, Continue current ONS, Modify current ONS  Continued Inpatient Monitoring, Coordination of Care    Nutrition Evaluation:   · Evaluation: Goals set   · Goals: Patient will consume >75% of most meals and supplements served     · Monitoring: Meal Intake, Supplement Intake, Diet Tolerance, Skin Integrity, Wound Healing, I&O, Monitor Hemodynamic Status, Monitor Bowel Function, Weight, Pertinent Labs, Chewing/Swallowing      Electronically signed by Aaron Lazaro RD, LD on 5/30/20 at 6:40 AM EDT    Contact Number: [phone]

## 2020-05-30 NOTE — PLAN OF CARE
Problem: Falls - Risk of:  Goal: Will remain free from falls  Description: Will remain free from falls  Outcome: Met This Shift     Problem:  Activity Intolerance:  Goal: Ability to tolerate increased activity will improve  Description: Ability to tolerate increased activity will improve  Outcome: Met This Shift     Problem: Anxiety:  Goal: Level of anxiety will decrease  Description: Level of anxiety will decrease  Outcome: Met This Shift

## 2020-05-30 NOTE — PROGRESS NOTES
term post operatively with resumption of xarelto at later time  --continue BB/oral amio       5. Prolonged postoperative respiratory insufficiency  --wean oxygen to keep SpO2 greater than or equal to 92%  --continue duonebs with ezpap  --encourage C&DB, SMI  --currently on 6L O2/NC  --start bid lasix      6. Hx DM2  --on lantus/metformin at home  --up titrate lantus to home dose, increase SSI to high dose, add metformin at lower dose  --monitor      7. Constipation  --no BM since prior to surgery  --Continue MOM and senna-s. --Increase MOM to daily until +BM. --Will give daily suppository until +BM starting tomorrow if no result by then. --Encouraged continued increase in oral intake and activity.          8. Post operative deconditioning  --Increase activity as tolerated  --PT/OT             DVT prophylaxis:  --continue bilateral knee high HOLLY hose  --continue PCDs  --continue progressive ambulation  --Add lovenox for dvt prophylaxis and continue knee high HOLLY hose/pcds/progressive ambulation      Dispo: home once ambulating 400ft on RA and tolerating        This patient's case and care plan was discussed with the attending surgeon

## 2020-05-30 NOTE — PLAN OF CARE
Problem: Falls - Risk of:  Goal: Will remain free from falls  Description: Will remain free from falls  Outcome: Met This Shift  Goal: Absence of physical injury  Description: Absence of physical injury  Outcome: Met This Shift     Problem: Pain:  Goal: Pain level will decrease  Description: Pain level will decrease  Outcome: Met This Shift  Goal: Control of acute pain  Description: Control of acute pain  Outcome: Met This Shift  Goal: Control of chronic pain  Description: Control of chronic pain  Outcome: Met This Shift     Problem: Discharge Planning:  Goal: Discharged to appropriate level of care  Description: Discharged to appropriate level of care  Outcome: Met This Shift     Problem:  Activity Intolerance:  Goal: Able to perform prescribed physical activity  Description: Able to perform prescribed physical activity  Outcome: Met This Shift  Goal: Ability to tolerate increased activity will improve  Description: Ability to tolerate increased activity will improve  Outcome: Met This Shift     Problem: Anxiety:  Goal: Level of anxiety will decrease  Description: Level of anxiety will decrease  Outcome: Met This Shift     Problem: Cardiac Output - Decreased:  Goal: Cardiac output within specified parameters  Description: Cardiac output within specified parameters  Outcome: Met This Shift  Goal: Hemodynamic stability will improve  Description: Hemodynamic stability will improve  Outcome: Met This Shift     Problem: Fluid Volume - Imbalance:  Goal: Ability to achieve a balanced intake and output will improve  Description: Ability to achieve a balanced intake and output will improve  Outcome: Met This Shift  Goal: Chest tube drainage is within specified parameters  Description: Chest tube drainage is within specified parameters  Outcome: Met This Shift     Problem: Gas Exchange - Impaired:  Goal: Levels of oxygenation will improve  Description: Levels of oxygenation will improve  Outcome: Met This Shift  Goal: Ability to maintain adequate ventilation will improve  Description: Ability to maintain adequate ventilation will improve  Outcome: Met This Shift     Problem: Pain:  Goal: Control of acute pain  Description: Control of acute pain  Outcome: Met This Shift  Goal: Control of chronic pain  Description: Control of chronic pain  Outcome: Met This Shift     Problem: Tissue Perfusion - Cardiopulmonary, Altered:  Goal: Absence of angina  Description: Absence of angina  Outcome: Met This Shift  Goal: Hemodynamic stability will improve  Description: Hemodynamic stability will improve  Outcome: Met This Shift  Goal: Will show no evidence of cardiac arrhythmias  Description: Will show no evidence of cardiac arrhythmias  Outcome: Met This Shift     Problem: Tobacco Use:  Goal: Will participate in inpatient tobacco-use cessation counseling  Description: Will participate in inpatient tobacco-use cessation counseling  Outcome: Met This Shift     Problem: Musculor/Skeletal Functional Status  Goal: Highest potential functional level  Outcome: Met This Shift  Goal: Absence of falls  Outcome: Met This Shift

## 2020-05-31 LAB
ANION GAP SERPL CALCULATED.3IONS-SCNC: 11 MMOL/L (ref 7–16)
BUN BLDV-MCNC: 18 MG/DL (ref 6–20)
CALCIUM SERPL-MCNC: 8.8 MG/DL (ref 8.6–10.2)
CHLORIDE BLD-SCNC: 95 MMOL/L (ref 98–107)
CO2: 24 MMOL/L (ref 22–29)
CREAT SERPL-MCNC: 0.8 MG/DL (ref 0.7–1.2)
GFR AFRICAN AMERICAN: >60
GFR NON-AFRICAN AMERICAN: >60 ML/MIN/1.73
GLUCOSE BLD-MCNC: 183 MG/DL (ref 74–99)
HCT VFR BLD CALC: 29.7 % (ref 37–54)
HEMOGLOBIN: 9.6 G/DL (ref 12.5–16.5)
MAGNESIUM: 1.9 MG/DL (ref 1.6–2.6)
MCH RBC QN AUTO: 30.1 PG (ref 26–35)
MCHC RBC AUTO-ENTMCNC: 32.3 % (ref 32–34.5)
MCV RBC AUTO: 93.1 FL (ref 80–99.9)
METER GLUCOSE: 191 MG/DL (ref 74–99)
METER GLUCOSE: 198 MG/DL (ref 74–99)
METER GLUCOSE: 227 MG/DL (ref 74–99)
METER GLUCOSE: 346 MG/DL (ref 74–99)
PDW BLD-RTO: 14 FL (ref 11.5–15)
PLATELET # BLD: 182 E9/L (ref 130–450)
PMV BLD AUTO: 10.4 FL (ref 7–12)
POTASSIUM SERPL-SCNC: 4.9 MMOL/L (ref 3.5–5)
RBC # BLD: 3.19 E12/L (ref 3.8–5.8)
SODIUM BLD-SCNC: 130 MMOL/L (ref 132–146)
WBC # BLD: 13.4 E9/L (ref 4.5–11.5)

## 2020-05-31 PROCEDURE — 80048 BASIC METABOLIC PNL TOTAL CA: CPT

## 2020-05-31 PROCEDURE — 94640 AIRWAY INHALATION TREATMENT: CPT

## 2020-05-31 PROCEDURE — 85027 COMPLETE CBC AUTOMATED: CPT

## 2020-05-31 PROCEDURE — 82962 GLUCOSE BLOOD TEST: CPT

## 2020-05-31 PROCEDURE — 6370000000 HC RX 637 (ALT 250 FOR IP): Performed by: NURSE PRACTITIONER

## 2020-05-31 PROCEDURE — 6360000002 HC RX W HCPCS: Performed by: NURSE PRACTITIONER

## 2020-05-31 PROCEDURE — 6370000000 HC RX 637 (ALT 250 FOR IP): Performed by: INTERNAL MEDICINE

## 2020-05-31 PROCEDURE — 83735 ASSAY OF MAGNESIUM: CPT

## 2020-05-31 PROCEDURE — 93798 PHYS/QHP OP CAR RHAB W/ECG: CPT

## 2020-05-31 PROCEDURE — 2140000000 HC CCU INTERMEDIATE R&B

## 2020-05-31 PROCEDURE — 2580000003 HC RX 258: Performed by: INTERNAL MEDICINE

## 2020-05-31 PROCEDURE — 36415 COLL VENOUS BLD VENIPUNCTURE: CPT

## 2020-05-31 PROCEDURE — 6360000002 HC RX W HCPCS: Performed by: INTERNAL MEDICINE

## 2020-05-31 PROCEDURE — 99231 SBSQ HOSP IP/OBS SF/LOW 25: CPT | Performed by: INTERNAL MEDICINE

## 2020-05-31 PROCEDURE — 2700000000 HC OXYGEN THERAPY PER DAY

## 2020-05-31 PROCEDURE — 2580000003 HC RX 258: Performed by: NURSE PRACTITIONER

## 2020-05-31 PROCEDURE — 94660 CPAP INITIATION&MGMT: CPT

## 2020-05-31 RX ORDER — AMIODARONE HYDROCHLORIDE 200 MG/1
400 TABLET ORAL 3 TIMES DAILY
Status: DISCONTINUED | OUTPATIENT
Start: 2020-05-31 | End: 2020-05-31

## 2020-05-31 RX ADMIN — MUPIROCIN: 20 OINTMENT TOPICAL at 20:33

## 2020-05-31 RX ADMIN — AMIODARONE HYDROCHLORIDE 400 MG: 200 TABLET ORAL at 08:29

## 2020-05-31 RX ADMIN — INSULIN LISPRO 2 UNITS: 100 INJECTION, SOLUTION INTRAVENOUS; SUBCUTANEOUS at 20:33

## 2020-05-31 RX ADMIN — BENZOCAINE AND MENTHOL 1 LOZENGE: 15; 3.6 LOZENGE ORAL at 23:56

## 2020-05-31 RX ADMIN — Medication 10 ML: at 20:32

## 2020-05-31 RX ADMIN — MAGNESIUM HYDROXIDE 30 ML: 2400 SUSPENSION ORAL at 08:20

## 2020-05-31 RX ADMIN — Medication 10 ML: at 08:20

## 2020-05-31 RX ADMIN — AMIODARONE HYDROCHLORIDE 400 MG: 200 TABLET ORAL at 13:27

## 2020-05-31 RX ADMIN — INSULIN LISPRO 12 UNITS: 100 INJECTION, SOLUTION INTRAVENOUS; SUBCUTANEOUS at 11:34

## 2020-05-31 RX ADMIN — Medication 500 MG: at 08:22

## 2020-05-31 RX ADMIN — AMIODARONE HYDROCHLORIDE 150 MG: 50 INJECTION, SOLUTION INTRAVENOUS at 17:33

## 2020-05-31 RX ADMIN — HYDROCODONE BITARTRATE AND ACETAMINOPHEN 2 TABLET: 5; 325 TABLET ORAL at 16:25

## 2020-05-31 RX ADMIN — INSULIN LISPRO 3 UNITS: 100 INJECTION, SOLUTION INTRAVENOUS; SUBCUTANEOUS at 08:23

## 2020-05-31 RX ADMIN — INSULIN GLARGINE 30 UNITS: 100 INJECTION, SOLUTION SUBCUTANEOUS at 20:33

## 2020-05-31 RX ADMIN — TAMSULOSIN HYDROCHLORIDE 0.4 MG: 0.4 CAPSULE ORAL at 08:21

## 2020-05-31 RX ADMIN — Medication 10 ML: at 08:29

## 2020-05-31 RX ADMIN — HYDROCODONE BITARTRATE AND ACETAMINOPHEN 2 TABLET: 5; 325 TABLET ORAL at 04:24

## 2020-05-31 RX ADMIN — SENNOSIDES AND DOCUSATE SODIUM 1 TABLET: 8.6; 5 TABLET ORAL at 08:30

## 2020-05-31 RX ADMIN — FUROSEMIDE 20 MG: 20 INJECTION, SOLUTION INTRAMUSCULAR; INTRAVENOUS at 08:29

## 2020-05-31 RX ADMIN — APIXABAN 5 MG: 5 TABLET, FILM COATED ORAL at 11:33

## 2020-05-31 RX ADMIN — METFORMIN HYDROCHLORIDE 500 MG: 500 TABLET ORAL at 08:21

## 2020-05-31 RX ADMIN — METOPROLOL TARTRATE 37.5 MG: 25 TABLET, FILM COATED ORAL at 08:20

## 2020-05-31 RX ADMIN — AMIODARONE HYDROCHLORIDE 1 MG/MIN: 50 INJECTION, SOLUTION INTRAVENOUS at 18:58

## 2020-05-31 RX ADMIN — FOLIC ACID 1 MG: 1 TABLET ORAL at 08:22

## 2020-05-31 RX ADMIN — KETOROLAC TROMETHAMINE 15 MG: 30 INJECTION, SOLUTION INTRAMUSCULAR at 12:46

## 2020-05-31 RX ADMIN — HYDROCODONE BITARTRATE AND ACETAMINOPHEN 2 TABLET: 5; 325 TABLET ORAL at 00:31

## 2020-05-31 RX ADMIN — MUPIROCIN: 20 OINTMENT TOPICAL at 08:21

## 2020-05-31 RX ADMIN — IPRATROPIUM BROMIDE AND ALBUTEROL SULFATE 1 AMPULE: 2.5; .5 SOLUTION RESPIRATORY (INHALATION) at 08:47

## 2020-05-31 RX ADMIN — ASPIRIN 81 MG: 81 TABLET, COATED ORAL at 08:22

## 2020-05-31 RX ADMIN — BISACODYL 5 MG: 5 TABLET, COATED ORAL at 08:29

## 2020-05-31 RX ADMIN — ENOXAPARIN SODIUM 40 MG: 40 INJECTION SUBCUTANEOUS at 08:20

## 2020-05-31 RX ADMIN — FERROUS SULFATE TAB 325 MG (65 MG ELEMENTAL FE) 325 MG: 325 (65 FE) TAB at 16:25

## 2020-05-31 RX ADMIN — IPRATROPIUM BROMIDE AND ALBUTEROL SULFATE 1 AMPULE: 2.5; .5 SOLUTION RESPIRATORY (INHALATION) at 16:07

## 2020-05-31 RX ADMIN — HYDROCODONE BITARTRATE AND ACETAMINOPHEN 2 TABLET: 5; 325 TABLET ORAL at 20:32

## 2020-05-31 RX ADMIN — HYDROCODONE BITARTRATE AND ACETAMINOPHEN 2 TABLET: 5; 325 TABLET ORAL at 12:21

## 2020-05-31 RX ADMIN — INSULIN LISPRO 6 UNITS: 100 INJECTION, SOLUTION INTRAVENOUS; SUBCUTANEOUS at 16:25

## 2020-05-31 RX ADMIN — KETOROLAC TROMETHAMINE 15 MG: 30 INJECTION, SOLUTION INTRAMUSCULAR at 23:56

## 2020-05-31 RX ADMIN — METFORMIN HYDROCHLORIDE 500 MG: 500 TABLET ORAL at 16:25

## 2020-05-31 RX ADMIN — APIXABAN 5 MG: 5 TABLET, FILM COATED ORAL at 20:32

## 2020-05-31 RX ADMIN — HYDROCODONE BITARTRATE AND ACETAMINOPHEN 2 TABLET: 5; 325 TABLET ORAL at 08:21

## 2020-05-31 RX ADMIN — ATORVASTATIN CALCIUM 40 MG: 40 TABLET, FILM COATED ORAL at 20:31

## 2020-05-31 RX ADMIN — FERROUS SULFATE TAB 325 MG (65 MG ELEMENTAL FE) 325 MG: 325 (65 FE) TAB at 08:21

## 2020-05-31 RX ADMIN — Medication 500 MG: at 20:32

## 2020-05-31 RX ADMIN — Medication 10 ML: at 12:45

## 2020-05-31 RX ADMIN — METOPROLOL TARTRATE 37.5 MG: 25 TABLET, FILM COATED ORAL at 20:32

## 2020-05-31 RX ADMIN — PANTOPRAZOLE SODIUM 40 MG: 40 TABLET, DELAYED RELEASE ORAL at 08:22

## 2020-05-31 RX ADMIN — SENNOSIDES AND DOCUSATE SODIUM 1 TABLET: 8.6; 5 TABLET ORAL at 20:32

## 2020-05-31 RX ADMIN — IPRATROPIUM BROMIDE AND ALBUTEROL SULFATE 1 AMPULE: 2.5; .5 SOLUTION RESPIRATORY (INHALATION) at 12:47

## 2020-05-31 RX ADMIN — MAGNESIUM GLUCONATE 500 MG ORAL TABLET 400 MG: 500 TABLET ORAL at 08:22

## 2020-05-31 RX ADMIN — IPRATROPIUM BROMIDE AND ALBUTEROL SULFATE 1 AMPULE: 2.5; .5 SOLUTION RESPIRATORY (INHALATION) at 20:23

## 2020-05-31 ASSESSMENT — PAIN SCALES - GENERAL
PAINLEVEL_OUTOF10: 8
PAINLEVEL_OUTOF10: 3
PAINLEVEL_OUTOF10: 4
PAINLEVEL_OUTOF10: 8
PAINLEVEL_OUTOF10: 7
PAINLEVEL_OUTOF10: 8
PAINLEVEL_OUTOF10: 7
PAINLEVEL_OUTOF10: 7
PAINLEVEL_OUTOF10: 8
PAINLEVEL_OUTOF10: 3

## 2020-05-31 ASSESSMENT — PAIN DESCRIPTION - PAIN TYPE
TYPE: ACUTE PAIN;SURGICAL PAIN
TYPE: ACUTE PAIN;SURGICAL PAIN
TYPE: SURGICAL PAIN
TYPE: SURGICAL PAIN
TYPE: ACUTE PAIN;SURGICAL PAIN
TYPE: SURGICAL PAIN
TYPE: SURGICAL PAIN

## 2020-05-31 ASSESSMENT — PAIN DESCRIPTION - ONSET
ONSET: ON-GOING

## 2020-05-31 ASSESSMENT — PAIN DESCRIPTION - FREQUENCY
FREQUENCY: CONTINUOUS
FREQUENCY: INTERMITTENT
FREQUENCY: CONTINUOUS

## 2020-05-31 ASSESSMENT — PAIN DESCRIPTION - ORIENTATION
ORIENTATION: MID
ORIENTATION: MID
ORIENTATION: LEFT;POSTERIOR
ORIENTATION: MID;LEFT
ORIENTATION: MID

## 2020-05-31 ASSESSMENT — PAIN SCALES - WONG BAKER
WONGBAKER_NUMERICALRESPONSE: 0

## 2020-05-31 ASSESSMENT — PAIN DESCRIPTION - LOCATION
LOCATION: CHEST
LOCATION: CHEST;SHOULDER
LOCATION: SHOULDER
LOCATION: CHEST
LOCATION: CHEST;SHOULDER

## 2020-05-31 ASSESSMENT — PAIN DESCRIPTION - DESCRIPTORS
DESCRIPTORS: ACHING;DISCOMFORT;SORE
DESCRIPTORS: DISCOMFORT;ACHING
DESCRIPTORS: ACHING;DISCOMFORT;PRESSURE
DESCRIPTORS: ACHING;DISCOMFORT;SORE
DESCRIPTORS: ACHING;DISCOMFORT;SORE
DESCRIPTORS: ACHING;DISCOMFORT;PRESSURE
DESCRIPTORS: ACHING;DISCOMFORT;SORE

## 2020-05-31 ASSESSMENT — PAIN DESCRIPTION - PROGRESSION
CLINICAL_PROGRESSION: NOT CHANGED

## 2020-05-31 ASSESSMENT — PAIN - FUNCTIONAL ASSESSMENT
PAIN_FUNCTIONAL_ASSESSMENT: PREVENTS OR INTERFERES SOME ACTIVE ACTIVITIES AND ADLS

## 2020-05-31 ASSESSMENT — PAIN DESCRIPTION - DIRECTION: RADIATING_TOWARDS: LEFT SCAPULA

## 2020-05-31 NOTE — CONSULTS
PULMONARY CONSULTATION    Reason for Consultation: post operative respiratory insufficiency with hypoxia   Referring Physician: Aniket Juarez MD    Communication with the referring physician will be sent via the electronic medical record. HPI:    The patient is a 54year old male with a history of nicotine dependence, atrial fibrillation, diabetes, obesity, HUMERA on CPAP, who underwent CABG x 3 on 5/28/20 for multivessel coronary disease. I am asked to see him for post operative respiratory insufficiency with hypoxia. Mr. Elise Jacobs reports a h/o COPD without chronic hypoxia and does not use oxygen at home. He does have a home CPAP but has not been compliant with this. He was smoking 1PPD x 40 years (sometimes more), and was smoking up until the time of his surgery. Currently he does have some SOB although respirations are shallow d/t ongoing discomfort in the chest and L shoulder. He has no cough or sputum. No hemoptysis. He is receiving 4L supplemental oxygen and received additional IV diuresis today.      Pre operative PFTs  FEV1/FVC 89%  FEV1 2.32L, 57%  DLCO 49%      Past Medical History:   Diagnosis Date    Atrial fibrillation ([address]     Atrial fibrillation ([address] 02/20/2018    lexiscan stress test    Cerebral artery occlusion with cerebral infarction ([address]     Diabetes mellitus ([address]     Hyperlipidemia     Hypertension     Psychiatric problem     TIA (transient ischemic attack)     2015       Past Surgical History:   Procedure Laterality Date    CARDIAC SURGERY      HEART CATHETERIZATION    CORONARY ARTERY BYPASS GRAFT N/A 5/28/2020    CORONARY ARTERY BYPASS, MAZE PROCEDURE, BERTHA performed by Gus Mancuso DO at [address] [place] [address] ECHOCARDIOGRAM COMPLETE WITH BUBBLE STUDY  7/7/2015         FRACTURE SURGERY      OTHER SURGICAL HISTORY  02/15/2018    incision and drainage bone biopsy of right great toe       Family History   Problem Relation Age of Onset    Cancer Mother at 05/31/20 0820    apixaban (ELIQUIS) tablet 5 mg  5 mg Oral BID Barabara Cancer, APRN - CNP   5 mg at 05/31/20 1133    acetaminophen (TYLENOL) tablet 650 mg  650 mg Oral Q4H PRN Alphonsus Cava, APRN - CNP        HYDROcodone-acetaminophen (NORCO) 5-325 MG per tablet 1 tablet  1 tablet Oral Q4H PRN Alphonsus Cava, APRN - CNP        Or    HYDROcodone-acetaminophen (NORCO) 5-325 MG per tablet 2 tablet  2 tablet Oral Q4H PRN Alphonsus Cava, APRN - CNP   2 tablet at 05/31/20 1221    aspirin EC tablet 81 mg  81 mg Oral Daily Alphonsus Cava, APRN - CNP   81 mg at 05/31/20 7495    ferrous sulfate (IRON 325) tablet 325 mg  325 mg Oral BID  Alphonsus Cava, APRN - CNP   325 mg at 05/31/20 1576    vitamin C (ASCORBIC ACID) tablet 500 mg  500 mg Oral BID Tanyaonsus Cava, APRN - CNP   500 mg at 14/92/22 7607    folic acid (FOLVITE) tablet 1 mg  1 mg Oral Daily Alphonsus Cava, APRN - CNP   1 mg at 05/31/20 9413    potassium chloride (KLOR-CON M) extended release tablet 20 mEq  20 mEq Oral PRN Alphonsus Cava, APRN - CNP        ondansetron [facility]) injection 4 mg  4 mg Intravenous Q8H PRN Alphonsus Cava, APRN - CNP        pantoprazole (PROTONIX) tablet 40 mg  40 mg Oral Daily Alphonsus Cava, APRN - CNP   40 mg at 05/31/20 5914    sennosides-docusate sodium (SENOKOT-S) 8.6-50 MG tablet 1 tablet  1 tablet Oral BID Alphonsus Cava, APRN - CNP   1 tablet at 05/31/20 0830    bisacodyl (DULCOLAX) EC tablet 5 mg  5 mg Oral Daily PRN Alphonsus Cava, APRN - CNP   5 mg at 05/31/20 6021    insulin glargine (LANTUS) injection vial 30 Units  30 Units Subcutaneous Nightly Barabara Cancer, APRN - CNP   30 Units at 05/30/20 2020    insulin lispro (HUMALOG) injection vial 0-18 Units  0-18 Units Subcutaneous TID [facility], APRN - CNP   12 Units at 05/31/20 1134    insulin lispro (HUMALOG) injection vial 0-9 Units  0-9 Units Subcutaneous Nightly Galilea Lane, APRN - CNP   3 Units at 05/30/20 2020    metFORMIN

## 2020-05-31 NOTE — PROGRESS NOTES
Patient is seen in follow-up for CAD status post CABG    Subjective:     Mr. Dakota Kebede still complaining of incisional chest pain,he thinks today is his down day, has more pain over his chest, back and left shoulder. He is also complaining of pain over the right leg at surgical incision sites.   Patient laying in the bed without apparent distress    ROS:  CONSTITUTIONAL:  negative for  fevers, chills  HEENT:  negative for earaches, nasal congestion and epistaxis  RESPIRATORY:  negative for  dry cough, cough with sputum,wheezing and hemoptysis  GASTROINTESTINAL:  negative for nausea, vomiting  MUSCULOSKELETAL:  negative for  myalgias, arthralgias  NEUROLOGICAL:  negative for visual disturbance, dysphagia    Medication side effects: none      Scheduled Meds:   metoprolol tartrate  37.5 mg Oral BID    amiodarone  400 mg Oral TID    apixaban  5 mg Oral BID    aspirin  81 mg Oral Daily    ferrous sulfate  325 mg Oral BID WC    vitamin C  500 mg Oral BID    folic acid  1 mg Oral Daily    pantoprazole  40 mg Oral Daily    sennosides-docusate sodium  1 tablet Oral BID    insulin glargine  30 Units Subcutaneous Nightly    insulin lispro  0-18 Units Subcutaneous TID WC    insulin lispro  0-9 Units Subcutaneous Nightly    metFORMIN  500 mg Oral BID WC    atorvastatin  40 mg Oral Nightly    sodium chloride flush  10 mL Intravenous 2 times per day    magnesium oxide  400 mg Oral Daily    mupirocin   Nasal BID    ipratropium-albuterol  1 ampule Inhalation Q4H WA    tamsulosin  0.4 mg Oral Daily     Continuous Infusions:   dextrose       PRN Meds:magnesium hydroxide, acetaminophen, HYDROcodone 5 mg - acetaminophen **OR** HYDROcodone 5 mg - acetaminophen, potassium chloride, ondansetron, bisacodyl, bisacodyl, benzocaine-menthol, ketorolac, sodium chloride flush, glucose, dextrose, glucagon (rDNA), dextrose      Objective:      Physical Exam:   /76   Pulse 94   Temp 97.7 °F (36.5 °C) (Temporal)   Resp 20 moderate central congestive   changes. 4. No obvious effusion. Lab Review   Lab Results   Component Value Date     05/31/2020    K 4.9 05/31/2020    K 4.8 05/16/2020    CL 95 05/31/2020    CO2 24 05/31/2020    BUN 18 05/31/2020    CREATININE 0.8 05/31/2020    GLUCOSE 183 05/31/2020    CALCIUM 8.8 05/31/2020     Lab Results   Component Value Date    WBC 13.4 05/31/2020    HGB 9.6 05/31/2020    HCT 29.7 05/31/2020    MCV 93.1 05/31/2020     05/31/2020     I have personally reviewed the laboratory, cardiac diagnostic and radiographic testing as outlined above:    Telemetry-normal sinus rhythm with heart rate in the 90s without any arrhythmias. Vitals-stable with a blood pressure of 127/76 with heart rate of 94. Labs were reviewed: Sodium 130 rest of the chemistries normal, WBC 13.4, H&H 9.6/29.7. Assessment:   1.  CAD: S/p CABG on 5/28/2020 with a LIMA to LAD, SVG to diagonal, SVG to posterior lateral branch, doing fine, will continue current treatment, stable  2. Paroxysmal atrial flutter: Now in sinus rhythm, continue current treatment, continue chronic anticoagulation with Xarelto  3. History of TIA:   4. Hypertension:   5. Tobacco abuse:  6. Diabetes mellitus  7. Obesity        Recommendations:     1. Will continue current treatment including amiodarone and metoprolol for rhythm control and Eliquis for anticoagulation. 2.  Continue aspirin and Lipitor 40 mg 1 by mouth daily  3. PT/OT  4. Pain management as per Dr. Niya Coelho. Discussed with patient    Eugene Zapien MD, [address] [place] [address] cardiology    Electronically signed by Mariam Ojeda MD on 5/31/2020 at 1:17 PM  NOTE: This report was transcribed using voice recognition software.  Every effort was made to ensure accuracy; however, inadvertent computerized transcription errors may be present

## 2020-05-31 NOTE — PROGRESS NOTES
Date: 5/30/2020    Time: 10:55 PM    Patient Placed On BIPAP/CPAP/ Non-Invasive Ventilation? Yes    If no must comment. Facial area red/color change? No           If YES are Blister/Lesion present? No   If yes must notify nursing staff  BIPAP/CPAP skin barrier? Yes    Skin barrier type:mepilex       Comments: On for HS. CPAP +12 6L bleed added inline.  SpO2 100%        Benjamín Breen

## 2020-05-31 NOTE — PLAN OF CARE
Problem: Falls - Risk of:  Goal: Will remain free from falls  Description: Will remain free from falls  5/31/2020 0530 by Carla Richardson RN  Outcome: Met This Shift  5/30/2020 1634 by Luis Figueroa RN  Outcome: Met This Shift     Problem:  Activity Intolerance:  Goal: Ability to tolerate increased activity will improve  Description: Ability to tolerate increased activity will improve  5/30/2020 1634 by Luis Figueroa RN  Outcome: Met This Shift     Problem: Anxiety:  Goal: Level of anxiety will decrease  Description: Level of anxiety will decrease  5/30/2020 1634 by Luis Figueroa RN  Outcome: Met This Shift     Problem: Pain:  Goal: Pain level will decrease  Description: Pain level will decrease  Outcome: Ongoing

## 2020-06-01 ENCOUNTER — APPOINTMENT (OUTPATIENT)
Dept: GENERAL RADIOLOGY | Age: 55
DRG: 235 | End: 2020-06-01
Attending: THORACIC SURGERY (CARDIOTHORACIC VASCULAR SURGERY)
Payer: COMMERCIAL

## 2020-06-01 LAB
ANION GAP SERPL CALCULATED.3IONS-SCNC: 10 MMOL/L (ref 7–16)
ANION GAP SERPL CALCULATED.3IONS-SCNC: 11 MMOL/L (ref 7–16)
BILIRUBIN URINE: NEGATIVE
BLOOD, URINE: NEGATIVE
BUN BLDV-MCNC: 24 MG/DL (ref 6–20)
BUN BLDV-MCNC: 26 MG/DL (ref 6–20)
CALCIUM SERPL-MCNC: 8.9 MG/DL (ref 8.6–10.2)
CALCIUM SERPL-MCNC: 9 MG/DL (ref 8.6–10.2)
CHLORIDE BLD-SCNC: 90 MMOL/L (ref 98–107)
CHLORIDE BLD-SCNC: 93 MMOL/L (ref 98–107)
CHLORIDE URINE RANDOM: <20 MMOL/L
CLARITY: CLEAR
CO2: 26 MMOL/L (ref 22–29)
CO2: 26 MMOL/L (ref 22–29)
COLOR: ABNORMAL
CORTISOL TOTAL: 12.79 MCG/DL (ref 2.68–18.4)
CREAT SERPL-MCNC: 1 MG/DL (ref 0.7–1.2)
CREAT SERPL-MCNC: 1.1 MG/DL (ref 0.7–1.2)
GFR AFRICAN AMERICAN: >60
GFR AFRICAN AMERICAN: >60
GFR NON-AFRICAN AMERICAN: >60 ML/MIN/1.73
GFR NON-AFRICAN AMERICAN: >60 ML/MIN/1.73
GLUCOSE BLD-MCNC: 166 MG/DL (ref 74–99)
GLUCOSE BLD-MCNC: 195 MG/DL (ref 74–99)
GLUCOSE URINE: NEGATIVE MG/DL
HCT VFR BLD CALC: 28.6 % (ref 37–54)
HEMOGLOBIN: 9.3 G/DL (ref 12.5–16.5)
KETONES, URINE: NEGATIVE MG/DL
LEUKOCYTE ESTERASE, URINE: NEGATIVE
MAGNESIUM: 2.2 MG/DL (ref 1.6–2.6)
MCH RBC QN AUTO: 30.7 PG (ref 26–35)
MCHC RBC AUTO-ENTMCNC: 32.5 % (ref 32–34.5)
MCV RBC AUTO: 94.4 FL (ref 80–99.9)
METER GLUCOSE: 163 MG/DL (ref 74–99)
METER GLUCOSE: 177 MG/DL (ref 74–99)
METER GLUCOSE: 200 MG/DL (ref 74–99)
NITRITE, URINE: NEGATIVE
OSMOLALITY URINE: 515 MOSM/KG (ref 300–900)
PDW BLD-RTO: 14.1 FL (ref 11.5–15)
PH UA: 6 (ref 5–9)
PLATELET # BLD: 207 E9/L (ref 130–450)
PMV BLD AUTO: 10.4 FL (ref 7–12)
POTASSIUM SERPL-SCNC: 4.7 MMOL/L (ref 3.5–5)
POTASSIUM SERPL-SCNC: 5.1 MMOL/L (ref 3.5–5)
POTASSIUM, UR: 16.7 MMOL/L
PROTEIN UA: NEGATIVE MG/DL
RBC # BLD: 3.03 E12/L (ref 3.8–5.8)
SODIUM BLD-SCNC: 127 MMOL/L (ref 132–146)
SODIUM BLD-SCNC: 129 MMOL/L (ref 132–146)
SODIUM URINE: <20 MMOL/L
SPECIFIC GRAVITY UA: 1.01 (ref 1–1.03)
TSH SERPL DL<=0.05 MIU/L-ACNC: 5.04 UIU/ML (ref 0.27–4.2)
UROBILINOGEN, URINE: 0.2 E.U./DL
WBC # BLD: 12.4 E9/L (ref 4.5–11.5)

## 2020-06-01 PROCEDURE — 6370000000 HC RX 637 (ALT 250 FOR IP): Performed by: NURSE PRACTITIONER

## 2020-06-01 PROCEDURE — 83935 ASSAY OF URINE OSMOLALITY: CPT

## 2020-06-01 PROCEDURE — 99232 SBSQ HOSP IP/OBS MODERATE 35: CPT | Performed by: INTERNAL MEDICINE

## 2020-06-01 PROCEDURE — 71046 X-RAY EXAM CHEST 2 VIEWS: CPT

## 2020-06-01 PROCEDURE — 6360000002 HC RX W HCPCS: Performed by: INTERNAL MEDICINE

## 2020-06-01 PROCEDURE — 82962 GLUCOSE BLOOD TEST: CPT

## 2020-06-01 PROCEDURE — 82436 ASSAY OF URINE CHLORIDE: CPT

## 2020-06-01 PROCEDURE — 85027 COMPLETE CBC AUTOMATED: CPT

## 2020-06-01 PROCEDURE — 81003 URINALYSIS AUTO W/O SCOPE: CPT

## 2020-06-01 PROCEDURE — 83735 ASSAY OF MAGNESIUM: CPT

## 2020-06-01 PROCEDURE — 84300 ASSAY OF URINE SODIUM: CPT

## 2020-06-01 PROCEDURE — 94669 MECHANICAL CHEST WALL OSCILL: CPT

## 2020-06-01 PROCEDURE — 94640 AIRWAY INHALATION TREATMENT: CPT

## 2020-06-01 PROCEDURE — 84443 ASSAY THYROID STIM HORMONE: CPT

## 2020-06-01 PROCEDURE — 82533 TOTAL CORTISOL: CPT

## 2020-06-01 PROCEDURE — 2580000003 HC RX 258: Performed by: INTERNAL MEDICINE

## 2020-06-01 PROCEDURE — 80048 BASIC METABOLIC PNL TOTAL CA: CPT

## 2020-06-01 PROCEDURE — 84133 ASSAY OF URINE POTASSIUM: CPT

## 2020-06-01 PROCEDURE — 36415 COLL VENOUS BLD VENIPUNCTURE: CPT

## 2020-06-01 PROCEDURE — 6370000000 HC RX 637 (ALT 250 FOR IP): Performed by: INTERNAL MEDICINE

## 2020-06-01 PROCEDURE — 94660 CPAP INITIATION&MGMT: CPT

## 2020-06-01 PROCEDURE — 2140000000 HC CCU INTERMEDIATE R&B

## 2020-06-01 PROCEDURE — 2580000003 HC RX 258: Performed by: NURSE PRACTITIONER

## 2020-06-01 PROCEDURE — 93798 PHYS/QHP OP CAR RHAB W/ECG: CPT

## 2020-06-01 PROCEDURE — 6360000002 HC RX W HCPCS: Performed by: NURSE PRACTITIONER

## 2020-06-01 RX ORDER — AMIODARONE HYDROCHLORIDE 200 MG/1
400 TABLET ORAL 3 TIMES DAILY
Status: DISCONTINUED | OUTPATIENT
Start: 2020-06-01 | End: 2020-06-08 | Stop reason: HOSPADM

## 2020-06-01 RX ORDER — MORPHINE SULFATE 2 MG/ML
2 INJECTION, SOLUTION INTRAMUSCULAR; INTRAVENOUS ONCE
Status: COMPLETED | OUTPATIENT
Start: 2020-06-01 | End: 2020-06-02

## 2020-06-01 RX ORDER — FUROSEMIDE 10 MG/ML
40 INJECTION INTRAMUSCULAR; INTRAVENOUS ONCE
Status: COMPLETED | OUTPATIENT
Start: 2020-06-01 | End: 2020-06-01

## 2020-06-01 RX ADMIN — HYDROCODONE BITARTRATE AND ACETAMINOPHEN 2 TABLET: 5; 325 TABLET ORAL at 08:34

## 2020-06-01 RX ADMIN — IPRATROPIUM BROMIDE AND ALBUTEROL SULFATE 1 AMPULE: 2.5; .5 SOLUTION RESPIRATORY (INHALATION) at 21:47

## 2020-06-01 RX ADMIN — FERROUS SULFATE TAB 325 MG (65 MG ELEMENTAL FE) 325 MG: 325 (65 FE) TAB at 08:24

## 2020-06-01 RX ADMIN — SENNOSIDES AND DOCUSATE SODIUM 1 TABLET: 8.6; 5 TABLET ORAL at 21:28

## 2020-06-01 RX ADMIN — FOLIC ACID 1 MG: 1 TABLET ORAL at 08:24

## 2020-06-01 RX ADMIN — MAGNESIUM GLUCONATE 500 MG ORAL TABLET 400 MG: 500 TABLET ORAL at 08:35

## 2020-06-01 RX ADMIN — Medication 10 ML: at 13:22

## 2020-06-01 RX ADMIN — HYDROCODONE BITARTRATE AND ACETAMINOPHEN 2 TABLET: 5; 325 TABLET ORAL at 04:30

## 2020-06-01 RX ADMIN — METFORMIN HYDROCHLORIDE 500 MG: 500 TABLET ORAL at 16:00

## 2020-06-01 RX ADMIN — ASPIRIN 81 MG: 81 TABLET, COATED ORAL at 08:24

## 2020-06-01 RX ADMIN — APIXABAN 5 MG: 5 TABLET, FILM COATED ORAL at 21:27

## 2020-06-01 RX ADMIN — INSULIN LISPRO 3 UNITS: 100 INJECTION, SOLUTION INTRAVENOUS; SUBCUTANEOUS at 08:25

## 2020-06-01 RX ADMIN — Medication 10 ML: at 08:24

## 2020-06-01 RX ADMIN — PANTOPRAZOLE SODIUM 40 MG: 40 TABLET, DELAYED RELEASE ORAL at 08:24

## 2020-06-01 RX ADMIN — Medication 10 ML: at 21:28

## 2020-06-01 RX ADMIN — HYDROCODONE BITARTRATE AND ACETAMINOPHEN 2 TABLET: 5; 325 TABLET ORAL at 21:28

## 2020-06-01 RX ADMIN — AMIODARONE HYDROCHLORIDE 400 MG: 200 TABLET ORAL at 13:22

## 2020-06-01 RX ADMIN — INSULIN LISPRO 6 UNITS: 100 INJECTION, SOLUTION INTRAVENOUS; SUBCUTANEOUS at 16:01

## 2020-06-01 RX ADMIN — Medication 500 MG: at 21:28

## 2020-06-01 RX ADMIN — HYDROCODONE BITARTRATE AND ACETAMINOPHEN 2 TABLET: 5; 325 TABLET ORAL at 17:09

## 2020-06-01 RX ADMIN — ATORVASTATIN CALCIUM 40 MG: 40 TABLET, FILM COATED ORAL at 21:28

## 2020-06-01 RX ADMIN — FUROSEMIDE 40 MG: 10 INJECTION, SOLUTION INTRAMUSCULAR; INTRAVENOUS at 13:22

## 2020-06-01 RX ADMIN — TAMSULOSIN HYDROCHLORIDE 0.4 MG: 0.4 CAPSULE ORAL at 08:24

## 2020-06-01 RX ADMIN — BENZOCAINE AND MENTHOL 1 LOZENGE: 15; 3.6 LOZENGE ORAL at 16:10

## 2020-06-01 RX ADMIN — APIXABAN 5 MG: 5 TABLET, FILM COATED ORAL at 08:24

## 2020-06-01 RX ADMIN — INSULIN GLARGINE 30 UNITS: 100 INJECTION, SOLUTION SUBCUTANEOUS at 21:36

## 2020-06-01 RX ADMIN — KETOROLAC TROMETHAMINE 15 MG: 30 INJECTION, SOLUTION INTRAMUSCULAR at 08:25

## 2020-06-01 RX ADMIN — METFORMIN HYDROCHLORIDE 500 MG: 500 TABLET ORAL at 08:24

## 2020-06-01 RX ADMIN — IPRATROPIUM BROMIDE AND ALBUTEROL SULFATE 1 AMPULE: 2.5; .5 SOLUTION RESPIRATORY (INHALATION) at 16:21

## 2020-06-01 RX ADMIN — MAGNESIUM HYDROXIDE 30 ML: 2400 SUSPENSION ORAL at 08:24

## 2020-06-01 RX ADMIN — AMIODARONE HYDROCHLORIDE 400 MG: 200 TABLET ORAL at 08:24

## 2020-06-01 RX ADMIN — AMIODARONE HYDROCHLORIDE 0.5 MG/MIN: 50 INJECTION, SOLUTION INTRAVENOUS at 00:53

## 2020-06-01 RX ADMIN — MUPIROCIN: 20 OINTMENT TOPICAL at 21:29

## 2020-06-01 RX ADMIN — METOPROLOL TARTRATE 37.5 MG: 25 TABLET, FILM COATED ORAL at 08:25

## 2020-06-01 RX ADMIN — HYDROCODONE BITARTRATE AND ACETAMINOPHEN 2 TABLET: 5; 325 TABLET ORAL at 12:54

## 2020-06-01 RX ADMIN — SENNOSIDES AND DOCUSATE SODIUM 1 TABLET: 8.6; 5 TABLET ORAL at 08:24

## 2020-06-01 RX ADMIN — Medication 500 MG: at 08:24

## 2020-06-01 RX ADMIN — HYDROCODONE BITARTRATE AND ACETAMINOPHEN 2 TABLET: 5; 325 TABLET ORAL at 00:32

## 2020-06-01 RX ADMIN — FERROUS SULFATE TAB 325 MG (65 MG ELEMENTAL FE) 325 MG: 325 (65 FE) TAB at 16:00

## 2020-06-01 RX ADMIN — MUPIROCIN: 20 OINTMENT TOPICAL at 08:24

## 2020-06-01 RX ADMIN — AMIODARONE HYDROCHLORIDE 400 MG: 200 TABLET ORAL at 21:28

## 2020-06-01 RX ADMIN — METOPROLOL TARTRATE 37.5 MG: 25 TABLET, FILM COATED ORAL at 21:27

## 2020-06-01 ASSESSMENT — PAIN DESCRIPTION - LOCATION
LOCATION: INCISION
LOCATION: CHEST;SHOULDER
LOCATION: INCISION
LOCATION: CHEST
LOCATION: STERNUM

## 2020-06-01 ASSESSMENT — PAIN DESCRIPTION - DIRECTION: RADIATING_TOWARDS: L SCAPULA

## 2020-06-01 ASSESSMENT — PAIN SCALES - GENERAL
PAINLEVEL_OUTOF10: 10
PAINLEVEL_OUTOF10: 0
PAINLEVEL_OUTOF10: 8
PAINLEVEL_OUTOF10: 4
PAINLEVEL_OUTOF10: 4
PAINLEVEL_OUTOF10: 7
PAINLEVEL_OUTOF10: 8
PAINLEVEL_OUTOF10: 8
PAINLEVEL_OUTOF10: 0
PAINLEVEL_OUTOF10: 10

## 2020-06-01 ASSESSMENT — PAIN DESCRIPTION - ORIENTATION
ORIENTATION: MID

## 2020-06-01 ASSESSMENT — PAIN DESCRIPTION - PAIN TYPE
TYPE: SURGICAL PAIN
TYPE: SURGICAL PAIN
TYPE: ACUTE PAIN;SURGICAL PAIN
TYPE: ACUTE PAIN;SURGICAL PAIN
TYPE: SURGICAL PAIN;ACUTE PAIN

## 2020-06-01 ASSESSMENT — PAIN DESCRIPTION - DESCRIPTORS
DESCRIPTORS: SORE;ACHING;DISCOMFORT
DESCRIPTORS: TENDER;SORE;ACHING
DESCRIPTORS: PRESSURE
DESCRIPTORS: ACHING;DISCOMFORT;SORE
DESCRIPTORS: ACHING;DISCOMFORT;SORE

## 2020-06-01 ASSESSMENT — PAIN DESCRIPTION - PROGRESSION
CLINICAL_PROGRESSION: NOT CHANGED
CLINICAL_PROGRESSION: NOT CHANGED

## 2020-06-01 ASSESSMENT — PAIN DESCRIPTION - ONSET
ONSET: ON-GOING

## 2020-06-01 ASSESSMENT — PAIN DESCRIPTION - FREQUENCY
FREQUENCY: CONTINUOUS

## 2020-06-01 ASSESSMENT — PAIN - FUNCTIONAL ASSESSMENT
PAIN_FUNCTIONAL_ASSESSMENT: PREVENTS OR INTERFERES SOME ACTIVE ACTIVITIES AND ADLS
PAIN_FUNCTIONAL_ASSESSMENT: PREVENTS OR INTERFERES SOME ACTIVE ACTIVITIES AND ADLS

## 2020-06-01 ASSESSMENT — PAIN SCALES - WONG BAKER
WONGBAKER_NUMERICALRESPONSE: 0
WONGBAKER_NUMERICALRESPONSE: 0

## 2020-06-01 NOTE — CARE COORDINATION
SOCIAL WORK/CASEMANAGEMENT TRANSITION OF CARE [id number], [address], [place], [phone]): I met with pt in the room this a.m. he wants to go to [facility] since he said he cannot care for himself and has not one to help him. He wants deandra seg[facility]ia, [facility] and [facility] in this order. I made a referral to deandra segovia and [facility] to assess. Will need a negative covid test to go to any [facility]. Pt on iv amioand 5l o2 nc. PT and OT notes are needed for precert. Sw/cm to follow. Roxane Guzman  6/1/2020    [facility] is out of network, [facility] has no beds. [facility] is assessing pt.need updated PT and OT to send for precert. Roxane Guzman  6/1/2020    Met with pt and asked for alternatives if no bed at [address]. [facility] to [facility] [place]. Roxane Guzman  6/1/2020     No bed at [address]. Called [facility] who has a bed and will assess pt and let me know in the a.m.  Roxane Guzman  6/1/2020

## 2020-06-01 NOTE — PROGRESS NOTES
POD#4 Awake, alert. No complaints. Denies CP, palpitations, SOB at rest, dizziness/lightheadedness. Vitals:    05/31/20 2240 06/01/20 0030 06/01/20 0335 06/01/20 0540   BP:  (!) 104/57 (!) 100/57    Pulse:  92 78    Resp: 14 16 17    Temp:  98.1 °F (36.7 °C) 97.2 °F (36.2 °C)    TempSrc:  Temporal Temporal    SpO2:  93% 98%    Weight:    286 lb 9.6 oz (130 kg)   Height:         O2: 5L/NC with SpO2 98%      Intake/Output Summary (Last 24 hours) at 6/1/2020 0730  Last data filed at 6/1/2020 0502  Gross per 24 hour   Intake 1190 ml   Output 800 ml   Net 390 ml       +BM 5/31  UO: 200mL/8hr     Recent Labs     05/30/20  0603 05/31/20  0617 06/01/20  0634   WBC 14.2* 13.4* 12.4*   HGB 9.7* 9.6* 9.3*   HCT 29.9* 29.7* 28.6*    182 207      Recent Labs     05/30/20  0603 05/31/20  0617   BUN 20 18   CREATININE 0.9 0.8        Telemetry: SR (afib 5/31)        PE  Cardiac: RRR  Lungs: decreased bases  Chest incision with intact JADIEL DSD. Sternum stable. Prior chest tube site incisions C/D/I, no erythema with intact sutures. Epicardial pacing wires present and secure. Abd: Soft, nontender, +BS, +flatus  Ext: Incisions C/D/I, approximated, no erythema, +minimal edema               A/P: POD#4     1. CAD  --Stable s/p CABG x 3, maze with PVI, LIS ligation, rigid sternal fixation with KLS plating system on 5/28/2020  --Scr stable  --BB/statin  --reinforce sternal precautions  --continue epicardial pacing wires        2. Acute blood loss anemia secondary to open heart surgery  --stable        3. Hx afib  --s/p maze/lis ligation  --Afib rvr 5/31--reverted back to SR with amiodarone infusion--will resume oral amiodarone and stop infusion after 24 hrs, continue eliquis  --currently NSR  --on xarelto at home  --will switch to eliquis short term post operatively with resumption of xarelto at later time--eliquis initiated 5/31  --continue BB/oral amio        4.  Prolonged postoperative respiratory insufficiency  --wean oxygen

## 2020-06-01 NOTE — DISCHARGE INSTR - COC
Essential hypertension I10    Hyperlipidemia E78.5    Acute liver failure without hepatic coma K72.00    Sepsis ([facility] A41.9    Diabetes mellitus ([facility] E11.9    Acute hepatitis B B16.9    Cellulitis L03.90    Abscess of foot L02.619    Atrial flutter (HCC) I48.92    Atrial fibrillation with RVR (HCC) I48.91    HUMERA (obstructive sleep apnea) G47.33    Chest pain R07.9    History of TIA (transient ischemic attack) Z86.73    Unstable angina (HCC) I20.0    Acute coronary syndrome (HCC) I24.9    CAD in native artery I25.10    COVID-19 U07.1    Acute postoperative respiratory insufficiency J95.89    Postoperative hypotension I95.89       Isolation/Infection:   Isolation          No Isolation        Patient Infection Status     Infection Onset Added Last Indicated Last Indicated By Review Planned Expiration Resolved Resolved By    None active    Resolved    COVID-19 Rule Out 05/23/20 05/23/20 05/23/20 Covid-19 Ambulatory (Ordered)   05/26/20 Rule-Out Test Resulted          Nurse Assessment:  Last Vital Signs: /72   Pulse 81   Temp 97.4 °F (36.3 °C) (Temporal)   Resp 18   Ht 6' (1.829 m)   Wt 286 lb 9.6 oz (130 kg)   SpO2 93% Comment: 93 was lowest while walking 400ft. ended walk at 95%   BMI 38.87 kg/m²     Last documented pain score (0-10 scale): Pain Level: 7  Last Weight:   Wt Readings from Last 1 Encounters:   06/01/20 286 lb 9.6 oz (130 kg)     Mental Status:  {IP PT MENTAL STATUS:20030}    IV Access:  { PASTOR IV ACCESS:020936074}    Nursing Mobility/ADLs:  Walking   {CHP DME OLAR:362462840}  Transfer  {CHP DME OSJJ:949569549}  Bathing  {CHP DME QXNM:019075171}  Dressing  {CHP DME HCLH:687478261}  Toileting  {CHP DME HQEN:129680247}  Feeding  {CHP DME TXFY:201880898}  Med Admin  {CHP DME KNZB:323971744}  Med Delivery   { PASTOR MED Delivery:462814633}    Wound Care Documentation and Therapy:        Elimination:  Continence:   · Bowel: {YES / VE:09253}  · Bladder: {YES / RR:57073}  Urinary Catheter: {Urinary Catheter:142164205}   Colostomy/Ileostomy/Ileal Conduit: {YES / PQ:43254}       Date of Last BM: ***    Intake/Output Summary (Last 24 hours) at 2020 1239  Last data filed at 2020 1056  Gross per 24 hour   Intake 1121 ml   Output 680 ml   Net 441 ml     I/O last 3 completed shifts: In: 9179 [P.O.:840;  I.V.:350]  Out: 800 [Urine:800]    Safety Concerns:     [employer] Safety Concerns:654015437}    Impairments/Disabilities:      [address] [employer] Impairments/Disabilities:198511163}    Nutrition Therapy:  Current Nutrition Therapy:   [employer] Diet List:730879242}    Routes of Feeding: {CHP DME Other Feedings:975970786}  Liquids: {Slp liquid thickness:39398}  Daily Fluid Restriction: {CHP DME Yes amt example:519645958}  Last Modified Barium Swallow with Video (Video Swallowing Test): {Done Not Done CVFJ:038811831}    Treatments at the Time of Hospital Discharge:   Respiratory Treatments: ***  Oxygen Therapy:  {Therapy; copd oxygen:74328}  Ventilator:    {MH CC Vent OHYJ:524187889}    Rehab Therapies: PT and OT to eval and treat   Weight Bearing Status/Restrictions: [facility] Weight Bearin}  Other Medical Equipment (for information only, NOT a DME order):  {EQUIPMENT:183409365}  Other Treatments: ***please follow the cardiac rehab protocol enclosed!!!    Patient's personal belongings (please select all that are sent with patient):  {CHP DME Belongings:545150903}    RN SIGNATURE:  {Esignature:318806132}    CASE MANAGEMENT/SOCIAL WORK SECTION    Inpatient Status Date: ***    Readmission Risk Assessment Score:  Readmission Risk              Risk of Unplanned Readmission:        19           Discharging to Facility/ Agency   · Name: [facility]   · Address:  · Phone:  · Fax:    Dialysis Facility (if applicable)   · Name:  · Address:  · Dialysis Schedule:  · Phone:  · Fax:    / signature: Electronically signed by BARRON Aggarwal on 2020 at 12:39 PM    PHYSICIAN SECTION    Prognosis: {Prognosis:2326456609}    Condition at Discharge: 50Sandee Gaston Patient Condition:031533594}    Rehab Potential (if transferring to Rehab): {Prognosis:2474784104}    Recommended Labs or Other Treatments After Discharge: ***    Physician Certification: I certify the above information and transfer of Briana Augustin  is necessary for the continuing treatment of the diagnosis listed and that he requires [place] for less 30 days.      Update Admission H&P: {CHP DME Changes in DDSNX:028725144}    PHYSICIAN SIGNATURE:  {Esignature:767507560}

## 2020-06-01 NOTE — CONSULTS
Nephrology Consult Note      Reason for Consult:  Hyponatremia  Requesting Physician:  Natali Carlisle    CHIEF COMPLAINT:  Chest pain    History Obtained From:  patient, electronic medical record    HISTORY OF PRESENT ILLNESS:  Mr. Vickie Galeazzi is a 54year old male with a past medical history of DM and Afib who presented to [facility] ER on 5/16/20 for a 2 week history of chest pain. He reported he has been under a lot of stress recently but also lighted heavy equipment at work so he didn't know if it was heart related. He went to his PCP who advised him to report to the ER. He initially refused until his symptoms continued. A stress test was performed which was abnormal, therefore, he was transferred downtown for a cardiac catheterization. On 5/20/20 the cardiac catheterization revealed multi vessel CAD. Patient was discharged with plan for CABG on 5/28/20.  3 Vessel CABG was done. Patient was transferred out of the ICU. Sodium level has continued to decrease over past 48 hours, which is reason for this consult.      Past Medical History:        Diagnosis Date    Atrial fibrillation ([address]     Atrial fibrillation ([address] 02/20/2018    lexiscan stress test    Cerebral artery occlusion with cerebral infarction ([address]     Diabetes mellitus ([address]     Hyperlipidemia     Hypertension     Psychiatric problem     TIA (transient ischemic attack)     2015     Past Surgical History:        Procedure Laterality Date    CARDIAC SURGERY      HEART CATHETERIZATION    CORONARY ARTERY BYPASS GRAFT N/A 5/28/2020    CORONARY ARTERY BYPASS, MAZE PROCEDURE, BERTHA performed by Jhony Bradford DO at [address] [place] [address] ECHOCARDIOGRAM COMPLETE WITH BUBBLE STUDY  7/7/2015         FRACTURE SURGERY      OTHER SURGICAL HISTORY  02/15/2018    incision and drainage bone biopsy of right great toe     Current Medications:    Current Facility-Administered Medications: amiodarone (CORDARONE) tablet 400 mg, 400 mg, Oral, TID  metoprolol tartrate (LOPRESSOR) tablet 37.5 mg, 37.5 mg, Oral, BID  magnesium hydroxide (MILK OF MAGNESIA) 400 MG/5ML suspension 30 mL, 30 mL, Oral, Daily PRN  apixaban (ELIQUIS) tablet 5 mg, 5 mg, Oral, BID  amiodarone (CORDARONE) 450 mg in dextrose 5 % 250 mL infusion, 0.5 mg/min, Intravenous, Continuous  acetaminophen (TYLENOL) tablet 650 mg, 650 mg, Oral, Q4H PRN  HYDROcodone-acetaminophen (NORCO) 5-325 MG per tablet 1 tablet, 1 tablet, Oral, Q4H PRN **OR** HYDROcodone-acetaminophen (NORCO) 5-325 MG per tablet 2 tablet, 2 tablet, Oral, Q4H PRN  aspirin EC tablet 81 mg, 81 mg, Oral, Daily  ferrous sulfate (IRON 325) tablet 325 mg, 325 mg, Oral, BID WC  vitamin C (ASCORBIC ACID) tablet 500 mg, 500 mg, Oral, BID  folic acid (FOLVITE) tablet 1 mg, 1 mg, Oral, Daily  potassium chloride (KLOR-CON M) extended release tablet 20 mEq, 20 mEq, Oral, PRN  ondansetron (ZOFRAN) injection 4 mg, 4 mg, Intravenous, Q8H PRN  pantoprazole (PROTONIX) tablet 40 mg, 40 mg, Oral, Daily  sennosides-docusate sodium (SENOKOT-S) 8.6-50 MG tablet 1 tablet, 1 tablet, Oral, BID  bisacodyl (DULCOLAX) EC tablet 5 mg, 5 mg, Oral, Daily PRN  insulin glargine (LANTUS) injection vial 30 Units, 30 Units, Subcutaneous, Nightly  insulin lispro (HUMALOG) injection vial 0-18 Units, 0-18 Units, Subcutaneous, TID WC  insulin lispro (HUMALOG) injection vial 0-9 Units, 0-9 Units, Subcutaneous, Nightly  metFORMIN (GLUCOPHAGE) tablet 500 mg, 500 mg, Oral, BID WC  bisacodyl (DULCOLAX) suppository 10 mg, 10 mg, Rectal, PRN  benzocaine-menthol (CEPACOL SORE THROAT) lozenge 1 lozenge, 1 lozenge, Oral, Q2H PRN  atorvastatin (LIPITOR) tablet 40 mg, 40 mg, Oral, Nightly  sodium chloride flush 0.9 % injection 10 mL, 10 mL, Intravenous, 2 times per day  sodium chloride flush 0.9 % injection 10 mL, 10 mL, Intravenous, PRN  magnesium oxide (MAG-OX) tablet 400 mg, 400 mg, Oral, Daily  mupirocin (BACTROBAN) 2 % ointment, , Nasal, BID  ipratropium-albuterol (DUONEB) nebulizer solution 1 ampule, 1 ampule, Inhalation, Q4H WA  glucose (GLUTOSE) 40 % oral gel 15 g, 15 g, Oral, PRN  dextrose 50 % IV solution, 12.5 g, Intravenous, PRN  glucagon (rDNA) injection 1 mg, 1 mg, Intramuscular, PRN  dextrose 5 % solution, 100 mL/hr, Intravenous, PRN  tamsulosin (FLOMAX) capsule 0.4 mg, 0.4 mg, Oral, Daily  Allergies: Other    Social History:    Social History     Socioeconomic History    Marital status:       Spouse name: Not on file    Number of children: Not on file    Years of education: Not on file    Highest education level: Not on file   Occupational History    Not on file   Social Needs    Financial resource strain: Not on file    Food insecurity     Worry: Not on file     Inability: Not on file    Transportation needs     Medical: Not on file     Non-medical: Not on file   Tobacco Use    Smoking status: Heavy Tobacco Smoker     Packs/day: 0.50     Years: 35.00     Pack years: 17.50     Types: Cigarettes    Smokeless tobacco: Never Used   Substance and Sexual Activity    Alcohol use: No     Comment: STOPPED    Drug use: No    Sexual activity: Not Currently   Lifestyle    Physical activity     Days per week: Not on file     Minutes per session: Not on file    Stress: Not on file   Relationships    Social connections     Talks on phone: Not on file     Gets together: Not on file     Attends Zoroastrian service: Not on file     Active member of club or organization: Not on file     Attends meetings of clubs or organizations: Not on file     Relationship status: Not on file    Intimate partner violence     Fear of current or ex partner: Not on file     Emotionally abused: Not on file     Physically abused: Not on file     Forced sexual activity: Not on file   Other Topics Concern    Not on file   Social History Narrative    Not on file       Family History:       Problem Relation Age of Onset    Cancer Mother     Heart Disease Father      REVIEW OF SYSTEMS:

## 2020-06-01 NOTE — PROGRESS NOTES
Dr. Saintclair Bender assessed pt. His recommendation is ANDRÉS d/t lack of endurance. Please see his consult note when available.

## 2020-06-01 NOTE — CONSULTS
[facility]                  [address] [place],  [address]                                  CONSULTATION    PATIENT NAME: Paige Sanders                    :        1965  MED REC NO:   [id number]                            ROOM:       6507  ACCOUNT NO:   [de-identified]                           ADMIT DATE: 2020  PROVIDER:     Jie Barber MD    CONSULT DATE:  2020    REHAB CONSULTATION    AGE:  54. SEX:  Male. CHIEF COMPLAINT:  Weakness. HISTORY OF PRESENT ILLNESS:  The patient was found to have cardiac  ischemia, was admitted to [address] [place] [address] on 2020 for coronary  artery bypass surgery. He had the surgery done. He tolerated that well  overall. He has been seen by Physical Therapy. He requires minimal  assist for transfers, minimal assist for ambulating about 20 feet, and  mod to max assist for his ADLs. He lives alone in an apartment with  five steps at the entrance and has very limited support available to  him. I was asked to see him for planning further rehab. PAST MEDICAL HISTORY:  1. Coronary artery disease. 2.  COPD. 3.  Atrial fibrillation. 4.  Type 2 diabetes mellitus. 5.  Chronic renal impairment. ALLERGIES:  None known. CURRENT MEDICATIONS:  Cordarone, Eliquis, aspirin, Lipitor, iron, folic  acid, short-acting and long-acting insulin, DuoNeb, Glucophage,  Lopressor, Protonix, and Flomax. HABITS:  Prior history of smoking half pack a day. SOCIAL HISTORY:  He lives alone in an apartment with five steps at the  entrance. REVIEW OF SYSTEMS:  HEENT[de-identified]  Negative for prior HEENT problems. PULMONARY:  Positive for COPD. CARDIAC:  Positive for coronary disease  and atrial fib. GI:  Negative. :  Positive for chronic renal  impairment. ORTHOPEDIC:  Positive for bilateral lower extremity  injuries in the past.  NEUROLOGIC:  Negative.     PHYSICAL EXAMINATION:  GENERAL:  Morbidly obese

## 2020-06-01 NOTE — PROGRESS NOTES
6'     Weight:  286#   BMI: 39       Date: 6/1/20 Date:  Date:    temperature 96.3     pulse 90     respirations 20     Blood pressure 110/60     Pulse oximeter 95% room air        ALLERGIES: Other    DIET : DIET CARDIAC; Carb Control: 5 carb choices (75 gms)/meal  Dietary Nutrition Supplements: Diabetic Oral Supplement  Dietary Nutrition Supplements: Wound Healing Oral Supplement    Current Lab and Diagnostic Tests:   Recent Results (from the past 24 hour(s))   POCT Glucose    Collection Time: 05/31/20 11:12 AM   Result Value Ref Range    Meter Glucose 346 (H) 74 - 99 mg/dL   POCT Glucose    Collection Time: 05/31/20  4:13 PM   Result Value Ref Range    Meter Glucose 227 (H) 74 - 99 mg/dL   POCT Glucose    Collection Time: 05/31/20  8:31 PM   Result Value Ref Range    Meter Glucose 191 (H) 74 - 99 mg/dL   Basic Metabolic Panel    Collection Time: 06/01/20  6:34 AM   Result Value Ref Range    Sodium 129 (L) 132 - 146 mmol/L    Potassium 5.1 (H) 3.5 - 5.0 mmol/L    Chloride 93 (L) 98 - 107 mmol/L    CO2 26 22 - 29 mmol/L    Anion Gap 10 7 - 16 mmol/L    Glucose 166 (H) 74 - 99 mg/dL    BUN 24 (H) 6 - 20 mg/dL    CREATININE 1.1 0.7 - 1.2 mg/dL    GFR Non-African American >60 >=60 mL/min/1.73    GFR African American >60     Calcium 8.9 8.6 - 10.2 mg/dL   CBC    Collection Time: 06/01/20  6:34 AM   Result Value Ref Range    WBC 12.4 (H) 4.5 - 11.5 E9/L    RBC 3.03 (L) 3.80 - 5.80 E12/L    Hemoglobin 9.3 (L) 12.5 - 16.5 g/dL    Hematocrit 28.6 (L) 37.0 - 54.0 %    MCV 94.4 80.0 - 99.9 fL    MCH 30.7 26.0 - 35.0 pg    MCHC 32.5 32.0 - 34.5 %    RDW 14.1 11.5 - 15.0 fL    Platelets 579 754 - 616 E9/L    MPV 10.4 7.0 - 12.0 fL   Magnesium    Collection Time: 06/01/20  6:34 AM   Result Value Ref Range    Magnesium 2.2 1.6 - 2.6 mg/dL   POCT Glucose    Collection Time: 06/01/20  6:44 AM   Result Value Ref Range    Meter Glucose 177 (H) 74 - 99 mg/dL     Xr Chest Portable  Result Date: 5/28/2020  1.  New postoperative changes ____________________________________________________________________  ____________________________________________________________________  ____________________________________________________________________  ____________________________________________________________________  ____________________________________________________________________    Physician Signature:_____________________________________    Print Signature:_________________________________________    Date:     Time:

## 2020-06-01 NOTE — PROGRESS NOTES
Leonel Mcclure M.D.,[facility]  Fidel Membreno D.O., F.A.C.O.I., Andrea Quintana M.D. Alla Perez M.D., Tabby Fall M.D. Elvis Wiggins D.O. Daily Pulmonary Progress Note    Patient:  Royce Peralta 54 y.o. male MRN: [id number]     Date of Service: 6/1/2020      Synopsis     We are following patient for s/p hypoxia     \"CC\" pain     Code status:full      Subjective      Patient was seen and examined. sitting up in chair he appears in NAD , encouraged pt to use his IS . He reports pain is more controlled today , ddi have Afib RVR and was placed on amiodarone gtt yesterday , had some sob when that started. Encouraged increase in activity  . Review of Systems:  Constitutional: Denies fever, weight loss, night sweats, and fatigue  Skin: Denies pigmentation, dark lesions, and rashes   HEENT: Denies hearing loss, tinnitus, ear drainage, epistaxis, sore throat, and hoarseness. Cardiovascular: Denies palpitations, chest pain, and chest pressure. Respiratory: Denies cough, dyspnea at rest, hemoptysis, apnea, and choking.   Gastrointestinal: Denies nausea, vomiting, poor appetite, diarrhea, heartburn or reflux  Genitourinary: Denies dysuria, frequency, urgency or hematuria  Musculoskeletal: Denies myalgias, muscle weakness, and bone pain  Neurological: Denies dizziness, vertigo, headache, and focal weakness  Psychological: Denies anxiety and depression  Endocrine: Denies heat intolerance and cold intolerance  Hematopoietic/Lymphatic: Denies bleeding problems and blood transfusions    24-hour events:      Objective   Vitals: /72   Pulse 81   Temp 97.4 °F (36.3 °C) (Temporal)   Resp 18   Ht 6' (1.829 m)   Wt 286 lb 9.6 oz (130 kg)   SpO2 93% Comment: 93 was lowest while walking 400ft. ended walk at 95%   BMI 38.87 kg/m²     I/O:    Intake/Output Summary (Last 24 hours) at 6/1/2020 1515  Last data filed at 6/1/2020 1448  Gross per 24 hour   Intake 1281 ml   Output 1205 ml

## 2020-06-02 LAB
ANION GAP SERPL CALCULATED.3IONS-SCNC: 13 MMOL/L (ref 7–16)
ANION GAP SERPL CALCULATED.3IONS-SCNC: 14 MMOL/L (ref 7–16)
BUN BLDV-MCNC: 23 MG/DL (ref 6–20)
BUN BLDV-MCNC: 24 MG/DL (ref 6–20)
CALCIUM SERPL-MCNC: 8.9 MG/DL (ref 8.6–10.2)
CALCIUM SERPL-MCNC: 9.1 MG/DL (ref 8.6–10.2)
CHLORIDE BLD-SCNC: 92 MMOL/L (ref 98–107)
CHLORIDE BLD-SCNC: 95 MMOL/L (ref 98–107)
CHLORIDE URINE RANDOM: <20 MMOL/L
CO2: 22 MMOL/L (ref 22–29)
CO2: 27 MMOL/L (ref 22–29)
CORTISOL TOTAL: 12.87 MCG/DL (ref 2.68–18.4)
CREAT SERPL-MCNC: 0.8 MG/DL (ref 0.7–1.2)
CREAT SERPL-MCNC: 0.9 MG/DL (ref 0.7–1.2)
GFR AFRICAN AMERICAN: >60
GFR AFRICAN AMERICAN: >60
GFR NON-AFRICAN AMERICAN: >60 ML/MIN/1.73
GFR NON-AFRICAN AMERICAN: >60 ML/MIN/1.73
GLUCOSE BLD-MCNC: 174 MG/DL (ref 74–99)
GLUCOSE BLD-MCNC: 176 MG/DL (ref 74–99)
HCT VFR BLD CALC: 28.2 % (ref 37–54)
HEMOGLOBIN: 9.2 G/DL (ref 12.5–16.5)
MAGNESIUM: 2.1 MG/DL (ref 1.6–2.6)
MCH RBC QN AUTO: 30.6 PG (ref 26–35)
MCHC RBC AUTO-ENTMCNC: 32.6 % (ref 32–34.5)
MCV RBC AUTO: 93.7 FL (ref 80–99.9)
METER GLUCOSE: 165 MG/DL (ref 74–99)
METER GLUCOSE: 194 MG/DL (ref 74–99)
METER GLUCOSE: 216 MG/DL (ref 74–99)
METER GLUCOSE: 239 MG/DL (ref 74–99)
OSMOLALITY URINE: 315 MOSM/KG (ref 300–900)
PDW BLD-RTO: 13.9 FL (ref 11.5–15)
PLATELET # BLD: 250 E9/L (ref 130–450)
PMV BLD AUTO: 10.5 FL (ref 7–12)
POTASSIUM SERPL-SCNC: 4.9 MMOL/L (ref 3.5–5)
POTASSIUM SERPL-SCNC: 5.1 MMOL/L (ref 3.5–5)
POTASSIUM, UR: 15.2 MMOL/L
PRO-BNP: 2269 PG/ML (ref 0–125)
RBC # BLD: 3.01 E12/L (ref 3.8–5.8)
SODIUM BLD-SCNC: 128 MMOL/L (ref 132–146)
SODIUM BLD-SCNC: 135 MMOL/L (ref 132–146)
SODIUM URINE: <20 MMOL/L
URIC ACID, SERUM: 7 MG/DL (ref 3.4–7)
WBC # BLD: 11.9 E9/L (ref 4.5–11.5)

## 2020-06-02 PROCEDURE — 2140000000 HC CCU INTERMEDIATE R&B

## 2020-06-02 PROCEDURE — 80048 BASIC METABOLIC PNL TOTAL CA: CPT

## 2020-06-02 PROCEDURE — 6370000000 HC RX 637 (ALT 250 FOR IP): Performed by: NURSE PRACTITIONER

## 2020-06-02 PROCEDURE — 99232 SBSQ HOSP IP/OBS MODERATE 35: CPT | Performed by: INTERNAL MEDICINE

## 2020-06-02 PROCEDURE — 2500000003 HC RX 250 WO HCPCS: Performed by: INTERNAL MEDICINE

## 2020-06-02 PROCEDURE — 36415 COLL VENOUS BLD VENIPUNCTURE: CPT

## 2020-06-02 PROCEDURE — 84133 ASSAY OF URINE POTASSIUM: CPT

## 2020-06-02 PROCEDURE — 6360000002 HC RX W HCPCS: Performed by: THORACIC SURGERY (CARDIOTHORACIC VASCULAR SURGERY)

## 2020-06-02 PROCEDURE — 82962 GLUCOSE BLOOD TEST: CPT

## 2020-06-02 PROCEDURE — 82533 TOTAL CORTISOL: CPT

## 2020-06-02 PROCEDURE — 94669 MECHANICAL CHEST WALL OSCILL: CPT

## 2020-06-02 PROCEDURE — 6370000000 HC RX 637 (ALT 250 FOR IP): Performed by: INTERNAL MEDICINE

## 2020-06-02 PROCEDURE — 94640 AIRWAY INHALATION TREATMENT: CPT

## 2020-06-02 PROCEDURE — 83935 ASSAY OF URINE OSMOLALITY: CPT

## 2020-06-02 PROCEDURE — 82436 ASSAY OF URINE CHLORIDE: CPT

## 2020-06-02 PROCEDURE — 83735 ASSAY OF MAGNESIUM: CPT

## 2020-06-02 PROCEDURE — 2580000003 HC RX 258: Performed by: NURSE PRACTITIONER

## 2020-06-02 PROCEDURE — 97535 SELF CARE MNGMENT TRAINING: CPT

## 2020-06-02 PROCEDURE — 85027 COMPLETE CBC AUTOMATED: CPT

## 2020-06-02 PROCEDURE — 6370000000 HC RX 637 (ALT 250 FOR IP): Performed by: PHYSICIAN ASSISTANT

## 2020-06-02 PROCEDURE — 97530 THERAPEUTIC ACTIVITIES: CPT

## 2020-06-02 PROCEDURE — 83880 ASSAY OF NATRIURETIC PEPTIDE: CPT

## 2020-06-02 PROCEDURE — 84300 ASSAY OF URINE SODIUM: CPT

## 2020-06-02 PROCEDURE — 94660 CPAP INITIATION&MGMT: CPT

## 2020-06-02 PROCEDURE — 84550 ASSAY OF BLOOD/URIC ACID: CPT

## 2020-06-02 PROCEDURE — 6360000002 HC RX W HCPCS: Performed by: INTERNAL MEDICINE

## 2020-06-02 RX ORDER — DILTIAZEM HYDROCHLORIDE 5 MG/ML
20 INJECTION INTRAVENOUS ONCE
Status: COMPLETED | OUTPATIENT
Start: 2020-06-02 | End: 2020-06-02

## 2020-06-02 RX ORDER — METOPROLOL TARTRATE 50 MG/1
50 TABLET, FILM COATED ORAL 2 TIMES DAILY
Status: DISCONTINUED | OUTPATIENT
Start: 2020-06-02 | End: 2020-06-08 | Stop reason: HOSPADM

## 2020-06-02 RX ORDER — OXYCODONE HYDROCHLORIDE AND ACETAMINOPHEN 5; 325 MG/1; MG/1
1 TABLET ORAL EVERY 4 HOURS PRN
Status: DISCONTINUED | OUTPATIENT
Start: 2020-06-02 | End: 2020-06-08 | Stop reason: HOSPADM

## 2020-06-02 RX ORDER — FUROSEMIDE 10 MG/ML
60 INJECTION INTRAMUSCULAR; INTRAVENOUS 2 TIMES DAILY
Status: DISCONTINUED | OUTPATIENT
Start: 2020-06-02 | End: 2020-06-04

## 2020-06-02 RX ORDER — OXYCODONE HYDROCHLORIDE AND ACETAMINOPHEN 5; 325 MG/1; MG/1
2 TABLET ORAL EVERY 4 HOURS PRN
Status: DISCONTINUED | OUTPATIENT
Start: 2020-06-02 | End: 2020-06-08 | Stop reason: HOSPADM

## 2020-06-02 RX ADMIN — APIXABAN 5 MG: 5 TABLET, FILM COATED ORAL at 20:48

## 2020-06-02 RX ADMIN — AMIODARONE HYDROCHLORIDE 400 MG: 200 TABLET ORAL at 20:48

## 2020-06-02 RX ADMIN — OXYCODONE HYDROCHLORIDE AND ACETAMINOPHEN 2 TABLET: 5; 325 TABLET ORAL at 12:13

## 2020-06-02 RX ADMIN — AMIODARONE HYDROCHLORIDE 400 MG: 200 TABLET ORAL at 08:23

## 2020-06-02 RX ADMIN — HYDROCODONE BITARTRATE AND ACETAMINOPHEN 2 TABLET: 5; 325 TABLET ORAL at 04:30

## 2020-06-02 RX ADMIN — SENNOSIDES AND DOCUSATE SODIUM 1 TABLET: 8.6; 5 TABLET ORAL at 20:48

## 2020-06-02 RX ADMIN — OXYCODONE HYDROCHLORIDE AND ACETAMINOPHEN 2 TABLET: 5; 325 TABLET ORAL at 20:48

## 2020-06-02 RX ADMIN — ATORVASTATIN CALCIUM 40 MG: 40 TABLET, FILM COATED ORAL at 20:47

## 2020-06-02 RX ADMIN — INSULIN LISPRO 3 UNITS: 100 INJECTION, SOLUTION INTRAVENOUS; SUBCUTANEOUS at 17:03

## 2020-06-02 RX ADMIN — METFORMIN HYDROCHLORIDE 500 MG: 500 TABLET ORAL at 17:02

## 2020-06-02 RX ADMIN — FERROUS SULFATE TAB 325 MG (65 MG ELEMENTAL FE) 325 MG: 325 (65 FE) TAB at 08:24

## 2020-06-02 RX ADMIN — SENNOSIDES AND DOCUSATE SODIUM 1 TABLET: 8.6; 5 TABLET ORAL at 08:22

## 2020-06-02 RX ADMIN — FOLIC ACID 1 MG: 1 TABLET ORAL at 08:24

## 2020-06-02 RX ADMIN — Medication 500 MG: at 08:22

## 2020-06-02 RX ADMIN — INSULIN LISPRO 3 UNITS: 100 INJECTION, SOLUTION INTRAVENOUS; SUBCUTANEOUS at 20:50

## 2020-06-02 RX ADMIN — PANTOPRAZOLE SODIUM 40 MG: 40 TABLET, DELAYED RELEASE ORAL at 08:24

## 2020-06-02 RX ADMIN — FUROSEMIDE 60 MG: 10 INJECTION, SOLUTION INTRAMUSCULAR; INTRAVENOUS at 11:22

## 2020-06-02 RX ADMIN — Medication 10 ML: at 01:29

## 2020-06-02 RX ADMIN — APIXABAN 5 MG: 5 TABLET, FILM COATED ORAL at 08:23

## 2020-06-02 RX ADMIN — METOPROLOL TARTRATE 50 MG: 50 TABLET, FILM COATED ORAL at 20:49

## 2020-06-02 RX ADMIN — IPRATROPIUM BROMIDE AND ALBUTEROL SULFATE 1 AMPULE: 2.5; .5 SOLUTION RESPIRATORY (INHALATION) at 13:25

## 2020-06-02 RX ADMIN — AMIODARONE HYDROCHLORIDE 400 MG: 200 TABLET ORAL at 14:20

## 2020-06-02 RX ADMIN — HYDROCODONE BITARTRATE AND ACETAMINOPHEN 2 TABLET: 5; 325 TABLET ORAL at 08:23

## 2020-06-02 RX ADMIN — TAMSULOSIN HYDROCHLORIDE 0.4 MG: 0.4 CAPSULE ORAL at 08:23

## 2020-06-02 RX ADMIN — MAGNESIUM GLUCONATE 500 MG ORAL TABLET 400 MG: 500 TABLET ORAL at 08:22

## 2020-06-02 RX ADMIN — IPRATROPIUM BROMIDE AND ALBUTEROL SULFATE 1 AMPULE: 2.5; .5 SOLUTION RESPIRATORY (INHALATION) at 09:43

## 2020-06-02 RX ADMIN — IPRATROPIUM BROMIDE AND ALBUTEROL SULFATE 1 AMPULE: 2.5; .5 SOLUTION RESPIRATORY (INHALATION) at 16:36

## 2020-06-02 RX ADMIN — DILTIAZEM HYDROCHLORIDE 20 MG: 5 INJECTION INTRAVENOUS at 18:27

## 2020-06-02 RX ADMIN — IPRATROPIUM BROMIDE AND ALBUTEROL SULFATE 1 AMPULE: 2.5; .5 SOLUTION RESPIRATORY (INHALATION) at 22:03

## 2020-06-02 RX ADMIN — Medication 500 MG: at 20:48

## 2020-06-02 RX ADMIN — Medication 10 ML: at 08:25

## 2020-06-02 RX ADMIN — FERROUS SULFATE TAB 325 MG (65 MG ELEMENTAL FE) 325 MG: 325 (65 FE) TAB at 17:02

## 2020-06-02 RX ADMIN — ASPIRIN 81 MG: 81 TABLET, COATED ORAL at 08:22

## 2020-06-02 RX ADMIN — Medication 2 MG: at 01:29

## 2020-06-02 RX ADMIN — INSULIN LISPRO 6 UNITS: 100 INJECTION, SOLUTION INTRAVENOUS; SUBCUTANEOUS at 12:13

## 2020-06-02 RX ADMIN — METOPROLOL TARTRATE 37.5 MG: 25 TABLET, FILM COATED ORAL at 08:23

## 2020-06-02 RX ADMIN — METFORMIN HYDROCHLORIDE 500 MG: 500 TABLET ORAL at 08:22

## 2020-06-02 RX ADMIN — Medication 10 ML: at 20:55

## 2020-06-02 RX ADMIN — FUROSEMIDE 60 MG: 10 INJECTION, SOLUTION INTRAMUSCULAR; INTRAVENOUS at 18:27

## 2020-06-02 RX ADMIN — OXYCODONE HYDROCHLORIDE AND ACETAMINOPHEN 2 TABLET: 5; 325 TABLET ORAL at 16:48

## 2020-06-02 RX ADMIN — INSULIN GLARGINE 30 UNITS: 100 INJECTION, SOLUTION SUBCUTANEOUS at 20:49

## 2020-06-02 ASSESSMENT — PAIN SCALES - GENERAL
PAINLEVEL_OUTOF10: 8
PAINLEVEL_OUTOF10: 0
PAINLEVEL_OUTOF10: 7
PAINLEVEL_OUTOF10: 8
PAINLEVEL_OUTOF10: 0
PAINLEVEL_OUTOF10: 8
PAINLEVEL_OUTOF10: 10
PAINLEVEL_OUTOF10: 8

## 2020-06-02 ASSESSMENT — PAIN DESCRIPTION - PROGRESSION
CLINICAL_PROGRESSION: NOT CHANGED

## 2020-06-02 ASSESSMENT — PAIN DESCRIPTION - DESCRIPTORS
DESCRIPTORS: ACHING;DISCOMFORT;DULL;SORE
DESCRIPTORS: ACHING;DISCOMFORT;SORE
DESCRIPTORS: ACHING;CONSTANT;DISCOMFORT
DESCRIPTORS: ACHING;CONSTANT;DISCOMFORT
DESCRIPTORS: ACHING;SORE;TENDER

## 2020-06-02 ASSESSMENT — PAIN - FUNCTIONAL ASSESSMENT
PAIN_FUNCTIONAL_ASSESSMENT: PREVENTS OR INTERFERES SOME ACTIVE ACTIVITIES AND ADLS
PAIN_FUNCTIONAL_ASSESSMENT: PREVENTS OR INTERFERES SOME ACTIVE ACTIVITIES AND ADLS
PAIN_FUNCTIONAL_ASSESSMENT: PREVENTS OR INTERFERES WITH MANY ACTIVE NOT PASSIVE ACTIVITIES
PAIN_FUNCTIONAL_ASSESSMENT: PREVENTS OR INTERFERES SOME ACTIVE ACTIVITIES AND ADLS

## 2020-06-02 ASSESSMENT — PAIN DESCRIPTION - PAIN TYPE
TYPE: SURGICAL PAIN
TYPE: SURGICAL PAIN
TYPE: ACUTE PAIN;SURGICAL PAIN
TYPE: ACUTE PAIN;SURGICAL PAIN
TYPE: SURGICAL PAIN

## 2020-06-02 ASSESSMENT — PAIN DESCRIPTION - ONSET
ONSET: ON-GOING
ONSET: PROGRESSIVE

## 2020-06-02 ASSESSMENT — PAIN DESCRIPTION - LOCATION
LOCATION: STERNUM;SHOULDER
LOCATION: BACK;STERNUM
LOCATION: STERNUM;SHOULDER
LOCATION: STERNUM;RIB CAGE
LOCATION: STERNUM;BACK

## 2020-06-02 ASSESSMENT — PAIN DESCRIPTION - FREQUENCY
FREQUENCY: CONTINUOUS
FREQUENCY: INTERMITTENT
FREQUENCY: INTERMITTENT
FREQUENCY: CONTINUOUS
FREQUENCY: INTERMITTENT

## 2020-06-02 ASSESSMENT — PAIN DESCRIPTION - ORIENTATION
ORIENTATION: MID;LEFT
ORIENTATION: LOWER;MID
ORIENTATION: MID;LOWER
ORIENTATION: LEFT;MID
ORIENTATION: MID

## 2020-06-02 NOTE — CARE COORDINATION
SOCIAL WORK/CASEMANAGEMENT TRANSITION OF CARE [id number] [facility], [address], [place], [phone]): [facility] is assessing pt by the DON. Will give a answer later this a.m. called [facility] and they do not have any beds. Nadiya Shelley  6/2/2020    Pt was accepted to [facility]. precert started today per rep, may take 24-48 hours. henns and discharge paper work is in place with protocol.  Nadiya Shelley  6/2/2020

## 2020-06-02 NOTE — PROGRESS NOTES
Assisted patient from Bed back to chair . States \"I cant get comfortable. They never told me this would hurt so much. \"  Once in chair, I asked the patient if  I could elevate his legs and the importance of keeping legs up while sitting. Pt stated \"It hurts my back when my legs are up. I dont have to put them up\"  Once again explained the importance of elevating the lower extremities while sitting. Patient still refusing. Call light and tray table within reach.

## 2020-06-02 NOTE — PROGRESS NOTES
In to check on patient and offer Morphine for pain as requested by patient. Pt sleeping at this time. Will continue to monitor.

## 2020-06-02 NOTE — PROGRESS NOTES
(Temporal)   Resp 20   Ht 6' (1.829 m)   Wt 285 lb 6.4 oz (129.5 kg)   SpO2 95%   BMI 38.71 kg/m²   CONSTITUTIONAL:  awake, alert, cooperative, no apparent distress, and appears stated age  HEAD:  normocepalic, without obvious abnormality, atraumatic  NECK:  Supple, symmetrical, trachea midline, no adenopathy, thyroid symmetric, not enlarged and no tenderness, skin normal  LUNGS:  No increased work of breathing, No accessory muscle use or intercostal retractions, good air exchange, clear to auscultation bilaterally, no crackles or wheezing. Surgical site has krystin dressings  CARDIOVASCULAR:  Normal apical impulse, regular rate and rhythm, normal S1 and S2, no S3 or S4, and no murmur noted, no edema, no JVD, no carotid bruit. ABDOMEN:  Soft, nontender, no masses, no hepatomegaly, no splenomegaly, BS+  MUSCULOSKELETAL:  No clubbing no cyanosis. there is no redness, warmth, or swelling of the joints  full range of motion noted  NEUROLOGIC:  Alert, awake,oriented x3  SKIN:  no bruising or bleeding, normal skin color, texture, turgor and no redness, warmth, or swelling      Cardiographics  I personally reviewed the telemetry monitor strips with the following interpretation: Sinus rhythm    Echocardiogram: 5/22/2020,  Summary   Left ventricular internal dimensions were normal in diastole and systole. Mild left ventricular concentric hypertrophy noted. No regional wall motion abnormalities seen. Normal left ventricular ejection fraction. Physiologic and/or trace mitral regurgitation is present. Imaging  XR CHEST STANDARD (2 VW)   Final Result   Worsening CHF with developing edema. Superimposed pneumonia is not   excluded. XR CHEST PORTABLE   Final Result      Cardiomegaly with median sternotomy with left atrial clipping      Mild pulmonary venous congestion      No evidence of airspace consolidation or pneumothorax.          XR CHEST PORTABLE   Final Result   No significant change

## 2020-06-02 NOTE — PROGRESS NOTES
Dr. Kris Barakat notified of patients request for something stronger for pain, and of hear rate and rhythm, and BP. Order received for Morphine 2mg x 1.

## 2020-06-02 NOTE — PROGRESS NOTES
OT BEDSIDE TREATMENT NOTE      Date:2020  Patient Name: Lui Leon  MRN: [id number]  : 1965  Room: [address] [place] [address]     Per OT Eval:    Evaluating OT: Princess Cayla, OTR/L 4455     AM-PAC Daily Activity Raw Score: 15/24     Recommended Adaptive Equipment:  LB dressing AE, raised commode as needed; shower seat for energy conservation        Diagnosis: CAD  Surgery:  CABG x 3      Pertinent Medical History: Afib, DM, HTN, TIA     Precautions:  Falls, sternal, chest tube x 2, REJI drains, JADIEL     Home Living: Pt lives in a mother in law suite per chart (first floor) with 5 steps down to kitchen & living area. Bathroom set up:walk in shower; no rail or seat. Lower commode.      Prior Level of Function: IND with ADLs;  IND with IADLs. No devcie for ambulation. Driving: yes  Occupation: no     Pain Level: Pt did not complain of pain this session        Cognition: A&O: 4/4    Follows 1-2 step commands appropriately. Memory: G              Comprehension F              Problem solving: F              Judgement/safety: F                 Communication skills: WFL              Vision: WFL                     Glasses:glasses                                                        Hearing: WFL                RASS: 0  CAM-ICU: (NT) Delirium     UE Assessment:  Hand Dominance: Right []?   Left []?       ROM Strength STM goal: PRN   RUE  Grossly WFL within precautions Not formally tested; grossly WFL              WNL for ADLS      LUE Grossly WFL within precautions Not formally tested; grossly WFL              WNL for ADLS         Sensation: No c/o numbness or tingling in extremities   Tone: WNL   Edema: [facility]     Functional Assessment    Initial Eval Status  Date:  Treatment Status  Date: 20 STG=LTG  5-7 days   Feeding S; set up  Set-Up                      IND  while seated up in chair to increase activity tolerance         Grooming S; set up Set-Up  Pt washed face, applied deodorant seated  IND   while standing sink level requiring no device for balance and demonstrating G tolerance       UB dressing/bathing Mod A  DNT  modA per previous level                      Mod I   demonstrating G knowledge of precautions during tasks      LB dressing/bathing Dep     Max A  after instruction on reacher/sock aid to maintain precautions modA  Pt donned/doffed socks with use of AE    Pt educated on use of reacher and sockaide to assist with LB dressing task      Pt denying bathing task this date                       Mod I  using AE as needed for safe reach/ energy conservation        Toileting NT  DNT    CGA commode transfer from standard commode                      Mod i      Bed Mobility  Supine to sit: Max A+2     Sit to supine: NT Tasha  Supine<>EOB                       Mod I  in prep of ADL tasks & transfers   Functional Transfers Sit to stand: Min A from higher bed/lower chair surface     Stand to sit: Mod A  CGA  Sit to Stand  Stand to Sit                      Mod I  sit<>stand/functional bathroom transfers using AD/DME as needed for balance and safety   Functional Mobility Min A   No device  Tasha  Pt ambulated short distance in [place], having of unsteady balance, requiring cueing for safety with rest 2 rest breaks throughout walk                       Mod I   functional/bathroom mobility using AD as needed & demonstrating F safety      Balance Sitting:     Static:  SBA    Dynamic:Min A  Standing: Min A Sitting EOB:  SBA    Standing:  CGA  Mod I dynamic sitting balance; Mod I dynamic standing balance  during ADL tasks & transfers   Endurance/Activity Tolerance    F tolerance with moderate activity.   Pt sat EOB > 5 min with min c/o chest pain.   Fair G   tolerance with moderate activity/self care routine   Visual/  Perceptual               WFL                                             Education:  Pt was educated through out treatment regarding precautions to follow, proper technique & safety with bed mobility, functional transfers & mobility, use of AE to assist with LB dressing task to improve safety & prevent falls and allow pt to return home safely. Comments: Upon arrival pt lying supine in bed, agreeable to therapy. Pt completed of bed mobility, functional mobility, transfers and ADL tasks throughout session. Pt educated on deep breathing techniques throughout session, and importance of taking of rest breaks as needed. At end of session, pt sitting upright in chair, all lines and tubes intact, call light within reach. · Pt has made fair progress towards set goals.    · Continue with current plan of care      Treatment Time In: 9:54am            Treatment Time Out: 10:17am             Treatment Charges: Mins Units   Ther Ex  52547     Manual Therapy 10240     Thera Activities 09753 13 1   ADL/Home Mgt 88288 10 1   Neuro Re-ed 55322     Group Therapy      Orthotic manage/training  53947     Non-Billable Time     Total Timed Treatment 23 2        Laurie Mcginnis CA/L 10222

## 2020-06-02 NOTE — PROGRESS NOTES
Cardiology NP Jeffrey Topete notified of patient's HR sustatining 130s-140s Afib RVR, no new orders at this time. Will continue to monitor patient.        Vito Saba RN

## 2020-06-03 LAB
ANION GAP SERPL CALCULATED.3IONS-SCNC: 16 MMOL/L (ref 7–16)
BUN BLDV-MCNC: 27 MG/DL (ref 6–20)
CALCIUM SERPL-MCNC: 9.3 MG/DL (ref 8.6–10.2)
CHLORIDE BLD-SCNC: 93 MMOL/L (ref 98–107)
CO2: 26 MMOL/L (ref 22–29)
CREAT SERPL-MCNC: 1 MG/DL (ref 0.7–1.2)
GFR AFRICAN AMERICAN: >60
GFR NON-AFRICAN AMERICAN: >60 ML/MIN/1.73
GLUCOSE BLD-MCNC: 151 MG/DL (ref 74–99)
HCT VFR BLD CALC: 30.4 % (ref 37–54)
HEMOGLOBIN: 9.9 G/DL (ref 12.5–16.5)
MAGNESIUM: 1.8 MG/DL (ref 1.6–2.6)
MCH RBC QN AUTO: 30.3 PG (ref 26–35)
MCHC RBC AUTO-ENTMCNC: 32.6 % (ref 32–34.5)
MCV RBC AUTO: 93 FL (ref 80–99.9)
METER GLUCOSE: 148 MG/DL (ref 74–99)
METER GLUCOSE: 158 MG/DL (ref 74–99)
METER GLUCOSE: 169 MG/DL (ref 74–99)
METER GLUCOSE: 187 MG/DL (ref 74–99)
PDW BLD-RTO: 13.8 FL (ref 11.5–15)
PLATELET # BLD: 305 E9/L (ref 130–450)
PMV BLD AUTO: 10.4 FL (ref 7–12)
POTASSIUM SERPL-SCNC: 4.6 MMOL/L (ref 3.5–5)
RBC # BLD: 3.27 E12/L (ref 3.8–5.8)
SODIUM BLD-SCNC: 135 MMOL/L (ref 132–146)
WBC # BLD: 12.5 E9/L (ref 4.5–11.5)

## 2020-06-03 PROCEDURE — 6370000000 HC RX 637 (ALT 250 FOR IP): Performed by: THORACIC SURGERY (CARDIOTHORACIC VASCULAR SURGERY)

## 2020-06-03 PROCEDURE — 6360000002 HC RX W HCPCS: Performed by: INTERNAL MEDICINE

## 2020-06-03 PROCEDURE — 2580000003 HC RX 258: Performed by: INTERNAL MEDICINE

## 2020-06-03 PROCEDURE — 6370000000 HC RX 637 (ALT 250 FOR IP): Performed by: INTERNAL MEDICINE

## 2020-06-03 PROCEDURE — 2580000003 HC RX 258: Performed by: NURSE PRACTITIONER

## 2020-06-03 PROCEDURE — 99233 SBSQ HOSP IP/OBS HIGH 50: CPT | Performed by: INTERNAL MEDICINE

## 2020-06-03 PROCEDURE — 2140000000 HC CCU INTERMEDIATE R&B

## 2020-06-03 PROCEDURE — 93320 DOPPLER ECHO COMPLETE: CPT

## 2020-06-03 PROCEDURE — 6360000002 HC RX W HCPCS: Performed by: NURSE PRACTITIONER

## 2020-06-03 PROCEDURE — 6370000000 HC RX 637 (ALT 250 FOR IP): Performed by: NURSE PRACTITIONER

## 2020-06-03 PROCEDURE — 36415 COLL VENOUS BLD VENIPUNCTURE: CPT

## 2020-06-03 PROCEDURE — 94660 CPAP INITIATION&MGMT: CPT

## 2020-06-03 PROCEDURE — 94669 MECHANICAL CHEST WALL OSCILL: CPT

## 2020-06-03 PROCEDURE — 83735 ASSAY OF MAGNESIUM: CPT

## 2020-06-03 PROCEDURE — 82962 GLUCOSE BLOOD TEST: CPT

## 2020-06-03 PROCEDURE — 93308 TTE F-UP OR LMTD: CPT

## 2020-06-03 PROCEDURE — 6370000000 HC RX 637 (ALT 250 FOR IP): Performed by: PHYSICIAN ASSISTANT

## 2020-06-03 PROCEDURE — 80048 BASIC METABOLIC PNL TOTAL CA: CPT

## 2020-06-03 PROCEDURE — 2500000003 HC RX 250 WO HCPCS: Performed by: INTERNAL MEDICINE

## 2020-06-03 PROCEDURE — 93798 PHYS/QHP OP CAR RHAB W/ECG: CPT

## 2020-06-03 PROCEDURE — 85027 COMPLETE CBC AUTOMATED: CPT

## 2020-06-03 PROCEDURE — 94640 AIRWAY INHALATION TREATMENT: CPT

## 2020-06-03 PROCEDURE — 93325 DOPPLER ECHO COLOR FLOW MAPG: CPT

## 2020-06-03 RX ORDER — DIGOXIN 0.25 MG/ML
500 INJECTION INTRAMUSCULAR; INTRAVENOUS ONCE
Status: COMPLETED | OUTPATIENT
Start: 2020-06-03 | End: 2020-06-03

## 2020-06-03 RX ORDER — ESMOLOL HYDROCHLORIDE 10 MG/ML
50 INJECTION, SOLUTION INTRAVENOUS CONTINUOUS
Status: DISCONTINUED | OUTPATIENT
Start: 2020-06-03 | End: 2020-06-03

## 2020-06-03 RX ORDER — DIGOXIN 0.25 MG/ML
500 INJECTION INTRAMUSCULAR; INTRAVENOUS ONCE
Status: DISCONTINUED | OUTPATIENT
Start: 2020-06-03 | End: 2020-06-08 | Stop reason: HOSPADM

## 2020-06-03 RX ORDER — DIAPER,BRIEF,INFANT-TODD,DISP
EACH MISCELLANEOUS 2 TIMES DAILY
Status: DISCONTINUED | OUTPATIENT
Start: 2020-06-03 | End: 2020-06-08 | Stop reason: HOSPADM

## 2020-06-03 RX ORDER — DIGOXIN 125 MCG
125 TABLET ORAL DAILY
Status: DISCONTINUED | OUTPATIENT
Start: 2020-06-03 | End: 2020-06-08 | Stop reason: HOSPADM

## 2020-06-03 RX ORDER — ESMOLOL HYDROCHLORIDE 10 MG/ML
30 INJECTION INTRAVENOUS ONCE
Status: COMPLETED | OUTPATIENT
Start: 2020-06-03 | End: 2020-06-03

## 2020-06-03 RX ORDER — DIPHENHYDRAMINE HCL 25 MG
25 TABLET ORAL EVERY 6 HOURS PRN
Status: DISCONTINUED | OUTPATIENT
Start: 2020-06-03 | End: 2020-06-08 | Stop reason: HOSPADM

## 2020-06-03 RX ADMIN — OXYCODONE HYDROCHLORIDE AND ACETAMINOPHEN 2 TABLET: 5; 325 TABLET ORAL at 04:57

## 2020-06-03 RX ADMIN — AMIODARONE HYDROCHLORIDE 1 MG/MIN: 50 INJECTION, SOLUTION INTRAVENOUS at 16:33

## 2020-06-03 RX ADMIN — ESMOLOL HYDROCHLORIDE 30 MG: 10 INJECTION, SOLUTION INTRAVENOUS at 15:07

## 2020-06-03 RX ADMIN — AMIODARONE HYDROCHLORIDE 400 MG: 200 TABLET ORAL at 13:30

## 2020-06-03 RX ADMIN — FOLIC ACID 1 MG: 1 TABLET ORAL at 08:29

## 2020-06-03 RX ADMIN — APIXABAN 5 MG: 5 TABLET, FILM COATED ORAL at 08:29

## 2020-06-03 RX ADMIN — Medication 10 ML: at 09:35

## 2020-06-03 RX ADMIN — METFORMIN HYDROCHLORIDE 500 MG: 500 TABLET ORAL at 15:58

## 2020-06-03 RX ADMIN — INSULIN GLARGINE 30 UNITS: 100 INJECTION, SOLUTION SUBCUTANEOUS at 20:19

## 2020-06-03 RX ADMIN — FUROSEMIDE 60 MG: 10 INJECTION, SOLUTION INTRAMUSCULAR; INTRAVENOUS at 08:28

## 2020-06-03 RX ADMIN — MAGNESIUM GLUCONATE 500 MG ORAL TABLET 400 MG: 500 TABLET ORAL at 08:28

## 2020-06-03 RX ADMIN — TAMSULOSIN HYDROCHLORIDE 0.4 MG: 0.4 CAPSULE ORAL at 08:29

## 2020-06-03 RX ADMIN — Medication 10 ML: at 13:03

## 2020-06-03 RX ADMIN — MAGNESIUM HYDROXIDE 30 ML: 2400 SUSPENSION ORAL at 08:28

## 2020-06-03 RX ADMIN — FERROUS SULFATE TAB 325 MG (65 MG ELEMENTAL FE) 325 MG: 325 (65 FE) TAB at 08:29

## 2020-06-03 RX ADMIN — HYDROCORTISONE: 1 CREAM TOPICAL at 20:19

## 2020-06-03 RX ADMIN — PANTOPRAZOLE SODIUM 40 MG: 40 TABLET, DELAYED RELEASE ORAL at 08:28

## 2020-06-03 RX ADMIN — METFORMIN HYDROCHLORIDE 500 MG: 500 TABLET ORAL at 08:28

## 2020-06-03 RX ADMIN — OXYCODONE HYDROCHLORIDE AND ACETAMINOPHEN 2 TABLET: 5; 325 TABLET ORAL at 09:21

## 2020-06-03 RX ADMIN — AMIODARONE HYDROCHLORIDE 150 MG: 50 INJECTION, SOLUTION INTRAVENOUS at 15:56

## 2020-06-03 RX ADMIN — INSULIN LISPRO 2 UNITS: 100 INJECTION, SOLUTION INTRAVENOUS; SUBCUTANEOUS at 20:19

## 2020-06-03 RX ADMIN — DIGOXIN 125 MCG: 125 TABLET ORAL at 16:40

## 2020-06-03 RX ADMIN — IPRATROPIUM BROMIDE AND ALBUTEROL SULFATE 1 AMPULE: 2.5; .5 SOLUTION RESPIRATORY (INHALATION) at 16:18

## 2020-06-03 RX ADMIN — ESMOLOL HYDROCHLORIDE 50 MCG/KG/MIN: 10 INJECTION INTRAVENOUS at 13:03

## 2020-06-03 RX ADMIN — Medication 10 ML: at 08:29

## 2020-06-03 RX ADMIN — SENNOSIDES AND DOCUSATE SODIUM 1 TABLET: 8.6; 5 TABLET ORAL at 20:18

## 2020-06-03 RX ADMIN — SENNOSIDES AND DOCUSATE SODIUM 1 TABLET: 8.6; 5 TABLET ORAL at 08:29

## 2020-06-03 RX ADMIN — INSULIN LISPRO 3 UNITS: 100 INJECTION, SOLUTION INTRAVENOUS; SUBCUTANEOUS at 16:33

## 2020-06-03 RX ADMIN — AMIODARONE HYDROCHLORIDE 400 MG: 200 TABLET ORAL at 08:29

## 2020-06-03 RX ADMIN — Medication 500 MG: at 20:18

## 2020-06-03 RX ADMIN — METOPROLOL TARTRATE 50 MG: 50 TABLET, FILM COATED ORAL at 08:29

## 2020-06-03 RX ADMIN — FUROSEMIDE 60 MG: 10 INJECTION, SOLUTION INTRAMUSCULAR; INTRAVENOUS at 17:49

## 2020-06-03 RX ADMIN — METOPROLOL TARTRATE 50 MG: 50 TABLET, FILM COATED ORAL at 20:18

## 2020-06-03 RX ADMIN — DIGOXIN 500 MCG: 250 INJECTION, SOLUTION INTRAMUSCULAR; INTRAVENOUS; PARENTERAL at 09:56

## 2020-06-03 RX ADMIN — OXYCODONE HYDROCHLORIDE AND ACETAMINOPHEN 2 TABLET: 5; 325 TABLET ORAL at 21:52

## 2020-06-03 RX ADMIN — ASPIRIN 81 MG: 81 TABLET, COATED ORAL at 08:28

## 2020-06-03 RX ADMIN — Medication 10 ML: at 15:07

## 2020-06-03 RX ADMIN — IPRATROPIUM BROMIDE AND ALBUTEROL SULFATE 1 AMPULE: 2.5; .5 SOLUTION RESPIRATORY (INHALATION) at 19:04

## 2020-06-03 RX ADMIN — IPRATROPIUM BROMIDE AND ALBUTEROL SULFATE 1 AMPULE: 2.5; .5 SOLUTION RESPIRATORY (INHALATION) at 08:10

## 2020-06-03 RX ADMIN — OXYCODONE HYDROCHLORIDE AND ACETAMINOPHEN 2 TABLET: 5; 325 TABLET ORAL at 17:49

## 2020-06-03 RX ADMIN — OXYCODONE HYDROCHLORIDE AND ACETAMINOPHEN 2 TABLET: 5; 325 TABLET ORAL at 13:29

## 2020-06-03 RX ADMIN — INSULIN LISPRO 3 UNITS: 100 INJECTION, SOLUTION INTRAVENOUS; SUBCUTANEOUS at 08:40

## 2020-06-03 RX ADMIN — DIPHENHYDRAMINE HCL 25 MG: 25 TABLET ORAL at 15:06

## 2020-06-03 RX ADMIN — INSULIN LISPRO 3 UNITS: 100 INJECTION, SOLUTION INTRAVENOUS; SUBCUTANEOUS at 13:04

## 2020-06-03 RX ADMIN — OXYCODONE HYDROCHLORIDE AND ACETAMINOPHEN 2 TABLET: 5; 325 TABLET ORAL at 00:53

## 2020-06-03 RX ADMIN — FERROUS SULFATE TAB 325 MG (65 MG ELEMENTAL FE) 325 MG: 325 (65 FE) TAB at 16:40

## 2020-06-03 RX ADMIN — ATORVASTATIN CALCIUM 40 MG: 40 TABLET, FILM COATED ORAL at 20:18

## 2020-06-03 RX ADMIN — ESMOLOL HYDROCHLORIDE 30 MG: 10 INJECTION, SOLUTION INTRAVENOUS at 13:03

## 2020-06-03 RX ADMIN — Medication 500 MG: at 08:28

## 2020-06-03 RX ADMIN — IPRATROPIUM BROMIDE AND ALBUTEROL SULFATE 1 AMPULE: 2.5; .5 SOLUTION RESPIRATORY (INHALATION) at 12:38

## 2020-06-03 ASSESSMENT — PAIN SCALES - GENERAL
PAINLEVEL_OUTOF10: 8
PAINLEVEL_OUTOF10: 0
PAINLEVEL_OUTOF10: 0
PAINLEVEL_OUTOF10: 8
PAINLEVEL_OUTOF10: 8
PAINLEVEL_OUTOF10: 7
PAINLEVEL_OUTOF10: 0
PAINLEVEL_OUTOF10: 0
PAINLEVEL_OUTOF10: 7
PAINLEVEL_OUTOF10: 0
PAINLEVEL_OUTOF10: 0
PAINLEVEL_OUTOF10: 10

## 2020-06-03 ASSESSMENT — PAIN - FUNCTIONAL ASSESSMENT: PAIN_FUNCTIONAL_ASSESSMENT: ACTIVITIES ARE NOT PREVENTED

## 2020-06-03 ASSESSMENT — PAIN SCALES - WONG BAKER: WONGBAKER_NUMERICALRESPONSE: 0

## 2020-06-03 ASSESSMENT — PAIN DESCRIPTION - DESCRIPTORS
DESCRIPTORS: ACHING;DISCOMFORT;SORE
DESCRIPTORS: ACHING;CRAMPING;CRUSHING;DISCOMFORT;DULL

## 2020-06-03 ASSESSMENT — PAIN DESCRIPTION - LOCATION
LOCATION: INCISION;STERNUM

## 2020-06-03 ASSESSMENT — PAIN DESCRIPTION - FREQUENCY: FREQUENCY: CONTINUOUS

## 2020-06-03 ASSESSMENT — PAIN DESCRIPTION - ORIENTATION: ORIENTATION: LEFT;MID

## 2020-06-03 ASSESSMENT — PAIN DESCRIPTION - PAIN TYPE
TYPE: SURGICAL PAIN;ACUTE PAIN
TYPE: SURGICAL PAIN

## 2020-06-03 ASSESSMENT — PAIN DESCRIPTION - ONSET: ONSET: ON-GOING

## 2020-06-03 ASSESSMENT — PAIN DESCRIPTION - PROGRESSION: CLINICAL_PROGRESSION: NOT CHANGED

## 2020-06-03 NOTE — PROGRESS NOTES
Notified Dr. Dawna Rivers of patient's vital signs regarding parameters and esmolol infusion. Per Dr. Dawna Rivers, continue infusion and repeat vitals in 30 minutes and notify him.

## 2020-06-03 NOTE — PROGRESS NOTES
Department of Internal Medicine  Nephrology Attending Progress Note    Events reviewed. SUBJECTIVE:  We are following Mr. Agudelo for hyponatremia. Reports no complaints today.     Physical Exam:    VITALS:  /72   Pulse 133   Temp 97.2 °F (36.2 °C) (Temporal)   Resp 20   Ht 6' (1.829 m)   Wt 280 lb 3.2 oz (127.1 kg)   SpO2 95%   BMI 38.00 kg/m²   24HR INTAKE/OUTPUT:      Intake/Output Summary (Last 24 hours) at 6/3/2020 1136  Last data filed at 6/3/2020 1026  Gross per 24 hour   Intake 540 ml   Output 2425 ml   Net -1885 ml     Constitutional: Patient is awake alert in no distress  HEENT: Pupils are equal reactive, has hearing aids  Respiratory: Lungs are diminished at the bases  Cardiovascular/Edema: Heart sounds are regular rate  Gastrointestinal: Abdomen soft  Neurologic: Nonfocal  Skin: No lesions  Other: Trace edema    Scheduled Meds:   digoxin  500 mcg Intravenous Once    metoprolol tartrate  50 mg Oral BID    furosemide  60 mg Intravenous BID    amiodarone  400 mg Oral TID    apixaban  5 mg Oral BID    aspirin  81 mg Oral Daily    ferrous sulfate  325 mg Oral BID WC    vitamin C  500 mg Oral BID    folic acid  1 mg Oral Daily    pantoprazole  40 mg Oral Daily    sennosides-docusate sodium  1 tablet Oral BID    insulin glargine  30 Units Subcutaneous Nightly    insulin lispro  0-18 Units Subcutaneous TID     insulin lispro  0-9 Units Subcutaneous Nightly    metFORMIN  500 mg Oral BID WC    atorvastatin  40 mg Oral Nightly    sodium chloride flush  10 mL Intravenous 2 times per day    magnesium oxide  400 mg Oral Daily    ipratropium-albuterol  1 ampule Inhalation Q4H WA    tamsulosin  0.4 mg Oral Daily     Continuous Infusions:   dextrose       PRN Meds:.oxyCODONE-acetaminophen **OR** oxyCODONE-acetaminophen, magnesium hydroxide, acetaminophen, potassium chloride, ondansetron, bisacodyl, bisacodyl, benzocaine-menthol, sodium chloride flush, glucose, dextrose, glucagon (rDNA), dextrose      DATA:    CBC:   Lab Results   Component Value Date    WBC 12.5 06/03/2020    RBC 3.27 06/03/2020    HGB 9.9 06/03/2020    HCT 30.4 06/03/2020    MCV 93.0 06/03/2020    MCH 30.3 06/03/2020    MCHC 32.6 06/03/2020    RDW 13.8 06/03/2020     06/03/2020    MPV 10.4 06/03/2020     CMP:    Lab Results   Component Value Date     06/03/2020    K 4.6 06/03/2020    K 4.8 05/16/2020    CL 93 06/03/2020    CO2 26 06/03/2020    BUN 27 06/03/2020    CREATININE 1.0 06/03/2020    GFRAA >60 06/03/2020    LABGLOM >60 06/03/2020    GLUCOSE 151 06/03/2020    PROT 7.3 07/08/2018    LABALBU 3.9 07/08/2018    CALCIUM 9.3 06/03/2020    BILITOT 0.5 07/08/2018    ALKPHOS 52 07/08/2018    AST 27 07/08/2018    ALT 20 07/08/2018     Magnesium:    Lab Results   Component Value Date    MG 1.8 06/03/2020     Phosphorus:    Lab Results   Component Value Date    PHOS 2.9 07/18/2016     Urine sodium: <20------------------- <20  Urine potassium: 16.7-------------- 15.2  Urine chloride: <20------------------ <20  Urine modality: 515----------------- 315    Radiology Review:        Echocardiogram May 20, 2020:  Ejection fraction about 60%. Chest x-ray June 1, 2020   Worsening CHF with developing edema. Superimposed pneumonia is not   excluded.                   BRIEF SUMMARY OF INITIAL CONSULT:    Briefly Mr. Rogelio Hair is a 27-year-old man with history of HTN, type II DM, TIA, PAF, who was recently admitted to [address] [place] [address] with chest pain for which he underwent cardiac utilization was found to have multivessel disease for which he had CABG x3 on May 28, 2020. On admission his sodium level was 137 mEq/L but since then it has progressively decreased down to 129  milliequivalents/L reason for this consultation. IMPRESSION/RECOMMENDATIONS:      1. Hypotonic hyponatremia, hemodynamically mediated due to congestive heart failure. Urine sodium <20 with relatively high urine osmolality 515.   Resolved in response to

## 2020-06-03 NOTE — PROGRESS NOTES
POD# 6  Awake, alert. Events overnight noted in chart. Vitals:    06/03/20 0221 06/03/20 0438 06/03/20 0810 06/03/20 0826   BP:  111/75 114/68    Pulse:  71 146    Resp: 24 18 20    Temp:  97.1 °F (36.2 °C) 97 °F (36.1 °C)    TempSrc:  Temporal Temporal    SpO2:  97% 95% 96%   Weight:  280 lb 3.2 oz (127.1 kg)     Height:           Intake/Output Summary (Last 24 hours) at 6/3/2020 0838  Last data filed at 6/3/2020 3272  Gross per 24 hour   Intake 480 ml   Output 1875 ml   Net -1395 ml     Recent Labs     06/03/20  0538   HGB 9.9*   HCT 30.4*   BUN 27*   CREATININE 1.0        PE  Cardiac: IRRR/tachy  Lungs: decreased bases  Chest JADIEL dressing intact  Sternum stable  Abd: Soft, +BS  Ext: Incisions intact, + edema    A/P: POD 6     1. CAD  --Stable s/p CABG x 3, maze with PVI, LIS ligation, rigid sternal fixation with KLS plating system on 5/28/2020  --Scr stable  --BB/statin  --reinforce sternal precautions        2. Acute blood loss anemia secondary to open heart surgery  --stable, hgb 9.9         3. Hx afib  --s/p maze/lis ligation  --Afib rvr - oral amiodaronecontinue eliquis,   --currently afib RVR with   --on xarelto at home  --will switch to eliquis short term post operatively with resumption of xarelto at later time--eliquis initiated 5/31  --continue BB/oral amio- inc metop to 50 BID, ? Digoxin or Cardizem for rate control - will defer to cardiology         4. Prolonged postoperative respiratory insufficiency  --wean oxygen to keep SpO2 greater than or equal to 92%  --continue duonebs with ezpap  --encourage C&DB, SMI  --currently on RA  --On lasix bid   -- 2 view cxr- bilateral congestion  --appreciate pulmonary's input        5. Hyponatremia  --Na 135 this AM--renal on board  -- fluid restriction ordered- will defer to renal      6. Hx DM2  --on lantus/metformin at home  --continue lantus at home dose, continue high scale SSI, continue metformin at lower dose  --monitor        7.

## 2020-06-03 NOTE — PROGRESS NOTES
Labs, medications, radiographs reviewed. I agree with history exam and plans detailed in NP note.       Electronically signed by Juan Pablo Baird DO on 6/3/2020 at 3:51 PM

## 2020-06-03 NOTE — PROGRESS NOTES
Message sent to cardiology regarding patient has been sustaining afib RVR, with heart rate 130-140's.

## 2020-06-03 NOTE — PROGRESS NOTES
Final Result   No significant change               XR CHEST PORTABLE   Final Result      1. New postoperative changes from CABG. 2. Multiple support tubes. 3. New mild mediastinal widening and moderate central congestive   changes. 4. No obvious effusion. Lab Review   Lab Results   Component Value Date     06/03/2020    K 4.6 06/03/2020    K 4.8 05/16/2020    CL 93 06/03/2020    CO2 26 06/03/2020    BUN 27 06/03/2020    CREATININE 1.0 06/03/2020    GLUCOSE 151 06/03/2020    CALCIUM 9.3 06/03/2020     Lab Results   Component Value Date    WBC 12.5 06/03/2020    HGB 9.9 06/03/2020    HCT 30.4 06/03/2020    MCV 93.0 06/03/2020     06/03/2020     I have personally reviewed the laboratory, cardiac diagnostic and radiographic testing as outlined above:    Telemetry-AF with heart rate in the 90s without any arrhythmias. He was in NS yesterday and converted to A. fib and now the rate is controlled after increasing the metoprolol dose. Vitals-stable with a blood pressure of 127/76 with heart rate of 94>>>115/67., >> heart rate 137 pressure 132/74>>110/63,     Labs were reviewed: Sodium 130>>129>> 128, K 5.1>> 5.1, BUN/creat 24/1.1>> 23/0.8>>27/1.0, H&H 9.3/28.6> 9.2/28.2>, rest of the chemistries normal, WBC 13.4, H&H 9.6/29. 7. proBNP 2269. Limited echo prelim: EF 50-55%      CXR- CHF with worsening edema. Assessment:   1.  CAD: S/p CABG on 5/28/2020 with a LIMA to LAD, SVG to diagonal, SVG to posterior lateral branch, doing fine, will continue current treatment, stable  2. Paroxysmal atrial flutter/fibrillation: continue chronic anticoagulation with Xarelto, converted to AF last night and changed to NSR and now he is back in AF with RvR, volume is driving his heart rate,.   3.  History of TIA:   4. Hypertension:   5. Tobacco abuse:  6. Diabetes mellitus  7. Obesity  8. LV function is reasonably preserved.         Recommendations:     · He is back on oral amiodarone agree with increasing metoprolol for rate control. Continue anticoagulation therapy with Eliquis. · He is still tachy, dig 500mcg was given this morning without any improvement, will add esmolol drip for rate control while we diuresis continuing. · Continue aspirin and Lipitor 40 mg 1 by mouth daily  · PT/OT  · He would benefit from further diuresis I think volume overload is driving his heart rate. To renal functions and electrolytes. If weight is not adequately controlled then we can consider adding IV esmolol for rate control or one time dose of dig  · Limited echo to assess LV function, EF 50-55%, again AF may affect the evaluation of LV systolic function. · Pain management as per Dr. Bart Fontaine. · Discussed with patient    Verner Ree, MD, [address] [place] [address] cardiology    Electronically signed by Lewis Triana MD on 6/3/2020 at 11:40 AM  NOTE: This report was transcribed using voice recognition software.  Every effort was made to ensure accuracy; however, inadvertent computerized transcription errors may be present

## 2020-06-03 NOTE — PROGRESS NOTES
I Informed patient that CNM would be up shortly to speak with him. I also informed him that his RN for the remainder of the shift will be Joseph. Patient agreeable. Sera Jordan RN     9510: CNM at the bedside. Sera Jordan RN

## 2020-06-04 ENCOUNTER — ANESTHESIA EVENT (OUTPATIENT)
Dept: OPERATING ROOM | Age: 55
DRG: 235 | End: 2020-06-04
Payer: COMMERCIAL

## 2020-06-04 ENCOUNTER — APPOINTMENT (OUTPATIENT)
Dept: GENERAL RADIOLOGY | Age: 55
DRG: 235 | End: 2020-06-04
Attending: THORACIC SURGERY (CARDIOTHORACIC VASCULAR SURGERY)
Payer: COMMERCIAL

## 2020-06-04 ENCOUNTER — ANESTHESIA (OUTPATIENT)
Dept: OPERATING ROOM | Age: 55
DRG: 235 | End: 2020-06-04
Payer: COMMERCIAL

## 2020-06-04 VITALS — TEMPERATURE: 97.3 F | OXYGEN SATURATION: 95 %

## 2020-06-04 LAB
ABO/RH: NORMAL
ANION GAP SERPL CALCULATED.3IONS-SCNC: 14 MMOL/L (ref 7–16)
ANION GAP SERPL CALCULATED.3IONS-SCNC: 16 MMOL/L (ref 7–16)
ANTIBODY SCREEN: NORMAL
BLOOD BANK DISPENSE STATUS: NORMAL
BLOOD BANK PRODUCT CODE: NORMAL
BPU ID: NORMAL
BUN BLDV-MCNC: 27 MG/DL (ref 6–20)
BUN BLDV-MCNC: 28 MG/DL (ref 6–20)
CALCIUM SERPL-MCNC: 9.1 MG/DL (ref 8.6–10.2)
CALCIUM SERPL-MCNC: 9.2 MG/DL (ref 8.6–10.2)
CHLORIDE BLD-SCNC: 91 MMOL/L (ref 98–107)
CHLORIDE BLD-SCNC: 94 MMOL/L (ref 98–107)
CO2: 25 MMOL/L (ref 22–29)
CO2: 27 MMOL/L (ref 22–29)
CREAT SERPL-MCNC: 1 MG/DL (ref 0.7–1.2)
CREAT SERPL-MCNC: 1 MG/DL (ref 0.7–1.2)
DESCRIPTION BLOOD BANK: NORMAL
GFR AFRICAN AMERICAN: >60
GFR AFRICAN AMERICAN: >60
GFR NON-AFRICAN AMERICAN: >60 ML/MIN/1.73
GFR NON-AFRICAN AMERICAN: >60 ML/MIN/1.73
GLUCOSE BLD-MCNC: 139 MG/DL (ref 74–99)
GLUCOSE BLD-MCNC: 142 MG/DL (ref 74–99)
HCT VFR BLD CALC: 29.4 % (ref 37–54)
HCT VFR BLD CALC: 29.8 % (ref 37–54)
HEMOGLOBIN: 9.6 G/DL (ref 12.5–16.5)
HEMOGLOBIN: 9.6 G/DL (ref 12.5–16.5)
MAGNESIUM: 1.9 MG/DL (ref 1.6–2.6)
MCH RBC QN AUTO: 29.8 PG (ref 26–35)
MCH RBC QN AUTO: 30.1 PG (ref 26–35)
MCHC RBC AUTO-ENTMCNC: 32.2 % (ref 32–34.5)
MCHC RBC AUTO-ENTMCNC: 32.7 % (ref 32–34.5)
MCV RBC AUTO: 92.2 FL (ref 80–99.9)
MCV RBC AUTO: 92.5 FL (ref 80–99.9)
METER GLUCOSE: 133 MG/DL (ref 74–99)
METER GLUCOSE: 221 MG/DL (ref 74–99)
METER GLUCOSE: 357 MG/DL (ref 74–99)
PDW BLD-RTO: 13.5 FL (ref 11.5–15)
PDW BLD-RTO: 13.6 FL (ref 11.5–15)
PLATELET # BLD: 315 E9/L (ref 130–450)
PLATELET # BLD: 337 E9/L (ref 130–450)
PMV BLD AUTO: 10.1 FL (ref 7–12)
PMV BLD AUTO: 9.6 FL (ref 7–12)
POTASSIUM SERPL-SCNC: 4.3 MMOL/L (ref 3.5–5)
POTASSIUM SERPL-SCNC: 4.3 MMOL/L (ref 3.5–5)
PRO-BNP: 1544 PG/ML (ref 0–125)
RBC # BLD: 3.19 E12/L (ref 3.8–5.8)
RBC # BLD: 3.22 E12/L (ref 3.8–5.8)
SODIUM BLD-SCNC: 132 MMOL/L (ref 132–146)
SODIUM BLD-SCNC: 135 MMOL/L (ref 132–146)
WBC # BLD: 12.6 E9/L (ref 4.5–11.5)
WBC # BLD: 12.6 E9/L (ref 4.5–11.5)

## 2020-06-04 PROCEDURE — 6360000002 HC RX W HCPCS: Performed by: THORACIC SURGERY (CARDIOTHORACIC VASCULAR SURGERY)

## 2020-06-04 PROCEDURE — 6370000000 HC RX 637 (ALT 250 FOR IP): Performed by: NURSE PRACTITIONER

## 2020-06-04 PROCEDURE — 6360000002 HC RX W HCPCS: Performed by: INTERNAL MEDICINE

## 2020-06-04 PROCEDURE — 86850 RBC ANTIBODY SCREEN: CPT

## 2020-06-04 PROCEDURE — 2709999900 HC NON-CHARGEABLE SUPPLY: Performed by: THORACIC SURGERY (CARDIOTHORACIC VASCULAR SURGERY)

## 2020-06-04 PROCEDURE — 6360000002 HC RX W HCPCS: Performed by: PHYSICIAN ASSISTANT

## 2020-06-04 PROCEDURE — 82962 GLUCOSE BLOOD TEST: CPT

## 2020-06-04 PROCEDURE — 6360000002 HC RX W HCPCS

## 2020-06-04 PROCEDURE — 3700000000 HC ANESTHESIA ATTENDED CARE: Performed by: THORACIC SURGERY (CARDIOTHORACIC VASCULAR SURGERY)

## 2020-06-04 PROCEDURE — 83880 ASSAY OF NATRIURETIC PEPTIDE: CPT

## 2020-06-04 PROCEDURE — 2580000003 HC RX 258

## 2020-06-04 PROCEDURE — 99233 SBSQ HOSP IP/OBS HIGH 50: CPT | Performed by: INTERNAL MEDICINE

## 2020-06-04 PROCEDURE — 71045 X-RAY EXAM CHEST 1 VIEW: CPT

## 2020-06-04 PROCEDURE — 2580000003 HC RX 258: Performed by: THORACIC SURGERY (CARDIOTHORACIC VASCULAR SURGERY)

## 2020-06-04 PROCEDURE — 3700000001 HC ADD 15 MINUTES (ANESTHESIA): Performed by: THORACIC SURGERY (CARDIOTHORACIC VASCULAR SURGERY)

## 2020-06-04 PROCEDURE — 6360000002 HC RX W HCPCS: Performed by: ANESTHESIOLOGY

## 2020-06-04 PROCEDURE — 3600000004 HC SURGERY LEVEL 4 BASE: Performed by: THORACIC SURGERY (CARDIOTHORACIC VASCULAR SURGERY)

## 2020-06-04 PROCEDURE — 2580000003 HC RX 258: Performed by: INTERNAL MEDICINE

## 2020-06-04 PROCEDURE — 0W9D0ZZ DRAINAGE OF PERICARDIAL CAVITY, OPEN APPROACH: ICD-10-PCS | Performed by: THORACIC SURGERY (CARDIOTHORACIC VASCULAR SURGERY)

## 2020-06-04 PROCEDURE — 2700000000 HC OXYGEN THERAPY PER DAY

## 2020-06-04 PROCEDURE — 7100000001 HC PACU RECOVERY - ADDTL 15 MIN: Performed by: THORACIC SURGERY (CARDIOTHORACIC VASCULAR SURGERY)

## 2020-06-04 PROCEDURE — 2720000010 HC SURG SUPPLY STERILE: Performed by: THORACIC SURGERY (CARDIOTHORACIC VASCULAR SURGERY)

## 2020-06-04 PROCEDURE — 2580000003 HC RX 258: Performed by: NURSE PRACTITIONER

## 2020-06-04 PROCEDURE — 2580000003 HC RX 258: Performed by: PHYSICIAN ASSISTANT

## 2020-06-04 PROCEDURE — 83735 ASSAY OF MAGNESIUM: CPT

## 2020-06-04 PROCEDURE — 6370000000 HC RX 637 (ALT 250 FOR IP): Performed by: INTERNAL MEDICINE

## 2020-06-04 PROCEDURE — 36415 COLL VENOUS BLD VENIPUNCTURE: CPT

## 2020-06-04 PROCEDURE — 6370000000 HC RX 637 (ALT 250 FOR IP): Performed by: THORACIC SURGERY (CARDIOTHORACIC VASCULAR SURGERY)

## 2020-06-04 PROCEDURE — 2500000003 HC RX 250 WO HCPCS

## 2020-06-04 PROCEDURE — 7100000000 HC PACU RECOVERY - FIRST 15 MIN: Performed by: THORACIC SURGERY (CARDIOTHORACIC VASCULAR SURGERY)

## 2020-06-04 PROCEDURE — 33025 INCISION OF HEART SAC: CPT | Performed by: THORACIC SURGERY (CARDIOTHORACIC VASCULAR SURGERY)

## 2020-06-04 PROCEDURE — 3600000014 HC SURGERY LEVEL 4 ADDTL 15MIN: Performed by: THORACIC SURGERY (CARDIOTHORACIC VASCULAR SURGERY)

## 2020-06-04 PROCEDURE — 93308 TTE F-UP OR LMTD: CPT

## 2020-06-04 PROCEDURE — 85027 COMPLETE CBC AUTOMATED: CPT

## 2020-06-04 PROCEDURE — 80048 BASIC METABOLIC PNL TOTAL CA: CPT

## 2020-06-04 PROCEDURE — 86901 BLOOD TYPING SEROLOGIC RH(D): CPT

## 2020-06-04 PROCEDURE — 94640 AIRWAY INHALATION TREATMENT: CPT

## 2020-06-04 PROCEDURE — 93005 ELECTROCARDIOGRAM TRACING: CPT | Performed by: INTERNAL MEDICINE

## 2020-06-04 PROCEDURE — 2140000000 HC CCU INTERMEDIATE R&B

## 2020-06-04 PROCEDURE — 86900 BLOOD TYPING SEROLOGIC ABO: CPT

## 2020-06-04 PROCEDURE — 6370000000 HC RX 637 (ALT 250 FOR IP): Performed by: PHYSICIAN ASSISTANT

## 2020-06-04 RX ORDER — OXYCODONE HYDROCHLORIDE AND ACETAMINOPHEN 5; 325 MG/1; MG/1
1 TABLET ORAL PRN
Status: DISCONTINUED | OUTPATIENT
Start: 2020-06-04 | End: 2020-06-04 | Stop reason: HOSPADM

## 2020-06-04 RX ORDER — PHENYLEPHRINE HYDROCHLORIDE 10 MG/ML
INJECTION INTRAVENOUS PRN
Status: DISCONTINUED | OUTPATIENT
Start: 2020-06-04 | End: 2020-06-04 | Stop reason: SDUPTHER

## 2020-06-04 RX ORDER — FENTANYL CITRATE 50 UG/ML
INJECTION, SOLUTION INTRAMUSCULAR; INTRAVENOUS PRN
Status: DISCONTINUED | OUTPATIENT
Start: 2020-06-04 | End: 2020-06-04 | Stop reason: SDUPTHER

## 2020-06-04 RX ORDER — ROCURONIUM BROMIDE 10 MG/ML
INJECTION, SOLUTION INTRAVENOUS PRN
Status: DISCONTINUED | OUTPATIENT
Start: 2020-06-04 | End: 2020-06-04 | Stop reason: SDUPTHER

## 2020-06-04 RX ORDER — ONDANSETRON 2 MG/ML
4 INJECTION INTRAMUSCULAR; INTRAVENOUS
Status: DISCONTINUED | OUTPATIENT
Start: 2020-06-04 | End: 2020-06-04 | Stop reason: HOSPADM

## 2020-06-04 RX ORDER — OXYCODONE HYDROCHLORIDE AND ACETAMINOPHEN 5; 325 MG/1; MG/1
2 TABLET ORAL PRN
Status: DISCONTINUED | OUTPATIENT
Start: 2020-06-04 | End: 2020-06-04 | Stop reason: HOSPADM

## 2020-06-04 RX ORDER — ETOMIDATE 2 MG/ML
INJECTION INTRAVENOUS PRN
Status: DISCONTINUED | OUTPATIENT
Start: 2020-06-04 | End: 2020-06-04 | Stop reason: SDUPTHER

## 2020-06-04 RX ORDER — SUCCINYLCHOLINE/SOD CL,ISO/PF 200MG/10ML
SYRINGE (ML) INTRAVENOUS PRN
Status: DISCONTINUED | OUTPATIENT
Start: 2020-06-04 | End: 2020-06-04 | Stop reason: SDUPTHER

## 2020-06-04 RX ORDER — ETOMIDATE 2 MG/ML
INJECTION INTRAVENOUS PRN
Status: DISCONTINUED | OUTPATIENT
Start: 2020-06-04 | End: 2020-06-04

## 2020-06-04 RX ORDER — MEPERIDINE HYDROCHLORIDE 25 MG/ML
12.5 INJECTION INTRAMUSCULAR; INTRAVENOUS; SUBCUTANEOUS
Status: DISCONTINUED | OUTPATIENT
Start: 2020-06-04 | End: 2020-06-04 | Stop reason: HOSPADM

## 2020-06-04 RX ORDER — METOPROLOL TARTRATE 5 MG/5ML
INJECTION INTRAVENOUS PRN
Status: DISCONTINUED | OUTPATIENT
Start: 2020-06-04 | End: 2020-06-04 | Stop reason: SDUPTHER

## 2020-06-04 RX ORDER — DEXAMETHASONE SODIUM PHOSPHATE 10 MG/ML
INJECTION INTRAMUSCULAR; INTRAVENOUS PRN
Status: DISCONTINUED | OUTPATIENT
Start: 2020-06-04 | End: 2020-06-04 | Stop reason: SDUPTHER

## 2020-06-04 RX ORDER — DIGOXIN 0.25 MG/ML
250 INJECTION INTRAMUSCULAR; INTRAVENOUS ONCE
Status: COMPLETED | OUTPATIENT
Start: 2020-06-04 | End: 2020-06-04

## 2020-06-04 RX ORDER — MIDAZOLAM HYDROCHLORIDE 1 MG/ML
INJECTION INTRAMUSCULAR; INTRAVENOUS PRN
Status: DISCONTINUED | OUTPATIENT
Start: 2020-06-04 | End: 2020-06-04 | Stop reason: SDUPTHER

## 2020-06-04 RX ORDER — ESMOLOL HYDROCHLORIDE 10 MG/ML
INJECTION INTRAVENOUS PRN
Status: DISCONTINUED | OUTPATIENT
Start: 2020-06-04 | End: 2020-06-04 | Stop reason: SDUPTHER

## 2020-06-04 RX ORDER — MORPHINE SULFATE 2 MG/ML
2 INJECTION, SOLUTION INTRAMUSCULAR; INTRAVENOUS EVERY 5 MIN PRN
Status: DISCONTINUED | OUTPATIENT
Start: 2020-06-04 | End: 2020-06-04 | Stop reason: HOSPADM

## 2020-06-04 RX ORDER — LABETALOL HYDROCHLORIDE 5 MG/ML
INJECTION, SOLUTION INTRAVENOUS PRN
Status: DISCONTINUED | OUTPATIENT
Start: 2020-06-04 | End: 2020-06-04 | Stop reason: SDUPTHER

## 2020-06-04 RX ORDER — ONDANSETRON 2 MG/ML
INJECTION INTRAMUSCULAR; INTRAVENOUS PRN
Status: DISCONTINUED | OUTPATIENT
Start: 2020-06-04 | End: 2020-06-04 | Stop reason: SDUPTHER

## 2020-06-04 RX ORDER — MORPHINE SULFATE 2 MG/ML
1 INJECTION, SOLUTION INTRAMUSCULAR; INTRAVENOUS EVERY 5 MIN PRN
Status: DISCONTINUED | OUTPATIENT
Start: 2020-06-04 | End: 2020-06-04 | Stop reason: HOSPADM

## 2020-06-04 RX ORDER — SODIUM CHLORIDE 9 MG/ML
INJECTION, SOLUTION INTRAVENOUS CONTINUOUS PRN
Status: DISCONTINUED | OUTPATIENT
Start: 2020-06-04 | End: 2020-06-04 | Stop reason: SDUPTHER

## 2020-06-04 RX ORDER — 0.9 % SODIUM CHLORIDE 0.9 %
250 INTRAVENOUS SOLUTION INTRAVENOUS ONCE
Status: DISCONTINUED | OUTPATIENT
Start: 2020-06-04 | End: 2020-06-04

## 2020-06-04 RX ADMIN — IPRATROPIUM BROMIDE AND ALBUTEROL SULFATE 1 AMPULE: 2.5; .5 SOLUTION RESPIRATORY (INHALATION) at 20:36

## 2020-06-04 RX ADMIN — INSULIN LISPRO 7 UNITS: 100 INJECTION, SOLUTION INTRAVENOUS; SUBCUTANEOUS at 21:30

## 2020-06-04 RX ADMIN — HYDROMORPHONE HYDROCHLORIDE 0.25 MG: 1 INJECTION, SOLUTION INTRAMUSCULAR; INTRAVENOUS; SUBCUTANEOUS at 13:00

## 2020-06-04 RX ADMIN — ROCURONIUM BROMIDE 30 MG: 10 INJECTION, SOLUTION INTRAVENOUS at 11:14

## 2020-06-04 RX ADMIN — INSULIN LISPRO 6 UNITS: 100 INJECTION, SOLUTION INTRAVENOUS; SUBCUTANEOUS at 18:21

## 2020-06-04 RX ADMIN — DEXAMETHASONE SODIUM PHOSPHATE 10 MG: 10 INJECTION INTRAMUSCULAR; INTRAVENOUS at 11:42

## 2020-06-04 RX ADMIN — Medication 10 ML: at 09:20

## 2020-06-04 RX ADMIN — CEFAZOLIN 3 G: 10 INJECTION, POWDER, FOR SOLUTION INTRAVENOUS at 11:07

## 2020-06-04 RX ADMIN — HYDROCORTISONE: 1 CREAM TOPICAL at 21:19

## 2020-06-04 RX ADMIN — CEFAZOLIN 3 G: 10 INJECTION, POWDER, FOR SOLUTION INTRAVENOUS at 19:50

## 2020-06-04 RX ADMIN — DIGOXIN 125 MCG: 125 TABLET ORAL at 09:21

## 2020-06-04 RX ADMIN — IPRATROPIUM BROMIDE AND ALBUTEROL SULFATE 1 AMPULE: 2.5; .5 SOLUTION RESPIRATORY (INHALATION) at 16:15

## 2020-06-04 RX ADMIN — OXYCODONE HYDROCHLORIDE AND ACETAMINOPHEN 2 TABLET: 5; 325 TABLET ORAL at 02:53

## 2020-06-04 RX ADMIN — AMIODARONE HYDROCHLORIDE 1 MG/MIN: 50 INJECTION, SOLUTION INTRAVENOUS at 01:00

## 2020-06-04 RX ADMIN — INSULIN GLARGINE 30 UNITS: 100 INJECTION, SOLUTION SUBCUTANEOUS at 21:29

## 2020-06-04 RX ADMIN — FERROUS SULFATE TAB 325 MG (65 MG ELEMENTAL FE) 325 MG: 325 (65 FE) TAB at 09:20

## 2020-06-04 RX ADMIN — PANTOPRAZOLE SODIUM 40 MG: 40 TABLET, DELAYED RELEASE ORAL at 09:21

## 2020-06-04 RX ADMIN — FUROSEMIDE 60 MG: 10 INJECTION, SOLUTION INTRAMUSCULAR; INTRAVENOUS at 09:21

## 2020-06-04 RX ADMIN — SODIUM CHLORIDE: 9 INJECTION, SOLUTION INTRAVENOUS at 10:59

## 2020-06-04 RX ADMIN — METFORMIN HYDROCHLORIDE 500 MG: 500 TABLET ORAL at 15:22

## 2020-06-04 RX ADMIN — MAGNESIUM HYDROXIDE 30 ML: 2400 SUSPENSION ORAL at 09:20

## 2020-06-04 RX ADMIN — Medication 500 MG: at 21:19

## 2020-06-04 RX ADMIN — IPRATROPIUM BROMIDE AND ALBUTEROL SULFATE 1 AMPULE: 2.5; .5 SOLUTION RESPIRATORY (INHALATION) at 09:30

## 2020-06-04 RX ADMIN — OXYCODONE HYDROCHLORIDE AND ACETAMINOPHEN 2 TABLET: 5; 325 TABLET ORAL at 07:01

## 2020-06-04 RX ADMIN — MIDAZOLAM 1 MG: 1 INJECTION INTRAMUSCULAR; INTRAVENOUS at 11:00

## 2020-06-04 RX ADMIN — ESMOLOL HYDROCHLORIDE 50 MG: 10 INJECTION, SOLUTION INTRAVENOUS at 11:23

## 2020-06-04 RX ADMIN — PHENYLEPHRINE HYDROCHLORIDE 200 MCG: 10 INJECTION INTRAVENOUS at 11:23

## 2020-06-04 RX ADMIN — TAMSULOSIN HYDROCHLORIDE 0.4 MG: 0.4 CAPSULE ORAL at 09:21

## 2020-06-04 RX ADMIN — LABETALOL HYDROCHLORIDE 2.5 MG: 5 INJECTION INTRAVENOUS at 11:44

## 2020-06-04 RX ADMIN — METOPROLOL TARTRATE 50 MG: 50 TABLET, FILM COATED ORAL at 09:21

## 2020-06-04 RX ADMIN — OXYCODONE HYDROCHLORIDE AND ACETAMINOPHEN 2 TABLET: 5; 325 TABLET ORAL at 23:56

## 2020-06-04 RX ADMIN — ASPIRIN 81 MG: 81 TABLET, COATED ORAL at 09:21

## 2020-06-04 RX ADMIN — PHENYLEPHRINE HYDROCHLORIDE 100 MCG: 10 INJECTION INTRAVENOUS at 11:28

## 2020-06-04 RX ADMIN — HYDROCORTISONE: 1 CREAM TOPICAL at 09:24

## 2020-06-04 RX ADMIN — Medication 140 MG: at 11:08

## 2020-06-04 RX ADMIN — ONDANSETRON HYDROCHLORIDE 4 MG: 2 INJECTION, SOLUTION INTRAMUSCULAR; INTRAVENOUS at 11:42

## 2020-06-04 RX ADMIN — PHENYLEPHRINE HYDROCHLORIDE 100 MCG: 10 INJECTION INTRAVENOUS at 11:18

## 2020-06-04 RX ADMIN — HYDROMORPHONE HYDROCHLORIDE 0.25 MG: 1 INJECTION, SOLUTION INTRAMUSCULAR; INTRAVENOUS; SUBCUTANEOUS at 12:55

## 2020-06-04 RX ADMIN — HYDROMORPHONE HYDROCHLORIDE 0.5 MG: 1 INJECTION, SOLUTION INTRAMUSCULAR; INTRAVENOUS; SUBCUTANEOUS at 12:42

## 2020-06-04 RX ADMIN — FOLIC ACID 1 MG: 1 TABLET ORAL at 09:20

## 2020-06-04 RX ADMIN — SUGAMMADEX 253 MG: 100 INJECTION, SOLUTION INTRAVENOUS at 11:36

## 2020-06-04 RX ADMIN — AMIODARONE HYDROCHLORIDE 1 MG/MIN: 50 INJECTION, SOLUTION INTRAVENOUS at 15:22

## 2020-06-04 RX ADMIN — MAGNESIUM GLUCONATE 500 MG ORAL TABLET 400 MG: 500 TABLET ORAL at 09:21

## 2020-06-04 RX ADMIN — OXYCODONE HYDROCHLORIDE AND ACETAMINOPHEN 2 TABLET: 5; 325 TABLET ORAL at 15:22

## 2020-06-04 RX ADMIN — FENTANYL CITRATE 100 MCG: 50 INJECTION, SOLUTION INTRAMUSCULAR; INTRAVENOUS at 11:08

## 2020-06-04 RX ADMIN — METOPROLOL TARTRATE 5 MG: 1 INJECTION, SOLUTION INTRAVENOUS at 11:28

## 2020-06-04 RX ADMIN — DIGOXIN 250 MCG: 250 INJECTION, SOLUTION INTRAMUSCULAR; INTRAVENOUS; PARENTERAL at 20:55

## 2020-06-04 RX ADMIN — HYDROMORPHONE HYDROCHLORIDE 0.5 MG: 1 INJECTION, SOLUTION INTRAMUSCULAR; INTRAVENOUS; SUBCUTANEOUS at 12:33

## 2020-06-04 RX ADMIN — FERROUS SULFATE TAB 325 MG (65 MG ELEMENTAL FE) 325 MG: 325 (65 FE) TAB at 15:23

## 2020-06-04 RX ADMIN — OXYCODONE HYDROCHLORIDE AND ACETAMINOPHEN 2 TABLET: 5; 325 TABLET ORAL at 19:49

## 2020-06-04 RX ADMIN — METOPROLOL TARTRATE 50 MG: 50 TABLET, FILM COATED ORAL at 21:21

## 2020-06-04 RX ADMIN — SENNOSIDES AND DOCUSATE SODIUM 1 TABLET: 8.6; 5 TABLET ORAL at 09:11

## 2020-06-04 RX ADMIN — ESMOLOL HYDROCHLORIDE 50 MG: 10 INJECTION, SOLUTION INTRAVENOUS at 11:25

## 2020-06-04 RX ADMIN — Medication 500 MG: at 09:21

## 2020-06-04 RX ADMIN — ATORVASTATIN CALCIUM 40 MG: 40 TABLET, FILM COATED ORAL at 21:19

## 2020-06-04 RX ADMIN — ETOMIDATE 16 MG: 2 INJECTION, SOLUTION INTRAVENOUS at 11:08

## 2020-06-04 RX ADMIN — Medication 10 ML: at 20:56

## 2020-06-04 RX ADMIN — SENNOSIDES AND DOCUSATE SODIUM 1 TABLET: 8.6; 5 TABLET ORAL at 21:19

## 2020-06-04 ASSESSMENT — PAIN DESCRIPTION - LOCATION
LOCATION: STERNUM
LOCATION: STERNUM;INCISION
LOCATION: STERNUM
LOCATION: STERNUM

## 2020-06-04 ASSESSMENT — PAIN SCALES - GENERAL
PAINLEVEL_OUTOF10: 8
PAINLEVEL_OUTOF10: 5
PAINLEVEL_OUTOF10: 0
PAINLEVEL_OUTOF10: 0
PAINLEVEL_OUTOF10: 8
PAINLEVEL_OUTOF10: 8
PAINLEVEL_OUTOF10: 2
PAINLEVEL_OUTOF10: 0
PAINLEVEL_OUTOF10: 9
PAINLEVEL_OUTOF10: 7
PAINLEVEL_OUTOF10: 0
PAINLEVEL_OUTOF10: 6
PAINLEVEL_OUTOF10: 7
PAINLEVEL_OUTOF10: 0
PAINLEVEL_OUTOF10: 7
PAINLEVEL_OUTOF10: 9

## 2020-06-04 ASSESSMENT — PULMONARY FUNCTION TESTS
PIF_VALUE: 38
PIF_VALUE: 31
PIF_VALUE: 19
PIF_VALUE: 34
PIF_VALUE: 32
PIF_VALUE: 31
PIF_VALUE: 34
PIF_VALUE: 21
PIF_VALUE: 33
PIF_VALUE: 31
PIF_VALUE: 0
PIF_VALUE: 1
PIF_VALUE: 22
PIF_VALUE: 0
PIF_VALUE: 29
PIF_VALUE: 4
PIF_VALUE: 31
PIF_VALUE: 31
PIF_VALUE: 35
PIF_VALUE: 35
PIF_VALUE: 30
PIF_VALUE: 31
PIF_VALUE: 32
PIF_VALUE: 4
PIF_VALUE: 33
PIF_VALUE: 32
PIF_VALUE: 0
PIF_VALUE: 0
PIF_VALUE: 31
PIF_VALUE: 0
PIF_VALUE: 41
PIF_VALUE: 31
PIF_VALUE: 32
PIF_VALUE: 19
PIF_VALUE: 1
PIF_VALUE: 30
PIF_VALUE: 31
PIF_VALUE: 42
PIF_VALUE: 31
PIF_VALUE: 30
PIF_VALUE: 5
PIF_VALUE: 1
PIF_VALUE: 32
PIF_VALUE: 30
PIF_VALUE: 32
PIF_VALUE: 2
PIF_VALUE: 0
PIF_VALUE: 3
PIF_VALUE: 33
PIF_VALUE: 38

## 2020-06-04 ASSESSMENT — PAIN DESCRIPTION - PAIN TYPE
TYPE: SURGICAL PAIN

## 2020-06-04 ASSESSMENT — PAIN DESCRIPTION - DESCRIPTORS
DESCRIPTORS: SHARP;ACHING;DISCOMFORT
DESCRIPTORS: STABBING;DISCOMFORT
DESCRIPTORS: STABBING;DISCOMFORT
DESCRIPTORS: ACHING;DULL

## 2020-06-04 ASSESSMENT — PAIN DESCRIPTION - ORIENTATION
ORIENTATION: LOWER
ORIENTATION: LOWER
ORIENTATION: MID
ORIENTATION: LOWER

## 2020-06-04 ASSESSMENT — PAIN DESCRIPTION - PROGRESSION: CLINICAL_PROGRESSION: NOT CHANGED

## 2020-06-04 ASSESSMENT — PAIN DESCRIPTION - ONSET: ONSET: ON-GOING

## 2020-06-04 ASSESSMENT — PAIN DESCRIPTION - FREQUENCY: FREQUENCY: INTERMITTENT

## 2020-06-04 ASSESSMENT — LIFESTYLE VARIABLES: SMOKING_STATUS: 1

## 2020-06-04 ASSESSMENT — PAIN - FUNCTIONAL ASSESSMENT: PAIN_FUNCTIONAL_ASSESSMENT: ACTIVITIES ARE NOT PREVENTED

## 2020-06-04 NOTE — PROGRESS NOTES
Patient sent down with permit unsigned. Awaiting physician to discuss procedure. Call placed to pharmacy regarding 3g Ancef ordered on call to OR and has not been received by floor to send with patient. Per pharmacist, pharmacy will send Ancef to OR.

## 2020-06-04 NOTE — PROGRESS NOTES
Infusions:   amiodarone 450mg/250ml D5W infusion 1 mg/min (06/04/20 1420)    dextrose       PRN Meds:.diphenhydrAMINE, oxyCODONE-acetaminophen **OR** oxyCODONE-acetaminophen, magnesium hydroxide, acetaminophen, potassium chloride, ondansetron, bisacodyl, bisacodyl, benzocaine-menthol, sodium chloride flush, glucose, dextrose, glucagon (rDNA), dextrose      DATA:    CBC:   Lab Results   Component Value Date    WBC 12.6 06/04/2020    RBC 3.19 06/04/2020    HGB 9.6 06/04/2020    HCT 29.4 06/04/2020    MCV 92.2 06/04/2020    MCH 30.1 06/04/2020    MCHC 32.7 06/04/2020    RDW 13.5 06/04/2020     06/04/2020    MPV 9.6 06/04/2020     CMP:    Lab Results   Component Value Date     06/04/2020    K 4.3 06/04/2020    K 4.8 05/16/2020    CL 94 06/04/2020    CO2 27 06/04/2020    BUN 28 06/04/2020    CREATININE 1.0 06/04/2020    GFRAA >60 06/04/2020    LABGLOM >60 06/04/2020    GLUCOSE 139 06/04/2020    PROT 7.3 07/08/2018    LABALBU 3.9 07/08/2018    CALCIUM 9.1 06/04/2020    BILITOT 0.5 07/08/2018    ALKPHOS 52 07/08/2018    AST 27 07/08/2018    ALT 20 07/08/2018     Magnesium:    Lab Results   Component Value Date    MG 1.9 06/04/2020     Phosphorus:    Lab Results   Component Value Date    PHOS 2.9 07/18/2016     Urine sodium: <20------------------- <20  Urine potassium: 16.7-------------- 15.2  Urine chloride: <20------------------ <20  Urine modality: 515----------------- 315    Radiology Review:        Echocardiogram May 20, 2020:  Ejection fraction about 60%. Chest x-ray June 1, 2020   Worsening CHF with developing edema. Superimposed pneumonia is not   excluded.                   BRIEF SUMMARY OF INITIAL CONSULT:    Briefly Mr. Vickie Galeazzi is a 49-year-old man with history of HTN, type II DM, TIA, PAF, who was recently admitted to [facility] with chest pain for which he underwent cardiac utilization was found to have multivessel disease for which he had CABG x3 on May 28, 2020.   On admission his sodium level was 137 mEq/L but since then it has progressively decreased down to 129  milliequivalents/L reason for this consultation. IMPRESSION/RECOMMENDATIONS:      1. Hypotonic hyponatremia, hemodynamically mediated due to congestive heart failure. Urine sodium <20 with relatively high urine osmolality 515. Resolved in response to diuresis and fluid restriction. 2. Large pericardial effusion status post CABG, status post pericardial window  3. HFpEF 60%, proBNP improved  4. CAD status post CABG x3 May 28, 2020  5. HTN, on metoprolol  6. PAF, on metoprolol  7.  Normocytic anemia, blood losses due to surgery    Plan:    · Continue fluid restriction 1 L daily   · Hold lasix today   · Continue to monitor sodium level

## 2020-06-04 NOTE — PROGRESS NOTES
No evidence of airspace consolidation or pneumothorax. XR CHEST PORTABLE   Final Result   No significant change               XR CHEST PORTABLE   Final Result      1. New postoperative changes from CABG. 2. Multiple support tubes. 3. New mild mediastinal widening and moderate central congestive   changes. 4. No obvious effusion. Lab Review   Lab Results   Component Value Date     06/04/2020    K 4.3 06/04/2020    K 4.8 05/16/2020    CL 94 06/04/2020    CO2 27 06/04/2020    BUN 28 06/04/2020    CREATININE 1.0 06/04/2020    GLUCOSE 139 06/04/2020    CALCIUM 9.1 06/04/2020     Lab Results   Component Value Date    WBC 12.6 06/04/2020    HGB 9.6 06/04/2020    HCT 29.8 06/04/2020    MCV 92.5 06/04/2020     06/04/2020     I have personally reviewed the laboratory, cardiac diagnostic and radiographic testing as outlined above:    Telemetry-A Flutter  with heart rate in the 140s with intermittent variable rate. Vitals-stable with a blood pressure of 127/76 with heart rate of 94>>>115/67., >> heart rate 137 pressure 132/74>>110/63, >>>103/68,     Labs were reviewed: Sodium 130>>129>> 128>>135, K 5.1>> 5.1, BUN/creat 24/1.1>> 23/0.8>>27/1.0>>>28/1.0, H&H 9.3/28.6> 9.2/28.2>9.6/29.8, rest of the chemistries normal, WBC 13.4, H&H 9.6/29. 7. proBNP 2269. Limited echo- 6/3/2020:   Summary   Normal left ventricle size and systolic function. Ejection fraction is visually estimated at >75%. Overall ejection fraction increased (hyperdynamic) . Normal left ventricle wall thickness. There is a large localized near left ventricle pericardial effusion noted. Can not rule out localized tamponade. Results discussed with Dr. Shelly Day. Limited echo to assess LV function. CXR- CHF with worsening edema.      Assessment:   1.  CAD: S/p CABG on 5/28/2020 with a LIMA to LAD, SVG to diagonal, SVG to posterior lateral branch, doing fine, will

## 2020-06-04 NOTE — ANESTHESIA PRE PROCEDURE
Department of Anesthesiology  Preprocedure Note       Name:  Jennie Kenyon   Age:  54 y.o.  :  1965                                          MRN:  [id number]         Date:  2020      Surgeon: Sarai Ellis):  Chris Sullivan DO    Procedure: Procedure(s):  PERICARDIAL WINDOW CREATION    Medications prior to admission:   Prior to Admission medications    Medication Sig Start Date End Date Taking? Authorizing Provider   Insulin Glargine, 1 Unit Dial, (TOUJEO SOLOSTAR) 300 UNIT/ML SOPN Inject 30 Units into the skin nightly TAKE HALF DOSE NIGHT BEFORE SURGERY   Yes Historical Provider, MD   isosorbide mononitrate (IMDUR) 30 MG extended release tablet Take 1 tablet by mouth daily 20  Yes Amada Mace MD   nitroGLYCERIN (NITROSTAT) 0.4 MG SL tablet up to max of 3 total doses.  If no relief after 1 dose, call 911. 20  Yes Amada Mace MD   ranolazine (RANEXA) 500 MG extended release tablet Take 1 tablet by mouth 2 times daily 20  Yes Amada Mace MD   dilTIAZem (CARDIZEM CD) 180 MG extended release capsule Take 1 capsule by mouth daily 20  Yes Antwan Verde MD   sotalol (BETAPACE) 120 MG tablet Take 1 tablet by mouth every 12 hours 18  Yes Antwan Verde MD   metFORMIN (GLUCOPHAGE) 500 MG tablet Take 1,000 mg by mouth 2 times daily (with meals)    Yes Historical Provider, MD   albuterol sulfate  (90 BASE) MCG/ACT inhaler Inhale 2 puffs into the lungs every 6 hours as needed for Wheezing or Shortness of Breath   Yes Historical Provider, MD   pantoprazole (PROTONIX) 40 MG tablet Take 1 tablet by mouth every morning (before breakfast) 16  Yes Zaina Pena MD   lisinopril-hydrochlorothiazide (PRINZIDE;ZESTORETIC) 20-12.5 MG per tablet Take 1 tablet by mouth daily   Yes Historical Provider, MD   vitamin B-12 (CYANOCOBALAMIN) 1000 MCG tablet Take 1,000 mcg by mouth daily   Yes Historical Provider, MD   Ascorbic Acid (VITAMIN C) 250 MG tablet Take 06/04/20 0921    [Held by provider] amiodarone (CORDARONE) tablet 400 mg  400 mg Oral TID ANGIE Cruz CNP   400 mg at 06/03/20 1330    magnesium hydroxide (MILK OF MAGNESIA) 400 MG/5ML suspension 30 mL  30 mL Oral Daily PRN ANGIE Cruz CNP   30 mL at 06/04/20 0920    [Held by provider] apixaban (ELIQUIS) tablet 5 mg  5 mg Oral BID ANGIE Cruz CNP   5 mg at 06/03/20 0829    acetaminophen (TYLENOL) tablet 650 mg  650 mg Oral Q4H PRN ANGIE Ornelas CNP        aspirin EC tablet 81 mg  81 mg Oral Daily ANGIE Ornelas CNP   81 mg at 06/04/20 5847    ferrous sulfate (IRON 325) tablet 325 mg  325 mg Oral BID WC ANGIE Ornelas CNP   325 mg at 06/04/20 0920    vitamin C (ASCORBIC ACID) tablet 500 mg  500 mg Oral BID ANGIE Ornelas CNP   500 mg at 63/25/77 5823    folic acid (FOLVITE) tablet 1 mg  1 mg Oral Daily ANGIE Ornelas CNP   1 mg at 06/04/20 0920    potassium chloride (KLOR-CON M) extended release tablet 20 mEq  20 mEq Oral PRN ANGIE Ornelas CNP        ondansetron [facility]) injection 4 mg  4 mg Intravenous Q8H PRN ANGIE Ornelas CNP        pantoprazole (PROTONIX) tablet 40 mg  40 mg Oral Daily ANGIE Ornelas CNP   40 mg at 06/04/20 5051    sennosides-docusate sodium (SENOKOT-S) 8.6-50 MG tablet 1 tablet  1 tablet Oral BID ANGIE Ornelas CNP   1 tablet at 06/04/20 0911    bisacodyl (DULCOLAX) EC tablet 5 mg  5 mg Oral Daily PRN ANGIE Ornelas CNP   5 mg at 05/31/20 2025    insulin glargine (LANTUS) injection vial 30 Units  30 Units Subcutaneous Nightly ANGIE Cruz CNP   30 Units at 06/03/20 2019    insulin lispro (HUMALOG) injection vial 0-18 Units  0-18 Units Subcutaneous TID WC ANGIE Cruz CNP   3 Units at 06/03/20 1633    insulin lispro (HUMALOG) injection vial 0-9 Units  0-9 Units Subcutaneous Nightly ANGIE Cruz CNP   2 Units at 06/03/20 2019 Height:                                                  BP Readings from Last 3 Encounters:   06/04/20 115/87   05/28/20 113/77   05/27/20 (!) 116/59       NPO Status: Time of last liquid consumption: 2304                        Time of last solid consumption: 2304                        Date of last liquid consumption: 06/03/20                        Date of last solid food consumption: 06/03/20    BMI:   Wt Readings from Last 3 Encounters:   06/04/20 278 lb 6.4 oz (126.3 kg)   05/26/20 270 lb (122.5 kg)   05/20/20 270 lb (122.5 kg)     Body mass index is 37.76 kg/m². CBC:   Lab Results   Component Value Date    WBC 12.6 06/04/2020    RBC 3.22 06/04/2020    HGB 9.6 06/04/2020    HCT 29.8 06/04/2020    MCV 92.5 06/04/2020    RDW 13.6 06/04/2020     06/04/2020       CMP:   Lab Results   Component Value Date     06/04/2020    K 4.3 06/04/2020    K 4.8 05/16/2020    CL 94 06/04/2020    CO2 27 06/04/2020    BUN 28 06/04/2020    CREATININE 1.0 06/04/2020    GFRAA >60 06/04/2020    LABGLOM >60 06/04/2020    GLUCOSE 139 06/04/2020    PROT 7.3 07/08/2018    CALCIUM 9.1 06/04/2020    BILITOT 0.5 07/08/2018    ALKPHOS 52 07/08/2018    AST 27 07/08/2018    ALT 20 07/08/2018       POC Tests: No results for input(s): POCGLU, POCNA, POCK, POCCL, POCBUN, POCHEMO, POCHCT in the last 72 hours.     Coags:   Lab Results   Component Value Date    PROTIME 13.3 05/28/2020    INR 1.2 05/28/2020    APTT 34.4 05/28/2020       HCG (If Applicable): No results found for: PREGTESTUR, PREGSERUM, HCG, HCGQUANT     ABGs:   Lab Results   Component Value Date    PO2ART 127.6 05/28/2020    IRC7FUS 37.7 05/28/2020    SEX5ZRG 22.0 05/28/2020        Type & Screen (If Applicable):  No results found for: LABABO, [address]    Drug/Infectious Status (If Applicable):  No results found for: HIV, HEPCAB    COVID-19 Screening (If Applicable):   Lab Results   Component Value Date    COVID19 Not Detected 05/23/2020     EKG 5/28/20  Narrative &

## 2020-06-04 NOTE — ANESTHESIA PRE PROCEDURE
GI/Hepatic/Renal:   (+) hepatitis: B, liver disease:, renal disease: ARF,          ROS comment: Diverticulitis. Endo/Other:    (+) DiabetesType II DM, , .                 Abdominal:   (+) obese,         Vascular:                                          Anesthesia Plan      general     ASA 4       Induction: intravenous. BIS and arterial line  MIPS: Postoperative opioids intended, Prophylactic antiemetics administered and Postoperative trial extubation. Anesthetic plan and risks discussed with patient. Use of blood products discussed with patient whom consented to blood products. Plan discussed with CRNA and attending.                 Nia Lew DO   6/4/2020

## 2020-06-04 NOTE — ANESTHESIA PROCEDURE NOTES
Arterial Line:    An arterial line was placed using surface landmarks, in the pre-op for the following indication(s): continuous blood pressure monitoring and blood sampling needed. A 20 gauge (size), 1 and 3/4 inch (length), Arrow (type) catheter was placed, Seldinger technique used, into the left radial artery, secured by Tegaderm and tape. Anesthesia type: Local  Local infiltration: Injection    Events:  patient tolerated procedure well with no complications. 6/4/2020 10:40 AM6/4/2020 10:44 AM  Anesthesiologist: Keren Samayoa DO  Resident/CRNA: Arleen Romberg, APRN - CRNA  Other anesthesia staff: Adamaris Garcia RN  Performed:  Other anesthesia staff   Preanesthetic Checklist  Completed: patient identified, site marked, surgical consent, pre-op evaluation, timeout performed, IV checked, risks and benefits discussed, monitors and equipment checked, anesthesia consent given, oxygen available and patient being monitored
not present. Regurgitation none. Aorta    Ascending Aorta:  Size normal.  Dissection not present. Aortic Arch:  Size normal.  Dissection not present. Descending Aorta:  Size normal.  Dissection not present. Atria    Right Atrium:  Size normal.  Spontaneous echo contrast not present. Thrombus not present. Tumor not present. Device not present. Left Atrium:  Size normal.  Spontaneous echo contrast not present. Thrombus not present. Tumor not present. Device not present. Left atrial appendage normal.      Septa    Atrial Septum:  Intra-atrial septal morphology normal.      Ventricular Septum:  Intra-ventricular septum morphology normal.          Other Findings  Pericardium:  pericardial effusion  Pleural Effusion:  none  Pulmonary Arteries:  normal  Pulmonary Venous Flow:  normal    Anesthesia Information  Performed Personally  Anesthesiologist:  Melissa Garrison DO      Echocardiogram Comments:       S/p Pericardial Window. Pt in A-fib with RVR so EF unable to be accurately assessed. Pericardial effusion extending from inferior to anterior along the lateral aspect of the LV. No sign of tamponade. Drained with resolution of effusion.   All findings d/w surgeon

## 2020-06-04 NOTE — PROGRESS NOTES
lantus/metformin at home  --continue lantus at home dose, continue high scale SSI, continue metformin at lower dose  --monitor        7. Constipation  --resolved  --Continue MOM and senna-s.   --Encouraged continued increase in oral intake and activity.          8. Post operative deconditioning  --Increase activity as tolerated  --PT/OT  --ambulating 400ft  -- lives alone so will likely need rehab        9 . DVT prophylaxis:  --continue bilateral knee high HOLLY hose  --continue PCDs  --continue progressive ambulation  --continue eliquis     10- uncontrolled pain-   - Norco changed to Percocet      11.  Volume overload-   - Lasix 60mg bid per renal      Dispo:declined by ARU- looking into [facility]- DC  Now with pericardial window today, likely DC over weekend         This patient's case and care plan was discussed with the attending surgeon

## 2020-06-05 LAB
ALBUMIN SERPL-MCNC: 3.6 G/DL (ref 3.5–5.2)
ALP BLD-CCNC: 63 U/L (ref 40–129)
ALT SERPL-CCNC: 6 U/L (ref 0–40)
ANION GAP SERPL CALCULATED.3IONS-SCNC: 12 MMOL/L (ref 7–16)
ANION GAP SERPL CALCULATED.3IONS-SCNC: 18 MMOL/L (ref 7–16)
AST SERPL-CCNC: 16 U/L (ref 0–39)
BETA-HYDROXYBUTYRATE: 0.14 MMOL/L (ref 0.02–0.27)
BILIRUB SERPL-MCNC: 0.3 MG/DL (ref 0–1.2)
BILIRUBIN DIRECT: <0.2 MG/DL (ref 0–0.3)
BILIRUBIN, INDIRECT: NORMAL MG/DL (ref 0–1)
BUN BLDV-MCNC: 28 MG/DL (ref 6–20)
BUN BLDV-MCNC: 29 MG/DL (ref 6–20)
CALCIUM SERPL-MCNC: 9.3 MG/DL (ref 8.6–10.2)
CALCIUM SERPL-MCNC: 9.3 MG/DL (ref 8.6–10.2)
CHLORIDE BLD-SCNC: 94 MMOL/L (ref 98–107)
CHLORIDE BLD-SCNC: 94 MMOL/L (ref 98–107)
CO2: 23 MMOL/L (ref 22–29)
CO2: 28 MMOL/L (ref 22–29)
CREAT SERPL-MCNC: 0.9 MG/DL (ref 0.7–1.2)
CREAT SERPL-MCNC: 0.9 MG/DL (ref 0.7–1.2)
DIGOXIN LEVEL: 1.4 NG/ML (ref 0.8–2)
EKG ATRIAL RATE: 142 BPM
EKG ATRIAL RATE: 274 BPM
EKG P AXIS: 85 DEGREES
EKG P-R INTERVAL: 162 MS
EKG Q-T INTERVAL: 292 MS
EKG Q-T INTERVAL: 298 MS
EKG QRS DURATION: 104 MS
EKG QRS DURATION: 86 MS
EKG QTC CALCULATION (BAZETT): 440 MS
EKG QTC CALCULATION (BAZETT): 458 MS
EKG R AXIS: -18 DEGREES
EKG R AXIS: -34 DEGREES
EKG T AXIS: -156 DEGREES
EKG T AXIS: 134 DEGREES
EKG VENTRICULAR RATE: 137 BPM
EKG VENTRICULAR RATE: 142 BPM
GFR AFRICAN AMERICAN: >60
GFR AFRICAN AMERICAN: >60
GFR NON-AFRICAN AMERICAN: >60 ML/MIN/1.73
GFR NON-AFRICAN AMERICAN: >60 ML/MIN/1.73
GLUCOSE BLD-MCNC: 180 MG/DL (ref 74–99)
GLUCOSE BLD-MCNC: 225 MG/DL (ref 74–99)
HCT VFR BLD CALC: 31.2 % (ref 37–54)
HEMOGLOBIN: 10.1 G/DL (ref 12.5–16.5)
MAGNESIUM: 2.1 MG/DL (ref 1.6–2.6)
MCH RBC QN AUTO: 30 PG (ref 26–35)
MCHC RBC AUTO-ENTMCNC: 32.4 % (ref 32–34.5)
MCV RBC AUTO: 92.6 FL (ref 80–99.9)
METER GLUCOSE: 156 MG/DL (ref 74–99)
METER GLUCOSE: 198 MG/DL (ref 74–99)
METER GLUCOSE: 230 MG/DL (ref 74–99)
METER GLUCOSE: 338 MG/DL (ref 74–99)
PDW BLD-RTO: 13.6 FL (ref 11.5–15)
PLATELET # BLD: 382 E9/L (ref 130–450)
PMV BLD AUTO: 10.6 FL (ref 7–12)
POTASSIUM SERPL-SCNC: 4.9 MMOL/L (ref 3.5–5)
POTASSIUM SERPL-SCNC: 5.3 MMOL/L (ref 3.5–5)
RBC # BLD: 3.37 E12/L (ref 3.8–5.8)
SODIUM BLD-SCNC: 134 MMOL/L (ref 132–146)
SODIUM BLD-SCNC: 135 MMOL/L (ref 132–146)
TOTAL PROTEIN: 7.2 G/DL (ref 6.4–8.3)
WBC # BLD: 15.5 E9/L (ref 4.5–11.5)

## 2020-06-05 PROCEDURE — 82010 KETONE BODYS QUAN: CPT

## 2020-06-05 PROCEDURE — 2580000003 HC RX 258: Performed by: THORACIC SURGERY (CARDIOTHORACIC VASCULAR SURGERY)

## 2020-06-05 PROCEDURE — 80076 HEPATIC FUNCTION PANEL: CPT

## 2020-06-05 PROCEDURE — 94640 AIRWAY INHALATION TREATMENT: CPT

## 2020-06-05 PROCEDURE — 6370000000 HC RX 637 (ALT 250 FOR IP): Performed by: NURSE PRACTITIONER

## 2020-06-05 PROCEDURE — 83735 ASSAY OF MAGNESIUM: CPT

## 2020-06-05 PROCEDURE — 6370000000 HC RX 637 (ALT 250 FOR IP): Performed by: THORACIC SURGERY (CARDIOTHORACIC VASCULAR SURGERY)

## 2020-06-05 PROCEDURE — 2140000000 HC CCU INTERMEDIATE R&B

## 2020-06-05 PROCEDURE — 82962 GLUCOSE BLOOD TEST: CPT

## 2020-06-05 PROCEDURE — 99255 IP/OBS CONSLTJ NEW/EST HI 80: CPT | Performed by: INTERNAL MEDICINE

## 2020-06-05 PROCEDURE — 94660 CPAP INITIATION&MGMT: CPT

## 2020-06-05 PROCEDURE — 6370000000 HC RX 637 (ALT 250 FOR IP): Performed by: INTERNAL MEDICINE

## 2020-06-05 PROCEDURE — 36415 COLL VENOUS BLD VENIPUNCTURE: CPT

## 2020-06-05 PROCEDURE — 85027 COMPLETE CBC AUTOMATED: CPT

## 2020-06-05 PROCEDURE — 80048 BASIC METABOLIC PNL TOTAL CA: CPT

## 2020-06-05 PROCEDURE — 80162 ASSAY OF DIGOXIN TOTAL: CPT

## 2020-06-05 PROCEDURE — 93798 PHYS/QHP OP CAR RHAB W/ECG: CPT

## 2020-06-05 PROCEDURE — 93010 ELECTROCARDIOGRAM REPORT: CPT | Performed by: INTERNAL MEDICINE

## 2020-06-05 PROCEDURE — 99232 SBSQ HOSP IP/OBS MODERATE 35: CPT | Performed by: INTERNAL MEDICINE

## 2020-06-05 PROCEDURE — 6360000002 HC RX W HCPCS: Performed by: THORACIC SURGERY (CARDIOTHORACIC VASCULAR SURGERY)

## 2020-06-05 PROCEDURE — 94669 MECHANICAL CHEST WALL OSCILL: CPT

## 2020-06-05 RX ORDER — BUMETANIDE 1 MG/1
1 TABLET ORAL DAILY
Status: DISCONTINUED | OUTPATIENT
Start: 2020-06-05 | End: 2020-06-08 | Stop reason: HOSPADM

## 2020-06-05 RX ORDER — DILTIAZEM HYDROCHLORIDE 120 MG/1
120 CAPSULE, COATED, EXTENDED RELEASE ORAL DAILY
Status: DISCONTINUED | OUTPATIENT
Start: 2020-06-05 | End: 2020-06-08 | Stop reason: HOSPADM

## 2020-06-05 RX ORDER — IPRATROPIUM BROMIDE AND ALBUTEROL SULFATE 2.5; .5 MG/3ML; MG/3ML
1 SOLUTION RESPIRATORY (INHALATION) EVERY 4 HOURS PRN
Status: DISCONTINUED | OUTPATIENT
Start: 2020-06-05 | End: 2020-06-08 | Stop reason: HOSPADM

## 2020-06-05 RX ORDER — INSULIN GLARGINE 100 [IU]/ML
35 INJECTION, SOLUTION SUBCUTANEOUS NIGHTLY
Status: DISCONTINUED | OUTPATIENT
Start: 2020-06-05 | End: 2020-06-08 | Stop reason: HOSPADM

## 2020-06-05 RX ADMIN — INSULIN LISPRO 2 UNITS: 100 INJECTION, SOLUTION INTRAVENOUS; SUBCUTANEOUS at 20:17

## 2020-06-05 RX ADMIN — METOPROLOL TARTRATE 50 MG: 50 TABLET, FILM COATED ORAL at 08:02

## 2020-06-05 RX ADMIN — SENNOSIDES AND DOCUSATE SODIUM 1 TABLET: 8.6; 5 TABLET ORAL at 08:01

## 2020-06-05 RX ADMIN — Medication 500 MG: at 20:10

## 2020-06-05 RX ADMIN — METOPROLOL TARTRATE 50 MG: 50 TABLET, FILM COATED ORAL at 20:11

## 2020-06-05 RX ADMIN — OXYCODONE HYDROCHLORIDE AND ACETAMINOPHEN 2 TABLET: 5; 325 TABLET ORAL at 16:09

## 2020-06-05 RX ADMIN — HYDROCORTISONE: 1 CREAM TOPICAL at 08:02

## 2020-06-05 RX ADMIN — DILTIAZEM HYDROCHLORIDE 120 MG: 120 CAPSULE, COATED, EXTENDED RELEASE ORAL at 12:08

## 2020-06-05 RX ADMIN — SENNOSIDES AND DOCUSATE SODIUM 1 TABLET: 8.6; 5 TABLET ORAL at 20:10

## 2020-06-05 RX ADMIN — OXYCODONE HYDROCHLORIDE AND ACETAMINOPHEN 2 TABLET: 5; 325 TABLET ORAL at 20:09

## 2020-06-05 RX ADMIN — METFORMIN HYDROCHLORIDE 1000 MG: 1000 TABLET ORAL at 16:09

## 2020-06-05 RX ADMIN — CEFAZOLIN 3 G: 10 INJECTION, POWDER, FOR SOLUTION INTRAVENOUS at 10:49

## 2020-06-05 RX ADMIN — INSULIN LISPRO 6 UNITS: 100 INJECTION, SOLUTION INTRAVENOUS; SUBCUTANEOUS at 08:03

## 2020-06-05 RX ADMIN — AMIODARONE HYDROCHLORIDE 400 MG: 200 TABLET ORAL at 14:51

## 2020-06-05 RX ADMIN — MAGNESIUM HYDROXIDE 30 ML: 2400 SUSPENSION ORAL at 08:01

## 2020-06-05 RX ADMIN — INSULIN GLARGINE 35 UNITS: 100 INJECTION, SOLUTION SUBCUTANEOUS at 20:17

## 2020-06-05 RX ADMIN — METFORMIN HYDROCHLORIDE 500 MG: 500 TABLET ORAL at 08:01

## 2020-06-05 RX ADMIN — IPRATROPIUM BROMIDE AND ALBUTEROL SULFATE 1 AMPULE: 2.5; .5 SOLUTION RESPIRATORY (INHALATION) at 08:28

## 2020-06-05 RX ADMIN — OXYCODONE HYDROCHLORIDE AND ACETAMINOPHEN 2 TABLET: 5; 325 TABLET ORAL at 03:59

## 2020-06-05 RX ADMIN — Medication 10 ML: at 02:58

## 2020-06-05 RX ADMIN — BUMETANIDE 1 MG: 1 TABLET ORAL at 10:49

## 2020-06-05 RX ADMIN — OXYCODONE HYDROCHLORIDE AND ACETAMINOPHEN 2 TABLET: 5; 325 TABLET ORAL at 12:08

## 2020-06-05 RX ADMIN — MAGNESIUM GLUCONATE 500 MG ORAL TABLET 400 MG: 500 TABLET ORAL at 08:01

## 2020-06-05 RX ADMIN — FOLIC ACID 1 MG: 1 TABLET ORAL at 08:02

## 2020-06-05 RX ADMIN — Medication 500 MG: at 08:01

## 2020-06-05 RX ADMIN — INSULIN LISPRO 12 UNITS: 100 INJECTION, SOLUTION INTRAVENOUS; SUBCUTANEOUS at 12:08

## 2020-06-05 RX ADMIN — ASPIRIN 81 MG: 81 TABLET, COATED ORAL at 08:01

## 2020-06-05 RX ADMIN — FERROUS SULFATE TAB 325 MG (65 MG ELEMENTAL FE) 325 MG: 325 (65 FE) TAB at 08:02

## 2020-06-05 RX ADMIN — Medication 10 ML: at 20:17

## 2020-06-05 RX ADMIN — OXYCODONE HYDROCHLORIDE AND ACETAMINOPHEN 2 TABLET: 5; 325 TABLET ORAL at 08:01

## 2020-06-05 RX ADMIN — FERROUS SULFATE TAB 325 MG (65 MG ELEMENTAL FE) 325 MG: 325 (65 FE) TAB at 16:09

## 2020-06-05 RX ADMIN — TAMSULOSIN HYDROCHLORIDE 0.4 MG: 0.4 CAPSULE ORAL at 08:02

## 2020-06-05 RX ADMIN — DIGOXIN 125 MCG: 125 TABLET ORAL at 08:01

## 2020-06-05 RX ADMIN — HYDROCORTISONE: 1 CREAM TOPICAL at 20:16

## 2020-06-05 RX ADMIN — INSULIN LISPRO 3 UNITS: 100 INJECTION, SOLUTION INTRAVENOUS; SUBCUTANEOUS at 16:10

## 2020-06-05 RX ADMIN — CEFAZOLIN 3 G: 10 INJECTION, POWDER, FOR SOLUTION INTRAVENOUS at 02:58

## 2020-06-05 RX ADMIN — AMIODARONE HYDROCHLORIDE 400 MG: 200 TABLET ORAL at 20:10

## 2020-06-05 RX ADMIN — PANTOPRAZOLE SODIUM 40 MG: 40 TABLET, DELAYED RELEASE ORAL at 08:02

## 2020-06-05 RX ADMIN — ATORVASTATIN CALCIUM 40 MG: 40 TABLET, FILM COATED ORAL at 20:10

## 2020-06-05 ASSESSMENT — PAIN DESCRIPTION - LOCATION
LOCATION: STERNUM
LOCATION: STERNUM

## 2020-06-05 ASSESSMENT — PAIN DESCRIPTION - PAIN TYPE
TYPE: SURGICAL PAIN
TYPE: SURGICAL PAIN

## 2020-06-05 ASSESSMENT — PAIN SCALES - GENERAL
PAINLEVEL_OUTOF10: 8
PAINLEVEL_OUTOF10: 0
PAINLEVEL_OUTOF10: 8
PAINLEVEL_OUTOF10: 7
PAINLEVEL_OUTOF10: 0
PAINLEVEL_OUTOF10: 7
PAINLEVEL_OUTOF10: 7
PAINLEVEL_OUTOF10: 0
PAINLEVEL_OUTOF10: 7
PAINLEVEL_OUTOF10: 7

## 2020-06-05 ASSESSMENT — PAIN - FUNCTIONAL ASSESSMENT: PAIN_FUNCTIONAL_ASSESSMENT: ACTIVITIES ARE NOT PREVENTED

## 2020-06-05 ASSESSMENT — PAIN DESCRIPTION - DESCRIPTORS
DESCRIPTORS: ACHING;CONSTANT;DISCOMFORT
DESCRIPTORS: ACHING;DISCOMFORT;TENDER

## 2020-06-05 ASSESSMENT — PAIN DESCRIPTION - FREQUENCY: FREQUENCY: CONTINUOUS

## 2020-06-05 ASSESSMENT — PAIN DESCRIPTION - PROGRESSION: CLINICAL_PROGRESSION: GRADUALLY WORSENING

## 2020-06-05 ASSESSMENT — PAIN DESCRIPTION - ORIENTATION: ORIENTATION: MID

## 2020-06-05 ASSESSMENT — PAIN DESCRIPTION - ONSET: ONSET: ON-GOING

## 2020-06-05 NOTE — ADT AUTH CERT
Normal left ventricle wall thickness.    There is a large localized near left ventricle pericardial effusion noted.    Can not rule out localized tamponade.    Results discussed with Dr. Shelly Day.    Limited echo to assess LV function.       ** ** CARDIOTHORACIC SURGERY ** **    A/P: POD 6       1. CAD   --Stable s/p CABG x 3, maze with PVI, LIS ligation, rigid sternal fixation with KLS plating system on 5/28/2020   --Scr stable   --BB/statin   --reinforce sternal precautions           2. Acute blood loss anemia secondary to open heart surgery   --stable, hgb 9.9            3. Hx afib   --s/p maze/lis ligation   --Afib rvr - oral amiodaronecontinue eliquis,    --currently afib RVR with    --on xarelto at home   --will switch to eliquis short term post operatively with resumption of xarelto at later time--eliquis initiated 5/31   --continue BB/oral amio- inc metop to 50 BID, ? Digoxin or Cardizem for rate control - will defer to cardiology            4. Prolonged postoperative respiratory insufficiency   --wean oxygen to keep SpO2 greater than or equal to 92%   --continue duonebs with ezpap   --encourage C&DB, SMI   --currently on RA   --On lasix bid    -- 2 view cxr- bilateral congestion   --appreciate pulmonary's input           5. Hyponatremia   --Na 135 this AM--renal on board   -- fluid restriction ordered- will defer to renal        6. Hx DM2   --on lantus/metformin at home   --continue lantus at home dose, continue high scale SSI, continue metformin at lower dose   --monitor           7.  Constipation   --resolved   --Continue MOM and senna-s.    --Encouraged continued increase in oral intake and activity.             8. Post operative deconditioning   --Increase activity as tolerated   --PT/OT   --ambulating 400ft   -- lives alone so will likely need rehab           9 . DVT prophylaxis:   --continue bilateral knee high HOLLY hose   --continue PCDs   --continue progressive ambulation   --continue eliquis home   --will switch to eliquis short term post operatively with resumption of xarelto at later time--eliquis initiated 5/31   --continue BB/oral amio- inc metop to 50 BID           4. Prolonged postoperative respiratory insufficiency   --wean oxygen to keep SpO2 greater than or equal to 92%   --continue duonebs with ezpap   --encourage C&DB, SMI   --currently on RA   --got lasix yesterday   -- 2 view cxr- bilateral congestion   --appreciate pulmonary's input           5. Hyponatremia   --Na 128 this AM--renal on board   -- fluid restriction ordered       6. Hx DM2   --on lantus/metformin at home   --continue lantus at home dose, continue high scale SSI, continue metformin at lower dose   --monitor           7. Constipation   --resolved   --Continue MOM and senna-s.    --Encouraged continued increase in oral intake and activity.             8. Post operative deconditioning   --Increase activity as tolerated   --PT/OT   --ambulating 400ft   -- lives alone so will need rehab           9 . DVT prophylaxis:   --continue bilateral knee high HOLLY hose   --continue PCDs   --continue progressive ambulation   --continue eliquis       10- uncontrolled pain- will inc narcotic to percocet; will ask renal if toradol is ok       11. Volume overload- would benefot from diuresis but given hyponatremia will defer to renal      ** ** NEPHROLOGY ** **    IMPRESSION/RECOMMENDATIONS:         1. Hypotonic hyponatremia, probably hemodynamically mediated due to congestive heart failure.  Urine sodium <20 with relatively high urine osmolality 515 are consistent with hemodynamically mediated hyponatremia. 2. HFpEF 60%, proBNP 2269, chest x-ray yesterday with increasing pulmonary infiltrates. 3. CAD status post CABG x3 May 28, 2020   4. HTN, on metoprolol   5. PAF, on metoprolol   6.  Normocytic anemia, blood losses due to surgery       Plan:       · Fluid restriction to dry tray   · Lasix 60 mg IV twice daily   · Continue to monitor sodium

## 2020-06-05 NOTE — PROGRESS NOTES
Date: 6/5/2020    Time: 12:53 AM    Patient Placed On BIPAP/CPAP/ Non-Invasive Ventilation? Yes    If no must comment. Facial area red/color change? No           If YES are Blister/Lesion present? No   If yes must notify nursing staff  BIPAP/CPAP skin barrier?   Yes    Skin barrier type:mepilex       Comments:        Yasemin Hull

## 2020-06-05 NOTE — PROGRESS NOTES
consultation. IMPRESSION/RECOMMENDATIONS:      1. Hypotonic hyponatremia, hemodynamically mediated due to congestive heart failure. Urine sodium <20 with relatively high urine osmolality 515. Resolved in response to diuresis and fluid restriction. 2. Large pericardial effusion status post CABG, status post pericardial window  3. Mild hyperkalemia, possibly hyporeninemic state  4. High anion gap, possibly ketoacidosis? 5. HFpEF 60%, proBNP improved  6. CAD status post CABG x3 May 28, 2020  7. HTN, on metoprolol  8. PAF, on metoprolol  9.  Normocytic anemia, blood losses due to surgery    Plan:    · Continue fluid restriction 1 L daily   · Bumex 1 mg daily  · Obtain beta hydroxybutyrate  · Continue to monitor sodium level  · Monitor anion gap  · Low potassium diet

## 2020-06-05 NOTE — OP NOTE
[address]                  [address] [facility]  [facility]                                OPERATIVE REPORT    PATIENT NAME: Gabriella Hernandez                    :        1965  MED REC NO:   [id number]                            ROOM:       [facility]  ACCOUNT NO:   [de-identified]                           ADMIT DATE: 2020  PROVIDER:     Ainsley Hamilton DO    DATE OF PROCEDURE:  2020    PREOPERATIVE DIAGNOSIS:  Large pericardial effusion. POSTOPERATIVE DIAGNOSIS:  Large pericardial effusion. PROCEDURE PERFORMED:  Subxiphoid pericardial window. SURGEON:  Ainsley Hamilton DO    ASSISTANT:  Zain Marroquin. ANESTHESIA:  General.    ESTIMATED BLOOD LOSS:  Less than 50. COMPLICATIONS:  None. INDICATION:  The patient is a 77-year-old male who had undergone  uncomplicated coronary artery bypass grafting last week and was  recovering well. He began to experience new onset atrial fibrillation  48 hours ago, which has been refractory to medical treatment with his  heart rate sustained in the 130s to 140s. An echocardiogram was ordered  by the cardiologist who was following him, which noted a large  pericardial effusion. Noting this finding, it was thought to be prudent  to drain this effusion as to eliminate it as a possible cause for his  tachycardia and atrial fibrillation. DESCRIPTION OF THE PROCEDURE:  After informed written consent had been  obtained, the patient was brought to the operating room, placed in the  supine position on the operating table. Monitoring lines as well as  transesophageal echo probe were inserted after induction of anesthesia. This verified large pericardial effusion. Next, a surgical timeout was  held and a subxiphoid incision was carried out from the inferior portion  of the previous sternotomy. The pericardial space was then entered and  under BERTHA guidance, the effusion was drained in its entirety.   This

## 2020-06-05 NOTE — PROGRESS NOTES
Nutrition Assessment    Type and Reason for Visit: Reassess    Nutrition Recommendations: Recommend and start Boost Pudding supplement BID to help meet increased nutritional needs. Recommend Elmer wound healing supplement BID to help assist with proper surgical wound healing. Nutrition Assessment: Patients po intake has been a little sporadic, averaging 50-75% of most meals served since admission ; pt at further nutritional risk d/t increased needs from surgical wound healing (s/p CABG and pericardial window) ; questionable hx of COVID-19 ; will re-start low potassium ONS    Malnutrition Assessment:  · Malnutrition Status: At risk for malnutrition  · Context: Acute illness or injury  · Findings of the 6 clinical characteristics of malnutrition (Minimum of 2 out of 6 clinical characteristics is required to make the diagnosis of moderate or severe Protein Calorie Malnutrition based on AND/ASPEN Guidelines):  1. Energy Intake-Less than or equal to 75% of estimated energy requirement, Greater than or equal to 7 days    2. Weight Loss-No significant weight loss,    3. Fat Loss-Unable to assess(not available to assess at this time),    4. Muscle Loss-Unable to assess(not available to assess at this time),    5. Fluid Accumulation-No significant fluid accumulation,    6.  Strength-Not measured    Nutrition Risk Level:  Moderate    Nutrient Needs:  · Estimated Daily Total Kcal: 2277-8403 (REE 2137 x 1.2 SF)  · Estimated Daily Protein (g): 120-145 (1.5-1.8g/kg IBW)  · Estimated Daily Total Fluid (ml/day): per renal management     Nutrition Diagnosis:   · Problem: Increased nutrient needs  · Etiology: related to Increased demand for energy/nutrients     Signs and symptoms:  as evidenced by Presence of wounds    Objective Information:  · Nutrition-Focused Physical Findings: -I&Os (-2.8 L), 1+ edema, active BS, rounded abd, chest tube x 1, A&O x 4, obesity     · Wound Type: Multiple, Surgical Wound(Incisions x 2

## 2020-06-05 NOTE — PROGRESS NOTES
patient. Labs, medications, radiographs reviewed. I agree with history exam and plans detailed in NP note.       Electronically signed by Courtney Cole DO on 6/5/2020 at 2:50 PM

## 2020-06-05 NOTE — CONSULTS
MICHAEL Atriclip 45 mm with Dr. Joellen Gonzalez. Postoperatively he was noted to be in sinus rhythm, however he was started on amiodarone 400 mg 3 times daily, Lopressor 37.5 twice daily as well as Eliquis. On 5/31/2020, POD 3 had brief breakthrough of atrial fibrillation that self terminated to sinus and was placed on an Amiodarone drip. On 6/2/20 he developed AF with RVR and his Lopresor was increased which contoled the rate overnioght. On 6/4/20 his rate increases into the 140 an echocardiogram was performed that showed an large loculated pericardial effusion near the left ventricle with marked degree of respiratory variation in mitral flow suggesting tamponade physiology, he then went for a subxiphoid pericardial window which drained approximately 300 mL's of dark somewhat bloody fluid and a chest tube was left in place, postprocedure she remained in atrial fibrillation however his rate was significantly more controlled, except during periods of agitation, minimal exertion. Today he denies any chest discomfort, shortness of breath, he does attest to palpitations and feeling of heart racing. Cardiac electrophysiology service is consulted for paroxysmal atrial fibrillation.     Patient Active Problem List    Diagnosis Date Noted    CAD in native artery 05/28/2020    COVID-19     Acute postoperative respiratory insufficiency     Postoperative hypotension     Unstable angina ([address] 05/20/2020    Acute coronary syndrome ([address] 05/20/2020    History of TIA (transient ischemic attack)     Chest pain 05/16/2020    HUMERA (obstructive sleep apnea) 06/18/2018    Atrial fibrillation with RVR ([address] 06/17/2018    Atrial flutter ([address] 06/16/2018    Cellulitis 02/11/2018    Abscess of foot 02/11/2018    Acute hepatitis B 02/01/2017    Acute liver failure without hepatic coma 01/31/2017    Sepsis ([address] 01/31/2017    Diabetes mellitus ([address]     Acute renal failure (ARF) ([address] 07/15/2016    Diverticulitis 07/15/2016    Atrial fibrillation ([facility] 07/15/2016    Type 2 diabetes mellitus ([facility] 07/15/2016    Essential hypertension 07/15/2016    Hyperlipidemia 07/15/2016    TIA (transient ischemic attack) 07/07/2015    New onset atrial fibrillation ([facility] 07/07/2015       Past Medical History:   Diagnosis Date    Atrial fibrillation ([facility]     Atrial fibrillation ([facility] 02/20/2018    lexiscan stress test    Cerebral artery occlusion with cerebral infarction ([facility]     Diabetes mellitus ([facility]     Hyperlipidemia     Hypertension     Psychiatric problem     TIA (transient ischemic attack)     2015       Family History   Problem Relation Age of Onset    Cancer Mother     Heart Disease Father        Social History     Tobacco Use    Smoking status: Heavy Tobacco Smoker     Packs/day: 0.50     Years: 35.00     Pack years: 17.50     Types: Cigarettes    Smokeless tobacco: Never Used   Substance Use Topics    Alcohol use: No     Comment: STOPPED       Current Facility-Administered Medications   Medication Dose Route Frequency Provider Last Rate Last Dose    bumetanide (BUMEX) tablet 1 mg  1 mg Oral Daily Jeremie Kurtz MD   1 mg at 06/05/20 1049    metFORMIN (GLUCOPHAGE) tablet 1,000 mg  1,000 mg Oral BID  Akanksha Jeff, APRN - CNP        dilTIAZem (CARDIZEM CD) extended release capsule 120 mg  120 mg Oral Daily Soledad Aguilar APRN - CNP        ceFAZolin (ANCEF) 3 g in dextrose 5 % 100 mL IVPB  3 g Intravenous Q8H Andrea Mehran,  mL/hr at 06/05/20 1049 3 g at 06/05/20 1049    digoxin (LANOXIN) injection 500 mcg  500 mcg Intravenous Once Congress Jeff APRN - CNP        diphenhydrAMINE (BENADRYL) tablet 25 mg  25 mg Oral Q6H PRN Andrea Mehran, DO   25 mg at 06/03/20 1506    hydrocortisone 1 % cream   Topical BID Andrea Mehran, DO        digoxin (LANOXIN) tablet 125 mcg  125 mcg Oral Daily Andrea Mehran, DO   125 mcg at 06/05/20 0801    amiodarone (CORDARONE) 450 mg in dextrose 5 % 250 mL infusion  0.5 mg/min Intravenous Continuous Enrique Moss MD 16.7 mL/hr at 06/04/20 2120 0.5 mg/min at 06/04/20 2120    oxyCODONE-acetaminophen (PERCOCET) 5-325 MG per tablet 1 tablet  1 tablet Oral Q4H PRN Gasper Umberto, DO        Or    oxyCODONE-acetaminophen (PERCOCET) 5-325 MG per tablet 2 tablet  2 tablet Oral Q4H PRN Wheeler Umberto, DO   2 tablet at 06/05/20 0801    metoprolol tartrate (LOPRESSOR) tablet 50 mg  50 mg Oral BID Gasper Umberto, DO   50 mg at 06/05/20 0802    [Held by provider] amiodarone (CORDARONE) tablet 400 mg  400 mg Oral TID Wheeler Umberto, DO   400 mg at 06/03/20 1330    magnesium hydroxide (MILK OF MAGNESIA) 400 MG/5ML suspension 30 mL  30 mL Oral Daily PRN Wheeler Umberto, DO   30 mL at 06/05/20 0801    [Held by provider] apixaban (ELIQUIS) tablet 5 mg  5 mg Oral BID Wheeler Umberto, DO   5 mg at 06/03/20 0829    acetaminophen (TYLENOL) tablet 650 mg  650 mg Oral Q4H PRN Wheeler Umberto, DO        aspirin EC tablet 81 mg  81 mg Oral Daily Shimon Vega, DO   81 mg at 06/05/20 0801    ferrous sulfate (IRON 325) tablet 325 mg  325 mg Oral BID WC Gasper Umberto, DO   325 mg at 06/05/20 0802    vitamin C (ASCORBIC ACID) tablet 500 mg  500 mg Oral BID Wheeler Umberto, DO   500 mg at 22/56/29 3751    folic acid (FOLVITE) tablet 1 mg  1 mg Oral Daily Wheeler Umberto, DO   1 mg at 06/05/20 0802    potassium chloride (KLOR-CON M) extended release tablet 20 mEq  20 mEq Oral PRN Wheeler Umberto, DO        ondansetron [facility]) injection 4 mg  4 mg Intravenous Q8H PRN Jerrica Vega,         pantoprazole (PROTONIX) tablet 40 mg  40 mg Oral Daily Wheeler Umberto, DO   40 mg at 06/05/20 0802    sennosides-docusate sodium (SENOKOT-S) 8.6-50 MG tablet 1 tablet  1 tablet Oral BID Gasper Umberto, DO   1 tablet at 06/05/20 0801    bisacodyl (DULCOLAX) EC tablet 5 mg  5 mg Oral Daily PRN Wheeler Umberto, DO   5 mg at 05/31/20 0829    insulin glargine (LANTUS) injection vial 30 Units  30 Units Subcutaneous Negative for abdominal pain and blood in stool. All other review of systems are negative     PHYSICAL EXAM:   Vitals:    06/05/20 0759 06/05/20 0829 06/05/20 0926 06/05/20 1019   BP: 122/64      Pulse: 139   67   Resp: 18      Temp: 97.4 °F (36.3 °C)      TempSrc: Temporal      SpO2: 94% 93% 94%    Weight:       Height:          Constitutional: In NAD  Head: Normocephalic and atraumatic,   Neck: No hepatojugular reflux and no JVD present  Cardiovascular: S1 , S2 present IRIR  Pulmonary/Chest:  No respiratory distress. Abdominal: Soft. Normal appearance   Musculoskeletal: Sternal restrictions  Neurological: Alert    Skin: Skin is warm and dry. Extremity:   neg edema. Psychiatric: Normal mood and affect. Thought content normal.     I have personally reviewed the laboratory, cardiac diagnostic and radiographic testing as outlined below:    Data:    Recent Labs     06/04/20  0531 06/04/20  1219 06/05/20  0631   WBC 12.6* 12.6* 15.5*   HGB 9.6* 9.6* 10.1*   HCT 29.8* 29.4* 31.2*    337 382     Recent Labs     06/03/20  2347 06/04/20  0531 06/05/20  0631    135 135   K 4.3 4.3 5.3*   CL 91* 94* 94*   CO2 25 27 23   BUN 27* 28* 29*   CREATININE 1.0 1.0 0.9   CALCIUM 9.2 9.1 9.3      Lab Results   Component Value Date    MG 2.1 06/05/2020     No results for input(s): TSH in the last 72 hours. No results for input(s): INR in the last 72 hours. CXR 6/4/20: There is moderate cardiomegaly and moderate left  lower lobe infiltrate. Right lung is clear. Telemetry 6/5/20: Atrial flutter with variable AV conduction, intermittent RVR into the 140s, currently 76 bpm    EKG 6/4/20: Atrial flutter with 2 1 AV conduction, rate 137 bpm please see scan in Cardiology. Echocardiogram 6/4/20:    Summary   Normal left ventricle size and systolic function. Ejection fraction is visually estimated at >75%. Overall ejection fraction increased (hyperdynamic) . Normal left ventricle wall thickness.    Septal catheterization with Dr. Rosa Bruno on 5/20/2020 this revealed significant three-vessel coronary artery disease. On May 26 he underwent three-vessel CABG (LIMA-LAD, SVG-diagonal, SVG-PL,) MAZE, MICHAEL Atriclip 45 mm with Dr. Jade Hinton. Postoperatively he was noted to be in sinus rhythm, however he was started on amiodarone 400 mg 3 times daily, Lopressor 37.5 twice daily as well as Eliquis. Post op he has had difficulty controlling atrial fibrillation and ultimately repeat echo was done which showed a large pericardial effusion with early tamponade signs and he underwent a pericardial window and drainage of his effusion 6/4/2020    Today he denies any chest discomfort, shortness of breath, he does attest to palpitations and feeling of heart racing when he exerts himself    ROS:   Constitutional: Negative for fever, + for  activity change and appetite change. HENT: Negative for epistaxis. Eyes: Negative for diploplia, blurred vision. Respiratory: Negative for cough, chest tightness,  and wheezing. + for shortness of breath  Cardiovascular: pertinent positives in HPI  Gastrointestinal: Negative for abdominal pain and blood in stool. All other review of systems are negative      PHYSICAL EXAM:   Vitals          Vitals:     06/05/20 0759 06/05/20 0829 06/05/20 0926 06/05/20 1019   BP: 122/64         Pulse: 139     67   Resp: 18         Temp: 97.4 °F (36.3 °C)         TempSrc: Temporal         SpO2: 94% 93% 94%     Weight:           Height:                 Constitutional: In NAD, Obese  Head: Normocephalic and atraumatic,   Neck: No hepatojugular reflux and no JVD present  Cardiovascular: S1 , S2 present IRIR  Pulmonary/Chest:  No respiratory distress. Abdominal: Soft. Normal appearance   Musculoskeletal: Sternal restrictions  Neurological: Alert    Skin: Skin is warm and dry. Extremity:   pos edema. Psychiatric: Normal mood and affect.  Thought content normal.     Labs  Data:           Recent Labs 06/04/20  0531 06/04/20  1219 06/05/20  0631   WBC 12.6* 12.6* 15.5*   HGB 9.6* 9.6* 10.1*   HCT 29.8* 29.4* 31.2*    337 382            Recent Labs     06/03/20  2347 06/04/20  0531 06/05/20  0631    135 135   K 4.3 4.3 5.3*   CL 91* 94* 94*   CO2 25 27 23   BUN 27* 28* 29*   CREATININE 1.0 1.0 0.9   CALCIUM 9.2 9.1 9.3      EKG 6/4/20: Atrial flutter with 2 1 AV conduction, rate 137 bpm please see scan in Cardiology.     Echocardiogram 6/4/20:    Summary   Normal left ventricle size and systolic function.   Ejection fraction is visually estimated at >75%.   Overall ejection fraction increased (hyperdynamic) .   Normal left ventricle wall thickness.   Septal motion consistent with pericardial tamponade.   Normal right ventricular size and function.   There is a large loculated near left ventricle hemorrhagic pericardial   effusion noted.  Suzette Rued is a marked degree of respiratory variation in mitral end flow   velocities (31%) suggestive of tamponade physiology.   Results discussed with CT surgery. A/p     1. Paroxysmal atrial fibrillation  - [id number] 5 (TIA, VASc. DM, HTN)  - Xarelto at home now Eliquis that was interrupted for pericardial window. - MICHAEL Atrialclip 5/26/2020  - Plan rebolus with amiodarone again IV, increase p.o. amiodarone to 400 mg 3 times daily, continue Lopressor 50 mg twice daily and digoxin 125 mcg daily. Add Cardizem 120 mg daily  - Rhythm control: MAZE 5/26/2020  Plan: No plan for DCCV at this time given pericardial effusion s/p window, will continue with rate control strategy, adding Cardizem  daily, and increasing PO amiodarone 400mg to TID. If more rate control is needed, we can either up titrate B-blocker or CCB  - Check LFT, TSH - 5    2. Coronary artery disease  -  S/p CABG LIMA-LAD, SVG-diagonal, SVG-PL 5/26/2020 POD #8  - BB, ASA, Statin      3. Pericardial effusion   - S/P subxyphoid pericardial window on 6/4/2020   - Chest tube retained      4.

## 2020-06-05 NOTE — PROGRESS NOTES
Patient is seen in follow-up for CAD status post CABG    Subjective:     Mr. Dg Albrecth still complaining of incisional chest pain but he is feeling better, denies any chest pain, but has palpitations, denies any dyspnea or dizziness. Patient laying in the bed without apparent distress. He underwent pericardiocentesis yesterday and 350 mL of dark bloody fluid was removed. He is feeling much better after pericardiocentesis. His heart rate is improved but remains in atrial flutter. EP service was consulted for persistent flutter. He was initiated on Cardizem CD in addition to amiodarone.     ROS:  CONSTITUTIONAL:  negative for  fevers, chills  HEENT:  negative for earaches, nasal congestion and epistaxis  RESPIRATORY:  negative for  dry cough, cough with sputum,wheezing and hemoptysis  GASTROINTESTINAL:  negative for nausea, vomiting  MUSCULOSKELETAL:  negative for  myalgias, arthralgias  NEUROLOGICAL:  negative for visual disturbance, dysphagia    Medication side effects: none      Scheduled Meds:   bumetanide  1 mg Oral Daily    metFORMIN  1,000 mg Oral BID WC    dilTIAZem  120 mg Oral Daily    insulin glargine  35 Units Subcutaneous Nightly    digoxin  500 mcg Intravenous Once    hydrocortisone   Topical BID    digoxin  125 mcg Oral Daily    metoprolol tartrate  50 mg Oral BID    amiodarone  400 mg Oral TID    [Held by provider] apixaban  5 mg Oral BID    aspirin  81 mg Oral Daily    ferrous sulfate  325 mg Oral BID WC    vitamin C  500 mg Oral BID    folic acid  1 mg Oral Daily    pantoprazole  40 mg Oral Daily    sennosides-docusate sodium  1 tablet Oral BID    insulin lispro  0-18 Units Subcutaneous TID WC    insulin lispro  0-9 Units Subcutaneous Nightly    atorvastatin  40 mg Oral Nightly    sodium chloride flush  10 mL Intravenous 2 times per day    magnesium oxide  400 mg Oral Daily    tamsulosin  0.4 mg Oral Daily     Continuous Infusions:   dextrose       PRN No interval change               XR CHEST STANDARD (2 VW)   Final Result   Worsening CHF with developing edema. Superimposed pneumonia is not   excluded. XR CHEST PORTABLE   Final Result      Cardiomegaly with median sternotomy with left atrial clipping      Mild pulmonary venous congestion      No evidence of airspace consolidation or pneumothorax. XR CHEST PORTABLE   Final Result   No significant change               XR CHEST PORTABLE   Final Result      1. New postoperative changes from CABG. 2. Multiple support tubes. 3. New mild mediastinal widening and moderate central congestive   changes. 4. No obvious effusion. Lab Review   Lab Results   Component Value Date     06/05/2020    K 4.9 06/05/2020    K 4.8 05/16/2020    CL 94 06/05/2020    CO2 28 06/05/2020    BUN 28 06/05/2020    CREATININE 0.9 06/05/2020    GLUCOSE 180 06/05/2020    CALCIUM 9.3 06/05/2020     Lab Results   Component Value Date    WBC 15.5 06/05/2020    HGB 10.1 06/05/2020    HCT 31.2 06/05/2020    MCV 92.6 06/05/2020     06/05/2020     I have personally reviewed the laboratory, cardiac diagnostic and radiographic testing as outlined above:    Telemetry-A Flutter  with heart rate in the 140s with intermittent variable rate. Vitals-stable with a blood pressure of 127/76 with heart rate of 94>>>115/67., >> heart rate 137 pressure 132/74>>110/63, >>>103/68, >> 104/65 with heart rate of 140    Labs were reviewed: Sodium 130>>129>> 128>>135, K 5.1>> 5.1, BUN/creat 24/1.1>> 23/0.8>>27/1.0>>>28/1.0>> 28/0.9, WBC 15.5 H&H 9.3/28.6> 9.2/28.2>9.6/29.8> 10.1/31.2>, rest of the chemistries normal, WBC 13.4, H&H 9.6/29. 7. proBNP 2269. Limited echo- 6/3/2020:   Summary   Normal left ventricle size and systolic function. Ejection fraction is visually estimated at >75%. Overall ejection fraction increased (hyperdynamic) . Normal left ventricle wall thickness.

## 2020-06-05 NOTE — PROGRESS NOTES
130's  --on xarelto at home  --will switch to eliquis short term post operatively with resumption of xarelto at later time--eliquis initiated 5/31- Eliquis held 6/4, 6/5--will resume tomorrow after removal of chest tube       5. Prolonged postoperative respiratory insufficiency  --wean oxygen to keep SpO2 greater than or equal to 92%  --continue duonebs with ezpap  --encourage C&DB, SMI  --currently on RA  --On oral bumex daily  -- 2 view cxr- bilateral congestion  --appreciate pulmonary's input        6. Hyponatremia  --ZE 798 again  this AM--renal on board  -- appreciate renal input        7. Hx DM2  --on lantus/metformin at home  --continue lantus at home dose, continue high scale SSI, continue metformin and increase to home dose--monitor        8. Constipation  --resolved  --Continue MOM and senna-s.   --Encouraged continued increase in oral intake and activity.          9. Post operative deconditioning  --Increase activity as tolerated  --PT/OT  --ambulating 500ft  -- lives alone so will likely need rehab        10. DVT prophylaxis:  --continue bilateral knee high HOLLY hose  --continue PCDs  --continue progressive ambulation  --continue eliquis--resume tomorrow after chest tube removal        11- uncontrolled pain-   - Norco changed to Percocet--improved       12.  Volume overload-   -- bumex 1mg oral daily       Dispo:declined by [facility]- looking into [facility]- DC on hold d/t PW yesterday--likely need for DCCV on Monday       This patient's case and care plan was discussed with the attending surgeon

## 2020-06-05 NOTE — DISCHARGE SUMMARY
mg/dL   POC ACT-HR    Collection Time: 05/28/20  7:47 AM   Result Value Ref Range    Activated Clotting Time 106 Not established seconds   POC ACT-HR    Collection Time: 05/28/20  9:02 AM   Result Value Ref Range    Activated Clotting Time 135 Not established seconds   POCT Glucose    Collection Time: 05/28/20  9:03 AM   Result Value Ref Range    Meter Glucose 140 (H) 74 - 99 mg/dL   POC ACT-HR    Collection Time: 05/28/20  9:04 AM   Result Value Ref Range    Activated Clotting Time 537 Not established seconds   Arterial Blood Gas, Respiratory Only    Collection Time: 05/28/20 10:11 AM   Result Value Ref Range    Source: Arterial     FIO2 Arterial 80.0     pH, Blood Gas 7.316 (L) 7.350 - 7.450    pCO2, Arterial 48.5 (H) 35.0 - 45.0 mmHg    pO2, Arterial 482.9 (H) 80.0 - 100.0 mmHg    HCO3, Arterial 24.8 22.0 - 26.0 mmol/L    B.E. -1.7 -3.0 - 0.0 mmol/L    O2 Sat 100.0 (H) 92.0 - 98.5 %    Potassium 4.7 3.5 - 5.5 mmol/L    HGB, (EST) 11.3 (L) 12.5 - 15.5 g/dL    HCT (EST) 33.0 (L) 37.0 - 54.0 %    Cardiopulmonary Bypass Yes      ID [id number]     DEVICE [id number]    POC ACT-HR    Collection Time: 05/28/20 10:11 AM   Result Value Ref Range    Activated Clotting Time 430 Not established seconds   POCT Glucose    Collection Time: 05/28/20 10:11 AM   Result Value Ref Range    Meter Glucose 176 (H) 74 - 99 mg/dL   POC ACT-HR    Collection Time: 05/28/20 10:36 AM   Result Value Ref Range    Activated Clotting Time 422 Not established seconds   POC ACT-HR    Collection Time: 05/28/20 11:10 AM   Result Value Ref Range    Activated Clotting Time 404 Not established seconds   Arterial Blood Gas, Respiratory Only    Collection Time: 05/28/20 11:12 AM   Result Value Ref Range    Source: Arterial     FIO2 Arterial 80.0     pH, Blood Gas 7.368 7.350 - 7.450    pCO2, Arterial 42.9 35.0 - 45.0 mmHg    pO2, Arterial 385.4 (H) 80.0 - 100.0 mmHg    HCO3, Arterial 24.7 22.0 - 26.0 mmol/L    B.E. -0.7 -3.0 - 0.0 mmol/L    O2 14 7 - 16 mmol/L    Glucose 198 (H) 74 - 99 mg/dL    BUN 20 6 - 20 mg/dL    CREATININE 0.9 0.7 - 1.2 mg/dL    GFR Non-African American >60 >=60 mL/min/1.73    GFR African American >60     Calcium 8.3 (L) 8.6 - 10.2 mg/dL   CBC    Collection Time: 05/30/20  6:03 AM   Result Value Ref Range    WBC 14.2 (H) 4.5 - 11.5 E9/L    RBC 3.18 (L) 3.80 - 5.80 E12/L    Hemoglobin 9.7 (L) 12.5 - 16.5 g/dL    Hematocrit 29.9 (L) 37.0 - 54.0 %    MCV 94.0 80.0 - 99.9 fL    MCH 30.5 26.0 - 35.0 pg    MCHC 32.4 32.0 - 34.5 %    RDW 14.4 11.5 - 15.0 fL    Platelets 592 158 - 482 E9/L    MPV 10.3 7.0 - 12.0 fL   Magnesium    Collection Time: 05/30/20  6:03 AM   Result Value Ref Range    Magnesium 1.9 1.6 - 2.6 mg/dL   POCT Glucose    Collection Time: 05/30/20  6:22 AM   Result Value Ref Range    Meter Glucose 197 (H) 74 - 99 mg/dL   POCT Glucose    Collection Time: 05/30/20 11:42 AM   Result Value Ref Range    Meter Glucose 267 (H) 74 - 99 mg/dL   POCT Glucose    Collection Time: 05/30/20  4:58 PM   Result Value Ref Range    Meter Glucose 259 (H) 74 - 99 mg/dL   POCT Glucose    Collection Time: 05/30/20  8:19 PM   Result Value Ref Range    Meter Glucose 217 (H) 74 - 99 mg/dL   Basic Metabolic Panel    Collection Time: 05/31/20  6:17 AM   Result Value Ref Range    Sodium 130 (L) 132 - 146 mmol/L    Potassium 4.9 3.5 - 5.0 mmol/L    Chloride 95 (L) 98 - 107 mmol/L    CO2 24 22 - 29 mmol/L    Anion Gap 11 7 - 16 mmol/L    Glucose 183 (H) 74 - 99 mg/dL    BUN 18 6 - 20 mg/dL    CREATININE 0.8 0.7 - 1.2 mg/dL    GFR Non-African American >60 >=60 mL/min/1.73    GFR African American >60     Calcium 8.8 8.6 - 10.2 mg/dL   CBC    Collection Time: 05/31/20  6:17 AM   Result Value Ref Range    WBC 13.4 (H) 4.5 - 11.5 E9/L    RBC 3.19 (L) 3.80 - 5.80 E12/L    Hemoglobin 9.6 (L) 12.5 - 16.5 g/dL    Hematocrit 29.7 (L) 37.0 - 54.0 %    MCV 93.1 80.0 - 99.9 fL    MCH 30.1 26.0 - 35.0 pg    MCHC 32.3 32.0 - 34.5 %    RDW 14.0 11.5 - 15.0 fL 8.6 - 10.2 mg/dL   CBC    Collection Time: 06/02/20  6:08 AM   Result Value Ref Range    WBC 11.9 (H) 4.5 - 11.5 E9/L    RBC 3.01 (L) 3.80 - 5.80 E12/L    Hemoglobin 9.2 (L) 12.5 - 16.5 g/dL    Hematocrit 28.2 (L) 37.0 - 54.0 %    MCV 93.7 80.0 - 99.9 fL    MCH 30.6 26.0 - 35.0 pg    MCHC 32.6 32.0 - 34.5 %    RDW 13.9 11.5 - 15.0 fL    Platelets 758 055 - 566 E9/L    MPV 10.5 7.0 - 12.0 fL   Magnesium    Collection Time: 06/02/20  6:08 AM   Result Value Ref Range    Magnesium 2.1 1.6 - 2.6 mg/dL   Brain Natriuretic Peptide    Collection Time: 06/02/20  6:08 AM   Result Value Ref Range    Pro-BNP 2,269 (H) 0 - 125 pg/mL   Uric acid    Collection Time: 06/02/20  6:08 AM   Result Value Ref Range    Uric Acid, Serum 7.0 3.4 - 7.0 mg/dL   Cortisol Total    Collection Time: 06/02/20  6:08 AM   Result Value Ref Range    Cortisol 12.87 2.68 - 18.40 mcg/dL   POCT Glucose    Collection Time: 06/02/20  6:24 AM   Result Value Ref Range    Meter Glucose 194 (H) 74 - 99 mg/dL   Urine electrolytes    Collection Time: 06/02/20  7:00 AM   Result Value Ref Range    Sodium, Ur <20 Not Established mmol/L    Potassium, Ur 15.2 Not Established mmol/L    Chloride <20 Not Established mmol/L   Osmolality, urine    Collection Time: 06/02/20  7:00 AM   Result Value Ref Range    Osmolality, Ur 315 300 - 900 mOsm/kg   POCT Glucose    Collection Time: 06/02/20 11:19 AM   Result Value Ref Range    Meter Glucose 239 (H) 74 - 99 mg/dL   Basic Metabolic Panel    Collection Time: 06/02/20  4:16 PM   Result Value Ref Range    Sodium 135 132 - 146 mmol/L    Potassium 4.9 3.5 - 5.0 mmol/L    Chloride 95 (L) 98 - 107 mmol/L    CO2 27 22 - 29 mmol/L    Anion Gap 13 7 - 16 mmol/L    Glucose 174 (H) 74 - 99 mg/dL    BUN 24 (H) 6 - 20 mg/dL    CREATININE 0.9 0.7 - 1.2 mg/dL    GFR Non-African American >60 >=60 mL/min/1.73    GFR African American >60     Calcium 9.1 8.6 - 10.2 mg/dL   POCT Glucose    Collection Time: 06/02/20  4:59 PM   Result Value Ref Range    Meter Glucose 165 (H) 74 - 99 mg/dL   POCT Glucose    Collection Time: 06/02/20  8:47 PM   Result Value Ref Range    Meter Glucose 216 (H) 74 - 99 mg/dL   Basic Metabolic Panel    Collection Time: 06/03/20  5:38 AM   Result Value Ref Range    Sodium 135 132 - 146 mmol/L    Potassium 4.6 3.5 - 5.0 mmol/L    Chloride 93 (L) 98 - 107 mmol/L    CO2 26 22 - 29 mmol/L    Anion Gap 16 7 - 16 mmol/L    Glucose 151 (H) 74 - 99 mg/dL    BUN 27 (H) 6 - 20 mg/dL    CREATININE 1.0 0.7 - 1.2 mg/dL    GFR Non-African American >60 >=60 mL/min/1.73    GFR African American >60     Calcium 9.3 8.6 - 10.2 mg/dL   CBC    Collection Time: 06/03/20  5:38 AM   Result Value Ref Range    WBC 12.5 (H) 4.5 - 11.5 E9/L    RBC 3.27 (L) 3.80 - 5.80 E12/L    Hemoglobin 9.9 (L) 12.5 - 16.5 g/dL    Hematocrit 30.4 (L) 37.0 - 54.0 %    MCV 93.0 80.0 - 99.9 fL    MCH 30.3 26.0 - 35.0 pg    MCHC 32.6 32.0 - 34.5 %    RDW 13.8 11.5 - 15.0 fL    Platelets 604 068 - 198 E9/L    MPV 10.4 7.0 - 12.0 fL   Magnesium    Collection Time: 06/03/20  5:38 AM   Result Value Ref Range    Magnesium 1.8 1.6 - 2.6 mg/dL   POCT Glucose    Collection Time: 06/03/20  6:17 AM   Result Value Ref Range    Meter Glucose 148 (H) 74 - 99 mg/dL   POCT Glucose    Collection Time: 06/03/20 11:58 AM   Result Value Ref Range    Meter Glucose 187 (H) 74 - 99 mg/dL   POCT Glucose    Collection Time: 06/03/20  3:26 PM   Result Value Ref Range    Meter Glucose 158 (H) 74 - 99 mg/dL   POCT Glucose    Collection Time: 06/03/20  8:15 PM   Result Value Ref Range    Meter Glucose 169 (H) 74 - 99 mg/dL   Basic metabolic panel    Collection Time: 06/03/20 11:47 PM   Result Value Ref Range    Sodium 132 132 - 146 mmol/L    Potassium 4.3 3.5 - 5.0 mmol/L    Chloride 91 (L) 98 - 107 mmol/L    CO2 25 22 - 29 mmol/L    Anion Gap 16 7 - 16 mmol/L    Glucose 142 (H) 74 - 99 mg/dL    BUN 27 (H) 6 - 20 mg/dL    CREATININE 1.0 0.7 - 1.2 mg/dL    GFR Non-African American >60 >=60 mL/min/1.73 GFR African American >60     Calcium 9.2 8.6 - 10.2 mg/dL   Basic Metabolic Panel    Collection Time: 06/04/20  5:31 AM   Result Value Ref Range    Sodium 135 132 - 146 mmol/L    Potassium 4.3 3.5 - 5.0 mmol/L    Chloride 94 (L) 98 - 107 mmol/L    CO2 27 22 - 29 mmol/L    Anion Gap 14 7 - 16 mmol/L    Glucose 139 (H) 74 - 99 mg/dL    BUN 28 (H) 6 - 20 mg/dL    CREATININE 1.0 0.7 - 1.2 mg/dL    GFR Non-African American >60 >=60 mL/min/1.73    GFR African American >60     Calcium 9.1 8.6 - 10.2 mg/dL   CBC    Collection Time: 06/04/20  5:31 AM   Result Value Ref Range    WBC 12.6 (H) 4.5 - 11.5 E9/L    RBC 3.22 (L) 3.80 - 5.80 E12/L    Hemoglobin 9.6 (L) 12.5 - 16.5 g/dL    Hematocrit 29.8 (L) 37.0 - 54.0 %    MCV 92.5 80.0 - 99.9 fL    MCH 29.8 26.0 - 35.0 pg    MCHC 32.2 32.0 - 34.5 %    RDW 13.6 11.5 - 15.0 fL    Platelets 232 729 - 706 E9/L    MPV 10.1 7.0 - 12.0 fL   Magnesium    Collection Time: 06/04/20  5:31 AM   Result Value Ref Range    Magnesium 1.9 1.6 - 2.6 mg/dL   Brain Natriuretic Peptide    Collection Time: 06/04/20  5:31 AM   Result Value Ref Range    Pro-BNP 1,544 (H) 0 - 125 pg/mL   POCT Glucose    Collection Time: 06/04/20  6:26 AM   Result Value Ref Range    Meter Glucose 133 (H) 74 - 99 mg/dL   EKG 12 Lead    Collection Time: 06/04/20  8:00 AM   Result Value Ref Range    Ventricular Rate 142 BPM    Atrial Rate 142 BPM    P-R Interval 162 ms    QRS Duration 104 ms    Q-T Interval 298 ms    QTc Calculation (Bazett) 458 ms    R Axis -18 degrees    T Axis -156 degrees   CBC    Collection Time: 06/04/20 12:19 PM   Result Value Ref Range    WBC 12.6 (H) 4.5 - 11.5 E9/L    RBC 3.19 (L) 3.80 - 5.80 E12/L    Hemoglobin 9.6 (L) 12.5 - 16.5 g/dL    Hematocrit 29.4 (L) 37.0 - 54.0 %    MCV 92.2 80.0 - 99.9 fL    MCH 30.1 26.0 - 35.0 pg    MCHC 32.7 32.0 - 34.5 %    RDW 13.5 11.5 - 15.0 fL    Platelets 517 478 - 350 E9/L    MPV 9.6 7.0 - 12.0 fL   TYPE AND SCREEN    Collection Time: 06/04/20  2:43 PM   Result ABO/Rh O POS     Antibody Screen NEG    Urine culture    Collection Time: 05/27/20  8:50 AM   Result Value Ref Range    Urine Culture, Routine <10,000 CFU/mL  Mixed gram positive organisms      Culture, MRSA, Screening    Collection Time: 05/27/20  8:50 AM   Result Value Ref Range    MRSA Culture Only Methicillin resistant Staph aureus not isolated    Urinalysis    Collection Time: 05/27/20  8:50 AM   Result Value Ref Range    Color, UA Yellow Straw/Yellow    Clarity, UA Clear Clear    Glucose, Ur Negative Negative mg/dL    Bilirubin Urine Negative Negative    Ketones, Urine Negative Negative mg/dL    Specific Gravity, UA 1.025 1.005 - 1.030    Blood, Urine Negative Negative    pH, UA 6.0 5.0 - 9.0    Protein, UA Negative Negative mg/dL    Urobilinogen, Urine 0.2 <2.0 E.U./dL    Nitrite, Urine Negative Negative    Leukocyte Esterase, Urine Negative Negative   Results for orders placed or performed during the hospital encounter of 05/23/20 (from the past 336 hour(s))   Covid-19 Ambulatory    Collection Time: 05/23/20  9:55 AM   Result Value Ref Range    SARS-CoV-2 Not Detected Not Detected    Source OP swab    Results for orders placed or performed during the hospital encounter of 05/20/20 (from the past 336 hour(s))   Basic metabolic panel    Collection Time: 05/22/20  4:51 AM   Result Value Ref Range    Sodium 137 132 - 146 mmol/L    Potassium 4.2 3.5 - 5.0 mmol/L    Chloride 101 98 - 107 mmol/L    CO2 23 22 - 29 mmol/L    Anion Gap 13 7 - 16 mmol/L    Glucose 213 (H) 74 - 99 mg/dL    BUN 14 6 - 20 mg/dL    CREATININE 0.8 0.7 - 1.2 mg/dL    GFR Non-African American >60 >=60 mL/min/1.73    GFR African American >60     Calcium 8.7 8.6 - 10.2 mg/dL   CBC    Collection Time: 05/22/20  4:51 AM   Result Value Ref Range    WBC 7.8 4.5 - 11.5 E9/L    RBC 3.89 3.80 - 5.80 E12/L    Hemoglobin 11.9 (L) 12.5 - 16.5 g/dL    Hematocrit 36.6 (L) 37.0 - 54.0 %    MCV 94.1 80.0 - 99.9 fL    MCH 30.6 26.0 - 35.0 pg    MCHC 32.5 32.0 vitamin B-12 (CYANOCOBALAMIN) 1000 MCG tablet Take 1,000 mcg by mouth daily      Ascorbic Acid (VITAMIN C) 250 MG tablet Take 1,000 mg by mouth daily      glyBURIDE (DIABETA) 5 MG tablet Take 1 tablet by mouth daily (with breakfast), Disp-30 tablet, R-0      glucose monitoring kit (FREESTYLE) monitoring kit DAILY PRN Starting 7/8/2015, Until Discontinued, Disp-1 kit, R-0, Normal      Multiple Vitamins-Minerals (THERAPEUTIC MULTIVITAMIN-MINERALS) tablet Take 1 tablet by mouth daily      aspirin 81 MG tablet Take 81 mg by mouth daily         STOP taking these medications       Rivaroxaban (XARELTO PO) Comments:   Reason for Stopping:               Activity: As tolerated    Diet: Cardiac    Follow-up with Robert Zamudio MD  [address] [place] [address] Raghav Rasheed MD  [address] [place] [address]          Mik Naqvi, [place] [id number]     In 3 day Call Tuesday for pre-op arrangments.          Note that over 30 minutes was spent in preparing discharge papers, discussing discharge with patient, medication review, etc.    Signed:  Wan Gerard  6/4/2020  9:03 PM

## 2020-06-06 LAB
ALBUMIN SERPL-MCNC: 3.4 G/DL (ref 3.5–5.2)
ALP BLD-CCNC: 58 U/L (ref 40–129)
ALT SERPL-CCNC: 7 U/L (ref 0–40)
ANION GAP SERPL CALCULATED.3IONS-SCNC: 12 MMOL/L (ref 7–16)
AST SERPL-CCNC: 16 U/L (ref 0–39)
BILIRUB SERPL-MCNC: 0.3 MG/DL (ref 0–1.2)
BUN BLDV-MCNC: 30 MG/DL (ref 6–20)
CALCIUM SERPL-MCNC: 9.4 MG/DL (ref 8.6–10.2)
CHLORIDE BLD-SCNC: 94 MMOL/L (ref 98–107)
CO2: 29 MMOL/L (ref 22–29)
CREAT SERPL-MCNC: 1 MG/DL (ref 0.7–1.2)
GFR AFRICAN AMERICAN: >60
GFR NON-AFRICAN AMERICAN: >60 ML/MIN/1.73
GLUCOSE BLD-MCNC: 167 MG/DL (ref 74–99)
HCT VFR BLD CALC: 30.8 % (ref 37–54)
HEMOGLOBIN: 9.8 G/DL (ref 12.5–16.5)
MAGNESIUM: 2 MG/DL (ref 1.6–2.6)
MCH RBC QN AUTO: 30.2 PG (ref 26–35)
MCHC RBC AUTO-ENTMCNC: 31.8 % (ref 32–34.5)
MCV RBC AUTO: 94.8 FL (ref 80–99.9)
METER GLUCOSE: 139 MG/DL (ref 74–99)
METER GLUCOSE: 149 MG/DL (ref 74–99)
METER GLUCOSE: 175 MG/DL (ref 74–99)
METER GLUCOSE: 183 MG/DL (ref 74–99)
PDW BLD-RTO: 13.6 FL (ref 11.5–15)
PLATELET # BLD: 414 E9/L (ref 130–450)
PMV BLD AUTO: 9.9 FL (ref 7–12)
POTASSIUM SERPL-SCNC: 4.8 MMOL/L (ref 3.5–5)
RBC # BLD: 3.25 E12/L (ref 3.8–5.8)
SODIUM BLD-SCNC: 135 MMOL/L (ref 132–146)
TOTAL PROTEIN: 6.9 G/DL (ref 6.4–8.3)
WBC # BLD: 15.5 E9/L (ref 4.5–11.5)

## 2020-06-06 PROCEDURE — 6370000000 HC RX 637 (ALT 250 FOR IP): Performed by: THORACIC SURGERY (CARDIOTHORACIC VASCULAR SURGERY)

## 2020-06-06 PROCEDURE — 99233 SBSQ HOSP IP/OBS HIGH 50: CPT | Performed by: INTERNAL MEDICINE

## 2020-06-06 PROCEDURE — 6370000000 HC RX 637 (ALT 250 FOR IP): Performed by: NURSE PRACTITIONER

## 2020-06-06 PROCEDURE — 6370000000 HC RX 637 (ALT 250 FOR IP): Performed by: INTERNAL MEDICINE

## 2020-06-06 PROCEDURE — 82962 GLUCOSE BLOOD TEST: CPT

## 2020-06-06 PROCEDURE — 2580000003 HC RX 258: Performed by: THORACIC SURGERY (CARDIOTHORACIC VASCULAR SURGERY)

## 2020-06-06 PROCEDURE — 94660 CPAP INITIATION&MGMT: CPT

## 2020-06-06 PROCEDURE — 83735 ASSAY OF MAGNESIUM: CPT

## 2020-06-06 PROCEDURE — 80053 COMPREHEN METABOLIC PANEL: CPT

## 2020-06-06 PROCEDURE — 93798 PHYS/QHP OP CAR RHAB W/ECG: CPT

## 2020-06-06 PROCEDURE — 36415 COLL VENOUS BLD VENIPUNCTURE: CPT

## 2020-06-06 PROCEDURE — 2140000000 HC CCU INTERMEDIATE R&B

## 2020-06-06 PROCEDURE — 85027 COMPLETE CBC AUTOMATED: CPT

## 2020-06-06 RX ADMIN — INSULIN LISPRO 3 UNITS: 100 INJECTION, SOLUTION INTRAVENOUS; SUBCUTANEOUS at 11:20

## 2020-06-06 RX ADMIN — FERROUS SULFATE TAB 325 MG (65 MG ELEMENTAL FE) 325 MG: 325 (65 FE) TAB at 09:03

## 2020-06-06 RX ADMIN — SENNOSIDES AND DOCUSATE SODIUM 1 TABLET: 8.6; 5 TABLET ORAL at 09:02

## 2020-06-06 RX ADMIN — METOPROLOL TARTRATE 50 MG: 50 TABLET, FILM COATED ORAL at 09:02

## 2020-06-06 RX ADMIN — OXYCODONE HYDROCHLORIDE AND ACETAMINOPHEN 2 TABLET: 5; 325 TABLET ORAL at 21:27

## 2020-06-06 RX ADMIN — METOPROLOL TARTRATE 50 MG: 50 TABLET, FILM COATED ORAL at 21:29

## 2020-06-06 RX ADMIN — FERROUS SULFATE TAB 325 MG (65 MG ELEMENTAL FE) 325 MG: 325 (65 FE) TAB at 17:14

## 2020-06-06 RX ADMIN — INSULIN LISPRO 3 UNITS: 100 INJECTION, SOLUTION INTRAVENOUS; SUBCUTANEOUS at 17:14

## 2020-06-06 RX ADMIN — Medication 10 ML: at 21:34

## 2020-06-06 RX ADMIN — METFORMIN HYDROCHLORIDE 1000 MG: 1000 TABLET ORAL at 08:25

## 2020-06-06 RX ADMIN — BUMETANIDE 1 MG: 1 TABLET ORAL at 08:26

## 2020-06-06 RX ADMIN — OXYCODONE HYDROCHLORIDE AND ACETAMINOPHEN 2 TABLET: 5; 325 TABLET ORAL at 00:16

## 2020-06-06 RX ADMIN — APIXABAN 5 MG: 5 TABLET, FILM COATED ORAL at 21:30

## 2020-06-06 RX ADMIN — OXYCODONE HYDROCHLORIDE AND ACETAMINOPHEN 2 TABLET: 5; 325 TABLET ORAL at 17:14

## 2020-06-06 RX ADMIN — INSULIN LISPRO 3 UNITS: 100 INJECTION, SOLUTION INTRAVENOUS; SUBCUTANEOUS at 08:27

## 2020-06-06 RX ADMIN — INSULIN GLARGINE 35 UNITS: 100 INJECTION, SOLUTION SUBCUTANEOUS at 21:52

## 2020-06-06 RX ADMIN — OXYCODONE HYDROCHLORIDE AND ACETAMINOPHEN 2 TABLET: 5; 325 TABLET ORAL at 04:24

## 2020-06-06 RX ADMIN — AMIODARONE HYDROCHLORIDE 400 MG: 200 TABLET ORAL at 21:30

## 2020-06-06 RX ADMIN — Medication 10 ML: at 09:30

## 2020-06-06 RX ADMIN — AMIODARONE HYDROCHLORIDE 400 MG: 200 TABLET ORAL at 13:03

## 2020-06-06 RX ADMIN — METFORMIN HYDROCHLORIDE 1000 MG: 1000 TABLET ORAL at 18:51

## 2020-06-06 RX ADMIN — DIGOXIN 125 MCG: 125 TABLET ORAL at 08:25

## 2020-06-06 RX ADMIN — BISACODYL 5 MG: 5 TABLET, COATED ORAL at 09:03

## 2020-06-06 RX ADMIN — OXYCODONE HYDROCHLORIDE AND ACETAMINOPHEN 2 TABLET: 5; 325 TABLET ORAL at 09:02

## 2020-06-06 RX ADMIN — Medication 500 MG: at 08:26

## 2020-06-06 RX ADMIN — TAMSULOSIN HYDROCHLORIDE 0.4 MG: 0.4 CAPSULE ORAL at 09:08

## 2020-06-06 RX ADMIN — APIXABAN 5 MG: 5 TABLET, FILM COATED ORAL at 09:02

## 2020-06-06 RX ADMIN — DILTIAZEM HYDROCHLORIDE 120 MG: 120 CAPSULE, COATED, EXTENDED RELEASE ORAL at 09:01

## 2020-06-06 RX ADMIN — MAGNESIUM GLUCONATE 500 MG ORAL TABLET 400 MG: 500 TABLET ORAL at 08:26

## 2020-06-06 RX ADMIN — OXYCODONE HYDROCHLORIDE AND ACETAMINOPHEN 2 TABLET: 5; 325 TABLET ORAL at 13:03

## 2020-06-06 RX ADMIN — PANTOPRAZOLE SODIUM 40 MG: 40 TABLET, DELAYED RELEASE ORAL at 09:03

## 2020-06-06 RX ADMIN — SENNOSIDES AND DOCUSATE SODIUM 1 TABLET: 8.6; 5 TABLET ORAL at 21:30

## 2020-06-06 RX ADMIN — ASPIRIN 81 MG: 81 TABLET, COATED ORAL at 08:25

## 2020-06-06 RX ADMIN — FOLIC ACID 1 MG: 1 TABLET ORAL at 09:01

## 2020-06-06 RX ADMIN — AMIODARONE HYDROCHLORIDE 400 MG: 200 TABLET ORAL at 09:02

## 2020-06-06 RX ADMIN — MAGNESIUM HYDROXIDE 30 ML: 2400 SUSPENSION ORAL at 13:09

## 2020-06-06 RX ADMIN — ATORVASTATIN CALCIUM 40 MG: 40 TABLET, FILM COATED ORAL at 21:29

## 2020-06-06 ASSESSMENT — PAIN SCALES - GENERAL
PAINLEVEL_OUTOF10: 7
PAINLEVEL_OUTOF10: 7
PAINLEVEL_OUTOF10: 8
PAINLEVEL_OUTOF10: 0
PAINLEVEL_OUTOF10: 2
PAINLEVEL_OUTOF10: 8

## 2020-06-06 ASSESSMENT — PAIN DESCRIPTION - PAIN TYPE
TYPE: SURGICAL PAIN
TYPE: SURGICAL PAIN

## 2020-06-06 ASSESSMENT — PAIN DESCRIPTION - ORIENTATION: ORIENTATION: MID

## 2020-06-06 ASSESSMENT — PAIN DESCRIPTION - FREQUENCY
FREQUENCY: INTERMITTENT
FREQUENCY: INTERMITTENT

## 2020-06-06 ASSESSMENT — PAIN DESCRIPTION - LOCATION
LOCATION: STERNUM
LOCATION: STERNUM

## 2020-06-06 ASSESSMENT — PAIN DESCRIPTION - DESCRIPTORS
DESCRIPTORS: ACHING;SORE;DISCOMFORT
DESCRIPTORS: ACHING;SORE;DISCOMFORT

## 2020-06-06 NOTE — PROGRESS NOTES
[address] [place] [address]- [facility] Electrophysiology  Progress Note Report  PATIENT: Flora Reyes  MEDICAL RECORD NUMBER: [id number]  DATE OF SERVICE:  6/6/2020  ATTENDING ELECTROPHYSIOLOGIST: Dr. Mando Mendoza  PRIMARY ELECTROPHYSIOLOGIST: Dr. Ramirez Rawls: ANGIE Shannon and Jarrod Calderón MD  CHIEF COMPLAINT: Coronary Artery Disease     HPI: This is a 54 y.o. male with a history of   Patient Active Problem List   Diagnosis    TIA (transient ischemic attack)    New onset atrial fibrillation ([address]    Acute renal failure (ARF) ([address]    Diverticulitis    Atrial fibrillation ([address]    Type 2 diabetes mellitus ([address]    Essential hypertension    Hyperlipidemia    Acute liver failure without hepatic coma    Sepsis ([address]    Diabetes mellitus ([address]    Acute hepatitis B    Cellulitis    Abscess of foot    Atrial flutter ([address]    Atrial fibrillation with RVR ([address]    HUMERA (obstructive sleep apnea)    Chest pain    History of TIA (transient ischemic attack)    Unstable angina ([address]    Acute coronary syndrome ([address]    CAD in native artery    COVID-19    Acute postoperative respiratory insufficiency    Postoperative hypotension   who presents to the hospital for urgent three vessel CABG on 5/28/20. Flora Reyes is known to Dr. Erasmo Sandhu and has a past medical history of Obesity, Hypertension, Hyperlipidemia,  Type 2 Diabetes, TIA, paroxysmal atrial fibrillation with use of Xarelto for [facility], Hepatis B and Coronary Artery Disease. On May 17, 2020 the patient presented to the emergency department at [facility] with complaints of chest discomfort, he underwent a Lexiscan stress test on the 18th which was abnormal followed by a left heart catheterization with Dr. Ashley Higginbotham on 5/20/2020 this revealed significant three-vessel coronary artery disease.   On May 26 he underwent three-vessel CABG (LIMA-LAD, SVG-diagonal, SVG-PL,) MAZE, Acute renal failure (ARF) ([facility] 07/15/2016    Diverticulitis 07/15/2016    Atrial fibrillation ([facility] 07/15/2016    Type 2 diabetes mellitus ([facility] 07/15/2016    Essential hypertension 07/15/2016    Hyperlipidemia 07/15/2016    TIA (transient ischemic attack) 07/07/2015    New onset atrial fibrillation ([facility] 07/07/2015       Past Medical History:   Diagnosis Date    Atrial fibrillation ([facility]     Atrial fibrillation ([facility] 02/20/2018    lexiscan stress test    Cerebral artery occlusion with cerebral infarction ([facility]     Diabetes mellitus ([facility]     Hyperlipidemia     Hypertension     Psychiatric problem     TIA (transient ischemic attack)     2015       Family History   Problem Relation Age of Onset    Cancer Mother     Heart Disease Father        Social History     Tobacco Use    Smoking status: Heavy Tobacco Smoker     Packs/day: 0.50     Years: 35.00     Pack years: 17.50     Types: Cigarettes    Smokeless tobacco: Never Used   Substance Use Topics    Alcohol use: No     Comment: STOPPED       Current Facility-Administered Medications   Medication Dose Route Frequency Provider Last Rate Last Dose    bumetanide (BUMEX) tablet 1 mg  1 mg Oral Daily Jeremie Kurtz MD   1 mg at 06/05/20 1049    metFORMIN (GLUCOPHAGE) tablet 1,000 mg  1,000 mg Oral BID  ANGIE Lobo CNP   1,000 mg at 06/05/20 1609    dilTIAZem (CARDIZEM CD) extended release capsule 120 mg  120 mg Oral Daily ANGIE Dillard CNP   120 mg at 06/05/20 1208    ipratropium-albuterol (DUONEB) nebulizer solution 1 ampule  1 ampule Inhalation Q4H PRN Belén Gomez DO        insulin glargine (LANTUS) injection vial 35 Units  35 Units Subcutaneous Nightly ANGIE Lobo CNP   35 Units at 06/05/20 2017    digoxin (LANOXIN) injection 500 mcg  500 mcg Intravenous Once ANGIE Lobo CNP        diphenhydrAMINE (BENADRYL) tablet 25 mg  25 mg Oral Q6H PRN Clara Zavala DO   25 mg at 06/03/20 1506    hydrocortisone 1 % cream   Topical BID Doyce Gerardo, DO        digoxin (LANOXIN) tablet 125 mcg  125 mcg Oral Daily Doyce Gerardo, DO   125 mcg at 06/05/20 0801    oxyCODONE-acetaminophen (PERCOCET) 5-325 MG per tablet 1 tablet  1 tablet Oral Q4H PRN Doyce [place], DO        Or    oxyCODONE-acetaminophen (PERCOCET) 5-325 MG per tablet 2 tablet  2 tablet Oral Q4H PRN Doyce [place], DO   2 tablet at 06/06/20 0424    metoprolol tartrate (LOPRESSOR) tablet 50 mg  50 mg Oral BID Doyce Gerardo, DO   50 mg at 06/05/20 2011    amiodarone (CORDARONE) tablet 400 mg  400 mg Oral TID Doyce Gerardo, DO   400 mg at 06/05/20 2010    magnesium hydroxide (MILK OF MAGNESIA) 400 MG/5ML suspension 30 mL  30 mL Oral Daily PRN Doyce [place], DO   30 mL at 06/05/20 0801    apixaban (ELIQUIS) tablet 5 mg  5 mg Oral BID Doyce Gerardo, DO   5 mg at 06/03/20 0829    acetaminophen (TYLENOL) tablet 650 mg  650 mg Oral Q4H PRN Doyce Gerardo, DO        aspirin EC tablet 81 mg  81 mg Oral Daily Shimon Vega, DO   81 mg at 06/05/20 0801    ferrous sulfate (IRON 325) tablet 325 mg  325 mg Oral BID WC Doyce [place], DO   325 mg at 06/05/20 1609    vitamin C (ASCORBIC ACID) tablet 500 mg  500 mg Oral BID Doyce Gerardo, DO   500 mg at 78/22/47 0967    folic acid (FOLVITE) tablet 1 mg  1 mg Oral Daily Doyce Gerardo, DO   1 mg at 06/05/20 0802    potassium chloride (KLOR-CON M) extended release tablet 20 mEq  20 mEq Oral PRN Doyce Gerardo, DO        ondansetron [facility]) injection 4 mg  4 mg Intravenous Q8H PRN Doyce [place], DO        pantoprazole (PROTONIX) tablet 40 mg  40 mg Oral Daily Doyce Gerardo, DO   40 mg at 06/05/20 0802    sennosides-docusate sodium (SENOKOT-S) 8.6-50 MG tablet 1 tablet  1 tablet Oral BID Doyce [place], DO   1 tablet at 06/05/20 2010    bisacodyl (DULCOLAX) EC tablet 5 mg  5 mg Oral Daily PRN Alex Carrillo DO   5 mg at 05/31/20 0829    insulin lispro (HUMALOG) injection vial 0-18 Units  0-18 Units Subcutaneous TID WC Azeem Richard, DO   3 Units at 06/05/20 1610    insulin lispro (HUMALOG) injection vial 0-9 Units  0-9 Units Subcutaneous Nightly Azeem Richard, DO   2 Units at 06/05/20 2017    bisacodyl (DULCOLAX) suppository 10 mg  10 mg Rectal PRN Azeem Richard, DO   10 mg at 05/30/20 1621    benzocaine-menthol (CEPACOL SORE THROAT) lozenge 1 lozenge  1 lozenge Oral Q2H PRN Azeem Richard, DO   1 lozenge at 06/01/20 1610    atorvastatin (LIPITOR) tablet 40 mg  40 mg Oral Nightly Azeem Richard, DO   40 mg at 06/05/20 2010    sodium chloride flush 0.9 % injection 10 mL  10 mL Intravenous 2 times per day Azeem Richard, DO   10 mL at 06/05/20 2017    sodium chloride flush 0.9 % injection 10 mL  10 mL Intravenous PRN Azeem Richard, DO   10 mL at 06/05/20 0258    magnesium oxide (MAG-OX) tablet 400 mg  400 mg Oral Daily Azeem Richard, DO   400 mg at 06/05/20 0801    glucose (GLUTOSE) 40 % oral gel 15 g  15 g Oral PRN Azeem Richard, DO        dextrose 50 % IV solution  12.5 g Intravenous PRN Azeem Richard, DO        glucagon (rDNA) injection 1 mg  1 mg Intramuscular PRN Nataly Vega, DO        dextrose 5 % solution  100 mL/hr Intravenous PRN Azeem Richard, DO        tamsulosin [facility]) capsule 0.4 mg  0.4 mg Oral Daily Azeem Richard, DO   0.4 mg at 06/05/20 0802        Allergies   Allergen Reactions    Other      pollen       PHYSICAL EXAM:   Vitals:    06/05/20 2324 06/06/20 0433 06/06/20 0700 06/06/20 0746   BP: 110/68 121/69  119/82   Pulse: 68 63     Resp: 20 18     Temp: 96.6 °F (35.9 °C) 97.6 °F (36.4 °C)     TempSrc: Temporal Temporal     SpO2: 94% 97% 98%    Weight:  275 lb 6.4 oz (124.9 kg)     Height:          Constitutional: In NAD  Head: Normocephalic and atraumatic,   Neck: No hepatojugular reflux and no JVD present  Cardiovascular: S1 , S2 present IRIR  Pulmonary/Chest:  No respiratory distress. Abdominal: Soft.  Normal appearance   Musculoskeletal: Sternal restrictions  Neurological: Pericardial effusion   - S/P subxyphoid pericardial window on 6/4/2020   - Chest tube retained     4. HUMERA   - CPAP    5. Hyponatremia   - Nephrology following     6. DM type 2     7. Volume overload   -  Oral  Bumex       Plan     - No plan for DCCV at this time   -  Continue with rate control strategy with Cardizem  daily, and  PO amiodarone 400mg TID, Digoxin 125 mcg daily, metoprolol 50 mg bid  - Rates are well controlled currently  - Resume Eliquis when cleared by surgery  - If more rate control is needed, we can either up titrate B-blocker or CCB     Please call if additional questions.      Royal Jhoana NG  [facility] Electrophysiology

## 2020-06-06 NOTE — PROGRESS NOTES
Department of Internal Medicine  Nephrology Attending Progress Note    Events reviewed. SUBJECTIVE:  We are following Mr. Agudelo for hyponatremia. Reports no complaints today. Possible discharge tomorrow. Physical Exam:    VITALS:  /82   Pulse 84   Temp 98 °F (36.7 °C) (Temporal)   Resp 18   Ht 6' (1.829 m)   Wt 275 lb 6.4 oz (124.9 kg)   SpO2 97%   BMI 37.35 kg/m²   24HR INTAKE/OUTPUT:      Intake/Output Summary (Last 24 hours) at 6/6/2020 0909  Last data filed at 6/6/2020 0745  Gross per 24 hour   Intake 400 ml   Output 2022 ml   Net -1622 ml     Constitutional: Patient is awake alert in no distress  HEENT: Pupils are equal reactive, has hearing aids  Respiratory: Lungs are diminished at the bases  Cardiovascular/Edema: Heart sounds are regular rate, pericardial window in place.   Gastrointestinal: Abdomen soft  Neurologic: Nonfocal  Skin: No lesions  Other: Trace edema    Scheduled Meds:   bumetanide  1 mg Oral Daily    metFORMIN  1,000 mg Oral BID WC    dilTIAZem  120 mg Oral Daily    insulin glargine  35 Units Subcutaneous Nightly    digoxin  500 mcg Intravenous Once    hydrocortisone   Topical BID    digoxin  125 mcg Oral Daily    metoprolol tartrate  50 mg Oral BID    amiodarone  400 mg Oral TID    apixaban  5 mg Oral BID    aspirin  81 mg Oral Daily    ferrous sulfate  325 mg Oral BID WC    vitamin C  500 mg Oral BID    folic acid  1 mg Oral Daily    pantoprazole  40 mg Oral Daily    sennosides-docusate sodium  1 tablet Oral BID    insulin lispro  0-18 Units Subcutaneous TID WC    insulin lispro  0-9 Units Subcutaneous Nightly    atorvastatin  40 mg Oral Nightly    sodium chloride flush  10 mL Intravenous 2 times per day    magnesium oxide  400 mg Oral Daily    tamsulosin  0.4 mg Oral Daily     Continuous Infusions:   dextrose       PRN Meds:.ipratropium-albuterol, diphenhydrAMINE, oxyCODONE-acetaminophen **OR** oxyCODONE-acetaminophen, magnesium hydroxide, acetaminophen, potassium chloride, ondansetron, bisacodyl, bisacodyl, benzocaine-menthol, sodium chloride flush, glucose, dextrose, glucagon (rDNA), dextrose      DATA:    CBC:   Lab Results   Component Value Date    WBC 15.5 06/06/2020    RBC 3.25 06/06/2020    HGB 9.8 06/06/2020    HCT 30.8 06/06/2020    MCV 94.8 06/06/2020    MCH 30.2 06/06/2020    MCHC 31.8 06/06/2020    RDW 13.6 06/06/2020     06/06/2020    MPV 9.9 06/06/2020     CMP:    Lab Results   Component Value Date     06/06/2020    K 4.8 06/06/2020    K 4.8 05/16/2020    CL 94 06/06/2020    CO2 29 06/06/2020    BUN 30 06/06/2020    CREATININE 1.0 06/06/2020    GFRAA >60 06/06/2020    LABGLOM >60 06/06/2020    GLUCOSE 167 06/06/2020    PROT 6.9 06/06/2020    LABALBU 3.4 06/06/2020    CALCIUM 9.4 06/06/2020    BILITOT 0.3 06/06/2020    ALKPHOS 58 06/06/2020    AST 16 06/06/2020    ALT 7 06/06/2020     Magnesium:    Lab Results   Component Value Date    MG 2.0 06/06/2020     Phosphorus:    Lab Results   Component Value Date    PHOS 2.9 07/18/2016     Urine sodium: <20------------------- <20  Urine potassium: 16.7-------------- 15.2  Urine chloride: <20------------------ <20  Urine modality: 515----------------- 315    Radiology Review:        Echocardiogram May 20, 2020:  Ejection fraction about 60%. Chest x-ray June 4, 2020   No interval change                      BRIEF SUMMARY OF INITIAL CONSULT:    Briefly Mr. Colt Baca is a 69-year-old man with history of HTN, type II DM, TIA, PAF, who was recently admitted to [facility] with chest pain for which he underwent cardiac utilization was found to have multivessel disease for which he had CABG x3 on May 28, 2020. On admission his sodium level was 137 mEq/L but since then it has progressively decreased down to 129  milliequivalents/L reason for this consultation. Problems Resolved:  · High anion gap, possibly ketoacidosis? IMPRESSION/RECOMMENDATIONS:      1.  Hypotonic

## 2020-06-06 NOTE — PROGRESS NOTES
POD#9 Awake, alert. No complaints. Denies CP, palpitations, SOB at rest, dizziness/lightheadedness. Vitals:    06/05/20 2324 06/06/20 0433 06/06/20 0700 06/06/20 0746   BP: 110/68 121/69  119/82   Pulse: 68 63     Resp: 20 18     Temp: 96.6 °F (35.9 °C) 97.6 °F (36.4 °C)     TempSrc: Temporal Temporal     SpO2: 94% 97% 98%    Weight:  275 lb 6.4 oz (124.9 kg)     Height:         O2: none      Intake/Output Summary (Last 24 hours) at 6/6/2020 0750  Last data filed at 6/6/2020 0745  Gross per 24 hour   Intake 520 ml   Output 2022 ml   Net -1502 ml       +BM 6/2  UO: 170mL/8hr   CT output: Mediastinal: 2mL/8hr (22mL/24hrs)        Recent Labs     06/04/20  1219 06/05/20  0631 06/06/20  0558   WBC 12.6* 15.5* 15.5*   HGB 9.6* 10.1* 9.8*   HCT 29.4* 31.2* 30.8*    382 414      Recent Labs     06/05/20  0631 06/05/20  1436 06/06/20  0558   BUN 29* 28* 30*   CREATININE 0.9 0.9 1.0         Telemetry: aflutter, cvr (HR 70's)      PE  Cardiac: IRRR   Lungs: decreased bases  Chest incision C/D/I, approximated, no erythema. Sternum stable. Prior chest tube site incisions C/D/I, no erythema with intact sutures. Chest tube x 1 present and secure. Abd: Soft, nontender, +BS  Ext: Incisions C/D/I, approximated, no erythema, +ecchymosis right medial thigh, +minimal edema           A/P: POD#9  1. CAD  --Stable s/p CABG x 3, maze with PVI, LIS ligation, rigid sternal fixation with KLS plating system on 5/28/2020  --Scr stable- 1.0  --BB/statin  --reinforce sternal precautions        2. Acute blood loss anemia secondary to open heart surgery  --stable, hgb 9.8        3. Pericardial effusion  -- Echo 6/3 and 6/4 showed large pericardial effusion with early tamponade physiology   --s/p subxyphoid pericardial window on 6/4/2020 for 300cc dark bloody effusion  --Chest tube removed without difficulty. Patient tolerated well          4.  Hx afib  --s/p maze/lis ligation  --currently aflutter, cvr- oral amiodarone/BB/dig--possible DCCV on Monday per cardiology  --on xarelto at home  --will switch to eliquis short term post operatively with resumption of xarelto at later time--eliquis initiated 5/31- Eliquis held 6/4, 6/5--resumed this AM   --EP following       5. Prolonged postoperative respiratory insufficiency  --wean oxygen to keep SpO2 greater than or equal to 92%  --continue duonebs with ezpap  --encourage C&DB, SMI  --currently on RA  --On oral bumex daily  -- 2 view cxr- bilateral congestion  --appreciate pulmonary's input        6. Hyponatremia  --Na 135 again  this AM--renal on board  -- appreciate renal input         7. Hx DM2  --on lantus/metformin at home  --continue lantus at home dose, continue high scale SSI, continue metformin--monitor        8. Constipation  --resolved  --Continue MOM and senna-s.   --Encouraged continued increase in oral intake and activity.          9. Post operative deconditioning  --Increase activity as tolerated  --PT/OT  --ambulating 800ft  -- lives alone, but by time he is ready for discharge will likely be ok for home with [facility]        10. DVT prophylaxis:  --continue bilateral knee high HOLLY hose  --continue PCDs  --continue progressive ambulation  --continue eliquis        11- uncontrolled pain-   - Norco changed to Percocet--improved        12.  Volume overload-   -- bumex 1mg oral daily        Dispo:declined by [facility]- looking into [facility]- DC on hold d/t PW--possible need for DCCV on Monday--lives alone, but by time he is ready for discharge will likely be ok for home with [facility]       This patient's case and care plan was discussed with the attending surgeon

## 2020-06-06 NOTE — PROGRESS NOTES
INR 1.2 05/28/2020     TSH:    Lab Results   Component Value Date    TSH 5.040 06/01/2020     Rhythm Strip: atrial fibrillation. ASSESSMENT & PLAN:    Patient Active Problem List   Diagnosis    TIA (transient ischemic attack)    New onset atrial fibrillation (HCC)    Acute renal failure (ARF) (HCC)    Diverticulitis    Atrial fibrillation (HCC)    Type 2 diabetes mellitus ([facility]    Essential hypertension    Hyperlipidemia    Acute liver failure without hepatic coma    Sepsis ([facility]    Diabetes mellitus ([facility]    Acute hepatitis B    Cellulitis    Abscess of foot    Atrial flutter (HCC)    Atrial fibrillation with RVR (HCC)    HUMERA (obstructive sleep apnea)    Chest pain    History of TIA (transient ischemic attack)    Unstable angina (HCC)    Acute coronary syndrome ([facility]    CAD in native artery    COVID-19    Acute postoperative respiratory insufficiency    Postoperative hypotension     1. CAD: S/p CABG on 5/28/2020 with a LIMA to LAD, SVG to diagonal, SVG to posterior lateral branch. ASA/statin/BB. 2.  Paroxysmal atrial flutter/fibrillation:     Eliquis/amio/dig/Cardizem/BB. 3. History of TIA: Eliquis/statin. 4. Hypertension: Observe. 5. Tobacco abuse    6. Diabetes mellitus    7. Obesity    8. Large loculated pericardial effusion over LV can not rule out localized tamponade physiology,s/p pericardiocentesis. 9. Anemia    Gretel Trejo D.O.   Cardiologist  Cardiology, [id number]

## 2020-06-06 NOTE — PROGRESS NOTES
Marleni Pereyra M.D.,[facility]  Grecia Laura D.O., FCARMINAOERYN., [facility] HERNANDEZ [place]. Hermelinda Anders M.D., Miky Bardales M.D. Padmini Cuenca D.O. Daily Pulmonary Progress Note    Patient:  Hermila Armando 54 y.o. male MRN: [id number]     Date of Service: 6/6/2020      Synopsis     We are following patient for s/p post operative hypoxia  CABG , s/p pericardial window     \"CC\" pain     Code status:full      Subjective      Patient was seen and examined. Taken for pericardial window for pericardial effusion . He reports that he feels better today , feels no SOB had no cough . Review of Systems:  Constitutional: Denies fever, weight loss, night sweats, and fatigue  Skin: Denies pigmentation, dark lesions, and rashes   HEENT: Denies hearing loss, tinnitus, ear drainage, epistaxis, sore throat, and hoarseness. Cardiovascular: Denies palpitations, chest pain, and chest pressure. Respiratory: Denies cough, dyspnea at rest, hemoptysis, apnea, and choking.   Gastrointestinal: Denies nausea, vomiting, poor appetite, diarrhea, heartburn or reflux  Genitourinary: Denies dysuria, frequency, urgency or hematuria    24-hour events:  S/o pericardial window     Objective   Vitals: /66   Pulse 69   Temp 97 °F (36.1 °C) (Temporal)   Resp 18   Ht 6' (1.829 m)   Wt 275 lb 6.4 oz (124.9 kg)   SpO2 97%   BMI 37.35 kg/m²     I/O:    Intake/Output Summary (Last 24 hours) at 6/6/2020 1313  Last data filed at 6/6/2020 0830  Gross per 24 hour   Intake 580 ml   Output 2022 ml   Net -1442 ml             CPAP/EPAP: 12 cmH2O     CURRENT MEDS :  Scheduled Meds:   bumetanide  1 mg Oral Daily    metFORMIN  1,000 mg Oral BID WC    dilTIAZem  120 mg Oral Daily    insulin glargine  35 Units Subcutaneous Nightly    digoxin  500 mcg Intravenous Once    hydrocortisone   Topical BID    digoxin  125 mcg Oral Daily    metoprolol tartrate  50 mg Oral BID    amiodarone  400 mg Oral TID    apixaban  5 mg Oral

## 2020-06-06 NOTE — PLAN OF CARE
Problem: Falls - Risk of:  Goal: Will remain free from falls  Description: Will remain free from falls  6/6/2020 0942 by Claudio Curry RN  Outcome: Met This Shift  6/6/2020 0549 by Rubi Cheek RN  Outcome: Met This Shift  Goal: Absence of physical injury  Description: Absence of physical injury  6/6/2020 0942 by Claudio Curry RN  Outcome: Met This Shift  6/6/2020 0549 by Rubi Cheek RN  Outcome: Met This Shift     Problem:  Activity Intolerance:  Goal: Able to perform prescribed physical activity  Description: Able to perform prescribed physical activity  6/6/2020 0549 by Rubi Cheek RN  Outcome: Met This Shift     Problem: Anxiety:  Goal: Level of anxiety will decrease  Description: Level of anxiety will decrease  6/6/2020 0549 by Rubi Cheek RN  Outcome: Met This Shift     Problem: Cardiac Output - Decreased:  Goal: Cardiac output within specified parameters  Description: Cardiac output within specified parameters  6/6/2020 0942 by Claudio Curry RN  Outcome: Met This Shift  6/6/2020 0549 by Rubi Cheek RN  Outcome: Ongoing  Goal: Hemodynamic stability will improve  Description: Hemodynamic stability will improve  Outcome: Met This Shift     Problem: Gas Exchange - Impaired:  Goal: Levels of oxygenation will improve  Description: Levels of oxygenation will improve  6/6/2020 0549 by Rubi Cheek RN  Outcome: Met This Shift

## 2020-06-07 LAB
ANION GAP SERPL CALCULATED.3IONS-SCNC: 13 MMOL/L (ref 7–16)
BUN BLDV-MCNC: 36 MG/DL (ref 6–20)
CALCIUM SERPL-MCNC: 9.4 MG/DL (ref 8.6–10.2)
CHLORIDE BLD-SCNC: 97 MMOL/L (ref 98–107)
CO2: 27 MMOL/L (ref 22–29)
CREAT SERPL-MCNC: 1 MG/DL (ref 0.7–1.2)
EKG ATRIAL RATE: 267 BPM
EKG P AXIS: -92 DEGREES
EKG Q-T INTERVAL: 436 MS
EKG QRS DURATION: 84 MS
EKG QTC CALCULATION (BAZETT): 424 MS
EKG R AXIS: -18 DEGREES
EKG T AXIS: 77 DEGREES
EKG VENTRICULAR RATE: 57 BPM
GFR AFRICAN AMERICAN: >60
GFR NON-AFRICAN AMERICAN: >60 ML/MIN/1.73
GLUCOSE BLD-MCNC: 168 MG/DL (ref 74–99)
HCT VFR BLD CALC: 32.3 % (ref 37–54)
HEMOGLOBIN: 10.2 G/DL (ref 12.5–16.5)
MAGNESIUM: 1.9 MG/DL (ref 1.6–2.6)
MCH RBC QN AUTO: 29.8 PG (ref 26–35)
MCHC RBC AUTO-ENTMCNC: 31.6 % (ref 32–34.5)
MCV RBC AUTO: 94.4 FL (ref 80–99.9)
METER GLUCOSE: 148 MG/DL (ref 74–99)
METER GLUCOSE: 163 MG/DL (ref 74–99)
METER GLUCOSE: 167 MG/DL (ref 74–99)
METER GLUCOSE: 226 MG/DL (ref 74–99)
PDW BLD-RTO: 13.9 FL (ref 11.5–15)
PLATELET # BLD: 433 E9/L (ref 130–450)
PMV BLD AUTO: 9.9 FL (ref 7–12)
POTASSIUM SERPL-SCNC: 4.6 MMOL/L (ref 3.5–5)
RBC # BLD: 3.42 E12/L (ref 3.8–5.8)
SODIUM BLD-SCNC: 137 MMOL/L (ref 132–146)
WBC # BLD: 15.7 E9/L (ref 4.5–11.5)

## 2020-06-07 PROCEDURE — 2580000003 HC RX 258: Performed by: THORACIC SURGERY (CARDIOTHORACIC VASCULAR SURGERY)

## 2020-06-07 PROCEDURE — 93798 PHYS/QHP OP CAR RHAB W/ECG: CPT

## 2020-06-07 PROCEDURE — 80048 BASIC METABOLIC PNL TOTAL CA: CPT

## 2020-06-07 PROCEDURE — 6370000000 HC RX 637 (ALT 250 FOR IP): Performed by: THORACIC SURGERY (CARDIOTHORACIC VASCULAR SURGERY)

## 2020-06-07 PROCEDURE — 6370000000 HC RX 637 (ALT 250 FOR IP): Performed by: NURSE PRACTITIONER

## 2020-06-07 PROCEDURE — 94660 CPAP INITIATION&MGMT: CPT

## 2020-06-07 PROCEDURE — 6370000000 HC RX 637 (ALT 250 FOR IP): Performed by: INTERNAL MEDICINE

## 2020-06-07 PROCEDURE — 6360000002 HC RX W HCPCS: Performed by: NURSE PRACTITIONER

## 2020-06-07 PROCEDURE — 99233 SBSQ HOSP IP/OBS HIGH 50: CPT | Performed by: INTERNAL MEDICINE

## 2020-06-07 PROCEDURE — 36415 COLL VENOUS BLD VENIPUNCTURE: CPT

## 2020-06-07 PROCEDURE — 82962 GLUCOSE BLOOD TEST: CPT

## 2020-06-07 PROCEDURE — 83735 ASSAY OF MAGNESIUM: CPT

## 2020-06-07 PROCEDURE — 93010 ELECTROCARDIOGRAM REPORT: CPT | Performed by: INTERNAL MEDICINE

## 2020-06-07 PROCEDURE — 93005 ELECTROCARDIOGRAM TRACING: CPT | Performed by: INTERNAL MEDICINE

## 2020-06-07 PROCEDURE — 85027 COMPLETE CBC AUTOMATED: CPT

## 2020-06-07 PROCEDURE — 2140000000 HC CCU INTERMEDIATE R&B

## 2020-06-07 RX ORDER — MAGNESIUM SULFATE IN WATER 40 MG/ML
2 INJECTION, SOLUTION INTRAVENOUS ONCE
Status: COMPLETED | OUTPATIENT
Start: 2020-06-07 | End: 2020-06-07

## 2020-06-07 RX ORDER — AMIODARONE HYDROCHLORIDE 200 MG/1
400 TABLET ORAL SEE ADMIN INSTRUCTIONS
Qty: 49 TABLET | Refills: 0 | Status: SHIPPED | OUTPATIENT
Start: 2020-06-07 | End: 2020-08-17 | Stop reason: ALTCHOICE

## 2020-06-07 RX ORDER — OXYCODONE HYDROCHLORIDE AND ACETAMINOPHEN 5; 325 MG/1; MG/1
1 TABLET ORAL EVERY 6 HOURS PRN
Qty: 28 TABLET | Refills: 0 | Status: SHIPPED | OUTPATIENT
Start: 2020-06-07 | End: 2020-06-14

## 2020-06-07 RX ORDER — FERROUS SULFATE 325(65) MG
325 TABLET ORAL 2 TIMES DAILY WITH MEALS
Qty: 60 TABLET | Refills: 0 | Status: SHIPPED | OUTPATIENT
Start: 2020-06-07 | End: 2020-08-17

## 2020-06-07 RX ORDER — METOPROLOL TARTRATE 50 MG/1
50 TABLET, FILM COATED ORAL 2 TIMES DAILY
Qty: 60 TABLET | Refills: 2 | Status: SHIPPED | OUTPATIENT
Start: 2020-06-07

## 2020-06-07 RX ORDER — DOCUSATE SODIUM 100 MG/1
100 CAPSULE, LIQUID FILLED ORAL 2 TIMES DAILY PRN
Qty: 60 CAPSULE | Refills: 0 | Status: SHIPPED | OUTPATIENT
Start: 2020-06-07 | End: 2020-08-17

## 2020-06-07 RX ORDER — FOLIC ACID 1 MG/1
1 TABLET ORAL DAILY
Qty: 30 TABLET | Refills: 0 | Status: SHIPPED | OUTPATIENT
Start: 2020-06-07 | End: 2020-08-17

## 2020-06-07 RX ORDER — DIGOXIN 125 MCG
125 TABLET ORAL DAILY
Qty: 30 TABLET | Refills: 1 | Status: SHIPPED | OUTPATIENT
Start: 2020-06-07

## 2020-06-07 RX ORDER — ATORVASTATIN CALCIUM 40 MG/1
40 TABLET, FILM COATED ORAL NIGHTLY
Qty: 30 TABLET | Refills: 2 | Status: SHIPPED | OUTPATIENT
Start: 2020-06-07 | End: 2022-05-19

## 2020-06-07 RX ORDER — DILTIAZEM HYDROCHLORIDE 120 MG/1
120 CAPSULE, COATED, EXTENDED RELEASE ORAL DAILY
Qty: 30 CAPSULE | Refills: 2 | Status: SHIPPED | OUTPATIENT
Start: 2020-06-07

## 2020-06-07 RX ADMIN — Medication 500 MG: at 00:32

## 2020-06-07 RX ADMIN — FOLIC ACID 1 MG: 1 TABLET ORAL at 08:13

## 2020-06-07 RX ADMIN — HYDROCORTISONE: 1 CREAM TOPICAL at 08:12

## 2020-06-07 RX ADMIN — INSULIN LISPRO 3 UNITS: 100 INJECTION, SOLUTION INTRAVENOUS; SUBCUTANEOUS at 17:01

## 2020-06-07 RX ADMIN — INSULIN LISPRO 3 UNITS: 100 INJECTION, SOLUTION INTRAVENOUS; SUBCUTANEOUS at 08:12

## 2020-06-07 RX ADMIN — MAGNESIUM GLUCONATE 500 MG ORAL TABLET 400 MG: 500 TABLET ORAL at 08:13

## 2020-06-07 RX ADMIN — OXYCODONE HYDROCHLORIDE AND ACETAMINOPHEN 2 TABLET: 5; 325 TABLET ORAL at 01:29

## 2020-06-07 RX ADMIN — MAGNESIUM SULFATE HEPTAHYDRATE 2 G: 40 INJECTION, SOLUTION INTRAVENOUS at 08:15

## 2020-06-07 RX ADMIN — Medication 10 ML: at 08:13

## 2020-06-07 RX ADMIN — Medication 500 MG: at 08:14

## 2020-06-07 RX ADMIN — AMIODARONE HYDROCHLORIDE 400 MG: 200 TABLET ORAL at 22:04

## 2020-06-07 RX ADMIN — FERROUS SULFATE TAB 325 MG (65 MG ELEMENTAL FE) 325 MG: 325 (65 FE) TAB at 17:00

## 2020-06-07 RX ADMIN — METFORMIN HYDROCHLORIDE 1000 MG: 1000 TABLET ORAL at 17:00

## 2020-06-07 RX ADMIN — DILTIAZEM HYDROCHLORIDE 120 MG: 120 CAPSULE, COATED, EXTENDED RELEASE ORAL at 08:14

## 2020-06-07 RX ADMIN — TAMSULOSIN HYDROCHLORIDE 0.4 MG: 0.4 CAPSULE ORAL at 08:14

## 2020-06-07 RX ADMIN — AMIODARONE HYDROCHLORIDE 400 MG: 200 TABLET ORAL at 13:44

## 2020-06-07 RX ADMIN — ATORVASTATIN CALCIUM 40 MG: 40 TABLET, FILM COATED ORAL at 22:04

## 2020-06-07 RX ADMIN — OXYCODONE HYDROCHLORIDE AND ACETAMINOPHEN 2 TABLET: 5; 325 TABLET ORAL at 05:37

## 2020-06-07 RX ADMIN — BUMETANIDE 1 MG: 1 TABLET ORAL at 08:13

## 2020-06-07 RX ADMIN — OXYCODONE HYDROCHLORIDE AND ACETAMINOPHEN 2 TABLET: 5; 325 TABLET ORAL at 17:49

## 2020-06-07 RX ADMIN — OXYCODONE HYDROCHLORIDE AND ACETAMINOPHEN 2 TABLET: 5; 325 TABLET ORAL at 09:41

## 2020-06-07 RX ADMIN — APIXABAN 5 MG: 5 TABLET, FILM COATED ORAL at 08:13

## 2020-06-07 RX ADMIN — PANTOPRAZOLE SODIUM 40 MG: 40 TABLET, DELAYED RELEASE ORAL at 08:14

## 2020-06-07 RX ADMIN — Medication 10 ML: at 22:04

## 2020-06-07 RX ADMIN — APIXABAN 5 MG: 5 TABLET, FILM COATED ORAL at 22:03

## 2020-06-07 RX ADMIN — Medication 500 MG: at 22:04

## 2020-06-07 RX ADMIN — METOPROLOL TARTRATE 50 MG: 50 TABLET, FILM COATED ORAL at 22:03

## 2020-06-07 RX ADMIN — INSULIN LISPRO 2 UNITS: 100 INJECTION, SOLUTION INTRAVENOUS; SUBCUTANEOUS at 22:07

## 2020-06-07 RX ADMIN — INSULIN LISPRO 6 UNITS: 100 INJECTION, SOLUTION INTRAVENOUS; SUBCUTANEOUS at 11:49

## 2020-06-07 RX ADMIN — OXYCODONE HYDROCHLORIDE AND ACETAMINOPHEN 2 TABLET: 5; 325 TABLET ORAL at 13:45

## 2020-06-07 RX ADMIN — DIGOXIN 125 MCG: 125 TABLET ORAL at 08:13

## 2020-06-07 RX ADMIN — METOPROLOL TARTRATE 50 MG: 50 TABLET, FILM COATED ORAL at 08:13

## 2020-06-07 RX ADMIN — AMIODARONE HYDROCHLORIDE 400 MG: 200 TABLET ORAL at 08:13

## 2020-06-07 RX ADMIN — ASPIRIN 81 MG: 81 TABLET, COATED ORAL at 08:14

## 2020-06-07 RX ADMIN — METFORMIN HYDROCHLORIDE 1000 MG: 1000 TABLET ORAL at 08:13

## 2020-06-07 RX ADMIN — SENNOSIDES AND DOCUSATE SODIUM 1 TABLET: 8.6; 5 TABLET ORAL at 22:04

## 2020-06-07 RX ADMIN — SENNOSIDES AND DOCUSATE SODIUM 1 TABLET: 8.6; 5 TABLET ORAL at 08:13

## 2020-06-07 RX ADMIN — FERROUS SULFATE TAB 325 MG (65 MG ELEMENTAL FE) 325 MG: 325 (65 FE) TAB at 08:13

## 2020-06-07 RX ADMIN — INSULIN GLARGINE 35 UNITS: 100 INJECTION, SOLUTION SUBCUTANEOUS at 22:08

## 2020-06-07 RX ADMIN — OXYCODONE HYDROCHLORIDE AND ACETAMINOPHEN 2 TABLET: 5; 325 TABLET ORAL at 21:58

## 2020-06-07 ASSESSMENT — PAIN DESCRIPTION - LOCATION
LOCATION: STERNUM

## 2020-06-07 ASSESSMENT — PAIN DESCRIPTION - PAIN TYPE
TYPE: SURGICAL PAIN

## 2020-06-07 ASSESSMENT — PAIN DESCRIPTION - ORIENTATION
ORIENTATION: MID

## 2020-06-07 ASSESSMENT — PAIN SCALES - GENERAL
PAINLEVEL_OUTOF10: 8
PAINLEVEL_OUTOF10: 7
PAINLEVEL_OUTOF10: 8
PAINLEVEL_OUTOF10: 7
PAINLEVEL_OUTOF10: 3
PAINLEVEL_OUTOF10: 3
PAINLEVEL_OUTOF10: 2
PAINLEVEL_OUTOF10: 8
PAINLEVEL_OUTOF10: 0
PAINLEVEL_OUTOF10: 6

## 2020-06-07 ASSESSMENT — PAIN DESCRIPTION - DESCRIPTORS
DESCRIPTORS: ACHING;DISCOMFORT;SORE

## 2020-06-07 NOTE — PROGRESS NOTES
POD#10 Awake, alert. No complaints. Up in chair. Denies CP, palpitations, SOB at rest, dizziness/lightheadedness. Vitals:    06/07/20 0417 06/07/20 0509 06/07/20 0730 06/07/20 0853   BP: 127/75   128/73   Pulse: 67   69   Resp: 18   18   Temp: 97.4 °F (36.3 °C)   97.6 °F (36.4 °C)   TempSrc: Oral      SpO2: 98%  93% 98%   Weight:  275 lb 6.4 oz (124.9 kg)     Height:         O2: none      Intake/Output Summary (Last 24 hours) at 6/7/2020 0924  Last data filed at 6/7/2020 0618  Gross per 24 hour   Intake 180 ml   Output 850 ml   Net -670 ml       +BM 6/6  UO: 400mL/8hr       Recent Labs     06/05/20  0631 06/06/20  0558 06/07/20  0541   WBC 15.5* 15.5* 15.7*   HGB 10.1* 9.8* 10.2*   HCT 31.2* 30.8* 32.3*    414 433      Recent Labs     06/05/20  1436 06/06/20  0558 06/07/20  0541   BUN 28* 30* 36*   CREATININE 0.9 1.0 1.0     Telemetry: aflutter, cvr (HR 70's)        PE  Cardiac: IRRR   Lungs: decreased bases  Chest incision C/D/I, approximated, no erythema. Sternum stable. Prior chest tube site incisions C/D/I, no erythema with intact sutures. Abd: Soft, nontender, +BS  Ext: Incisions C/D/I, approximated, no erythema, +ecchymosis right medial thigh, +minimal edema               A/P: POD#10  1. CAD  --Stable s/p CABG x 3, maze with PVI, LIS ligation, rigid sternal fixation with KLS plating system on 5/28/2020  --Scr stable- 1.0  --BB/statin  --reinforce sternal precautions        2. Acute blood loss anemia secondary to open heart surgery  --stable, hgb 10.2        3. Pericardial effusion  -- Echo 6/3 and 6/4 showed large pericardial effusion with early tamponade physiology   --s/p subxyphoid pericardial window on 6/4/2020 for 300cc dark bloody effusion          4.  Hx afib  --s/p maze/lis ligation  --currently aflutter, cvr- oral amiodarone/BB/dig--refrain from dccv this admission as his HR as been controlled over the last 50 hrs--on xarelto at home  --will switch to eliquis short term post operatively with resumption of xarelto at later time--eliquis initiated 5/31- Eliquis held 6/4, 6/5--resumed 6/6  --EP following        5. Prolonged postoperative respiratory insufficiency  --wean oxygen to keep SpO2 greater than or equal to 92%  --continue duonebs with ezpap  --encourage C&DB, SMI  --currently on RA  --On oral bumex daily  -- 2 view cxr- bilateral congestion  --appreciate pulmonary's input        6. Hyponatremia  --Na 137--renal on board  -- appreciate renal input         7. Hx DM2  --on lantus/metformin at home  --continue lantus at home dose, continue high scale SSI, continue metformin--monitor        8. Constipation  --resolved  --Continue MOM and senna-s.   --Encouraged continued increase in oral intake and activity.          9. Post operative deconditioning  --Increase activity as tolerated  --PT/OT  --ambulating 800ft  -- lives alone, but by this time he is ready for discharge to home with [place] [id number]        10. DVT prophylaxis:  --continue bilateral knee high HOLLY hose  --continue PCDs  --continue progressive ambulation  --continue eliquis        11- uncontrolled pain-   - Norco changed to Percocet--improved        12.  Volume overload-   -- bumex 1mg oral daily        Dispo:--lives alone, but by this time he is ready for discharge to home with [place] [id number]        This patient's case and care plan was discussed with the attending surgeon

## 2020-06-07 NOTE — PROGRESS NOTES
[facility] Cardiology Inpatient Progress Note    Patient is a 54 y.o. male of Amina Peralta MD seen in hospital follow up. Chief complaint: CABG    HPI: No CP or SOB.      Patient Active Problem List   Diagnosis    TIA (transient ischemic attack)    New onset atrial fibrillation (HCC)    Acute [address] failure (ARF) ([address]    Diverticulitis    Atrial fibrillation (HCC)    Type 2 diabetes mellitus ([address]    Essential hypertension    Hyperlipidemia    Acute liver failure without hepatic coma    S[facility]is [facility]    Diabetes mellitus ([address]    Acute hepatitis B    Cellulitis    Abscess of foot    Atrial flutter (HCC)    Atrial fibrillation with RVR (HCC)    HUMERA (obstructive sleep apnea)    Chest pain    History of TIA (transient ischemic attack)    Unstable angina (HCC)    Acute coronary syndrome ([facility]    CAD in native artery    COVID-19    Acute postoperative respiratory insufficiency    Postoperative hypotension       Allergies   Allergen Reactions    Other      pollen       Current Facility-Administered Medications   Medication Dose Route Frequency Provider Last Rate Last Dose    bumetanide (BUMEX) tablet 1 mg  1 mg Oral Daily Jeremie Kurtz MD   1 mg at 06/07/20 0813    metFORMIN (GLUCOPHAGE) tablet 1,000 mg  1,000 mg Oral BID  Alec Postal, APRN - CNP   1,000 mg at 06/07/20 0813    dilTIAZem (CARDIZEM CD) extended release capsule 120 mg  120 mg Oral Daily Álvaro Dudley APRN - CNP   120 mg at 06/07/20 0814    ipratropium-albuterol (DUONEB) nebulizer solution 1 ampule  1 ampule Inhalation Q4H PRN Roxanna Gomez DO        insulin glargine (LANTUS) injection vial 35 Units  35 Units Subcutaneous Nightly Alec Postal, APRN - CNP   35 Units at 06/06/20 2152    digoxin (LANOXIN) injection 500 mcg  500 mcg Intravenous Once Alec Postal, APRN - CNP        diphenhydrAMINE (BENADRYL) tablet 25 mg  25 mg Oral Q6H PRN Anahi Dominguez DO   25 mg at 06/03/20 1506    hydrocortisone 1 % cream   Topical BID Jax Halo, DO        digoxin [facility]) tablet 125 mcg  125 mcg Oral Daily Jax Halo, DO   125 mcg at 06/07/20 0813    oxyCODONE-acetaminophen (PERCOCET) 5-325 MG per tablet 1 tablet  1 tablet Oral Q4H PRN Jax Halo, DO        Or    oxyCODONE-acetaminophen (PERCOCET) 5-325 MG per tablet 2 tablet  2 tablet Oral Q4H PRN Jax Halo, DO   2 tablet at 06/07/20 0941    metoprolol tartrate (LOPRESSOR) tablet 50 mg  50 mg Oral BID Jax Halo, DO   50 mg at 06/07/20 0813    amiodarone (CORDARONE) tablet 400 mg  400 mg Oral TID Jax Halo, DO   400 mg at 06/07/20 0813    magnesium hydroxide (MILK OF MAGNESIA) 400 MG/5ML suspension 30 mL  30 mL Oral Daily PRN Jax Halo, DO   30 mL at 06/06/20 1309    apixaban (ELIQUIS) tablet 5 mg  5 mg Oral BID Jax Halo, DO   5 mg at 06/07/20 0813    acetaminophen (TYLENOL) tablet 650 mg  650 mg Oral Q4H PRN Jax Halo, DO        aspirin EC tablet 81 mg  81 mg Oral Daily Shimon Vega, DO   81 mg at 06/07/20 0814    ferrous sulfate (IRON 325) tablet 325 mg  325 mg Oral BID WC Jax Halo, DO   325 mg at 06/07/20 0813    vitamin C (ASCORBIC ACID) tablet 500 mg  500 mg Oral BID Jax Halo, DO   500 mg at 23/83/32 0272    folic acid (FOLVITE) tablet 1 mg  1 mg Oral Daily Jax Halo, DO   1 mg at 06/07/20 0813    potassium chloride (KLOR-CON M) extended release tablet 20 mEq  20 mEq Oral PRN Jax Halo, DO        ondansetron [facility]) injection 4 mg  4 mg Intravenous Q8H PRN Jim Vega, DO        pantoprazole (PROTONIX) tablet 40 mg  40 mg Oral Daily Jax Halo, DO   40 mg at 06/07/20 0814    sennosides-docusate sodium (SENOKOT-S) 8.6-50 MG tablet 1 tablet  1 tablet Oral BID Jax Halo, DO   1 tablet at 06/07/20 0813    bisacodyl (DULCOLAX) EC tablet 5 mg  5 mg Oral Daily PRN Jax Halo, DO   5 mg at 06/06/20 0903    insulin lispro (HUMALOG) injection vial 0-18 Units  0-18 Units Subcutaneous TID WC

## 2020-06-07 NOTE — PLAN OF CARE
Problem: Falls - Risk of:  Goal: Will remain free from falls  Description: Will remain free from falls  Outcome: Met This Shift     Problem:  Activity Intolerance:  Goal: Able to perform prescribed physical activity  Description: Able to perform prescribed physical activity  Outcome: Met This Shift     Problem: Anxiety:  Goal: Level of anxiety will decrease  Description: Level of anxiety will decrease  Outcome: Met This Shift     Problem: Gas Exchange - Impaired:  Goal: Levels of oxygenation will improve  Description: Levels of oxygenation will improve  Outcome: Met This Shift     Problem: Pain:  Goal: Control of acute pain  Description: Control of acute pain  Outcome: Ongoing     Problem: Fluid Volume - Imbalance:  Goal: Ability to achieve a balanced intake and output will improve  Description: Ability to achieve a balanced intake and output will improve  Outcome: Ongoing

## 2020-06-07 NOTE — PROGRESS NOTES
Date: 6/6/2020    Time: 10:21 PM    Patient Placed On BIPAP/CPAP/ Non-Invasive Ventilation? Yes    If no must comment. Facial area red/color change? No           If YES are Blister/Lesion present? No   If yes must notify nursing staff  BIPAP/CPAP skin barrier?   Yes    Skin barrier type:duoderm       Comments:        Sharmaine Brown

## 2020-06-07 NOTE — PROGRESS NOTES
[address] [facility] 264- The Heart and Vascular [facility] Electrophysiology  Progress Note Report  PATIENT: Frank Hilton  MEDICAL RECORD NUMBER: [id number]  DATE OF SERVICE:  6/7/2020  ATTENDING ELECTROPHYSIOLOGIST: Dr. Consuelo Duong  PRIMARY ELECTROPHYSIOLOGIST: Dr. Eduardo Guadarrama: ANGIE Pugh and Jj Brasher MD  CHIEF COMPLAINT: Coronary Artery Disease     HPI: This is a 54 y.o. male with a history of   Patient Active Problem List   Diagnosis    TIA (transient ischemic attack)    New onset atrial fibrillation ([address]    Acute renal failure (ARF) ([address]    Diverticulitis    Atrial fibrillation ([address]    Type 2 diabetes mellitus ([address]    Essential hypertension    Hyperlipidemia    Acute liver failure without hepatic coma    Sepsis ([address]    Diabetes mellitus ([address]    Acute hepatitis B    Cellulitis    Abscess of foot    Atrial flutter ([address]    Atrial fibrillation with RVR ([address]    HUMERA (obstructive sleep apnea)    Chest pain    History of TIA (transient ischemic attack)    Unstable angina ([address]    Acute coronary syndrome ([address]    CAD in native artery    COVID-19    Acute postoperative respiratory insufficiency    Postoperative hypotension   who presents to the hospital for urgent three vessel CABG on 5/28/20. Frank Hilton is known to Dr. Helen Odonnell and has a past medical history of Obesity, Hypertension, Hyperlipidemia,  Type 2 Diabetes, TIA, paroxysmal atrial fibrillation with use of Xarelto for [address] [place] [address] Road, Hepatis B and Coronary Artery Disease. On May 17, 2020 the patient presented to the emergency department at [facility] with complaints of chest discomfort, he underwent a Lexiscan stress test on the 18th which was abnormal followed by a left heart catheterization with Dr. Krystina Walters on 5/20/2020 this revealed significant three-vessel coronary artery disease.   On May 26 he underwent three-vessel CABG (LIMA-LAD, SVG-diagonal, SVG-PL,) MAZE, MICHAEL Atriclip 45 mm with Dr. Shaan Pham. Postoperatively he was noted to be in sinus rhythm, however he was started on amiodarone 400 mg 3 times daily, Lopressor 37.5 twice daily as well as Eliquis. On 5/31/2020, POD 3 had brief breakthrough of atrial fibrillation that self terminated to sinus and was placed on an Amiodarone drip. On 6/2/20 he developed AF with RVR and his Lopresor was increased which contoled the rate overnioght. On 6/4/20 his rate increases into the 140 an echocardiogram was performed that showed an large loculated pericardial effusion near the left ventricle with marked degree of respiratory variation in mitral flow suggesting tamponade physiology, he then went for a subxiphoid pericardial window which drained approximately 300 mL's of dark somewhat bloody fluid and a chest tube was left in place, postprocedure she remained in atrial fibrillation however his rate was significantly more controlled, except during periods of agitation, minimal exertion. Today he denies any chest discomfort, shortness of breath, he does attest to palpitations and feeling of heart racing. Cardiac electrophysiology service is consulted for paroxysmal atrial fibrillation.     6/6/20 : heart rate stable, maintained in rate controlled atrial flutter    Patient Active Problem List    Diagnosis Date Noted    CAD in native artery 05/28/2020    COVID-19     Acute postoperative respiratory insufficiency     Postoperative hypotension     Unstable angina ([address] 05/20/2020    Acute coronary syndrome ([address] 05/20/2020    History of TIA (transient ischemic attack)     Chest pain 05/16/2020    HUMERA (obstructive sleep apnea) 06/18/2018    Atrial fibrillation with RVR ([address] 06/17/2018    Atrial flutter ([address] 06/16/2018    Cellulitis 02/11/2018    Abscess of foot 02/11/2018    Acute hepatitis B 02/01/2017    Acute liver failure without hepatic coma 01/31/2017    Sepsis ([address] 01/31/2017    Diabetes mellitus ([address]     hypertrophy noted. No regional wall motion abnormalities seen. Normal left ventricular ejection fraction. Physiologic and/or trace mitral regurgitation is present. Cardiac Catheterization 5/20/20:   Left main: 0% 0 stenosis  LAD: 100. %  stenosis  Circumflex: 75, 80, 50. %   stenosis  RCA: Dominant. 75-80, 70 % PDA, 90% PLB stenosis      I have independently reviewed all of the ECGs and rhythm strips per above     Assessment/Plan: This is a 54 y.o. male with a history of   Patient Active Problem List   Diagnosis    TIA (transient ischemic attack)    New onset atrial fibrillation ([address]    Acute renal failure (ARF) ([address]    Diverticulitis    Atrial fibrillation (HCC)    Type 2 diabetes mellitus ([address]    Essential hypertension    Hyperlipidemia    Acute liver failure without hepatic coma    Sepsis ([address]    Diabetes mellitus ([address]    Acute hepatitis B    Cellulitis    Abscess of foot    Atrial flutter (HCC)    Atrial fibrillation with RVR (HCC)    HUMERA (obstructive sleep apnea)    Chest pain    History of TIA (transient ischemic attack)    Unstable angina (HCC)    Acute coronary syndrome ([address]    CAD in native artery    COVID-19    Acute postoperative respiratory insufficiency    Postoperative hypotension    who presents for urgent CABG, who developed atrial fibrillation on postop day 3 and has been treated with amiodarone as well as Lopressor. 1.  Paroxysmal atrial fibrillation  - [id number] (TIA, VASc. DM, HTN)  - Xarelto at home now Eliquis that was interrupted for  pericardial window. - MICHAEL Atrialclip 5/26/2020  - Rate control:  Lopressor 50 mg BID, Digoxin 125 mcg daily, Cardizem CD 120mg Daily  - Rhythm control: MAZE 5/26/2020  Plan: No plan for DCCV at this time, artes have been well controlled.  Continue with rate control strategy, we added Cardizem  daily, and  PO amiodarone 400mg TID, Digoxin 125 mcg daily, metoprolol 50mg bid  Rates are well controlled currently  Resume Eliquis when cleared by surgery    2. Coronary artery disease  -  S/p CABG LIMA-LAD, SVG-diagonal, SVG-PL 5/26/2020 POD #8  - BB, ASA, Statin     3. Pericardial effusion   - S/P subxyphoid pericardial window on 6/4/2020     4. HUMERA   - CPAP    5. Hyponatremia   - Nephrology following     6. DM type 2     7. Volume overload   -  Oral  Bumex       Plan     - No plan for DCCV at this time; heart rate well controlled. This can be done as an outpatient if he does not chemically convert  -  Continue with rate control strategy with Cardizem  daily, and  PO amiodarone 400mg TID, Digoxin 125 mcg daily, metoprolol 50 mg bid  - Rates are well controlled currently  - Resume Eliquis when cleared by surgery  - If more rate control is needed, we can either up titrate B-blocker or CCB  - F/u with EP in 2-4 weeks     Please call if additional questions.  I will sign off    Lachelle Forte M.D  [facility] Electrophysiology

## 2020-06-07 NOTE — PROGRESS NOTES
Patient given 100cc of water past his nighttime fluid restriction. Patient educated on the importance of his fluid restriction. Patient states \"I don't care I didn't drink much throughout the day. \" Patient refusing to comply with fluid restriction.

## 2020-06-08 VITALS
RESPIRATION RATE: 16 BRPM | WEIGHT: 276.2 LBS | HEART RATE: 66 BPM | OXYGEN SATURATION: 95 % | DIASTOLIC BLOOD PRESSURE: 75 MMHG | BODY MASS INDEX: 37.41 KG/M2 | TEMPERATURE: 96.8 F | SYSTOLIC BLOOD PRESSURE: 121 MMHG | HEIGHT: 72 IN

## 2020-06-08 LAB
ANION GAP SERPL CALCULATED.3IONS-SCNC: 14 MMOL/L (ref 7–16)
BUN BLDV-MCNC: 30 MG/DL (ref 6–20)
CALCIUM SERPL-MCNC: 9.5 MG/DL (ref 8.6–10.2)
CHLORIDE BLD-SCNC: 97 MMOL/L (ref 98–107)
CO2: 28 MMOL/L (ref 22–29)
CREAT SERPL-MCNC: 0.9 MG/DL (ref 0.7–1.2)
GFR AFRICAN AMERICAN: >60
GFR NON-AFRICAN AMERICAN: >60 ML/MIN/1.73
GLUCOSE BLD-MCNC: 164 MG/DL (ref 74–99)
HCT VFR BLD CALC: 31.9 % (ref 37–54)
HEMOGLOBIN: 10.1 G/DL (ref 12.5–16.5)
MAGNESIUM: 1.8 MG/DL (ref 1.6–2.6)
MCH RBC QN AUTO: 30.1 PG (ref 26–35)
MCHC RBC AUTO-ENTMCNC: 31.7 % (ref 32–34.5)
MCV RBC AUTO: 94.9 FL (ref 80–99.9)
METER GLUCOSE: 158 MG/DL (ref 74–99)
METER GLUCOSE: 184 MG/DL (ref 74–99)
METER GLUCOSE: 217 MG/DL (ref 74–99)
PDW BLD-RTO: 13.9 FL (ref 11.5–15)
PLATELET # BLD: 452 E9/L (ref 130–450)
PMV BLD AUTO: 9.9 FL (ref 7–12)
POTASSIUM SERPL-SCNC: 4.6 MMOL/L (ref 3.5–5)
RBC # BLD: 3.36 E12/L (ref 3.8–5.8)
SODIUM BLD-SCNC: 139 MMOL/L (ref 132–146)
WBC # BLD: 14.8 E9/L (ref 4.5–11.5)

## 2020-06-08 PROCEDURE — 93798 PHYS/QHP OP CAR RHAB W/ECG: CPT

## 2020-06-08 PROCEDURE — 82962 GLUCOSE BLOOD TEST: CPT

## 2020-06-08 PROCEDURE — 6370000000 HC RX 637 (ALT 250 FOR IP): Performed by: THORACIC SURGERY (CARDIOTHORACIC VASCULAR SURGERY)

## 2020-06-08 PROCEDURE — 97530 THERAPEUTIC ACTIVITIES: CPT

## 2020-06-08 PROCEDURE — 83735 ASSAY OF MAGNESIUM: CPT

## 2020-06-08 PROCEDURE — 80048 BASIC METABOLIC PNL TOTAL CA: CPT

## 2020-06-08 PROCEDURE — 6370000000 HC RX 637 (ALT 250 FOR IP): Performed by: NURSE PRACTITIONER

## 2020-06-08 PROCEDURE — 94660 CPAP INITIATION&MGMT: CPT

## 2020-06-08 PROCEDURE — 36415 COLL VENOUS BLD VENIPUNCTURE: CPT

## 2020-06-08 PROCEDURE — 6370000000 HC RX 637 (ALT 250 FOR IP): Performed by: INTERNAL MEDICINE

## 2020-06-08 PROCEDURE — 2580000003 HC RX 258: Performed by: THORACIC SURGERY (CARDIOTHORACIC VASCULAR SURGERY)

## 2020-06-08 PROCEDURE — 85027 COMPLETE CBC AUTOMATED: CPT

## 2020-06-08 RX ORDER — BUMETANIDE 1 MG/1
1 TABLET ORAL DAILY
Qty: 30 TABLET | Refills: 3 | Status: SHIPPED | OUTPATIENT
Start: 2020-06-09

## 2020-06-08 RX ADMIN — FOLIC ACID 1 MG: 1 TABLET ORAL at 08:20

## 2020-06-08 RX ADMIN — Medication 500 MG: at 08:20

## 2020-06-08 RX ADMIN — FERROUS SULFATE TAB 325 MG (65 MG ELEMENTAL FE) 325 MG: 325 (65 FE) TAB at 16:27

## 2020-06-08 RX ADMIN — TAMSULOSIN HYDROCHLORIDE 0.4 MG: 0.4 CAPSULE ORAL at 08:20

## 2020-06-08 RX ADMIN — FERROUS SULFATE TAB 325 MG (65 MG ELEMENTAL FE) 325 MG: 325 (65 FE) TAB at 08:20

## 2020-06-08 RX ADMIN — INSULIN LISPRO 3 UNITS: 100 INJECTION, SOLUTION INTRAVENOUS; SUBCUTANEOUS at 08:19

## 2020-06-08 RX ADMIN — AMIODARONE HYDROCHLORIDE 400 MG: 200 TABLET ORAL at 14:15

## 2020-06-08 RX ADMIN — OXYCODONE HYDROCHLORIDE AND ACETAMINOPHEN 2 TABLET: 5; 325 TABLET ORAL at 10:01

## 2020-06-08 RX ADMIN — SENNOSIDES AND DOCUSATE SODIUM 1 TABLET: 8.6; 5 TABLET ORAL at 08:19

## 2020-06-08 RX ADMIN — ASPIRIN 81 MG: 81 TABLET, COATED ORAL at 08:20

## 2020-06-08 RX ADMIN — OXYCODONE HYDROCHLORIDE AND ACETAMINOPHEN 2 TABLET: 5; 325 TABLET ORAL at 06:04

## 2020-06-08 RX ADMIN — METFORMIN HYDROCHLORIDE 1000 MG: 1000 TABLET ORAL at 16:26

## 2020-06-08 RX ADMIN — AMIODARONE HYDROCHLORIDE 400 MG: 200 TABLET ORAL at 08:20

## 2020-06-08 RX ADMIN — Medication 10 ML: at 08:19

## 2020-06-08 RX ADMIN — BUMETANIDE 1 MG: 1 TABLET ORAL at 08:19

## 2020-06-08 RX ADMIN — INSULIN LISPRO 3 UNITS: 100 INJECTION, SOLUTION INTRAVENOUS; SUBCUTANEOUS at 16:26

## 2020-06-08 RX ADMIN — HYDROCORTISONE: 1 CREAM TOPICAL at 08:18

## 2020-06-08 RX ADMIN — DILTIAZEM HYDROCHLORIDE 120 MG: 120 CAPSULE, COATED, EXTENDED RELEASE ORAL at 08:19

## 2020-06-08 RX ADMIN — OXYCODONE HYDROCHLORIDE AND ACETAMINOPHEN 2 TABLET: 5; 325 TABLET ORAL at 14:15

## 2020-06-08 RX ADMIN — OXYCODONE HYDROCHLORIDE AND ACETAMINOPHEN 2 TABLET: 5; 325 TABLET ORAL at 02:03

## 2020-06-08 RX ADMIN — APIXABAN 5 MG: 5 TABLET, FILM COATED ORAL at 08:19

## 2020-06-08 RX ADMIN — MAGNESIUM GLUCONATE 500 MG ORAL TABLET 400 MG: 500 TABLET ORAL at 08:20

## 2020-06-08 RX ADMIN — INSULIN LISPRO 6 UNITS: 100 INJECTION, SOLUTION INTRAVENOUS; SUBCUTANEOUS at 11:43

## 2020-06-08 RX ADMIN — METOPROLOL TARTRATE 50 MG: 50 TABLET, FILM COATED ORAL at 08:20

## 2020-06-08 RX ADMIN — METFORMIN HYDROCHLORIDE 1000 MG: 1000 TABLET ORAL at 08:20

## 2020-06-08 RX ADMIN — OXYCODONE HYDROCHLORIDE AND ACETAMINOPHEN 2 TABLET: 5; 325 TABLET ORAL at 18:07

## 2020-06-08 RX ADMIN — PANTOPRAZOLE SODIUM 40 MG: 40 TABLET, DELAYED RELEASE ORAL at 08:19

## 2020-06-08 RX ADMIN — DIGOXIN 125 MCG: 125 TABLET ORAL at 08:20

## 2020-06-08 ASSESSMENT — PAIN SCALES - GENERAL
PAINLEVEL_OUTOF10: 0
PAINLEVEL_OUTOF10: 8
PAINLEVEL_OUTOF10: 3
PAINLEVEL_OUTOF10: 7
PAINLEVEL_OUTOF10: 8
PAINLEVEL_OUTOF10: 2
PAINLEVEL_OUTOF10: 3
PAINLEVEL_OUTOF10: 8
PAINLEVEL_OUTOF10: 7
PAINLEVEL_OUTOF10: 7

## 2020-06-08 ASSESSMENT — PAIN DESCRIPTION - ORIENTATION
ORIENTATION: MID

## 2020-06-08 ASSESSMENT — PAIN DESCRIPTION - PAIN TYPE
TYPE: SURGICAL PAIN

## 2020-06-08 ASSESSMENT — PAIN DESCRIPTION - DESCRIPTORS
DESCRIPTORS: ACHING;DISCOMFORT;SORE

## 2020-06-08 ASSESSMENT — PAIN DESCRIPTION - LOCATION
LOCATION: STERNUM

## 2020-06-08 NOTE — PROGRESS NOTES
Date: 6/7/2020    Time: 10:16 PM    Patient Placed On BIPAP/CPAP/ Non-Invasive Ventilation? Yes    If no must comment. Facial area red/color change? No           If YES are Blister/Lesion present? No   If yes must notify nursing staff  BIPAP/CPAP skin barrier?   Yes    Skin barrier type:duoderm       Comments:        Heike Bansal

## 2020-06-08 NOTE — PLAN OF CARE
Problem: Falls - Risk of:  Goal: Will remain free from falls  Description: Will remain free from falls  Outcome: Met This Shift     Problem: Falls - Risk of:  Goal: Absence of physical injury  Description: Absence of physical injury  Outcome: Met This Shift     Problem:  Activity Intolerance:  Goal: Able to perform prescribed physical activity  Description: Able to perform prescribed physical activity  Outcome: Met This Shift     Problem: Anxiety:  Goal: Level of anxiety will decrease  Description: Level of anxiety will decrease  Outcome: Met This Shift     Problem: Cardiac Output - Decreased:  Goal: Cardiac output within specified parameters  Description: Cardiac output within specified parameters  Outcome: Met This Shift     Problem: Pain:  Goal: Pain level will decrease  Description: Pain level will decrease  Outcome: Ongoing

## 2020-06-08 NOTE — PROGRESS NOTES
Daily    ferrous sulfate  325 mg Oral BID     vitamin C  500 mg Oral BID    folic acid  1 mg Oral Daily    pantoprazole  40 mg Oral Daily    sennosides-docusate sodium  1 tablet Oral BID    insulin lispro  0-18 Units Subcutaneous TID     insulin lispro  0-9 Units Subcutaneous Nightly    atorvastatin  40 mg Oral Nightly    sodium chloride flush  10 mL Intravenous 2 times per day    magnesium oxide  400 mg Oral Daily    tamsulosin  0.4 mg Oral Daily       Physical Exam:  General Appearance: appears comfortable in no acute distress. HEENT: Normocephalic atraumatic without obvious abnormality   Neck: Lips, mucosa, and tongue normal.  Supple, symmetrical, trachea midline, no adenopathy;thyroid:  no enlargement/tenderness/nodules or JVD. Lung: Breath sounds CTA, Diminished bilaterally . Respirations   unlabored. Symmetrical expansion. s/p ml with DSD   Heart: RVR, normal S1, S2. No MRG  Abdomen: Soft, NT, ND. BS present x 4 quadrants. No bruit or organomegaly. Extremities: Pedal pulses 2+ symmetric b/l. Extremities normal, no cyanosis, clubbing, or edema. Musculokeletal: No joint swelling, no muscle tenderness. ROM normal in all joints of extremities. Neurologic: Mental status: Alert and Oriented X3 . Pertinent/ New Labs and Imaging Studies     /21/1965   Age: 54 years   Gender: Male       Order Date:  6/1/2020 7:45 AM       EXAM: XR CHEST (2 VW) 2 images       INDICATION:  evaluate lung fields, evaluate for effusion    evaluate lung fields, evaluate for effusion        COMPARISON: 5/30/2020       FINDINGS:   There is cardiomegaly with the prominence of the superior mediastinum. There is vascular congestion with the progressive diffuse bilateral   infiltrates and pleural effusion.           Impression   Worsening CHF with developing edema.  Superimposed pneumonia is not   excluded.           Labs:  Lab Results   Component Value Date    WBC 14.8 06/08/2020    HGB 10.1 06/08/2020    HCT 31.9 06/08/2020    MCV 94.9 06/08/2020    MCH 30.1 06/08/2020    MCHC 31.7 06/08/2020    RDW 13.9 06/08/2020     06/08/2020    MPV 9.9 06/08/2020     Lab Results   Component Value Date     06/08/2020    K 4.6 06/08/2020    K 4.8 05/16/2020    CL 97 06/08/2020    CO2 28 06/08/2020    BUN 30 06/08/2020    CREATININE 0.9 06/08/2020    LABALBU 3.4 06/06/2020    CALCIUM 9.5 06/08/2020    GFRAA >60 06/08/2020    LABGLOM >60 06/08/2020     Lab Results   Component Value Date    PROTIME 13.3 05/28/2020    INR 1.2 05/28/2020     No results for input(s): PROBNP in the last 72 hours. No results for input(s): PROCAL in the last 72 hours. This SmartLink has not been configured with any valid records. Micro:  No results for input(s): CULTRESP in the last 72 hours. No results for input(s): LABGRAM in the last 72 hours. No results for input(s): LEGUR in the last 72 hours. No results for input(s): STREPNEUMAGU in the last 72 hours. No results for input(s): LP1UAG in the last 72 hours. Assessment:    1. Post operative respiratory insufficiency with hypoxia  2. Basilar atelectasis d/t pain and splinting  3. Pulmonary edema  4. Possible COPD although PFTs not consistent  5. HUMERA non compliant with  Home CPAP  6. Obesity BMI 38  7. CAD s/p CABG x 3 5/28/2020  8. Atrial fibrillation on eliquis   9. Nicotine dependence   10 . S/p pericardial window 6/4      Plan: 1. Wean oxygen as tolerated for SpO2 90%, currently on RA   2. Incentive/EZPAP, duonebs Q4 prn  3. Nephrology following managing diuresis   4. Increase mobility  5. Pain control per CTS   6. Smoking cessation  8. Nocturnal CPAP with humidification -decreased to 10/6   9. Doing well from a pulmonary standpoint and may be D/C'd when ok with others        Electronically signed by ANGIE Muñoz on 6/8/2020 at 10:06 AM    Addendum:  Patient is stable to be discharged from a pulmonary standpoint.   We discussed about his CPAP machine and encouraged him

## 2020-06-08 NOTE — PROGRESS NOTES
CLINICAL PHARMACY NOTE: MEDS TO [address] Select Patient?: No  Total # of Prescriptions Filled: 8   The following medications were delivered to the patient:  · bumex 1mg  · Digoxin 125 mcg  · Percocet [id number]  · Folic acid 1mg  · Amiodarone 200mg  · lipitor 40mg  · eliquis 5mg  · Metoprolol tart 25mg  Total # of Interventions Completed: 3  Time Spent (min): 45    Additional Documentation:

## 2020-06-08 NOTE — PROGRESS NOTES
Physical Therapy  Physical Therapy Treatment Note      Name: Digna Rivas  : 1965  MRN: [id number]     Referring Provider:  Sharmaine Hensley DO     Date of Service: 2020     Evaluating PT:  Marlene Saunders, PT, DPT [id number]     Room #:  6793C  Diagnosis:  CAD  Precautions: Falls, Sternal, , O2, JADIEL  Procedure/Surgery:   CABG x 3  PMHx/PSHx:  CVA, DM, HLD, HTN, TIA  Equipment Needs:  None     SUBJECTIVE:     Pt lives alone in a mother-in-law suite, 1st floor setup with 5 steps and 1 rail down to living area. Pt ambulated with SPC PRN and was independent PTA.     OBJECTIVE:    Initial Evaluation  Date: 20 Treatment Date: 2020 Short Term/ Long Term   Goals   AM-PAC 6 Clicks /30      Was pt agreeable to Eval/treatment? Yes Yes       Does pt have pain?  10/10 chest pain - RN aware Reported surgical pain but did not quantify on pain scale     Bed Mobility  Rolling: NT  Supine to sit: ModA x 2  Sit to supine: NT  Scooting: MaxA Rolling: min A to R side   Supine to sit: Mod A   Sit to supine: min A for LE and trunk control  Scooting: min A in sitting  ( x 2 reps  Bed mob ) Dilcia   Transfers Sit to stand: Dilcia  Stand to sit: ModA  Stand pivot: Dilcia no device Sit to stand: SBA  Stand to sit: SBA  Stand pivot: SBA no AD Independent   Ambulation   20 feet x 2 reps with Dilcia no device 175 feet with no AD with SBA >400 feet Independently   Stair negotiation: ascended and descended NT 4 steps with 1 rail (for balance only) with SBA  Non reciprocating >4 steps with 1 rail Mod Independent   ROM BUE:  Defer to OT note  BLE:  WNL       Strength BUE:  Defer to OT note  BLE:  4/5   Increase by 1/3 MMT grade   Balance Sitting EOB:  Dilcia  Dynamic Standing:  Dilcia no device Seated: Supervision  Standing: SBA with no AD Sitting EOB:  Independent  Dynamic Standing:  Independent      Pt is A & O x 4      Therapeutic Exercises:  NA    Patient education  Pt educated on importance of mobility, safety with [facility] PTA 1120

## 2020-06-08 NOTE — CARE COORDINATION
SOCIAL WORK/CASEMANAGEMENT TRANSITION OF CARE [id number] [facility] Katie, [address], [place], [phone]): plan is home per ctvs today. Orders for cardiac hhc protocol in [facility] and PT and OT added with hhc aide. Provided pt with hhc list to chose from. Pt said he lives in a  Mother in [facility] and he has to go down 5-6 steps to the kitchen. He said a friends son will be stopping in daily to assist him with a few things. He also said he has multiple rides home. Pt wants to rent a lift chair. I called rent a center on [facility] and they have one for delivery tomorrow, provided pt with number to call and set up. I called [facility] to michelle the  and left  to have her call back and see if pt can get any assistance at home. Pt is requesting a shower chair and raised toilet seat. I will check to see if that can be obtained thru [facility] or [facility] . The raised toilet will be private pay under $40 and shower chair private pay. OT saw pt this a.m. and said he was not able to get out of bed alone but when I was in the room with him he did it himself. OT said he was able to get out of a chair independently. We talked about [employer] mobile meals and called them but they would not be able to be delivered until next Monday since they only deliver 1x a week and it was today. Pt said he has friends that can  food or buy ready made meals in the freezer section and pop them in the microwave for him daily. Ajit Newman  6/8/2020     [facility] is setup. [facility] to setup for delivery to room a raised toilet seat and shower chair. Friend will be here after pt eats dinner around 6 p.m. pt is renting a lift chair for home delivery. Call out to the [facility] for home services to both sw's, john venturakaren and michelle today. Cm called and left  for the [facility] yesterday. Charles Le Awaiting call back. Ajit Newman  6/8/2020        I spoke with Viraj Benoit from the [facility] and they cannot provide pt with any services other then hhc aide for

## 2020-06-12 NOTE — ADT AUTH CERT
+BS   Ext: Incisions C/D/I, approximated, no erythema, +ecchymosis right medial thigh, +minimal edema                    A/P: POD#10   1. CAD   --Stable s/p CABG x 3, maze with PVI, LIS ligation, rigid sternal fixation with KLS plating system on 5/28/2020   --Scr stable- 1.0   --BB/statin   --reinforce sternal precautions           2. Acute blood loss anemia secondary to open heart surgery   --stable, hgb 10.2           3. Pericardial effusion   -- Echo 6/3 and 6/4 showed large pericardial effusion with early tamponade physiology    --s/p subxyphoid pericardial window on 6/4/2020 for 300cc dark bloody effusion               4. Hx afib   --s/p maze/lis ligation   --currently aflutter, cvr- oral amiodarone/BB/dig--refrain from dccv this admission as his HR as been controlled over the last 50 hrs--on xarelto at home   --will switch to eliquis short term post operatively with resumption of xarelto at later time--eliquis initiated 5/31- Eliquis held 6/4, 6/5--resumed 6/6   --EP following           5. Prolonged postoperative respiratory insufficiency   --wean oxygen to keep SpO2 greater than or equal to 92%   --continue duonebs with ezpap   --encourage C&DB, SMI   --currently on RA   --On oral bumex daily   -- 2 view cxr- bilateral congestion   --appreciate pulmonary's input           6. Hyponatremia   --Na 137--renal on board   -- appreciate renal input            7. Hx DM2   --on lantus/metformin at home   --continue lantus at home dose, continue high scale SSI, continue metformin--monitor           8.  Constipation   --resolved   --Continue MOM and senna-s.    --Encouraged continued increase in oral intake and activity.             9. Post operative deconditioning   --Increase activity as tolerated   --PT/OT   --ambulating 800ft   -- lives alone, but by this time he is ready for discharge to home with Yudith Hoang           10. DVT prophylaxis:   --continue bilateral knee high HOLLY hose   --continue PCDs   --continue progressive ambulation   --continue eliquis           11- uncontrolled pain-    - Norco changed to Percocet--improved           12.  Volume overload-    -- bumex 1mg oral daily           Dispo:--lives alone, but by this time he is ready for discharge to home with [facility]

## 2020-06-16 ENCOUNTER — HOSPITAL ENCOUNTER (OUTPATIENT)
Age: 55
Discharge: HOME | End: 2020-06-18
Payer: COMMERCIAL

## 2020-06-16 LAB — DIGOXIN LEVEL: 0.9 NG/ML (ref 0.8–2)

## 2020-06-16 PROCEDURE — 80162 ASSAY OF DIGOXIN TOTAL: CPT

## 2020-06-17 ENCOUNTER — TELEPHONE (OUTPATIENT)
Dept: CARDIOTHORACIC SURGERY | Age: 55
End: 2020-06-17

## 2020-06-22 NOTE — DISCHARGE SUMMARY
Physician Discharge Summary     Patient ID:  Génesis Lawler  [id number]  73 y.o.  1965    Admit date: 5/28/2020    Discharge date and time: 6/8/2020  7:41 PM     Admitting Physician: Kristina Raphael DO     Discharge Physician: Kristina Raphael DO    Admission Diagnoses: CAD in native artery [I25.10]    Discharge Diagnoses:   CAD  longstanding persistent atrial fibrillation  Acute blood loss anemia secondary to open heart surgery  Pericardial effusion  Prolonged postoperative respiratory insufficiency  Hyponatremia  Post operative deconditioning      5/28/2020:  Procedure(s):  CABG x3 (LIMA-LAD, SVG-Diag, SVG-PL)  EVH  MAZE  LAAL   KLS Plating     6/4/2020:  Procedure(s):  Subxyphoid pericardial window       Discharged Condition: stable    Indication for Admission:  Severe multivessel coronary artery disease,  longstanding persistent atrial fibrillation. Hospital Course: Course as above, and on 5/28/2020 he underwent coronary artery bypass grafting x 3, maze with pulmonary vein isolation, left atrial appendage ligation, and rigid sternal fixation with KLS plating system. Postoperatively, he was transferred to the CVICU in guarded stable condition and extubated that same operative day. He was transferred to the monitored stepdown unit on POD 1. Beta blockade was initiated. His mediastinal and pleural chest tubes, and epicardial pacing wires were discontinued in the usual fashion. He was diuresed. Pulmonary was consulted for prolonged postoperative respiratory insufficiency. Nephrology was consulted for hyponatremia and management of volume overload. Eliquis was initiated for hx afib--on xarelto pre-op. TTE on 6/3 and again on 6/4 revealed large pericardial effusion with early tamponade physiology; therefore, he underwent a subxiphoid pericardial window for 300mL of dark bloody effusion on 6/4/2020.  The eliquis was held on 6/4 and 6/5 and resumed on 6/6 after the chest drain removal. The chest drain was or Shortness of Breath             amiodarone (CORDARONE) 200 MG tablet  Take 2 tablets by mouth See Admin Instructions Take 400 mg orally twice daily for seven days, then take 200 mg orally twice daily for seven days, then take 200 mg orally daily for seven days, then discontinue             apixaban (ELIQUIS) 5 MG TABS tablet  Take 1 tablet by mouth 2 times daily             Ascorbic Acid (VITAMIN C) 250 MG tablet  Take 1,000 mg by mouth daily             aspirin 81 MG tablet  Take 81 mg by mouth daily             atorvastatin (LIPITOR) 40 MG tablet  Take 1 tablet by mouth nightly             bumetanide (BUMEX) 1 MG tablet  Take 1 tablet by mouth daily             digoxin (LANOXIN) 125 MCG tablet  Take 1 tablet by mouth daily             dilTIAZem (CARDIZEM CD) 120 MG extended release capsule  Take 1 capsule by mouth daily             docusate sodium (COLACE) 100 MG capsule  Take 1 capsule by mouth 2 times daily as needed for Constipation             ferrous sulfate (IRON 325) 325 (65 Fe) MG tablet  Take 1 tablet by mouth 2 times daily (with meals)             folic acid (FOLVITE) 1 MG tablet  Take 1 tablet by mouth daily             glucose monitoring kit (FREESTYLE) monitoring kit  1 kit by Does not apply route daily as needed             glyBURIDE (DIABETA) 5 MG tablet  Take 1 tablet by mouth daily (with breakfast)             Insulin Glargine, 1 Unit Dial, (TOUJEO SOLOSTAR) 300 UNIT/ML SOPN  Inject 30 Units into the skin nightly TAKE HALF DOSE NIGHT BEFORE SURGERY             magnesium oxide (MAG-OX) 400 (241.3 Mg) MG TABS tablet  Take 1 tablet by mouth daily for 14 days             metFORMIN (GLUCOPHAGE) 500 MG tablet  Take 1,000 mg by mouth 2 times daily (with meals)              metoprolol tartrate (LOPRESSOR) 50 MG tablet  Take 1 tablet by mouth 2 times daily             Multiple Vitamins-Minerals (THERAPEUTIC MULTIVITAMIN-MINERALS) tablet  Take 1 tablet by mouth daily             pantoprazole (PROTONIX)

## 2020-06-30 ENCOUNTER — OFFICE VISIT (OUTPATIENT)
Dept: CARDIOTHORACIC SURGERY | Age: 55
End: 2020-06-30

## 2020-06-30 VITALS
BODY MASS INDEX: 37.1 KG/M2 | HEIGHT: 71 IN | DIASTOLIC BLOOD PRESSURE: 87 MMHG | WEIGHT: 265 LBS | HEART RATE: 67 BPM | SYSTOLIC BLOOD PRESSURE: 128 MMHG

## 2020-06-30 PROCEDURE — 99024 POSTOP FOLLOW-UP VISIT: CPT | Performed by: NURSE PRACTITIONER

## 2020-07-13 ENCOUNTER — TELEPHONE (OUTPATIENT)
Dept: NON INVASIVE DIAGNOSTICS | Age: 55
End: 2020-07-13

## 2020-07-17 ENCOUNTER — VIRTUAL VISIT (OUTPATIENT)
Dept: NON INVASIVE DIAGNOSTICS | Age: 55
End: 2020-07-17
Payer: COMMERCIAL

## 2020-07-17 ENCOUNTER — TELEPHONE (OUTPATIENT)
Dept: NON INVASIVE DIAGNOSTICS | Age: 55
End: 2020-07-17

## 2020-07-17 ENCOUNTER — TELEPHONE (OUTPATIENT)
Dept: ADMINISTRATIVE | Age: 55
End: 2020-07-17

## 2020-07-17 PROCEDURE — 99214 OFFICE O/P EST MOD 30 MIN: CPT | Performed by: INTERNAL MEDICINE

## 2020-07-17 NOTE — PROGRESS NOTES
2020    TELEHEALTH EVALUATION -- Audio/Visual (During DLDPK-98 public health emergency)    HPI:    Franko Inderjit (:  1965) has requested an audio/video evaluation for the following concern(s): PAF      On May 17, 2020 the patient presented to the emergency department at [facility] with complaints of chest discomfort, he underwent a Lexiscan stress test on the  which was abnormal followed by a left heart catheterization with Dr. Kati Dunn on 2020 this revealed significant three-vessel coronary artery disease. 20: he underwent three-vessel CABG (LIMA-LAD, SVG-diagonal, SVG-PL,) MAZE, MICHAEL Atriclip 45 mm with Dr. Katy Shell. Postoperatively he was noted to be in sinus rhythm, however he was started on amiodarone 400 mg 3 times daily, Lopressor 37.5 twice daily as well as Eliquis. 2020, POD 3 had brief breakthrough of atrial fibrillation that self terminated to sinus and was placed on an Amiodarone drip. 20: he developed AF with RVR and his Lopresor was increased which contoled the rate overnioght. 20:  his rate increases into the 140 an echocardiogram was performed that showed an large loculated pericardial effusion near the left ventricle with marked degree of respiratory variation in mitral flow suggesting tamponade physiology, he then went for a subxiphoid pericardial window which drained approximately 300 mL's of dark fluid    From an EP perspective, he was continued on rate control strategy, we added Cardizem  daily, and  PO amiodarone 400mg TID, Digoxin 125 mcg daily, metoprolol 50mg bid. His rates were well controlled. 20 : Saw CTS. Cardiac rehab recommended, no further f/u with CTS    20: He feels well.  No further palpitations, no chest pain or dizziness    Review of Systems     General: No unusual weight gain, change in exercise tolerance  Skin: No rash or itching  EENT: No vision changes or nosebleeds  Cardiovascular: No orthopnea or paroxysmal nocturnal dyspnea, neg chest pain, palpitation  Respiratory: neg Cough  and sob  Gastrointestinal: No hematemesis or recent changes in bowel habits  Genitourinary: No hematuria, urgency or frequency  Musculoskeletal: No muscular weakness or joint swelling   Neurologic / Psychiatric: No incoordination or convulsions  Allergic / Immunologic/ Lymphatic / Endocrine: No anemia or bleeding tendency      Prior to Visit Medications    Medication Sig Taking?  Authorizing Provider   bumetanide (BUMEX) 1 MG tablet Take 1 tablet by mouth daily  Briaan Liang MD   amiodarone (CORDARONE) 200 MG tablet Take 2 tablets by mouth See Admin Instructions Take 400 mg orally twice daily for seven days, then take 200 mg orally twice daily for seven days, then take 200 mg orally daily for seven days, then discontinue  Patient not taking: Reported on 6/30/2020  ANGIE Amador CNP   apixaban (ELIQUIS) 5 MG TABS tablet Take 1 tablet by mouth 2 times daily  ANGIE Amador CNP   atorvastatin (LIPITOR) 40 MG tablet Take 1 tablet by mouth nightly  ANGIE Amador CNP   metoprolol tartrate (LOPRESSOR) 50 MG tablet Take 1 tablet by mouth 2 times daily  ANGIE Amador CNP   dilTIAZem (CARDIZEM CD) 120 MG extended release capsule Take 1 capsule by mouth daily  ANGIE Amador CNP   digoxin (LANOXIN) 125 MCG tablet Take 1 tablet by mouth daily  ANGIE Amador CNP   ferrous sulfate (IRON 325) 325 (65 Fe) MG tablet Take 1 tablet by mouth 2 times daily (with meals)  ANGIE Amador CNP   folic acid (FOLVITE) 1 MG tablet Take 1 tablet by mouth daily  ANGIE Amador CNP   docusate sodium (COLACE) 100 MG capsule Take 1 capsule by mouth 2 times daily as needed for Constipation  Patient not taking: Reported on 6/30/2020  ANGIE Amador CNP   magnesium oxide (MAG-OX) 400 (241.3 Mg) MG TABS tablet Take 1 tablet by mouth daily for 14 days  ANGIE Amador CNP Insulin Glargine, 1 Unit Dial, (TOUJEO SOLOSTAR) 300 UNIT/ML SOPN Inject 30 Units into the skin nightly TAKE HALF DOSE NIGHT BEFORE SURGERY  Historical Provider, MD   metFORMIN (GLUCOPHAGE) 500 MG tablet Take 1,000 mg by mouth 2 times daily (with meals)   Historical Provider, MD   albuterol sulfate  (90 BASE) MCG/ACT inhaler Inhale 2 puffs into the lungs every 6 hours as needed for Wheezing or Shortness of Breath  Historical Provider, MD   pantoprazole (PROTONIX) 40 MG tablet Take 1 tablet by mouth every morning (before breakfast)  Gaby Lozano MD   vitamin B-12 (CYANOCOBALAMIN) 1000 MCG tablet Take 1,000 mcg by mouth daily  Historical Provider, MD   Ascorbic Acid (VITAMIN C) 250 MG tablet Take 1,000 mg by mouth daily  Historical Provider, MD   glyBURIDE (DIABETA) 5 MG tablet Take 1 tablet by mouth daily (with breakfast)  Patient taking differently: Take 5 mg by mouth nightly   Radha Styles MD   glucose monitoring kit (FREESTYLE) monitoring kit 1 kit by Does not apply route daily as needed  Radha Styles MD   Multiple Vitamins-Minerals (THERAPEUTIC MULTIVITAMIN-MINERALS) tablet Take 1 tablet by mouth daily  Historical Provider, MD   aspirin 81 MG tablet Take 81 mg by mouth daily  Historical Provider, MD       Social History     Tobacco Use    Smoking status: Heavy Tobacco Smoker     Packs/day: 0.50     Years: 35.00     Pack years: 17.50     Types: Cigarettes    Smokeless tobacco: Never Used   Substance Use Topics    Alcohol use: No     Comment: STOPPED    Drug use: No        PHYSICAL EXAMINATION:   No physical exam due to virtual visit      ASSESSMENT/PLAN:    1. Persistent atrial fibrillation    2. HUMERA (obstructive sleep apnea)    3. Hx of CABG    4. Therapeutic drug monitoring        1. Paroxysmal atrial fibrillation  - [id number] 5 (TIA, VASc.  DM, HTN)  - On Eliquis for [address] [place] [address]  - [facility] Atrialclip 5/26/2020  - Rate control:  Lopressor 50 mg BID, Digoxin 125 mcg daily, Cardizem CD 120mg

## 2020-07-17 NOTE — TELEPHONE ENCOUNTER
Pt called to see if DR Nemesio Wall had any openings, pt had to cancel his appt 7/13 b/c of upset stomach, pt said office was going to call him back with another appt, please advise him at [phone]

## 2020-07-17 NOTE — TELEPHONE ENCOUNTER
----- Message from Margarito Paul MD sent at 7/17/2020 12:58 PM EDT -----  Pls schedule one week event monitor so I can assess his rate and rhythm control. I will adjust his meds after monitor is assessed.   thx

## 2020-07-27 ENCOUNTER — NURSE ONLY (OUTPATIENT)
Dept: NON INVASIVE DIAGNOSTICS | Age: 55
End: 2020-07-27

## 2020-07-27 VITALS — TEMPERATURE: 97.5 F

## 2020-07-27 NOTE — PROGRESS NOTES
Patient was in today had 1 week Preventice Patch placed per Dr Elyse Escobra. Patient given instructions and questions answered, patient verbalized understanding.       Yaneth Nair

## 2020-08-05 ENCOUNTER — TELEPHONE (OUTPATIENT)
Dept: NON INVASIVE DIAGNOSTICS | Age: 55
End: 2020-08-05

## 2020-08-05 NOTE — TELEPHONE ENCOUNTER
----- Message from Khoi Joiner MD sent at 8/3/2020  4:51 PM EDT -----  Can we try to arrange outpatient BERTHA/cardioversion with cardiology.   We can try to consolidate his therapy as he remains in AF  thx  ----- Message -----  From: Kylee Fink  Sent: 7/28/2020   8:57 AM EDT  To: Khoi Joiner MD

## 2020-08-05 NOTE — TELEPHONE ENCOUNTER
Spoke with patient let him know [address] [place] [address] would like patient to have BERTHA/ CV due to being in AF while wearing monitor. Patient states he will call office back, was at PCP office.

## 2020-08-07 ENCOUNTER — TELEPHONE (OUTPATIENT)
Dept: NON INVASIVE DIAGNOSTICS | Age: 55
End: 2020-08-07

## 2020-08-07 NOTE — TELEPHONE ENCOUNTER
----- Message from Cain Maurice MD sent at 8/6/2020  3:19 PM EDT -----  I called Patient today. He would rather have cardioversion done in [place]. I am not sure how we can help him coordinate that.  He has been compliant with his medications, Eliquis  since May so no BERTHA is needed  thx  ----- Message -----  From: Joel Mail  Sent: 7/28/2020   8:57 AM EDT  To: Cain Maurice MD

## 2020-08-07 NOTE — TELEPHONE ENCOUNTER
Patient scheduled on 08/18/2020 for CV at [facility] with his Cardiologist.

## 2020-08-13 ENCOUNTER — HOSPITAL ENCOUNTER (OUTPATIENT)
Age: 55
Discharge: HOME | End: 2020-08-15
Payer: COMMERCIAL

## 2020-08-13 PROCEDURE — U0003 INFECTIOUS AGENT DETECTION BY NUCLEIC ACID (DNA OR RNA); SEVERE ACUTE RESPIRATORY SYNDROME CORONAVIRUS 2 (SARS-COV-2) (CORONAVIRUS DISEASE [COVID-19]), AMPLIFIED PROBE TECHNIQUE, MAKING USE OF HIGH THROUGHPUT TECHNOLOGIES AS DESCRIBED BY CMS-2020-01-R: HCPCS

## 2020-08-15 LAB
SARS-COV-2: NOT DETECTED
SOURCE: NORMAL

## 2020-08-17 RX ORDER — PAROXETINE HYDROCHLORIDE 40 MG/1
40 TABLET, FILM COATED ORAL EVERY MORNING
COMMUNITY
End: 2021-06-04

## 2020-08-17 NOTE — PROGRESS NOTES
[address]        Date: 8/17/2020    Date of surgery:  8/18/2020  Arrival Time: Hospital will call you between 5pm and 7pm with your final arrival time for surgery    1. Do not eat or drink anything after   Midnight  prior to surgery. This includes no water, chewing gum, mints or ice chips. 2. Take the following medications with a small sip of water on the morning of Surgery:  Take am meds eliquis asa paxil pantaprozle metoprolol diltiazem and lanoxin per office    3. Diabetics may take evening dose of insulin but none after midnight. If you feel symptomatic or low blood sugar morning of surgery drink 1-2 ounces of apple juice only. 4. Aspirin, Ibuprofen, Advil, Naproxen, Vitamin E and other Anti-inflammatory products should be stopped  before surgery  as directed by your physician. Take Tylenol only unless instructed otherwise by your surgeon. 5. Check with your Doctor regarding stopping Plavix, Coumadin, Lovenox, Eliquis, Effient, or other blood thinners. 6. Do not smoke,use illicit drugs and do not drink any alcoholic beverages 24 hours prior to surgery. 7. You may brush your teeth the morning of surgery. DO NOT SWALLOW WATER    8. You MUST make arrangements for a responsible adult to take you home after your surgery. You will not be allowed to leave alone or drive yourself home. It is strongly suggested someone stay with you the first 24 hrs. Your surgery will be cancelled if you do not have a ride home. 9. PEDIATRIC PATIENTS ONLY:  A parent/legal guardian must accompany a child scheduled for surgery and plan to stay at the hospital until the child is discharged. Please do not bring other children with you.     10. Please wear simple, loose fitting clothing to the hospital.  Do not bring valuables (money, credit cards, checkbooks, etc.) Do not wear any makeup (including no eye makeup) or nail polish on your fingers or toes. 11. DO NOT wear any jewelry or piercings on day of surgery. All body piercing jewelry must be removed. 12. Shower the night before surgery with __x_Antibacterial soap /ROMI WIPES________    13. TOTAL JOINT REPLACEMENT/HYSTERECTOMY PATIENTS ONLY---Remember to bring Blood Bank bracelet to the hospital on the day of surgery. 14. If you have a Living Will and Durable Power of  for Healthcare, please bring in a copy. 15. If appropriate bring crutches, inspirex, WALKER, CANE etc... 12. Notify your Surgeon if you develop any illness between now and surgery time, cough, cold, fever, sore throat, nausea, vomiting, etc.  Please notify your surgeon if you experience dizziness, shortness of breath or blurred vision between now & the time of your surgery. 17. If you have ___dentures, they will be removed before going to the OR; we will provide you a container. If you wear ___contact lenses or ___glasses, they will be removed; please bring a case for them. 18. To provide excellent care visitors will be limited to 2 in the room at any given time. 19. Please bring picture ID and insurance card. 20. Sleep apnea patients need to bring CPAP AND SETTINGS to hospital on day of surgery. 21. During flu season no children under the age of 15 are permitted in the hospital for the safety of all patients. 22. Other                  Please call AMBULATORY CARE if you have any further questions.    [address] [facility]     [address]

## 2020-08-17 NOTE — PROGRESS NOTES
Called dr Nemesio Paulino for orders and what am meds to take dos told he should take normal am meds  No restrictions pt instructed not to take diuretic and to bring diabetic meds

## 2020-08-18 ENCOUNTER — HOSPITAL ENCOUNTER (OUTPATIENT)
Dept: POSTOP/PACU | Age: 55
Setting detail: OUTPATIENT SURGERY
Discharge: HOME | End: 2020-08-18
Payer: COMMERCIAL

## 2020-08-18 ENCOUNTER — ANESTHESIA (OUTPATIENT)
Dept: POSTOP/PACU | Age: 55
End: 2020-08-18

## 2020-08-18 ENCOUNTER — ANESTHESIA EVENT (OUTPATIENT)
Dept: POSTOP/PACU | Age: 55
End: 2020-08-18

## 2020-08-18 VITALS
DIASTOLIC BLOOD PRESSURE: 68 MMHG | SYSTOLIC BLOOD PRESSURE: 103 MMHG | OXYGEN SATURATION: 95 % | RESPIRATION RATE: 26 BRPM

## 2020-08-18 VITALS
BODY MASS INDEX: 35.17 KG/M2 | RESPIRATION RATE: 16 BRPM | WEIGHT: 251.19 LBS | SYSTOLIC BLOOD PRESSURE: 112 MMHG | HEART RATE: 69 BPM | OXYGEN SATURATION: 99 % | TEMPERATURE: 97.6 F | HEIGHT: 71 IN | DIASTOLIC BLOOD PRESSURE: 66 MMHG

## 2020-08-18 LAB
EKG ATRIAL RATE: 65 BPM
EKG P AXIS: 40 DEGREES
EKG P-R INTERVAL: 202 MS
EKG Q-T INTERVAL: 392 MS
EKG QRS DURATION: 84 MS
EKG QTC CALCULATION (BAZETT): 407 MS
EKG R AXIS: -23 DEGREES
EKG T AXIS: 102 DEGREES
EKG VENTRICULAR RATE: 65 BPM
LV EF: 63 %
LVEF MODALITY: NORMAL
METER GLUCOSE: 292 MG/DL (ref 74–99)

## 2020-08-18 PROCEDURE — 93325 DOPPLER ECHO COLOR FLOW MAPG: CPT

## 2020-08-18 PROCEDURE — 7100000011 HC PHASE II RECOVERY - ADDTL 15 MIN

## 2020-08-18 PROCEDURE — 93312 ECHO TRANSESOPHAGEAL: CPT

## 2020-08-18 PROCEDURE — 82962 GLUCOSE BLOOD TEST: CPT

## 2020-08-18 PROCEDURE — 7100000001 HC PACU RECOVERY - ADDTL 15 MIN

## 2020-08-18 PROCEDURE — 7100000010 HC PHASE II RECOVERY - FIRST 15 MIN

## 2020-08-18 PROCEDURE — 3700000000 HC ANESTHESIA ATTENDED CARE

## 2020-08-18 PROCEDURE — 3700000001 HC ADD 15 MINUTES (ANESTHESIA)

## 2020-08-18 PROCEDURE — 92960 CARDIOVERSION ELECTRIC EXT: CPT

## 2020-08-18 PROCEDURE — 93005 ELECTROCARDIOGRAM TRACING: CPT | Performed by: STUDENT IN AN ORGANIZED HEALTH CARE EDUCATION/TRAINING PROGRAM

## 2020-08-18 PROCEDURE — 2580000003 HC RX 258: Performed by: ANESTHESIOLOGY

## 2020-08-18 PROCEDURE — 92960 CARDIOVERSION ELECTRIC EXT: CPT | Performed by: STUDENT IN AN ORGANIZED HEALTH CARE EDUCATION/TRAINING PROGRAM

## 2020-08-18 PROCEDURE — 7100000000 HC PACU RECOVERY - FIRST 15 MIN

## 2020-08-18 PROCEDURE — 6360000002 HC RX W HCPCS: Performed by: NURSE ANESTHETIST, CERTIFIED REGISTERED

## 2020-08-18 RX ORDER — SODIUM CHLORIDE 9 MG/ML
INJECTION, SOLUTION INTRAVENOUS CONTINUOUS
Status: DISCONTINUED | OUTPATIENT
Start: 2020-08-18 | End: 2020-08-19 | Stop reason: HOSPADM

## 2020-08-18 RX ORDER — SODIUM CHLORIDE 0.9 % (FLUSH) 0.9 %
10 SYRINGE (ML) INJECTION PRN
Status: DISCONTINUED | OUTPATIENT
Start: 2020-08-18 | End: 2020-08-19 | Stop reason: HOSPADM

## 2020-08-18 RX ORDER — PROPOFOL 10 MG/ML
INJECTION, EMULSION INTRAVENOUS PRN
Status: DISCONTINUED | OUTPATIENT
Start: 2020-08-18 | End: 2020-08-18 | Stop reason: SDUPTHER

## 2020-08-18 RX ADMIN — SODIUM CHLORIDE: 9 INJECTION, SOLUTION INTRAVENOUS at 13:57

## 2020-08-18 RX ADMIN — SODIUM CHLORIDE: 9 INJECTION, SOLUTION INTRAVENOUS at 12:40

## 2020-08-18 RX ADMIN — PROPOFOL 300 MG: 10 INJECTION, EMULSION INTRAVENOUS at 14:01

## 2020-08-18 ASSESSMENT — LIFESTYLE VARIABLES: SMOKING_STATUS: 1

## 2020-08-18 ASSESSMENT — PAIN SCALES - GENERAL
PAINLEVEL_OUTOF10: 0

## 2020-08-18 ASSESSMENT — PAIN - FUNCTIONAL ASSESSMENT: PAIN_FUNCTIONAL_ASSESSMENT: 0-10

## 2020-08-18 NOTE — PROGRESS NOTES
To PACU [address] for BERTHA and Cardioversion AxO3 Skin warm and dry Respirations easy No complaints

## 2020-08-18 NOTE — ANESTHESIA PRE PROCEDURE
Department of Anesthesiology  Preprocedure Note       Name:  Anjelica Weber   Age:  54 y.o.  :  1965                                          MRN:  [id number]         Date:  2020      Surgeon: ?? Procedure: BERTHA \"TransOsophageal EchoCardioGram \", CARDIOVERSION    Medications prior to admission:   Prior to Admission medications    Medication Sig Start Date End Date Taking?  Authorizing Provider   PARoxetine (PAXIL) 40 MG tablet Take 40 mg by mouth every morning    Historical Provider, MD   bumetanide (BUMEX) 1 MG tablet Take 1 tablet by mouth daily 20   Jeremie Ulloa MD   apixaban (ELIQUIS) 5 MG TABS tablet Take 1 tablet by mouth 2 times daily 20   Vallorie Sox, APRN - CNP   atorvastatin (LIPITOR) 40 MG tablet Take 1 tablet by mouth nightly 20   Vallorie Sox, APRN - CNP   metoprolol tartrate (LOPRESSOR) 50 MG tablet Take 1 tablet by mouth 2 times daily 20   Vallorie Sox, APRN - CNP   dilTIAZem (CARDIZEM CD) 120 MG extended release capsule Take 1 capsule by mouth daily 20   Vallorie Sox, APRN - CNP   digoxin (LANOXIN) 125 MCG tablet Take 1 tablet by mouth daily 20   Vallorie Sox, APRN - CNP   Insulin Glargine, 1 Unit Dial, (TOUJEO SOLOSTAR) 300 UNIT/ML SOPN Inject into the skin 20 units in am and 20 hs    Historical Provider, MD   metFORMIN (GLUCOPHAGE) 500 MG tablet Take 1,000 mg by mouth 2 times daily (with meals)     Historical Provider, MD   albuterol sulfate  (90 BASE) MCG/ACT inhaler Inhale 2 puffs into the lungs every 6 hours as needed for Wheezing or Shortness of Breath    Historical Provider, MD   pantoprazole (PROTONIX) 40 MG tablet Take 1 tablet by mouth every morning (before breakfast) 16   Joy Recinos MD   vitamin B-12 (CYANOCOBALAMIN) 1000 MCG tablet Take 1,000 mcg by mouth daily    Historical Provider, MD   Ascorbic Acid (VITAMIN C) 250 MG tablet Take 1,000 mg by mouth daily    Historical Provider, MD   glyBURIDE (DIABETA) 5 MG tablet Take 1 tablet by mouth daily (with breakfast)  Patient taking differently: Take 5 mg by mouth nightly  7/8/15   Marcela Tang MD   glucose monitoring kit (FREESTYLE) monitoring kit 1 kit by Does not apply route daily as needed 7/8/15   Marcela Tang MD   Multiple Vitamins-Minerals (THERAPEUTIC MULTIVITAMIN-MINERALS) tablet Take 1 tablet by mouth daily    Historical Provider, MD   aspirin 81 MG tablet Take 81 mg by mouth daily    Historical Provider, MD       Current medications:    Current Outpatient Medications   Medication Sig Dispense Refill    PARoxetine (PAXIL) 40 MG tablet Take 40 mg by mouth every morning      bumetanide (BUMEX) 1 MG tablet Take 1 tablet by mouth daily 30 tablet 3    apixaban (ELIQUIS) 5 MG TABS tablet Take 1 tablet by mouth 2 times daily 60 tablet 2    atorvastatin (LIPITOR) 40 MG tablet Take 1 tablet by mouth nightly 30 tablet 2    metoprolol tartrate (LOPRESSOR) 50 MG tablet Take 1 tablet by mouth 2 times daily 60 tablet 2    dilTIAZem (CARDIZEM CD) 120 MG extended release capsule Take 1 capsule by mouth daily 30 capsule 2    digoxin (LANOXIN) 125 MCG tablet Take 1 tablet by mouth daily 30 tablet 1    Insulin Glargine, 1 Unit Dial, (TOUJEO SOLOSTAR) 300 UNIT/ML SOPN Inject into the skin 20 units in am and 20 hs      metFORMIN (GLUCOPHAGE) 500 MG tablet Take 1,000 mg by mouth 2 times daily (with meals)       albuterol sulfate  (90 BASE) MCG/ACT inhaler Inhale 2 puffs into the lungs every 6 hours as needed for Wheezing or Shortness of Breath      pantoprazole (PROTONIX) 40 MG tablet Take 1 tablet by mouth every morning (before breakfast) 30 tablet 3    vitamin B-12 (CYANOCOBALAMIN) 1000 MCG tablet Take 1,000 mcg by mouth daily      Ascorbic Acid (VITAMIN C) 250 MG tablet Take 1,000 mg by mouth daily      glyBURIDE (DIABETA) 5 MG tablet Take 1 tablet by mouth daily (with breakfast) (Patient taking differently: Take 5 mg by mouth nightly ) 30 tablet 0    glucose monitoring kit (FREESTYLE) monitoring kit 1 kit by Does not apply route daily as needed 1 kit 0    Multiple Vitamins-Minerals (THERAPEUTIC MULTIVITAMIN-MINERALS) tablet Take 1 tablet by mouth daily      aspirin 81 MG tablet Take 81 mg by mouth daily       No current facility-administered medications for this visit. Allergies:     Allergies   Allergen Reactions    Other      pollen       Problem List:    Patient Active Problem List   Diagnosis Code    TIA (transient ischemic attack) G45.9    New onset atrial fibrillation (HCC) I48.91    Acute renal failure (ARF) (HCC) N17.9    Diverticulitis K57.92    Atrial fibrillation (HCC) I48.91    Type 2 diabetes mellitus ([facility] E11.9    Essential hypertension I10    Hyperlipidemia E78.5    Acute liver failure without hepatic coma K72.00    Sepsis ([facility] A41.9    Diabetes mellitus ([facility] E11.9    Acute hepatitis B B16.9    Cellulitis L03.90    Abscess of foot L02.619    Atrial flutter (HCC) I48.92    Atrial fibrillation with RVR (HCC) I48.91    HUMERA (obstructive sleep apnea) G47.33    Chest pain R07.9    History of TIA (transient ischemic attack) Z86.73    Unstable angina (HCC) I20.0    Acute coronary syndrome (HCC) I24.9    CAD in native artery I25.10    COVID-19 U07.1    Acute postoperative respiratory insufficiency J95.89    Postoperative hypotension I95.89       Past Medical History:        Diagnosis Date    Atrial fibrillation (HCC)     Atrial fibrillation (HCC) 02/20/2018    lexiscan stress test    Cerebral artery occlusion with cerebral infarction ([facility]     COPD (chronic obstructive pulmonary disease) ([facility]     Diabetes mellitus (HCC)     Hyperlipidemia     Hypertension     Neuropathy     feet and legs    Psychiatric problem     Sleep apnea     cpap 12    TIA (transient ischemic attack)     2015       Past Surgical History:        Procedure Laterality Date    CARDIAC SURGERY      HEART CATHETERIZATION    CORONARY ARTERY ALT 7 06/06/2020       POC Tests: No results for input(s): POCGLU, POCNA, POCK, POCCL, POCBUN, POCHEMO, POCHCT in the last 72 hours. Coags:   Lab Results   Component Value Date    PROTIME 13.3 05/28/2020    INR 1.2 05/28/2020    APTT 34.4 05/28/2020       HCG (If Applicable): No results found for: PREGTESTUR, PREGSERUM, HCG, HCGQUANT     ABGs:   Lab Results   Component Value Date    PO2ART 127.6 05/28/2020    DGC7AYI 37.7 05/28/2020    OTW4JHT 22.0 05/28/2020        Type & Screen (If Applicable):  No results found for: LABABO, LABRH    Drug/Infectious Status (If Applicable):  No results found for: HIV, HEPCAB    COVID-19 Screening (If Applicable):   Lab Results   Component Value Date    COVID19 Not Detected 08/13/2020     EKG 5/28/20  Narrative & Impression     Sinus tachycardia  Septal infarct , age undetermined  Abnormal ECG  No previous ECGs available     ECHO 5/28/2020  Findings      Left Ventricle   Normal left ventricle size and systolic function. Ejection fraction is visually estimated at >75%. Overall ejection fraction increased (hyperdynamic) . Normal left ventricle wall thickness. Right Ventricle   The right ventricle was not clearly visualized. Left Atrium   Left atrium is of normal size. Right Atrium   Normal right atrium size. Mitral Valve   Not well visualized. Tricuspid Valve   The tricuspid valve was not well visualized. Aortic Valve   Individual aortic valve leaflets are not clearly visualized. Pulmonic Valve   The pulmonic valve was not well visualized. Pericardial Effusion   There is a large localized near left ventricle pericardial effusion noted. Can not rule out localized tamponade. Pleural Effusion   No evidence of pleural effusion. Aorta   Aorta was not clearly visualized. Miscellaneous   Inferior Vena Cava not well visualized. Conclusions      Summary   Normal left ventricle size and systolic function.    Ejection fraction is visually estimated at >75%. Overall ejection fraction increased (hyperdynamic) . Normal left ventricle wall thickness. There is a large localized near left ventricle pericardial effusion noted. Can not rule out localized tamponade. Results discussed with Dr. Debo Trotter. Limited echo to assess LV function. Signature      ----------------------------------------------------------------   Electronically signed by Karla Bello MD(Interpreting   physician) on 06/03/2020 05:43 PM    Cardiac Catheterization 5/20/20  HEMODYNAMICS:  1. Opening aortic pressure 98/64 with a mean of 79.  2.  Left ventricular systolic pressure 205.  3. LVEDP 14.  4.  No significant gradient across the aortic valve.     ANGIOGRAPHIC RESULTS:  RIGHT CORONARY ARTERY:  Dominant vessel. Proximal 30%, distal 30%. The  posterolateral branch then has a mid to distal 95% stenosis. The PDA  then has sequential 75% to 80% followed by 70% to 75% stenosis in the  mid vessel. There are right-to-left collaterals filling the LAD in a  retrograde fashion. LEFT MAIN:  No angiographically significant stenosis. LAD:  Mid 100% chronic total occlusion. The mid and distal LAD are seen  filling from right-to-left collaterals. D1:  No angiographically significant stenosis. CIRCUMFLEX:  Mid bifurcating 70% stenosis. The stenosis then extends to  an 85% to 90% stenosis in the more posterior branch. In the more  lateral branch, there is a mid 50% stenosis.     NM Cardiac Stress Test 5/16/2020  FINDINGS:   Moderate-size reversible defect in the mid and basal anterior segments.       End diastolic volume is  ml.  The ejection fraction equals 50% with no focal   wall motion abnormalities. There is no left ventricular dilatation.           Impression   1. Moderate-sized reversible defect in the mid and basal anterior segments. 2. Ejection fraction of 50%. 3. No focal wall motion abnormality.    The findings were sent to the Radiology Results Communication Center at 4:28   pm on 5/18/2020to be communicated to a licensed caregiver. Anesthesia Evaluation  Patient summary reviewed no history of anesthetic complications:   Airway: Mallampati: II  TM distance: >3 FB   Neck ROM: full  Mouth opening: > = 3 FB Dental:          Pulmonary: breath sounds clear to auscultation  (+) COPD ((per pt)):  sleep apnea:  current smoker                           Cardiovascular:    (+) hypertension:, angina:, CAD:, dysrhythmias: atrial fibrillation and atrial flutter, hyperlipidemia      ECG reviewed  Rhythm: irregular  Rate: normal  Echocardiogram reviewed  Stress test reviewed       Beta Blocker:  Dose within 24 Hrs      ROS comment: S/p CABG. EKG: Atrial Flutter 57. CATH:  HEMODYNAMICS:  1. Opening aortic pressure 98/64 with a mean of 79.  2.  Left ventricular systolic pressure 852.  3. LVEDP 14.  4.  No significant gradient across the aortic valve. Stress Test:  EKG changes:   Resting EKG: nonischemic   Stress EKG : nonischemic     ECHO:  Normal left ventricle size and systolic function. Ejection fraction is visually estimated at >75%. Overall ejection fraction increased (hyperdynamic) . Normal left ventricle wall thickness. Septal motion consistent with pericardial tamponade. Normal right ventricular size and function. There is a large loculated near left ventricle hemorrhagic pericardial effusion noted. There is a marked degree of respiratory variation in mitral end flow   velocities (31%) suggestive of tamponade physiology. Results discussed with CT surgery. Neuro/Psych:   (+) CVA:, TIA, psychiatric history:            GI/Hepatic/Renal:   (+) hepatitis: B, liver disease:, renal disease: ARF,          ROS comment: Diverticulitis.    Endo/Other:    (+) DiabetesType II DM, , .                 Abdominal:   (+) obese,         Vascular:                                        Anesthesia Plan      general     ASA 4       Induction: intravenous. BIS and arterial line  MIPS: Postoperative opioids intended, Prophylactic antiemetics administered and Postoperative trial extubation. Anesthetic plan and risks discussed with patient. Use of blood products discussed with patient whom consented to blood products. Plan discussed with CRNA and attending.     Attending anesthesiologist reviewed and agrees with Angy Pendleton MD   8/18/2020

## 2020-08-18 NOTE — ANESTHESIA POSTPROCEDURE EVALUATION
Department of Anesthesiology  Postprocedure Note    Patient: Ester Buckley  MRN: [id number]  YOB: 1965  Date of evaluation: 8/18/2020  Time:  3:03 PM     Procedure Summary     Date:  08/18/20 Room / Location:  [facility] PACU; [facility] ECHO    Anesthesia Start:  7144 Anesthesia Stop:  1417    Procedure:  SJWZ BERTHA CARDIOVERSION Diagnosis:  Encounter for cardioversion procedure    Scheduled Providers:   Responsible Provider:  Glenda Perera MD    Anesthesia Type:  general ASA Status:  4          Anesthesia Type: general    Ryan Phase I: Ryan Score: 10    Ryan Phase II:      Last vitals: Reviewed and per EMR flowsheets.        Anesthesia Post Evaluation    Patient location during evaluation: PACU  Patient participation: complete - patient participated  Level of consciousness: awake  Pain score: 0  Airway patency: patent  Nausea & Vomiting: no nausea  Complications: no  Cardiovascular status: blood pressure returned to baseline  Respiratory status: acceptable  Hydration status: euvolemic

## 2020-08-19 NOTE — PROCEDURES
CARDIOVERSION REPORT     CARDIOLOGIST: Hallie Montalvo    DATE OF SERVICE: 8/19/2020    PROCEDURE: DC Electrical Cardioversion    CURRENT MEDICATIONS PRIOR TO ENCOUNTER:  Current Outpatient Medications   Medication Sig Dispense Refill    PARoxetine (PAXIL) 40 MG tablet Take 40 mg by mouth every morning      bumetanide (BUMEX) 1 MG tablet Take 1 tablet by mouth daily 30 tablet 3    apixaban (ELIQUIS) 5 MG TABS tablet Take 1 tablet by mouth 2 times daily 60 tablet 2    atorvastatin (LIPITOR) 40 MG tablet Take 1 tablet by mouth nightly 30 tablet 2    metoprolol tartrate (LOPRESSOR) 50 MG tablet Take 1 tablet by mouth 2 times daily 60 tablet 2    dilTIAZem (CARDIZEM CD) 120 MG extended release capsule Take 1 capsule by mouth daily 30 capsule 2    digoxin (LANOXIN) 125 MCG tablet Take 1 tablet by mouth daily 30 tablet 1    Insulin Glargine, 1 Unit Dial, (TOUJEO SOLOSTAR) 300 UNIT/ML SOPN Inject into the skin 20 units in am and 20 hs      metFORMIN (GLUCOPHAGE) 500 MG tablet Take 1,000 mg by mouth 2 times daily (with meals)       albuterol sulfate  (90 BASE) MCG/ACT inhaler Inhale 2 puffs into the lungs every 6 hours as needed for Wheezing or Shortness of Breath      pantoprazole (PROTONIX) 40 MG tablet Take 1 tablet by mouth every morning (before breakfast) 30 tablet 3    vitamin B-12 (CYANOCOBALAMIN) 1000 MCG tablet Take 1,000 mcg by mouth daily      Ascorbic Acid (VITAMIN C) 250 MG tablet Take 1,000 mg by mouth daily      glyBURIDE (DIABETA) 5 MG tablet Take 1 tablet by mouth daily (with breakfast) (Patient taking differently: Take 5 mg by mouth nightly ) 30 tablet 0    glucose monitoring kit (FREESTYLE) monitoring kit 1 kit by Does not apply route daily as needed 1 kit 0    Multiple Vitamins-Minerals (THERAPEUTIC MULTIVITAMIN-MINERALS) tablet Take 1 tablet by mouth daily      aspirin 81 MG tablet Take 81 mg by mouth daily       No current facility-administered medications for this encounter. HISTORY: Symptomatic atrial fibrillation    TECHNIQUE: Risks, benefits and alternatives to DC cardioversion were explained to the patient at length, and the patient acknowledged understanding. The patient was in a stable fasting condition. Cardiac rhythm, arterial oxygen saturation and blood pressure were continuously monitored. The patient was sedated by the Department of Anesthesiology. RESULTS:  BERTHA showed no intracardiac thrombus.     Patch/Paddle Position: Anterior/posterior  Energy (Joules): 200  Initial Rhythm: Atrial fibrillation  Outcome Rhythm: Sinus rhythm  COMPLICATIONS: None  CONCLUSION:  Successful electrical cardioversion of atrial fibrillation    Zeeshan Cheek MD  [facility]

## 2020-08-28 ENCOUNTER — TELEPHONE (OUTPATIENT)
Dept: NON INVASIVE DIAGNOSTICS | Age: 55
End: 2020-08-28

## 2020-08-31 ENCOUNTER — OFFICE VISIT (OUTPATIENT)
Dept: CARDIOLOGY CLINIC | Age: 55
End: 2020-08-31
Payer: COMMERCIAL

## 2020-08-31 VITALS
BODY MASS INDEX: 35.56 KG/M2 | SYSTOLIC BLOOD PRESSURE: 130 MMHG | HEART RATE: 82 BPM | WEIGHT: 254 LBS | DIASTOLIC BLOOD PRESSURE: 62 MMHG | HEIGHT: 71 IN

## 2020-08-31 PROCEDURE — 99213 OFFICE O/P EST LOW 20 MIN: CPT | Performed by: INTERNAL MEDICINE

## 2020-08-31 NOTE — PROGRESS NOTES
[facility] Cardiology Progress Note  Dr. Elyse Vazquez      Referring Physician: Yariel Hughes MD  CHIEF COMPLAINT:   Chief Complaint   Patient presents with    Coronary Artery Disease     s/p ACB May 28, 2020. Patient had a cardioversion done at [facility] by [facility] Dr Bishop Vasquez. . Patient denies any cp sob or dizziness. HISTORY OF PRESENT ILLNESS:   Patient is a 54years old patient with history of paroxysmal atrial fibrillation, hypertension, diabetes mellitus, tobacco abuse, hepatitis B is here for follow up appointment. Patient denies any chest pain, no shortness of breath, no lightheadedness, no dizziness, no palpitations, no pedal edema, no PND, no orthopnea, no syncope, no presyncopal episodes.   Functional capacity is at baseline      Past Medical History:   Diagnosis Date    Atrial fibrillation ([address]     Atrial fibrillation ([address] 02/20/2018    lexiscan stress test    Cerebral artery occlusion with cerebral infarction ([address]     COPD (chronic obstructive pulmonary disease) ([address]     Diabetes mellitus ([address]     Hyperlipidemia     Hypertension     Neuropathy     feet and legs    Psychiatric problem     Sleep apnea     cpap 12    TIA (transient ischemic attack)     2015         Past Surgical History:   Procedure Laterality Date    CARDIAC SURGERY      HEART CATHETERIZATION    CORONARY ARTERY BYPASS GRAFT N/A 5/28/2020    CORONARY ARTERY BYPASS, MAZE PROCEDURE, BERTHA performed by Orestes Alberto DO at [address] [place] [address] ECHOCARDIOGRAM COMPLETE WITH BUBBLE STUDY  7/7/2015         FRACTURE SURGERY      HERNIA REPAIR      age child    OTHER SURGICAL HISTORY  02/15/2018    incision and drainage bone biopsy of right great toe    PERICARDIUM SURGERY N/A 6/4/2020    PERICARDIAL WINDOW CREATION performed by Orestes Alberto DO at [facility] OR         Current Outpatient Medications   Medication Sig Dispense Refill    PARoxetine (PAXIL) 40 MG tablet Take 40 mg by mouth every morning      bumetanide (BUMEX) 1 MG tablet Take 1 tablet by mouth daily 30 tablet 3    apixaban (ELIQUIS) 5 MG TABS tablet Take 1 tablet by mouth 2 times daily 60 tablet 2    atorvastatin (LIPITOR) 40 MG tablet Take 1 tablet by mouth nightly 30 tablet 2    metoprolol tartrate (LOPRESSOR) 50 MG tablet Take 1 tablet by mouth 2 times daily 60 tablet 2    dilTIAZem (CARDIZEM CD) 120 MG extended release capsule Take 1 capsule by mouth daily 30 capsule 2    digoxin (LANOXIN) 125 MCG tablet Take 1 tablet by mouth daily 30 tablet 1    Insulin Glargine, 1 Unit Dial, (TOUJEO SOLOSTAR) 300 UNIT/ML SOPN Inject into the skin 20 units in am and 20 hs      metFORMIN (GLUCOPHAGE) 500 MG tablet Take 1,000 mg by mouth 2 times daily (with meals)       albuterol sulfate  (90 BASE) MCG/ACT inhaler Inhale 2 puffs into the lungs every 6 hours as needed for Wheezing or Shortness of Breath      pantoprazole (PROTONIX) 40 MG tablet Take 1 tablet by mouth every morning (before breakfast) 30 tablet 3    vitamin B-12 (CYANOCOBALAMIN) 1000 MCG tablet Take 1,000 mcg by mouth daily      Ascorbic Acid (VITAMIN C) 250 MG tablet Take 1,000 mg by mouth daily      glyBURIDE (DIABETA) 5 MG tablet Take 1 tablet by mouth daily (with breakfast) (Patient taking differently: Take 5 mg by mouth nightly ) 30 tablet 0    glucose monitoring kit (FREESTYLE) monitoring kit 1 kit by Does not apply route daily as needed 1 kit 0    Multiple Vitamins-Minerals (THERAPEUTIC MULTIVITAMIN-MINERALS) tablet Take 1 tablet by mouth daily      aspirin 81 MG tablet Take 81 mg by mouth daily       No current facility-administered medications for this visit. Allergies as of 08/31/2020 - Review Complete 08/31/2020   Allergen Reaction Noted    Other  07/07/2015       Social History     Socioeconomic History    Marital status:       Spouse name: Not on file    Number of children: Not on file    Years of education: Not on file    Highest education level: neither polyuria nor polydipsia nor blurred vision  MUSCULOSKELETAL:  negative for  myalgias, arthralgias, joint swelling, stiff joints and decreased range of motion  NEUROLOGICAL:  negative for memory problems, speech problems, visual disturbance, dysphagia, weakness and numbness      PHYSICAL EXAM:    CONST:  Awake, alert cooperative, no apparent distress, and appears stated age. HEENT:  Moist and pink mucous membranes, normocephalic, without obvious abnormality, atraumatic, normal ears and nose. NECK:  Supple, symmetrical, trachea midline, no JVD, no adenopathy, thyroid symmetric, not enlarged and no tenderness, good carotid upstroke bilaterally, no carotid bruit, skin normal.    LUNGS: No increased work of breathing, good air exchange, clear to auscultation bilaterally, no crackles or wheezing. CV: Normal apical impulse, regular rate and rhythm, normal S1 and S2, no S3 or S4, no murmur, no pedal edema, good carotid upstroke bilaterally, no carotid bruit, no JVD, no abdominal pulsating masses. ABDOMEN: Soft, nontender, no hepatomegaly, no splenomegaly, bowel sound positive. CHEST:  Expands symmetrically, nontender to palpation. MUSCU:  No clubbing or cyanosis. No redness, warmth, or swelling of the joints. NEURO: Alert, awake, and oriented X3   SKIN: No bruises, no bleeding, normal skin color, texture, turgor and no redness, warmth or swelling. /62 (Site: Right Upper Arm, Position: Sitting, Cuff Size: Large Adult)   Pulse 82   Ht 5' 11\" (1.803 m)   Wt 254 lb (115.2 kg)   BMI 35.43 kg/m²     DATA:   I personally reviewed the visit EKG with the following interpretation: Paced rhythm, old anterior septal MI age undetermined, nonspecific ST changes    EKG 8/18/20 Normal sinus rhythm  Septal infarct , age undetermined  ST & T wave abnormality, consider anterolateral ischemia  Abnormal ECG    ECHO: BERTHA 8/18/20 Summary   No intracardiac thrombus.    Left atrial appendage is not completely isolated. Normal left ventricular chamber size. Normal left ventricular systolic function. Visually estimated LVEF is 60-65 %. Paradoxical septal wall motion. Normal right ventricle structure and function. No significant valvular abnormalities. Moderate atherosclerosis of the descending aorta. Stress Test: 5/18/20   FINDINGS:    Moderate-size reversible defect in the mid and basal anterior segments.         End diastolic volume is  ml.  The ejection fraction equals 50% with no focal    wall motion abnormalities. There is no left ventricular dilatation.              Impression    1. Moderate-sized reversible defect in the mid and basal anterior segments. 2. Ejection fraction of 50%. 3. No focal wall motion abnormality. The findings were sent to the Radiology Results [address] at 4:28    pm on 5/18/2020to be communicated to a licensed caregiver. Angiography: 5/20/20  RIGHT CORONARY ARTERY:  Dominant vessel. Proximal 30%, distal 30%. The  posterolateral branch then has a mid to distal 95% stenosis. The PDA  then has sequential 75% to 80% followed by 70% to 75% stenosis in the  mid vessel. There are right-to-left collaterals filling the LAD in a  retrograde fashion. LEFT MAIN:  No angiographically significant stenosis. LAD:  Mid 100% chronic total occlusion. The mid and distal LAD are seen  filling from right-to-left collaterals. D1:  No angiographically significant stenosis. CIRCUMFLEX:  Mid bifurcating 70% stenosis. The stenosis then extends to  an 85% to 90% stenosis in the more posterior branch.   In the more  lateral branch, there is a mid 50% stenosis.     Cardiology Labs: BMP:    Lab Results   Component Value Date     06/08/2020    K 4.6 06/08/2020    K 4.8 05/16/2020    CL 97 06/08/2020    CO2 28 06/08/2020    BUN 30 06/08/2020    CREATININE 0.9 06/08/2020     CMP:    Lab Results   Component Value Date     06/08/2020    K 4.6 06/08/2020    K 4.8 05/16/2020    CL 97 06/08/2020    CO2 28 06/08/2020    BUN 30 06/08/2020    CREATININE 0.9 06/08/2020    PROT 6.9 06/06/2020     CBC:    Lab Results   Component Value Date    WBC 14.8 06/08/2020    RBC 3.36 06/08/2020    HGB 10.1 06/08/2020    HCT 31.9 06/08/2020    MCV 94.9 06/08/2020    RDW 13.9 06/08/2020     06/08/2020     PT/INR:  No results found for: PTINR  PT/INR Warfarin:  No components found for: PTPATWAR, PTINRWAR  PTT:    Lab Results   Component Value Date    APTT 34.4 05/28/2020     PTT Heparin:  No components found for: APTTHEP  Magnesium:    Lab Results   Component Value Date    MG 1.8 06/08/2020     TSH:    Lab Results   Component Value Date    TSH 5.040 06/01/2020     TROPONIN:  No components found for: TROP  BNP:  No results found for: BNP  FASTING LIPID PANEL:    Lab Results   Component Value Date    CHOL 180 05/17/2020    HDL 24 05/17/2020    TRIG 447 05/17/2020     No orders to display     I have personally reviewed the laboratory, cardiac diagnostic and radiographic testing as outlined above:      IMPRESSION:  1.  CAD: S/p CABG on 5/28/2020 with a LIMA to LAD, SVG to diagonal, SVG to posterior lateral branch, doing fine, will continue current treatment  2.  Paroxysmal atrial flutter: Now in sinus rhythm, continue current treatment, continue chronic anticoagulation with Xarelto  3.  History of TIA:   4. Hypertension:   5. Tobacco abuse:  6. Diabetes mellitus  7. Obesity  RECOMMENDATIONS:   1. Continue current treatment  2. Increase risk of bleeding due to being on anti-coagulation, symptoms and signs of bleeding discussed with patient, patient was advised to seek medical attention at the earliest symptoms or signs of bleeding. 3.  Cardiac rehab  4. Follow-up with Dr. Sancho Sharma as scheduled  5.   Follow-up with Dr. Gabrielle Sprague in 6 months, sooner if symptomatic for any reason    I have reviewed my findings and recommendations with patient    Electronically signed by Anant Oropeza MD on 9/7/2020 at 9:55 PM    NOTE: This report was transcribed using voice recognition software.  Every effort was made to ensure accuracy; however, inadvertent computerized transcription errors may be present

## 2020-09-01 PROCEDURE — 93000 ELECTROCARDIOGRAM COMPLETE: CPT | Performed by: INTERNAL MEDICINE

## 2020-09-03 NOTE — TELEPHONE ENCOUNTER
Patient called in stating he is starting cardiac rehab and will wait to see how he feels after that and will call the office to schedule an appointment.

## 2020-09-09 ENCOUNTER — HOSPITAL ENCOUNTER (OUTPATIENT)
Dept: CARDIAC REHAB | Age: 55
Setting detail: THERAPIES SERIES
Discharge: HOME | End: 2020-09-09
Payer: COMMERCIAL

## 2020-09-09 VITALS
HEART RATE: 97 BPM | HEIGHT: 71 IN | WEIGHT: 256 LBS | OXYGEN SATURATION: 94 % | RESPIRATION RATE: 18 BRPM | DIASTOLIC BLOOD PRESSURE: 78 MMHG | SYSTOLIC BLOOD PRESSURE: 138 MMHG | BODY MASS INDEX: 35.84 KG/M2

## 2020-09-10 ASSESSMENT — PATIENT HEALTH QUESTIONNAIRE - PHQ9: SUM OF ALL RESPONSES TO PHQ QUESTIONS 1-9: 17

## 2020-09-16 ENCOUNTER — HOSPITAL ENCOUNTER (OUTPATIENT)
Dept: CARDIAC REHAB | Age: 55
Setting detail: THERAPIES SERIES
Discharge: HOME | End: 2020-09-16
Payer: COMMERCIAL

## 2020-09-16 PROCEDURE — 93798 PHYS/QHP OP CAR RHAB W/ECG: CPT

## 2020-09-18 ENCOUNTER — HOSPITAL ENCOUNTER (OUTPATIENT)
Dept: CARDIAC REHAB | Age: 55
Setting detail: THERAPIES SERIES
Discharge: HOME | End: 2020-09-18
Payer: COMMERCIAL

## 2020-09-18 PROCEDURE — 93798 PHYS/QHP OP CAR RHAB W/ECG: CPT

## 2020-09-21 ENCOUNTER — APPOINTMENT (OUTPATIENT)
Dept: CARDIAC REHAB | Age: 55
End: 2020-09-21
Payer: COMMERCIAL

## 2020-09-23 ENCOUNTER — HOSPITAL ENCOUNTER (OUTPATIENT)
Dept: CARDIAC REHAB | Age: 55
Setting detail: THERAPIES SERIES
Discharge: HOME | End: 2020-09-23
Payer: COMMERCIAL

## 2020-09-23 PROCEDURE — 93798 PHYS/QHP OP CAR RHAB W/ECG: CPT

## 2020-09-28 ENCOUNTER — HOSPITAL ENCOUNTER (OUTPATIENT)
Dept: CARDIAC REHAB | Age: 55
Setting detail: THERAPIES SERIES
Discharge: HOME | End: 2020-09-28
Payer: COMMERCIAL

## 2020-09-28 PROCEDURE — 93798 PHYS/QHP OP CAR RHAB W/ECG: CPT

## 2020-09-30 ENCOUNTER — HOSPITAL ENCOUNTER (OUTPATIENT)
Dept: CARDIAC REHAB | Age: 55
Setting detail: THERAPIES SERIES
Discharge: HOME | End: 2020-09-30
Payer: COMMERCIAL

## 2020-09-30 PROCEDURE — 93798 PHYS/QHP OP CAR RHAB W/ECG: CPT

## 2020-10-29 NOTE — PROGRESS NOTES
[facility] Cardiology Progress Note  Dr. Demarco Venegas      Referring Physician: Angus Segovia MD  CHIEF COMPLAINT:   Chief Complaint   Patient presents with    Coronary Artery Disease     2 month follow-up  c/o neuropathy pain in legs/hands. Unable to do Cardiac Rehab because of that. States he gets a sharp pain in upper left chest off & on. No hospitalizations. HISTORY OF PRESENT ILLNESS:   Patient is a 54years old patient with history of paroxysmal atrial fibrillation, hypertension, diabetes mellitus, tobacco abuse, hepatitis B is here for follow up appointment. Sharp chest pain that comes and goes, patient denies any shortness of breath, no lightheadedness, no dizziness, no palpitations, no pedal edema, no PND, no orthopnea, no syncope, no presyncopal episodes.     He stopped going to cardiac rehab, blames that on neuropathy      Past Medical History:   Diagnosis Date    Atrial fibrillation ([address]     Atrial fibrillation ([address] 02/20/2018    lexiscan stress test    Cerebral artery occlusion with cerebral infarction ([address]     COPD (chronic obstructive pulmonary disease) ([address]     Diabetes mellitus ([address]     Hyperlipidemia     Hypertension     Neuropathy     feet and legs    Psychiatric problem     Sleep apnea     cpap 12    TIA (transient ischemic attack)     2015         Past Surgical History:   Procedure Laterality Date    CARDIAC SURGERY      HEART CATHETERIZATION    CORONARY ARTERY BYPASS GRAFT N/A 5/28/2020    CORONARY ARTERY BYPASS, MAZE PROCEDURE, BERTHA performed by Daryl Corona DO at [address] [place] [address] ECHOCARDIOGRAM COMPLETE WITH BUBBLE STUDY  7/7/2015         FRACTURE SURGERY      HERNIA REPAIR      age child    OTHER SURGICAL HISTORY  02/15/2018    incision and drainage bone biopsy of right great toe    PERICARDIUM SURGERY N/A 6/4/2020    PERICARDIAL WINDOW CREATION performed by Daryl Corona DO at [facility]         Current Outpatient Medications   Medication Sig Dispense Refill    PARoxetine (PAXIL) 40 MG tablet Take 40 mg by mouth every morning      bumetanide (BUMEX) 1 MG tablet Take 1 tablet by mouth daily 30 tablet 3    apixaban (ELIQUIS) 5 MG TABS tablet Take 1 tablet by mouth 2 times daily 60 tablet 2    atorvastatin (LIPITOR) 40 MG tablet Take 1 tablet by mouth nightly 30 tablet 2    metoprolol tartrate (LOPRESSOR) 50 MG tablet Take 1 tablet by mouth 2 times daily 60 tablet 2    dilTIAZem (CARDIZEM CD) 120 MG extended release capsule Take 1 capsule by mouth daily 30 capsule 2    digoxin (LANOXIN) 125 MCG tablet Take 1 tablet by mouth daily 30 tablet 1    Insulin Glargine, 1 Unit Dial, (TOUJEO SOLOSTAR) 300 UNIT/ML SOPN Inject into the skin 20 units in am and 20 hs      metFORMIN (GLUCOPHAGE) 500 MG tablet Take 1,000 mg by mouth 2 times daily (with meals)       albuterol sulfate  (90 BASE) MCG/ACT inhaler Inhale 2 puffs into the lungs every 6 hours as needed for Wheezing or Shortness of Breath      pantoprazole (PROTONIX) 40 MG tablet Take 1 tablet by mouth every morning (before breakfast) 30 tablet 3    vitamin B-12 (CYANOCOBALAMIN) 1000 MCG tablet Take 1,000 mcg by mouth daily      Ascorbic Acid (VITAMIN C) 250 MG tablet Take 1,000 mg by mouth daily      glyBURIDE (DIABETA) 5 MG tablet Take 1 tablet by mouth daily (with breakfast) (Patient taking differently: Take 5 mg by mouth nightly ) 30 tablet 0    glucose monitoring kit (FREESTYLE) monitoring kit 1 kit by Does not apply route daily as needed 1 kit 0    Multiple Vitamins-Minerals (THERAPEUTIC MULTIVITAMIN-MINERALS) tablet Take 1 tablet by mouth daily      aspirin 81 MG tablet Take 81 mg by mouth daily       No current facility-administered medications for this visit. Allergies as of 11/03/2020 - Review Complete 11/03/2020   Allergen Reaction Noted    Other  07/07/2015       Social History     Socioeconomic History    Marital status:       Spouse name: Not on file    Number of children: Not on file    Years of education: Not on file    Highest education level: Not on file   Occupational History    Not on file   Social Needs    Financial resource strain: Not on file    Food insecurity     Worry: Not on file     Inability: Not on file    Transportation needs     Medical: Not on file     Non-medical: Not on file   Tobacco Use    Smoking status: Heavy Tobacco Smoker     Packs/day: 0.50     Years: 35.00     Pack years: 17.50     Types: Cigarettes    Smokeless tobacco: Former User     Types: Chew   Substance and Sexual Activity    Alcohol use: No     Comment: STOPPED;2 cups of coffee a day     Drug use: No    Sexual activity: Not Currently   Lifestyle    Physical activity     Days per week: Not on file     Minutes per session: Not on file    Stress: Not on file   Relationships    Social connections     Talks on phone: Not on file     Gets together: Not on file     Attends Synagogue service: Not on file     Active member of club or organization: Not on file     Attends meetings of clubs or organizations: Not on file     Relationship status: Not on file    Intimate partner violence     Fear of current or ex partner: Not on file     Emotionally abused: Not on file     Physically abused: Not on file     Forced sexual activity: Not on file   Other Topics Concern    Not on file   Social History Narrative    Not on file       Family History   Problem Relation Age of Onset    Cancer Mother     Heart Disease Father        REVIEW OF SYSTEMS:     CONSTITUTIONAL:  negative for  fevers, chills, sweats, + fatigue  HEENT:  negative for  tinnitus, earaches, nasal congestion and epistaxis  RESPIRATORY:  negative for  dry cough, cough with sputum, wheezing and hemoptysis  GASTROINTESTINAL:  negative for nausea, vomiting, diarrhea, constipation, pruritus and jaundice  HEMATOLOGIC/LYMPHATIC:  negative for easy bruising, bleeding, lymphadenopathy and petechiae  ENDOCRINE:  negative for heat intolerance, cold intolerance, tremor, hair loss and diabetic symptoms including neither polyuria nor polydipsia nor blurred vision  MUSCULOSKELETAL:  negative for  myalgias, arthralgias, joint swelling, stiff joints and decreased range of motion  NEUROLOGICAL:  negative for memory problems, speech problems, visual disturbance, dysphagia, + peripheral neuropathy      PHYSICAL EXAM:    CONST:  Awake, alert cooperative, no apparent distress, and appears stated age. HEENT:  Moist and pink mucous membranes, normocephalic, without obvious abnormality, atraumatic, normal ears and nose. NECK:  Supple, symmetrical, trachea midline, no JVD, no adenopathy, thyroid symmetric, not enlarged and no tenderness, good carotid upstroke bilaterally, no carotid bruit, skin normal.    LUNGS: No increased work of breathing, good air exchange, clear to auscultation bilaterally, no crackles or wheezing. CV: Normal apical impulse, regular rate and rhythm, normal S1 and S2, no S3 or S4, no murmur, no pedal edema, good carotid upstroke bilaterally, no carotid bruit, no JVD, no abdominal pulsating masses. ABDOMEN: Soft, nontender, no hepatomegaly, no splenomegaly, bowel sound positive. CHEST:  Expands symmetrically, nontender to palpation. MUSCU:  No clubbing or cyanosis. No redness, warmth, or swelling of the joints. NEURO: Alert, awake, and oriented X3   SKIN: No bruises, no bleeding, normal skin color, texture, turgor and no redness, warmth or swelling. /60 (Site: Right Upper Arm, Position: Sitting, Cuff Size: Large Adult)   Pulse 84   Ht 5' 11\" (1.803 m)   Wt 254 lb (115.2 kg)   BMI 35.43 kg/m²     DATA:   I personally reviewed the visit EKG with the following interpretation:     EKG 8/31/20 Paced rhythm, old anterior septal MI age undetermined, nonspecific ST changes    ECHO: BERTHA 8/18/20 Summary   No intracardiac thrombus. Left atrial appendage is not completely isolated.    Normal left ventricular chamber size. Normal left ventricular systolic function. Visually estimated LVEF is 60-65 %. Paradoxical septal wall motion. Normal right ventricle structure and function. No significant valvular abnormalities. Moderate atherosclerosis of the descending aorta. Stress Test: 5/18/20   FINDINGS:    Moderate-size reversible defect in the mid and basal anterior segments.         End diastolic volume is  ml.  The ejection fraction equals 50% with no focal    wall motion abnormalities. There is no left ventricular dilatation.              Impression    1. Moderate-sized reversible defect in the mid and basal anterior segments. 2. Ejection fraction of 50%. 3. No focal wall motion abnormality. The findings were sent to the Radiology Results [address] at 4:28    pm on 5/18/2020to be communicated to a licensed caregiver. Angiography: 5/20/20  RIGHT CORONARY ARTERY:  Dominant vessel. Proximal 30%, distal 30%. The  posterolateral branch then has a mid to distal 95% stenosis. The PDA  then has sequential 75% to 80% followed by 70% to 75% stenosis in the  mid vessel. There are right-to-left collaterals filling the LAD in a  retrograde fashion. LEFT MAIN:  No angiographically significant stenosis. LAD:  Mid 100% chronic total occlusion. The mid and distal LAD are seen  filling from right-to-left collaterals. D1:  No angiographically significant stenosis. CIRCUMFLEX:  Mid bifurcating 70% stenosis. The stenosis then extends to  an 85% to 90% stenosis in the more posterior branch.   In the more  lateral branch, there is a mid 50% stenosis.     Cardiology Labs: BMP:    Lab Results   Component Value Date     06/08/2020    K 4.6 06/08/2020    K 4.8 05/16/2020    CL 97 06/08/2020    CO2 28 06/08/2020    BUN 30 06/08/2020    CREATININE 0.9 06/08/2020     CMP:    Lab Results   Component Value Date     06/08/2020    K 4.6 06/08/2020    K 4.8 05/16/2020    CL 97 06/08/2020    CO2 28 06/08/2020    BUN 30 06/08/2020    CREATININE 0.9 06/08/2020    PROT 6.9 06/06/2020     CBC:    Lab Results   Component Value Date    WBC 14.8 06/08/2020    RBC 3.36 06/08/2020    HGB 10.1 06/08/2020    HCT 31.9 06/08/2020    MCV 94.9 06/08/2020    RDW 13.9 06/08/2020     06/08/2020     PT/INR:  No results found for: PTINR  PT/INR Warfarin:  No components found for: PTPATWAR, PTINRWAR  PTT:    Lab Results   Component Value Date    APTT 34.4 05/28/2020     PTT Heparin:  No components found for: APTTHEP  Magnesium:    Lab Results   Component Value Date    MG 1.8 06/08/2020     TSH:    Lab Results   Component Value Date    TSH 5.040 06/01/2020     TROPONIN:  No components found for: TROP  BNP:  No results found for: BNP  FASTING LIPID PANEL:    Lab Results   Component Value Date    CHOL 180 05/17/2020    HDL 24 05/17/2020    TRIG 447 05/17/2020     No orders to display     I have personally reviewed the laboratory, cardiac diagnostic and radiographic testing as outlined above:      IMPRESSION:  1.  Chest pain: Noncardiac, patient was reassured  2. CAD: S/p CABG on 5/28/2020 with a LIMA to LAD, SVG to diagonal, SVG to posterior lateral branch, doing fine, will continue current treatment  3.  Paroxysmal atrial flutter: Now in sinus rhythm, continue current treatment, continue chronic anticoagulation with Xarelto  4.  History of TIA:   5. Hypertension:   6. Tobacco abuse:  7. Diabetes mellitus with peripheral neuropathy    RECOMMENDATIONS:   1. Continue current treatment  2. Increase risk of bleeding due to being on anti-coagulation, symptoms and signs of bleeding discussed with patient, patient was advised to seek medical attention at the earliest symptoms or signs of bleeding. 3.  Preventive cardiology: Low-salt, low-cholesterol diet, daily exercise, total cholesterol of less than 200, LDL of less than 70, adherence to diabetic diet and diabetic medications, smoking cessation were all advised.   4. Follow-up with Dr. Jg Reis as scheduled  5. Follow-up with Dr. Olivia Hull in 3 months, sooner if symptomatic for any reason  6.  will keep her from for another couple of months patient works as a  in a [address] [place] [address], I do think he can perform his job duties due to his recent surgery    I have reviewed my findings and recommendations with patient    Electronically signed by Ana Sierra MD on 11/3/2020 at 6:03 PM    NOTE: This report was transcribed using voice recognition software.  Every effort was made to ensure accuracy; however, inadvertent computerized transcription errors may be present

## 2020-11-03 ENCOUNTER — OFFICE VISIT (OUTPATIENT)
Dept: CARDIOLOGY CLINIC | Age: 55
End: 2020-11-03
Payer: COMMERCIAL

## 2020-11-03 VITALS
HEART RATE: 84 BPM | HEIGHT: 71 IN | DIASTOLIC BLOOD PRESSURE: 60 MMHG | BODY MASS INDEX: 35.56 KG/M2 | SYSTOLIC BLOOD PRESSURE: 120 MMHG | WEIGHT: 254 LBS

## 2020-11-03 PROCEDURE — 99214 OFFICE O/P EST MOD 30 MIN: CPT | Performed by: INTERNAL MEDICINE

## 2021-06-03 NOTE — PROGRESS NOTES
[facility] Cardiology Progress Note  Dr. Britney Sena      Referring Physician: Jesusita Spence MD  CHIEF COMPLAINT:   Chief Complaint   Patient presents with    Coronary Artery Disease     3 month ov- pt has complaints of chest discomfort, dizziness, and swelling in feet and legs       HISTORY OF PRESENT ILLNESS:   Patient is a 64years old patient with history of paroxysmal atrial fibrillation, hypertension, diabetes mellitus, tobacco abuse, hepatitis B is here for follow up appointment. Sharp chest pain that comes and goes, patient denies any shortness of breath, no lightheadedness, no dizziness, no palpitations, no pedal edema, no PND, no orthopnea, no syncope, no presyncopal episodes.     Lost 20 pounds over the last 6 months going on keto diet      Past Medical History:   Diagnosis Date    Atrial fibrillation ([address]     Atrial fibrillation (HCC) 02/20/2018    lexiscan stress test    Cerebral artery occlusion with cerebral infarction ([address]     COPD (chronic obstructive [facility] disease) [facility]     Diabetes mellitus ([address]     Hyperlipidemia     Hypertension     Neuropathy     feet and legs    Psychiatric problem     Sleep apnea     cpap 12    TIA (transient ischemic attack)     2015         Past Surgical History:   Procedure Laterality Date    CARDIAC SURGERY      HEART CATHETERIZATION    CORONARY ARTERY BYPASS GRAFT N/A 5/28/2020    CORONARY ARTERY BYPASS, MAZE PROCEDURE, BERTHA performed by Marleni Rosales DO at [address] [place] [address] ECHOCARDIOGRAM COMPLETE WITH BUBBLE STUDY  7/7/2015         FRACTURE SURGERY      HERNIA REPAIR      age child    OTHER SURGICAL HISTORY  02/15/2018    incision and drainage bone biopsy of right great toe    PERICARDIUM SURGERY N/A 6/4/2020    PERICARDIAL WINDOW CREATION performed by Marleni Rosales DO at [facility]         Current Outpatient Medications   Medication Sig Dispense Refill    DULoxetine (CYMBALTA) 20 MG extended release capsule Take 20 mg by mouth daily      bumetanide (BUMEX) 1 MG tablet Take 1 tablet by mouth daily 30 tablet 3    apixaban (ELIQUIS) 5 MG TABS tablet Take 1 tablet by mouth 2 times daily 60 tablet 2    atorvastatin (LIPITOR) 40 MG tablet Take 1 tablet by mouth nightly 30 tablet 2    metoprolol tartrate (LOPRESSOR) 50 MG tablet Take 1 tablet by mouth 2 times daily 60 tablet 2    dilTIAZem (CARDIZEM CD) 120 MG extended release capsule Take 1 capsule by mouth daily 30 capsule 2    digoxin (LANOXIN) 125 MCG tablet Take 1 tablet by mouth daily 30 tablet 1    Insulin Glargine, 1 Unit Dial, (TOUJEO SOLOSTAR) 300 UNIT/ML SOPN Inject into the skin 20 units in am and 20 hs      metFORMIN (GLUCOPHAGE) 500 MG tablet Take 1,000 mg by mouth 2 times daily (with meals)       albuterol sulfate  (90 BASE) MCG/ACT inhaler Inhale 2 puffs into the lungs every 6 hours as needed for Wheezing or Shortness of Breath      pantoprazole (PROTONIX) 40 MG tablet Take 1 tablet by mouth every morning (before breakfast) 30 tablet 3    vitamin B-12 (CYANOCOBALAMIN) 1000 MCG tablet Take 1,000 mcg by mouth daily      Ascorbic Acid (VITAMIN C) 250 MG tablet Take 1,000 mg by mouth daily      glyBURIDE (DIABETA) 5 MG tablet Take 1 tablet by mouth daily (with breakfast) (Patient taking differently: Take 5 mg by mouth nightly ) 30 tablet 0    Multiple Vitamins-Minerals (THERAPEUTIC MULTIVITAMIN-MINERALS) tablet Take 1 tablet by mouth daily      aspirin 81 MG tablet Take 81 mg by mouth daily       No current facility-administered medications for this visit. Allergies as of 06/04/2021 - Fully Reviewed 06/04/2021   Allergen Reaction Noted    Other  07/07/2015       Social History     Socioeconomic History    Marital status:       Spouse name: Not on file    Number of children: Not on file    Years of education: Not on file    Highest education level: Not on file   Occupational History    Not on file   Tobacco Use    Smoking status: Heavy Tobacco Smoker     Packs/day: 0.50     Years: 35.00     Pack years: 17.50     Types: Cigarettes    Smokeless tobacco: Former User     Types: Chew   Vaping Use    Vaping Use: Never assessed   Substance and Sexual Activity    Alcohol use: No     Comment: STOPPED;2 cups of coffee a day     Drug use: No    Sexual activity: Not Currently   Other Topics Concern    Not on file   Social History Narrative    Not on file     Social Determinants of Health     Financial Resource Strain:     Difficulty of Paying Living Expenses:    Food Insecurity:     Worried About Running Out of Food in the Last Year:     [address] in the Last Year:    Transportation Needs:     Lack of Transportation (Medical):      Lack of Transportation (Non-Medical):    Physical Activity:     Days of Exercise per Week:     Minutes of Exercise per Session:    Stress:     Feeling of Stress :    Social Connections:     Frequency of Communication with Friends and Family:     Frequency of Social Gatherings with Friends and Family:     Attends Yazdan[employer] Services:     Active Member of Clubs or Organizations:     Attends Club or Organization Meetings:     Marital Status:    Intimate Partner Violence:     Fear of Current or Ex-Partner:     Emotionally Abused:     Physically Abused:     Sexually Abused:        Family History   Problem Relation Age of Onset    Cancer Mother     Heart Disease Father        REVIEW OF SYSTEMS:     CONSTITUTIONAL:  negative for  fevers, chills, sweats, + fatigue  HEENT:  negative for  tinnitus, earaches, nasal congestion and epistaxis  RESPIRATORY:  negative for  dry cough, cough with sputum, wheezing and hemoptysis  GASTROINTESTINAL:  negative for nausea, vomiting, diarrhea, constipation, pruritus and jaundice  HEMATOLOGIC/LYMPHATIC:  negative for easy bruising, bleeding, lymphadenopathy and petechiae  ENDOCRINE:  negative for heat intolerance, cold intolerance, tremor, hair loss and diabetic symptoms systolic function. Visually estimated LVEF is 60-65 %. Paradoxical septal wall motion. Normal right ventricle structure and function. No significant valvular abnormalities. Moderate atherosclerosis of the descending aorta. Stress Test: 5/18/20   FINDINGS:    Moderate-size reversible defect in the mid and basal anterior segments.         End diastolic volume is  ml.  The ejection fraction equals 50% with no focal    wall motion abnormalities. There is no left ventricular dilatation.              Impression    1. Moderate-sized reversible defect in the mid and basal anterior segments. 2. Ejection fraction of 50%. 3. No focal wall motion abnormality. The findings were sent to the Radiology Results [address] at 4:28    pm on 5/18/2020to be communicated to a licensed caregiver. Angiography: 5/20/20  RIGHT CORONARY ARTERY:  Dominant vessel. Proximal 30%, distal 30%. The  posterolateral branch then has a mid to distal 95% stenosis. The PDA  then has sequential 75% to 80% followed by 70% to 75% stenosis in the  mid vessel. There are right-to-left collaterals filling the LAD in a  retrograde fashion. LEFT MAIN:  No angiographically significant stenosis. LAD:  Mid 100% chronic total occlusion. The mid and distal LAD are seen  filling from right-to-left collaterals. D1:  No angiographically significant stenosis. CIRCUMFLEX:  Mid bifurcating 70% stenosis. The stenosis then extends to  an 85% to 90% stenosis in the more posterior branch.   In the more  lateral branch, there is a mid 50% stenosis.     Cardiology Labs: BMP:    Lab Results   Component Value Date     06/08/2020    K 4.6 06/08/2020    K 4.8 05/16/2020    CL 97 06/08/2020    CO2 28 06/08/2020    BUN 30 06/08/2020    CREATININE 0.9 06/08/2020     CMP:    Lab Results   Component Value Date     06/08/2020    K 4.6 06/08/2020    K 4.8 05/16/2020    CL 97 06/08/2020    CO2 28 06/08/2020    BUN 30 06/08/2020 Follow-up with Dr. Herlinda Osorio as scheduled  5. Follow-up with Dr. Boris Russell in 3 months, sooner if symptomatic for any reason  6.  will keep her from for another couple of months patient works as a  in a [employer], I do think he can perform his job duties due to his recent surgery    I have reviewed my findings and recommendations with patient    Electronically signed by Girma Newell MD on 6/4/2021 at 5:51 PM    NOTE: This report was transcribed using voice recognition software.  Every effort was made to ensure accuracy; however, inadvertent computerized transcription errors may be present

## 2021-06-04 ENCOUNTER — OFFICE VISIT (OUTPATIENT)
Dept: CARDIOLOGY CLINIC | Age: 56
End: 2021-06-04
Payer: COMMERCIAL

## 2021-06-04 VITALS
HEART RATE: 91 BPM | WEIGHT: 248 LBS | BODY MASS INDEX: 34.72 KG/M2 | HEIGHT: 71 IN | RESPIRATION RATE: 18 BRPM | DIASTOLIC BLOOD PRESSURE: 62 MMHG | SYSTOLIC BLOOD PRESSURE: 108 MMHG | OXYGEN SATURATION: 97 %

## 2021-06-04 DIAGNOSIS — I25.10 CORONARY ARTERY DISEASE INVOLVING NATIVE CORONARY ARTERY OF NATIVE HEART WITHOUT ANGINA PECTORIS: Primary | ICD-10-CM

## 2021-06-04 PROCEDURE — 99214 OFFICE O/P EST MOD 30 MIN: CPT | Performed by: INTERNAL MEDICINE

## 2021-06-04 PROCEDURE — 93000 ELECTROCARDIOGRAM COMPLETE: CPT | Performed by: INTERNAL MEDICINE

## 2021-06-04 RX ORDER — DULOXETIN HYDROCHLORIDE 20 MG/1
30 CAPSULE, DELAYED RELEASE ORAL DAILY
COMMUNITY

## 2021-06-06 ENCOUNTER — HOSPITAL ENCOUNTER (EMERGENCY)
Age: 56
Discharge: HOME | End: 2021-06-06
Attending: EMERGENCY MEDICINE
Payer: COMMERCIAL

## 2021-06-06 VITALS
HEIGHT: 71 IN | DIASTOLIC BLOOD PRESSURE: 68 MMHG | RESPIRATION RATE: 18 BRPM | TEMPERATURE: 97.5 F | WEIGHT: 248 LBS | HEART RATE: 101 BPM | BODY MASS INDEX: 34.72 KG/M2 | OXYGEN SATURATION: 98 % | SYSTOLIC BLOOD PRESSURE: 146 MMHG

## 2021-06-06 DIAGNOSIS — Z79.01 CHRONIC ANTICOAGULATION: ICD-10-CM

## 2021-06-06 DIAGNOSIS — E11.9 TYPE 2 DIABETES MELLITUS WITHOUT COMPLICATION, WITHOUT LONG-TERM CURRENT USE OF INSULIN (HCC): ICD-10-CM

## 2021-06-06 DIAGNOSIS — S91.319A LACERATION OF FOOT, UNSPECIFIED LATERALITY, INITIAL ENCOUNTER: Primary | ICD-10-CM

## 2021-06-06 DIAGNOSIS — S90.819A ABRASION OF FOOT, UNSPECIFIED LATERALITY, INITIAL ENCOUNTER: ICD-10-CM

## 2021-06-06 PROCEDURE — 6370000000 HC RX 637 (ALT 250 FOR IP): Performed by: EMERGENCY MEDICINE

## 2021-06-06 PROCEDURE — 99283 EMERGENCY DEPT VISIT LOW MDM: CPT

## 2021-06-06 RX ORDER — DIAPER,BRIEF,INFANT-TODD,DISP
EACH MISCELLANEOUS ONCE
Status: COMPLETED | OUTPATIENT
Start: 2021-06-06 | End: 2021-06-06

## 2021-06-06 RX ORDER — CEPHALEXIN 250 MG/1
500 CAPSULE ORAL ONCE
Status: COMPLETED | OUTPATIENT
Start: 2021-06-06 | End: 2021-06-06

## 2021-06-06 RX ORDER — HYDROCODONE BITARTRATE AND ACETAMINOPHEN 5; 325 MG/1; MG/1
1 TABLET ORAL EVERY 6 HOURS PRN
Qty: 12 TABLET | Refills: 0 | Status: SHIPPED | OUTPATIENT
Start: 2021-06-06 | End: 2021-06-09

## 2021-06-06 RX ORDER — CEPHALEXIN 500 MG/1
500 CAPSULE ORAL 3 TIMES DAILY
Qty: 21 CAPSULE | Refills: 0 | Status: SHIPPED | OUTPATIENT
Start: 2021-06-06 | End: 2021-06-13

## 2021-06-06 RX ORDER — HYDROCODONE BITARTRATE AND ACETAMINOPHEN 5; 325 MG/1; MG/1
1 TABLET ORAL ONCE
Status: COMPLETED | OUTPATIENT
Start: 2021-06-06 | End: 2021-06-06

## 2021-06-06 RX ADMIN — BACITRACIN: 500 OINTMENT TOPICAL at 16:41

## 2021-06-06 RX ADMIN — HYDROCODONE BITARTRATE AND ACETAMINOPHEN 1 TABLET: 5; 325 TABLET ORAL at 16:40

## 2021-06-06 RX ADMIN — CEPHALEXIN 500 MG: 250 CAPSULE ORAL at 16:40

## 2021-06-06 ASSESSMENT — PAIN SCALES - GENERAL
PAINLEVEL_OUTOF10: 8
PAINLEVEL_OUTOF10: 8

## 2021-06-06 NOTE — ED PROVIDER NOTES
5/28/2020); Pericardium surgery (N/A, 6/4/2020); and hernia repair. Social History:  reports that he has been smoking cigarettes. He has a 35.00 pack-year smoking history. He has quit using smokeless tobacco.  His smokeless tobacco use included chew. He reports that he does not drink alcohol and does not use drugs. Family History: family history includes Cancer in his mother; Heart Disease in his father. . Unless otherwise noted, family history is non contributory    The patients home medications have been reviewed. Allergies: Other        ---------------------------------------------------PHYSICAL EXAM--------------------------------------    Constitutional/General: Alert and oriented x3  Head: Normocephalic and atraumatic  Eyes: PERRL, EOMI, sclera non icteric  Mouth: Oropharynx clear, handling secretions, no trismus, no asymmetry of the posterior oropharynx or uvular edema  Neck: Supple, full ROM, no stridor, no meningeal signs  Respiratory: Lungs clear to auscultation bilaterally,Not in respiratory distress  Cardiovascular:  IRRegular rate. 2+ distal pulses. Equal extremity pulses. Chest: No chest wall tenderness  GI:  Abdomen Soft, Non tender, Non distended. No rebound, guarding, or rigidity. Musculoskeletal: Moves all extremities x 4. Warm and well perfused,  Capillary refill <3 seconds  Integument: skin warm and dry. No rashes. On the bottom of bilateral great first and second toes there are multiple abrasions. There are no lacerations requiring closure. Using a scant amount of blood  Neurologic: GCS 15, no focal deficits, symmetric strength 5/5 in the upper and lower extremities bilaterally  Psychiatric: Normal Affect      -------------------------------------------------- RESULTS -------------------------------------------------  I have personally reviewed all laboratory and imaging results for this patient. Results are listed below.      LABS: (Lab results interpreted by me)  No results found for this visit on 06/06/21.,       RADIOLOGY:  Interpreted by Radiologist unless otherwise specified  No orders to display                    ------------------------- NURSING NOTES AND VITALS REVIEWED ---------------------------   The nursing notes within the ED encounter and vital signs as below have been reviewed by myself  BP (!) 146/68   Pulse 101   Temp 97.5 °F (36.4 °C) (Temporal)   Resp 18   Ht 5' 11\" (1.803 m)   Wt 248 lb (112.5 kg)   SpO2 98%   BMI 34.59 kg/m²     Oxygen Saturation Interpretation: Normal      The patients available past medical records and past encounters were reviewed. ------------------------------ ED COURSE/MEDICAL DECISION MAKING----------------------  Medications   HYDROcodone-acetaminophen (NORCO) 5-325 MG per tablet 1 tablet (1 tablet Oral Given 6/6/21 1640)   bacitracin zinc ointment ( Topical Given 6/6/21 1641)   cephALEXin (KEFLEX) capsule 500 mg (500 mg Oral Given 6/6/21 1640)   bacitracin zinc ointment ( Topical Given 6/6/21 1641)                    Medical Decision Making:     I, Dr. Marisol Kirkpatrick am the primary provider of record    Tetanus already up-to-date, wounds were cleaned and dressed. High risk for infection given diabetes, neuropathy, and exposure to [place] water. Will prophylactically start antibiotics as well as topical antibiotic cream.  Discussed wound care need for follow-up, supportive pain control at home. Will discharge home with outpatient follow-up return for worsening signs or symptoms. This patient's ED course included: a personal history and physicial examination and complex medical decision making and emergency management    This patient has remained hemodynamically stable during their ED course. Counseling: The emergency provider has spoken with the patient and discussed todays results, in addition to providing specific details for the plan of care and counseling regarding the diagnosis and prognosis.   Questions are answered at this time and they are agreeable with the plan.       --------------------------------- IMPRESSION AND DISPOSITION ---------------------------------    IMPRESSION  1. Laceration of foot, unspecified laterality, initial encounter    2. Abrasion of foot, unspecified laterality, initial encounter    3. Chronic anticoagulation    4. Type 2 diabetes mellitus without complication, without long-term current use of insulin ([facility]        DISPOSITION  Disposition: Discharge to home  Patient condition is stable        NOTE: This report was transcribed using voice recognition software.  Every effort was made to ensure accuracy; however, inadvertent computerized transcription errors may be present       Ai Gonzalez DO  06/06/21 7613

## 2021-06-15 ENCOUNTER — HOSPITAL ENCOUNTER (OUTPATIENT)
Dept: WOUND CARE | Age: 56
Discharge: HOME | End: 2021-06-15
Payer: COMMERCIAL

## 2021-06-15 VITALS
RESPIRATION RATE: 18 BRPM | HEART RATE: 71 BPM | WEIGHT: 248 LBS | BODY MASS INDEX: 34.72 KG/M2 | TEMPERATURE: 97.8 F | SYSTOLIC BLOOD PRESSURE: 158 MMHG | HEIGHT: 71 IN | DIASTOLIC BLOOD PRESSURE: 76 MMHG

## 2021-06-15 DIAGNOSIS — L97.512 RIGHT FOOT ULCER, WITH FAT LAYER EXPOSED (HCC): ICD-10-CM

## 2021-06-15 DIAGNOSIS — L97.522 FOOT ULCER, LEFT, WITH FAT LAYER EXPOSED (HCC): ICD-10-CM

## 2021-06-15 PROCEDURE — 11042 DBRDMT SUBQ TIS 1ST 20SQCM/<: CPT | Performed by: PODIATRIST

## 2021-06-15 PROCEDURE — 99213 OFFICE O/P EST LOW 20 MIN: CPT | Performed by: PODIATRIST

## 2021-06-15 RX ORDER — LIDOCAINE HYDROCHLORIDE 40 MG/ML
SOLUTION TOPICAL ONCE
Status: DISCONTINUED | OUTPATIENT
Start: 2021-06-15 | End: 2021-06-16 | Stop reason: HOSPADM

## 2021-06-15 ASSESSMENT — PAIN DESCRIPTION - PROGRESSION: CLINICAL_PROGRESSION: NOT CHANGED

## 2021-06-15 ASSESSMENT — PAIN - FUNCTIONAL ASSESSMENT: PAIN_FUNCTIONAL_ASSESSMENT: PREVENTS OR INTERFERES SOME ACTIVE ACTIVITIES AND ADLS

## 2021-06-15 ASSESSMENT — PAIN DESCRIPTION - LOCATION: LOCATION: FOOT

## 2021-06-15 ASSESSMENT — PAIN DESCRIPTION - DESCRIPTORS: DESCRIPTORS: THROBBING;ACHING

## 2021-06-15 ASSESSMENT — PAIN DESCRIPTION - ONSET: ONSET: ON-GOING

## 2021-06-15 ASSESSMENT — PAIN DESCRIPTION - PAIN TYPE: TYPE: ACUTE PAIN

## 2021-06-15 ASSESSMENT — PAIN DESCRIPTION - ORIENTATION: ORIENTATION: RIGHT

## 2021-06-15 ASSESSMENT — PAIN SCALES - GENERAL: PAINLEVEL_OUTOF10: 7

## 2021-06-15 ASSESSMENT — PAIN DESCRIPTION - FREQUENCY: FREQUENCY: CONTINUOUS

## 2021-06-22 ENCOUNTER — HOSPITAL ENCOUNTER (OUTPATIENT)
Dept: WOUND CARE | Age: 56
Discharge: HOME | End: 2021-06-22
Payer: COMMERCIAL

## 2021-06-22 VITALS
RESPIRATION RATE: 16 BRPM | HEART RATE: 78 BPM | WEIGHT: 248 LBS | SYSTOLIC BLOOD PRESSURE: 136 MMHG | HEIGHT: 71 IN | DIASTOLIC BLOOD PRESSURE: 74 MMHG | BODY MASS INDEX: 34.72 KG/M2 | TEMPERATURE: 96.8 F

## 2021-06-22 DIAGNOSIS — L97.512 RIGHT FOOT ULCER, WITH FAT LAYER EXPOSED (HCC): ICD-10-CM

## 2021-06-22 DIAGNOSIS — L97.522 FOOT ULCER, LEFT, WITH FAT LAYER EXPOSED (HCC): ICD-10-CM

## 2021-06-22 PROCEDURE — 6370000000 HC RX 637 (ALT 250 FOR IP): Performed by: PODIATRIST

## 2021-06-22 PROCEDURE — 11720 DEBRIDE NAIL 1-5: CPT | Performed by: PODIATRIST

## 2021-06-22 PROCEDURE — 11042 DBRDMT SUBQ TIS 1ST 20SQCM/<: CPT | Performed by: PODIATRIST

## 2021-06-22 RX ORDER — LIDOCAINE HYDROCHLORIDE 40 MG/ML
SOLUTION TOPICAL ONCE
Status: COMPLETED | OUTPATIENT
Start: 2021-06-22 | End: 2021-06-22

## 2021-06-22 RX ADMIN — LIDOCAINE HYDROCHLORIDE 10 ML: 40 SOLUTION TOPICAL at 09:40

## 2021-06-22 ASSESSMENT — PAIN SCALES - GENERAL: PAINLEVEL_OUTOF10: 0

## 2021-06-29 ENCOUNTER — HOSPITAL ENCOUNTER (OUTPATIENT)
Dept: WOUND CARE | Age: 56
Discharge: HOME | End: 2021-06-29
Payer: COMMERCIAL

## 2021-06-29 VITALS
HEART RATE: 95 BPM | DIASTOLIC BLOOD PRESSURE: 82 MMHG | SYSTOLIC BLOOD PRESSURE: 138 MMHG | RESPIRATION RATE: 18 BRPM | TEMPERATURE: 97.8 F

## 2021-06-29 DIAGNOSIS — L97.522 FOOT ULCER, LEFT, WITH FAT LAYER EXPOSED (HCC): ICD-10-CM

## 2021-06-29 DIAGNOSIS — L97.512 RIGHT FOOT ULCER, WITH FAT LAYER EXPOSED (HCC): Primary | ICD-10-CM

## 2021-06-29 PROCEDURE — 11042 DBRDMT SUBQ TIS 1ST 20SQCM/<: CPT | Performed by: PODIATRIST

## 2021-06-29 RX ORDER — GABAPENTIN 100 MG/1
300 CAPSULE ORAL 3 TIMES DAILY
COMMUNITY

## 2021-06-29 RX ORDER — LIDOCAINE HYDROCHLORIDE 40 MG/ML
SOLUTION TOPICAL ONCE
Status: CANCELLED | OUTPATIENT
Start: 2021-06-29 | End: 2021-06-29

## 2021-06-29 RX ORDER — AMMONIUM LACTATE 12 G/100G
LOTION TOPICAL
Qty: 1 BOTTLE | Refills: 1 | Status: SHIPPED | OUTPATIENT
Start: 2021-06-29

## 2021-06-29 ASSESSMENT — PAIN DESCRIPTION - LOCATION: LOCATION: FOOT

## 2021-06-29 ASSESSMENT — PAIN DESCRIPTION - PROGRESSION: CLINICAL_PROGRESSION: NOT CHANGED

## 2021-06-29 ASSESSMENT — PAIN DESCRIPTION - FREQUENCY: FREQUENCY: CONTINUOUS

## 2021-06-29 ASSESSMENT — PAIN - FUNCTIONAL ASSESSMENT: PAIN_FUNCTIONAL_ASSESSMENT: PREVENTS OR INTERFERES SOME ACTIVE ACTIVITIES AND ADLS

## 2021-06-29 ASSESSMENT — PAIN DESCRIPTION - ONSET: ONSET: ON-GOING

## 2021-06-29 ASSESSMENT — PAIN DESCRIPTION - ORIENTATION: ORIENTATION: RIGHT

## 2021-06-29 ASSESSMENT — PAIN DESCRIPTION - DESCRIPTORS: DESCRIPTORS: THROBBING

## 2021-06-29 ASSESSMENT — PAIN DESCRIPTION - PAIN TYPE: TYPE: ACUTE PAIN

## 2021-06-29 ASSESSMENT — PAIN SCALES - GENERAL: PAINLEVEL_OUTOF10: 8

## 2021-06-29 NOTE — PLAN OF CARE
Problem: Wound:  Goal: Will show signs of wound healing; wound closure and no evidence of infection  Description: Will show signs of wound healing; wound closure and no evidence of infection  6/29/2021 1224 by Carlos Jose RN  Outcome: Met This Shift  6/29/2021 1224 by Carlos Jose RN  Outcome: Met This Shift

## 2021-07-06 ENCOUNTER — HOSPITAL ENCOUNTER (OUTPATIENT)
Dept: WOUND CARE | Age: 56
Discharge: HOME | End: 2021-07-06
Payer: COMMERCIAL

## 2021-07-06 VITALS
HEART RATE: 100 BPM | SYSTOLIC BLOOD PRESSURE: 104 MMHG | WEIGHT: 248 LBS | DIASTOLIC BLOOD PRESSURE: 76 MMHG | HEIGHT: 71 IN | RESPIRATION RATE: 20 BRPM | TEMPERATURE: 97 F | BODY MASS INDEX: 34.72 KG/M2

## 2021-07-06 DIAGNOSIS — L97.512 RIGHT FOOT ULCER, WITH FAT LAYER EXPOSED (HCC): Primary | ICD-10-CM

## 2021-07-06 DIAGNOSIS — L97.522 FOOT ULCER, LEFT, WITH FAT LAYER EXPOSED (HCC): ICD-10-CM

## 2021-07-06 PROCEDURE — 11042 DBRDMT SUBQ TIS 1ST 20SQCM/<: CPT | Performed by: PODIATRIST

## 2021-07-06 PROCEDURE — 6370000000 HC RX 637 (ALT 250 FOR IP): Performed by: PODIATRIST

## 2021-07-06 RX ORDER — LIDOCAINE HYDROCHLORIDE 40 MG/ML
SOLUTION TOPICAL ONCE
Status: COMPLETED | OUTPATIENT
Start: 2021-07-06 | End: 2021-07-06

## 2021-07-06 RX ORDER — LIDOCAINE HYDROCHLORIDE 40 MG/ML
SOLUTION TOPICAL ONCE
Status: CANCELLED | OUTPATIENT
Start: 2021-07-06 | End: 2021-07-06

## 2021-07-06 RX ADMIN — LIDOCAINE HYDROCHLORIDE 5 ML: 40 SOLUTION TOPICAL at 10:00

## 2021-07-06 ASSESSMENT — PAIN SCALES - GENERAL: PAINLEVEL_OUTOF10: 0

## 2021-07-13 ENCOUNTER — HOSPITAL ENCOUNTER (OUTPATIENT)
Dept: WOUND CARE | Age: 56
Discharge: HOME | End: 2021-07-13
Payer: COMMERCIAL

## 2021-07-27 ENCOUNTER — HOSPITAL ENCOUNTER (OUTPATIENT)
Dept: WOUND CARE | Age: 56
Discharge: HOME | End: 2021-07-27
Payer: COMMERCIAL

## 2021-07-27 VITALS
SYSTOLIC BLOOD PRESSURE: 132 MMHG | TEMPERATURE: 97.4 F | HEART RATE: 80 BPM | WEIGHT: 248 LBS | RESPIRATION RATE: 18 BRPM | HEIGHT: 71 IN | BODY MASS INDEX: 34.72 KG/M2 | DIASTOLIC BLOOD PRESSURE: 72 MMHG

## 2021-07-27 DIAGNOSIS — L97.512 RIGHT FOOT ULCER, WITH FAT LAYER EXPOSED (HCC): Primary | ICD-10-CM

## 2021-07-27 DIAGNOSIS — L97.522 FOOT ULCER, LEFT, WITH FAT LAYER EXPOSED (HCC): ICD-10-CM

## 2021-07-27 PROCEDURE — 6370000000 HC RX 637 (ALT 250 FOR IP): Performed by: PODIATRIST

## 2021-07-27 PROCEDURE — 11042 DBRDMT SUBQ TIS 1ST 20SQCM/<: CPT | Performed by: PODIATRIST

## 2021-07-27 RX ORDER — LIDOCAINE HYDROCHLORIDE 40 MG/ML
SOLUTION TOPICAL ONCE
Status: CANCELLED | OUTPATIENT
Start: 2021-07-27 | End: 2021-07-27

## 2021-07-27 RX ORDER — LIDOCAINE HYDROCHLORIDE 40 MG/ML
SOLUTION TOPICAL ONCE
Status: COMPLETED | OUTPATIENT
Start: 2021-07-27 | End: 2021-07-27

## 2021-07-27 RX ADMIN — LIDOCAINE HYDROCHLORIDE 5 ML: 40 SOLUTION TOPICAL at 10:00

## 2021-07-27 ASSESSMENT — PAIN DESCRIPTION - ONSET: ONSET: ON-GOING

## 2021-07-27 ASSESSMENT — PAIN DESCRIPTION - DESCRIPTORS: DESCRIPTORS: BURNING

## 2021-07-27 ASSESSMENT — PAIN DESCRIPTION - ORIENTATION: ORIENTATION: RIGHT

## 2021-07-27 ASSESSMENT — PAIN DESCRIPTION - PAIN TYPE: TYPE: CHRONIC PAIN

## 2021-07-27 ASSESSMENT — PAIN SCALES - GENERAL: PAINLEVEL_OUTOF10: 1

## 2021-07-27 ASSESSMENT — PAIN DESCRIPTION - FREQUENCY: FREQUENCY: CONTINUOUS

## 2021-07-27 ASSESSMENT — PAIN DESCRIPTION - LOCATION: LOCATION: FOOT

## 2021-07-27 ASSESSMENT — PAIN - FUNCTIONAL ASSESSMENT: PAIN_FUNCTIONAL_ASSESSMENT: PREVENTS OR INTERFERES SOME ACTIVE ACTIVITIES AND ADLS

## 2021-07-27 ASSESSMENT — PAIN DESCRIPTION - PROGRESSION: CLINICAL_PROGRESSION: NOT CHANGED

## 2021-08-03 ENCOUNTER — HOSPITAL ENCOUNTER (OUTPATIENT)
Dept: WOUND CARE | Age: 56
Discharge: HOME | End: 2021-08-03
Payer: COMMERCIAL

## 2021-08-10 ENCOUNTER — HOSPITAL ENCOUNTER (OUTPATIENT)
Dept: WOUND CARE | Age: 56
Discharge: HOME | End: 2021-08-10
Payer: COMMERCIAL

## 2021-08-10 VITALS
DIASTOLIC BLOOD PRESSURE: 78 MMHG | RESPIRATION RATE: 18 BRPM | SYSTOLIC BLOOD PRESSURE: 128 MMHG | TEMPERATURE: 96.9 F | WEIGHT: 248 LBS | HEART RATE: 90 BPM | BODY MASS INDEX: 34.72 KG/M2 | HEIGHT: 71 IN

## 2021-08-10 DIAGNOSIS — L97.512 RIGHT FOOT ULCER, WITH FAT LAYER EXPOSED (HCC): Primary | ICD-10-CM

## 2021-08-10 DIAGNOSIS — L97.522 FOOT ULCER, LEFT, WITH FAT LAYER EXPOSED (HCC): ICD-10-CM

## 2021-08-10 PROCEDURE — 99213 OFFICE O/P EST LOW 20 MIN: CPT

## 2021-08-10 RX ORDER — LIDOCAINE HYDROCHLORIDE 40 MG/ML
SOLUTION TOPICAL ONCE
Status: CANCELLED | OUTPATIENT
Start: 2021-08-10 | End: 2021-08-10

## 2021-08-10 ASSESSMENT — PAIN SCALES - GENERAL: PAINLEVEL_OUTOF10: 0

## 2021-08-10 NOTE — PROGRESS NOTES
Wound Healing Center Followup Visit Note    Referring Physician : Kranthi Hernandez MD  [facility] RECORD NUMBER:  [id number]  AGE: 64 y.o. GENDER: male  : 1965  EPISODE DATE:  8/10/2021    Subjective:     Chief Complaint   Patient presents with    Wound Check     right great toe      HISTORY of PRESENT ILLNESS ALEYDA Saenz is a 64 y.o. male who presents today in regards to follow up evaluation and treatment of wound/ulcer. That patient's past medical, family and social hx were reviewed and changes were made if present. History of Wound Context:  Patient was swimming and did not have protective water shoes on. He went home and realized he had multiple full thickness ulcerations to the bottom of his right and left foot. 8/10/21: Patient's wounds to right and left foot have healed completely. No open lesions, signs of infection noted to right or left foot. Patient to continue to apply moisturizing lotion and inspecting right and left foot daily.  Patient to f/u in 1-2 weeks at my office     Wound/Ulcer Pain Timing/Severity: none  Quality of pain: N/A  Severity:  0 / 10   Modifying Factors: None  Associated Signs/Symptoms: none    Ulcer Identification:  Ulcer Type: diabetic  Contributing Factors: none    Diabetic/Pressure/Non Pressure Ulcers only:  Ulcer: Non-Pressure ulcer, fat layer exposed    Wound: Abrasion        PAST MEDICAL HISTORY      Diagnosis Date    Atrial fibrillation (HCC)     Atrial fibrillation (HCC) 2018    lexiscan stress test    Cerebral artery occlusion with cerebral infarction ([facility]     COPD (chronic obstructive pulmonary disease) (HCC)     Diabetes mellitus (HCC)     Hyperlipidemia     Hypertension     Neuropathy     feet and legs    Psychiatric problem     Sleep apnea     cpap 12    TIA (transient ischemic attack)          Past Surgical History:   Procedure Laterality Date    CARDIAC SURGERY      HEART CATHETERIZATION    CORONARY ARTERY BYPASS GRAFT N/A 5/28/2020    CORONARY ARTERY BYPASS, MAZE PROCEDURE, BERTHA performed by Flores Henry DO at [address] [place] [address] ECHOCARDIOGRAM COMPLETE WITH BUBBLE STUDY  7/7/2015         FRACTURE SURGERY      HERNIA REPAIR      age child    OTHER SURGICAL HISTORY  02/15/2018    incision and drainage bone biopsy of right great toe    PERICARDIUM SURGERY N/A 6/4/2020    PERICARDIAL WINDOW CREATION performed by Flores [facility] DO at [facility] OR     Family History   Problem Relation Age of Onset    Cancer Mother     Heart Disease Father      Social History     Tobacco Use    Smoking status: Heavy Tobacco Smoker     Packs/day: 1.00     Years: 35.00     Pack years: 35.00     Types: Cigarettes    Smokeless tobacco: Former User     Types: Chew   Vaping Use    Vaping Use: Never assessed   Substance Use Topics    Alcohol use: No     Comment: STOPPED;2 cups of coffee a day     Drug use: No     Allergies   Allergen Reactions    Other      pollen     Current Outpatient Medications on File Prior to Encounter   Medication Sig Dispense Refill    ammonium lactate (LAC-HYDRIN) 12 % lotion Apply topically daily to dry skin on right and left foot. Do not apply to open skin. 1 Bottle 1    gabapentin (NEURONTIN) 100 MG capsule Take 100 mg by mouth 3 times daily.       DULoxetine (CYMBALTA) 20 MG extended release capsule Take 20 mg by mouth daily      apixaban (ELIQUIS) 5 MG TABS tablet Take 1 tablet by mouth 2 times daily 60 tablet 2    atorvastatin (LIPITOR) 40 MG tablet Take 1 tablet by mouth nightly 30 tablet 2    metoprolol tartrate (LOPRESSOR) 50 MG tablet Take 1 tablet by mouth 2 times daily 60 tablet 2    dilTIAZem (CARDIZEM CD) 120 MG extended release capsule Take 1 capsule by mouth daily 30 capsule 2    digoxin (LANOXIN) 125 MCG tablet Take 1 tablet by mouth daily 30 tablet 1    Insulin Glargine, 1 Unit Dial, (TOUJEO SOLOSTAR) 300 UNIT/ML SOPN Inject into the skin 20 units in am and 20 hs      metFORMIN (GLUCOPHAGE) 500 MG tablet Take 1,000 mg by mouth 2 times daily (with meals)       albuterol sulfate  (90 BASE) MCG/ACT inhaler Inhale 2 puffs into the lungs every 6 hours as needed for Wheezing or Shortness of Breath      pantoprazole (PROTONIX) 40 MG tablet Take 1 tablet by mouth every morning (before breakfast) 30 tablet 3    vitamin B-12 (CYANOCOBALAMIN) 1000 MCG tablet Take 1,000 mcg by mouth daily      Ascorbic Acid (VITAMIN C) 250 MG tablet Take 1,000 mg by mouth daily      glyBURIDE (DIABETA) 5 MG tablet Take 1 tablet by mouth daily (with breakfast) (Patient taking differently: Take 5 mg by mouth nightly ) 30 tablet 0    Multiple Vitamins-Minerals (THERAPEUTIC MULTIVITAMIN-MINERALS) tablet Take 1 tablet by mouth daily      aspirin 81 MG tablet Take 81 mg by mouth daily      bumetanide (BUMEX) 1 MG tablet Take 1 tablet by mouth daily 30 tablet 3     No current facility-administered medications on file prior to encounter.        REVIEW OF SYSTEMS See HPI    Objective:    /78   Pulse 90   Temp 96.9 °F (36.1 °C) (Temporal)   Resp 18   Ht 5' 11\" (1.803 m)   Wt 248 lb (112.5 kg)   BMI 34.59 kg/m²   Wt Readings from Last 3 Encounters:   08/10/21 248 lb (112.5 kg)   07/27/21 248 lb (112.5 kg)   07/06/21 248 lb (112.5 kg)     PHYSICAL EXAM  CONSTITUTIONAL:   Awake, alert, cooperative   EYES:  lids and lashes normal   ENT: external ears and nose without lesions   NECK:  supple, symmetrical, trachea midline   SKIN:  Open wound Absent     Assessment:     Problem List Items Addressed This Visit     Right foot ulcer, with fat layer exposed ([facility] - Primary    Relevant Orders    Initiate Outpatient Wound Care Protocol    Foot ulcer, left, with fat layer exposed [facility])    Relevant Orders    Initiate Outpatient Wound Care Protocol          Pre Debridement Measurements:  Are located in the [place]  Documentation Flow Sheet  Post Debridement Measurements:  Wound/Ulcer Descriptions are Pre pain at discharge: minimal      Anesthetic used: 4% lidocaine liquid solution      Discharge to: Home     Left via:Private automobile     Accompanied by: self     ECF/HHA:      Dressing Orders: Wound healed. Discharge from clinic. Treatment Orders: Wear diabetic shoe inserts. Limit pressure to wounds. Elevate legs when seated. Keep blood sugars below 200.  begin to apply  Specialty lotion (lac-hydrin) to dry skin on feet and toes; healed areas. Wear aqua shoes when swimming. Follow up in Dr. Kyung Fiore office 1-2 weeks. Call for appointment.     [facility] followup visit ______1-2  Weeks Dr. FIGUEROA________  (Please note your next appointment above and if you are unable to keep, kindly give a 24 hour notice.  Thank you.)     Physician signature:__________________________        If you experience any of the following, please call the [address] [place] [address] [address] [employer] during business hours:     * Increase in Pain  * Temperature over 101  * Increase in drainage from your wound  * Drainage with a foul odor  * Bleeding  * Increase in swelling  * Need for compression bandage changes due to slippage, breakthrough drainage.     If you need medical attention outside of the business hours of the "[employer] [place] [address] please contact your PCP or go to the nearest emergency room.                                                                                               Electronically signed by Jacqui Corrales DPM on 8/10/2021 at 10:23 AM

## 2021-08-23 ENCOUNTER — HOSPITAL ENCOUNTER (EMERGENCY)
Age: 56
Discharge: LEFT BEFORE DISCHARGE | End: 2021-08-23
Payer: COMMERCIAL

## 2021-08-23 VITALS
WEIGHT: 250 LBS | RESPIRATION RATE: 18 BRPM | TEMPERATURE: 96.5 F | HEIGHT: 71 IN | BODY MASS INDEX: 35 KG/M2 | HEART RATE: 87 BPM | OXYGEN SATURATION: 98 %

## 2021-09-23 ENCOUNTER — TELEPHONE (OUTPATIENT)
Dept: CARDIOLOGY CLINIC | Age: 56
End: 2021-09-23

## 2021-09-23 NOTE — TELEPHONE ENCOUNTER
Patient will be having hernia surgery needing cardiac clearance and an ok to hold his Eliquis    Also, he will be having an upper and lower scope needing the clearance with an ok to hold Eliquis

## 2021-09-27 NOTE — TELEPHONE ENCOUNTER
Patient is at acceptable risk for perioperative cardiovascular event for the planned procedure (hernia surgery), patient may proceed without any further cardiac testing. Patient can hold his Eliquis 2-3 days prior to the procedurePlease feel free to call for any further information. Patient is at acceptable risk for perioperative cardiovascular event for the planned procedure (EGD/colonoscopy, patient may proceed without any further cardiac testing. Patient can hold his Eliquis 2-3 days prior to the procedurePlease feel free to call for any further information.

## 2022-01-26 NOTE — PROGRESS NOTES
Wound Healing Center Followup Visit Note    Referring Physician : Steffany Doe MD  [facility] RECORD NUMBER:  [id number]  AGE: 64 y.o. GENDER: male  : 1965  EPISODE DATE:  2021    Subjective:     Chief Complaint   Patient presents with    Wound Check     right foot      HISTORY of PRESENT ILLNESS HPI   Joselyn Telles is a 64 y.o. male who presents today in regards to follow up evaluation and treatment of wound/ulcer. That patient's past medical, family and social hx were reviewed and changes were made if present. History of Wound Context:  Patient was swimming and did not have protective water shoes on. He went home and realized he had multiple full thickness ulcerations to the bottom of his right and left foot. 8/10/21: Patient's right and left digital wounds appear to be improving. No signs of infection noted to right or left foot. Patient to continue changing dressings as directed.  Patient to f/u in 1 week     Wound/Ulcer Pain Timing/Severity: none  Quality of pain: N/A  Severity:  0 / 10   Modifying Factors: None  Associated Signs/Symptoms: none    Ulcer Identification:  Ulcer Type: diabetic  Contributing Factors: none    Diabetic/Pressure/Non Pressure Ulcers only:  Ulcer: Non-Pressure ulcer, fat layer exposed    Wound: Abrasion        PAST MEDICAL HISTORY      Diagnosis Date    Atrial fibrillation (HCC)     Atrial fibrillation (HCC) 2018    lexiscan stress test    [facility]erebral artery occlusion with [facility] ([facility]     COPD (chronic obstructive pulmonary disease) (HCC)     Diabetes mellitus (HCC)     Hyperlipidemia     Hypertension     Neuropathy     feet and legs    Psychiatric problem     Sleep apnea     cpap 12    TIA (transient ischemic attack)          Past Surgical History:   Procedure Laterality Date    CARDIAC SURGERY      HEART CATHETERIZATION    CORONARY ARTERY BYPASS GRAFT N/A 2020    CORONARY ARTERY BYPASS, MAZE PROCEDURE, BERTHA performed by Surinder Helm DO at [address] [place] [address] ECHOCARDIOGRAM COMPLETE WITH BUBBLE STUDY  7/7/2015         FRACTURE SURGERY      HERNIA REPAIR      age child    OTHER SURGICAL HISTORY  02/15/2018    incision and drainage bone biopsy of right great toe    PERICARDIUM SURGERY N/A 6/4/2020    PERICARDIAL WINDOW CREATION performed by Surinder [facility] DO at [facility] OR     Family History   Problem Relation Age of Onset    Cancer Mother     Heart Disease Father      Social History     Tobacco Use    Smoking status: Heavy Tobacco Smoker     Packs/day: 1.00     Years: 35.00     Pack years: 35.00     Types: Cigarettes    Smokeless tobacco: Former User     Types: Chew   Vaping Use    Vaping Use: Not on file   Substance Use Topics    Alcohol use: No     Comment: STOPPED;2 cups of coffee a day     Drug use: No     Allergies   Allergen Reactions    Other      pollen     Current Outpatient Medications on File Prior to Encounter   Medication Sig Dispense Refill    ammonium lactate (LAC-HYDRIN) 12 % lotion Apply topically daily to dry skin on right and left foot. Do not apply to open skin. 1 Bottle 1    gabapentin (NEURONTIN) 100 MG capsule Take 100 mg by mouth 3 times daily.       DULoxetine (CYMBALTA) 20 MG extended release capsule Take 20 mg by mouth daily      bumetanide (BUMEX) 1 MG tablet Take 1 tablet by mouth daily 30 tablet 3    apixaban (ELIQUIS) 5 MG TABS tablet Take 1 tablet by mouth 2 times daily 60 tablet 2    atorvastatin (LIPITOR) 40 MG tablet Take 1 tablet by mouth nightly 30 tablet 2    metoprolol tartrate (LOPRESSOR) 50 MG tablet Take 1 tablet by mouth 2 times daily 60 tablet 2    dilTIAZem (CARDIZEM CD) 120 MG extended release capsule Take 1 capsule by mouth daily 30 capsule 2    digoxin (LANOXIN) 125 MCG tablet Take 1 tablet by mouth daily 30 tablet 1    Insulin Glargine, 1 Unit Dial, (TOUJEO SOLOSTAR) 300 UNIT/ML SOPN Inject into the skin 20 units in am and 20 hs      metFORMIN (GLUCOPHAGE) 500 MG tablet Take 1,000 mg by mouth 2 times daily (with meals)       albuterol sulfate  (90 BASE) MCG/ACT inhaler Inhale 2 puffs into the lungs every 6 hours as needed for Wheezing or Shortness of Breath      pantoprazole (PROTONIX) 40 MG tablet Take 1 tablet by mouth every morning (before breakfast) 30 tablet 3    vitamin B-12 (CYANOCOBALAMIN) 1000 MCG tablet Take 1,000 mcg by mouth daily      Ascorbic Acid (VITAMIN C) 250 MG tablet Take 1,000 mg by mouth daily      glyBURIDE (DIABETA) 5 MG tablet Take 1 tablet by mouth daily (with breakfast) (Patient taking differently: Take 5 mg by mouth nightly ) 30 tablet 0    Multiple Vitamins-Minerals (THERAPEUTIC MULTIVITAMIN-MINERALS) tablet Take 1 tablet by mouth daily      aspirin 81 MG tablet Take 81 mg by mouth daily       No current facility-administered medications on file prior to encounter.        REVIEW OF SYSTEMS See HPI    Objective:    /72   Pulse 80   Temp 97.4 °F (36.3 °C) (Temporal)   Resp 18   Ht 5' 11\" (1.803 m)   Wt 248 lb (112.5 kg)   BMI 34.59 kg/m²   Wt Readings from Last 3 Encounters:   08/23/21 250 lb (113.4 kg)   08/10/21 248 lb (112.5 kg)   07/27/21 248 lb (112.5 kg)     PHYSICAL EXAM  CONSTITUTIONAL:   Awake, alert, cooperative   EYES:  lids and lashes normal   ENT: external ears and nose without lesions   NECK:  supple, symmetrical, trachea midline   SKIN:  Open wound Absent     Assessment:     Problem List Items Addressed This Visit     Right foot ulcer, with fat layer exposed ([address] - Primary    Foot ulcer, left, with fat layer exposed ([address]          Pre Debridement Measurements:  Are located in the [place]  Documentation Flow Sheet  Post Debridement Measurements:  Wound/Ulcer Descriptions are Pre Debridement except measurements:     Wound 06/15/21 Toe (Comment  which one) Right;Plantar;Distal #2 Right Great Toe Plantar Distal (Active)   Wound Image   08/10/21 9921   Dressing Status New dressing applied 07/06/21 1041   Wound Cleansed Cleansed with saline 07/06/21 1041   Dressing/Treatment Collagen with Ag;Dry dressing 07/06/21 1041   Offloading for Diabetic Foot Ulcers No offloading required 07/06/21 1041   Wound Length (cm) 0 cm 08/10/21 0923   Wound Width (cm) 0 cm 08/10/21 0923   Wound Depth (cm) 0 cm 08/10/21 0923   Wound Surface Area (cm^2) 0 cm^2 08/10/21 0923   Change in Wound Size % (l*w) 100 08/10/21 0923   Wound Volume (cm^3) 0 cm^3 08/10/21 0923   Wound Healing % 100 08/10/21 0923   Post-Procedure Length (cm) 0.5 cm 07/27/21 1022   Post-Procedure Width (cm) 0.3 cm 07/27/21 1022   Post-Procedure Depth (cm) 0.2 cm 07/27/21 1022   Post-Procedure Surface Area (cm^2) 0.15 cm^2 07/27/21 1022   Post-Procedure Volume (cm^3) 0.03 cm^3 07/27/21 1022   Wound Assessment Dry;Fibrin 08/10/21 0923   Drainage Amount None 08/10/21 0923   Drainage Description Serosanguinous 07/06/21 0942   Odor None 08/10/21 0923   Mima-wound Assessment Hyperkeratosis (callous) 08/10/21 0923   Number of days: 225          Procedure Note  Indications:  Based on my examination of this patient's wound(s)/ulcer(s) today, debridement is required to promote healing and evaluate the wound base. Performed by: Waqar Linder DPM    Consent obtained:  Yes    Time out taken:  Yes    Sharp debridement to subcutaneous tissue with #15 blade. Minor bleeding controlled with pressure. Well tolerated by patient. Pain Control: Anesthetic  Anesthetic: 4% Lidocaine Liquid Topical     Procedural Pain:  0  / 10   Post Procedural Pain:  0 / 10     Response to treatment:  Well tolerated by patient. Plan:   Treatment Note please see attached Discharge Instructions    Written patient dismissal instructions given to patient and signed by patient or POA.          Discharge Instructions       Visit Discharge/Physician Orders     Discharge condition: Stable     Assessment of pain at discharge: minimal      Anesthetic used: 4% lidocaine liquid solution

## 2022-01-31 NOTE — PROGRESS NOTES
Wound Healing Center Followup Visit Note    Referring Physician : Tatiana Bravo MD  [facility] RECORD NUMBER:  [id number]  AGE: 64 y.o. GENDER: male  : 1965  EPISODE DATE:  2021    Subjective:     Chief Complaint   Patient presents with    Wound Check     bilateral feet      HISTORY of PRESENT ILLNESS HPI   Glo Dillon is a 64 y.o. male who presents today in regards to follow up evaluation and treatment of wound/ulcer. That patient's past medical, family and social hx were reviewed and changes were made if present. History of Wound Context:  Patient was swimming and did not have protective water shoes on. He went home and realized he had multiple full thickness ulcerations to the bottom of his right and left foot. 21: Patient's right and left digital wounds appear to be improving. No signs of infection noted to right or left foot. Patient to continue changing dressings as directed.  Patient to f/u in 1 week     Wound/Ulcer Pain Timing/Severity: none  Quality of pain: N/A  Severity:  0 / 10   Modifying Factors: None  Associated Signs/Symptoms: none    Ulcer Identification:  Ulcer Type: diabetic  Contributing Factors: none    Diabetic/Pressure/Non Pressure Ulcers only:  Ulcer: Non-Pressure ulcer, fat layer exposed    Wound: Abrasion        PAST MEDICAL HISTORY      Diagnosis Date    Atrial fibrillation (HCC)     Atrial fibrillation (HCC) 2018    lexiscan stress test    [facility]erebral artery occlusion [facility] infarction [facility] [address]     COPD (chronic obstructive pulmonary disease) (HCC)     Diabetes mellitus (HCC)     Hyperlipidemia     Hypertension     Neuropathy     feet and legs    Psychiatric problem     Sleep apnea     cpap 12    TIA (transient ischemic attack)          Past Surgical History:   Procedure Laterality Date    CARDIAC SURGERY      HEART CATHETERIZATION    CORONARY ARTERY BYPASS GRAFT N/A 2020    CORONARY ARTERY BYPASS, MAZE PROCEDURE, BERTHA performed by Yolanda Vargas DO at [address] [place] [address] ECHOCARDIOGRAM COMPLETE WITH BUBBLE STUDY  7/7/2015         FRACTURE SURGERY      HERNIA REPAIR      age child    OTHER SURGICAL HISTORY  02/15/2018    incision and drainage bone biopsy of right great toe    PERICARDIUM SURGERY N/A 6/4/2020    PERICARDIAL WINDOW CREATION performed by Yolanda Vargas DO at [facility]     Family History   Problem Relation Age of Onset    Cancer Mother     Heart Disease Father      Social History     Tobacco Use    Smoking status: Heavy Tobacco Smoker     Packs/day: 1.00     Years: 35.00     Pack years: 35.00     Types: Cigarettes    Smokeless tobacco: Former User     Types: Chew   Vaping Use    Vaping Use: Not on file   Substance Use Topics    Alcohol use: No     Comment: STOPPED;2 cups of coffee a day     Drug use: No     Allergies   Allergen Reactions    Other      pollen     Current Outpatient Medications on File Prior to Encounter   Medication Sig Dispense Refill    ammonium lactate (LAC-HYDRIN) 12 % lotion Apply topically daily to dry skin on right and left foot. Do not apply to open skin. 1 Bottle 1    gabapentin (NEURONTIN) 100 MG capsule Take 100 mg by mouth 3 times daily.       DULoxetine (CYMBALTA) 20 MG extended release capsule Take 20 mg by mouth daily      bumetanide (BUMEX) 1 MG tablet Take 1 tablet by mouth daily 30 tablet 3    apixaban (ELIQUIS) 5 MG TABS tablet Take 1 tablet by mouth 2 times daily 60 tablet 2    atorvastatin (LIPITOR) 40 MG tablet Take 1 tablet by mouth nightly 30 tablet 2    metoprolol tartrate (LOPRESSOR) 50 MG tablet Take 1 tablet by mouth 2 times daily 60 tablet 2    dilTIAZem (CARDIZEM CD) 120 MG extended release capsule Take 1 capsule by mouth daily 30 capsule 2    digoxin (LANOXIN) 125 MCG tablet Take 1 tablet by mouth daily 30 tablet 1    Insulin Glargine, 1 Unit Dial, (TOUJEO SOLOSTAR) 300 UNIT/ML SOPN Inject into the skin 20 units in am and 20 hs      metFORMIN (GLUCOPHAGE) 500 MG tablet Take 1,000 mg by mouth 2 times daily (with meals)       albuterol sulfate  (90 BASE) MCG/ACT inhaler Inhale 2 puffs into the lungs every 6 hours as needed for Wheezing or Shortness of Breath      pantoprazole (PROTONIX) 40 MG tablet Take 1 tablet by mouth every morning (before breakfast) 30 tablet 3    vitamin B-12 (CYANOCOBALAMIN) 1000 MCG tablet Take 1,000 mcg by mouth daily      Ascorbic Acid (VITAMIN C) 250 MG tablet Take 1,000 mg by mouth daily      glyBURIDE (DIABETA) 5 MG tablet Take 1 tablet by mouth daily (with breakfast) (Patient taking differently: Take 5 mg by mouth nightly ) 30 tablet 0    Multiple Vitamins-Minerals (THERAPEUTIC MULTIVITAMIN-MINERALS) tablet Take 1 tablet by mouth daily      aspirin 81 MG tablet Take 81 mg by mouth daily       No current facility-administered medications on file prior to encounter.        REVIEW OF SYSTEMS See HPI    Objective:    /76   Pulse 100   Temp 97 °F (36.1 °C) (Temporal)   Resp 20   Ht 5' 11\" (1.803 m)   Wt 248 lb (112.5 kg)   BMI 34.59 kg/m²   Wt Readings from Last 3 Encounters:   08/23/21 250 lb (113.4 kg)   08/10/21 248 lb (112.5 kg)   07/27/21 248 lb (112.5 kg)     PHYSICAL EXAM  CONSTITUTIONAL:   Awake, alert, cooperative   EYES:  lids and lashes normal   ENT: external ears and nose without lesions   NECK:  supple, symmetrical, trachea midline   SKIN:  Open wound Absent     Assessment:     Problem List Items Addressed This Visit     Right foot ulcer, with fat layer exposed ([address] - Primary    Foot ulcer, left, with fat layer exposed ([address]          Pre Debridement Measurements:  Are located in the [facility]  Documentation Flow Sheet  Post Debridement Measurements:  Wound/Ulcer Descriptions are Pre Debridement except measurements:     Wound 06/15/21 Toe (Comment  which one) Right;Plantar;Distal #2 Right Great Toe Plantar Distal (Active)   Wound Image   08/10/21 0923   Dressing Status New dressing applied 07/06/21 1041   Wound Cleansed Cleansed with saline 07/06/21 1041   Dressing/Treatment Collagen with Ag;Dry dressing 07/06/21 1041   Offloading for Diabetic Foot Ulcers No offloading required 07/06/21 1041   Wound Length (cm) 0 cm 08/10/21 0923   Wound Width (cm) 0 cm 08/10/21 0923   Wound Depth (cm) 0 cm 08/10/21 0923   Wound Surface Area (cm^2) 0 cm^2 08/10/21 0923   Change in Wound Size % (l*w) 100 08/10/21 0923   Wound Volume (cm^3) 0 cm^3 08/10/21 0923   Wound Healing % 100 08/10/21 0923   Post-Procedure Length (cm) 0.5 cm 07/27/21 1022   Post-Procedure Width (cm) 0.3 cm 07/27/21 1022   Post-Procedure Depth (cm) 0.2 cm 07/27/21 1022   Post-Procedure Surface Area (cm^2) 0.15 cm^2 07/27/21 1022   Post-Procedure Volume (cm^3) 0.03 cm^3 07/27/21 1022   Wound Assessment Dry;Fibrin 08/10/21 0923   Drainage Amount None 08/10/21 0923   Drainage Description Serosanguinous 07/06/21 0942   Odor None 08/10/21 0923   Mima-wound Assessment Hyperkeratosis (callous) 08/10/21 0923   Number of days: 230          Procedure Note  Indications:  Based on my examination of this patient's wound(s)/ulcer(s) today, debridement is required to promote healing and evaluate the wound base. Performed by: Sarina Iraheta DPM    Consent obtained:  Yes    Time out taken:  Yes    Sharp debridement to subcutaneous tissue with #15 blade. Minor bleeding controlled with pressure. Well tolerated by patient. Pain Control: Anesthetic  Anesthetic: 4% Lidocaine Liquid Topical     Procedural Pain:  0  / 10   Post Procedural Pain:  0 / 10     Response to treatment:  Well tolerated by patient. Plan:   Treatment Note please see attached Discharge Instructions    Written patient dismissal instructions given to patient and signed by patient or POA.          Discharge Instructions         Visit Discharge/Physician Orders     Discharge condition: Stable     Assessment of pain at discharge: minimal      Anesthetic used: 4% lidocaine liquid

## 2022-01-31 NOTE — PROGRESS NOTES
Wound Healing Center Followup Visit Note    Referring Physician : Irene Landeros MD  [facility] RECORD NUMBER:  [id number]  AGE: 64 y.o. GENDER: male  : 1965  EPISODE DATE:  2021    Subjective:     Chief Complaint   Patient presents with    Wound Check     right foot      HISTORY of PRESENT ILLNESS HPI   Marcos Gonzalez is a 64 y.o. male who presents today in regards to follow up evaluation and treatment of wound/ulcer. That patient's past medical, family and social hx were reviewed and changes were made if present. History of Wound Context:  Patient was swimming and did not have protective water shoes on. He went home and realized he had multiple full thickness ulcerations to the bottom of his right and left foot. 21: Patient's right and left digital wounds appear to be improving. No signs of infection noted to right or left foot. Patient to continue changing dressings as directed.  Patient to f/u in 1 week     Wound/Ulcer Pain Timing/Severity: none  Quality of pain: N/A  Severity:  0 / 10   Modifying Factors: None  Associated Signs/Symptoms: none    Ulcer Identification:  Ulcer Type: diabetic  Contributing Factors: none    Diabetic/Pressure/Non Pressure Ulcers only:  Ulcer: Non-Pressure ulcer, fat layer exposed    Wound: Abrasion        PAST MEDICAL HISTORY      Diagnosis Date    Atrial fibrillation (HCC)     Atrial fibrillation (HCC) 2018    lexiscan stress test    Cerebral artery occlusion with cerebral infarction ([facility] [address]     COPD (chronic obstructive pulmonary disease) (HCC)     Diabetes mellitus (HCC)     Hyperlipidemia     Hypertension     Neuropathy     feet and legs    Psychiatric problem     Sleep apnea     cpap 12    TIA (transient ischemic attack)          Past Surgical History:   Procedure Laterality Date    CARDIAC SURGERY      HEART CATHETERIZATION    CORONARY ARTERY BYPASS GRAFT N/A 2020    CORONARY ARTERY BYPASS, MAZE PROCEDURE, BERTHA performed by Arpita Justice DO at [address] [place] [address] ECHOCARDIOGRAM COMPLETE WITH BUBBLE STUDY  7/7/2015         FRACTURE SURGERY      HERNIA REPAIR      age child    OTHER SURGICAL HISTORY  02/15/2018    incision and drainage bone biopsy of right great toe    PERICARDIUM SURGERY N/A 6/4/2020    PERICARDIAL WINDOW CREATION [facility] DO [facility]     Family History   Problem Relation Age of Onset    Cancer Mother     Heart Disease Father      Social History     Tobacco Use    Smoking status: Heavy Tobacco Smoker     Packs/day: 1.00     Years: 35.00     Pack years: 35.00     Types: Cigarettes    Smokeless tobacco: Former User     Types: Chew   Vaping Use    Vaping Use: Not on file   Substance Use Topics    Alcohol use: No     Comment: STOPPED;2 cups of coffee a day     Drug use: No     Allergies   Allergen Reactions    Other      pollen     Current Outpatient Medications on File Prior to Encounter   Medication Sig Dispense Refill    gabapentin (NEURONTIN) 100 MG capsule Take 100 mg by mouth 3 times daily.       DULoxetine (CYMBALTA) 20 MG extended release capsule Take 20 mg by mouth daily      bumetanide (BUMEX) 1 MG tablet Take 1 tablet by mouth daily 30 tablet 3    apixaban (ELIQUIS) 5 MG TABS tablet Take 1 tablet by mouth 2 times daily 60 tablet 2    atorvastatin (LIPITOR) 40 MG tablet Take 1 tablet by mouth nightly 30 tablet 2    metoprolol tartrate (LOPRESSOR) 50 MG tablet Take 1 tablet by mouth 2 times daily 60 tablet 2    dilTIAZem (CARDIZEM CD) 120 MG extended release capsule Take 1 capsule by mouth daily 30 capsule 2    digoxin (LANOXIN) 125 MCG tablet Take 1 tablet by mouth daily 30 tablet 1    Insulin Glargine, 1 Unit Dial, (TOUJEO SOLOSTAR) 300 UNIT/ML SOPN Inject into the skin 20 units in am and 20 hs      metFORMIN (GLUCOPHAGE) 500 MG tablet Take 1,000 mg by mouth 2 times daily (with meals)       albuterol sulfate  (90 BASE) MCG/ACT inhaler 1041   Wound Length (cm) 0 cm 08/10/21 0923   Wound Width (cm) 0 cm 08/10/21 0923   Wound Depth (cm) 0 cm 08/10/21 0923   Wound Surface Area (cm^2) 0 cm^2 08/10/21 0923   Change in Wound Size % (l*w) 100 08/10/21 0923   Wound Volume (cm^3) 0 cm^3 08/10/21 0923   Wound Healing % 100 08/10/21 0923   Post-Procedure Length (cm) 0.5 cm 07/27/21 1022   Post-Procedure Width (cm) 0.3 cm 07/27/21 1022   Post-Procedure Depth (cm) 0.2 cm 07/27/21 1022   Post-Procedure Surface Area (cm^2) 0.15 cm^2 07/27/21 1022   Post-Procedure Volume (cm^3) 0.03 cm^3 07/27/21 1022   Wound Assessment Dry;Fibrin 08/10/21 0923   Drainage Amount None 08/10/21 0923   Drainage Description Serosanguinous 07/06/21 0942   Odor None 08/10/21 0923   Mima-wound Assessment Hyperkeratosis (callous) 08/10/21 0923   Number of days: 230          Procedure Note  Indications:  Based on my examination of this patient's wound(s)/ulcer(s) today, debridement is required to promote healing and evaluate the wound base. Performed by: Rafy Early DPM    Consent obtained:  Yes    Time out taken:  Yes    Sharp debridement to subcutaneous tissue with #15 blade. Minor bleeding controlled with pressure. Well tolerated by patient. Pain Control: Anesthetic  Anesthetic: 4% Lidocaine Liquid Topical     Procedural Pain:  0  / 10   Post Procedural Pain:  0 / 10     Response to treatment:  Well tolerated by patient. Plan:   Treatment Note please see attached Discharge Instructions    Written patient dismissal instructions given to patient and signed by patient or POA.          Discharge Instructions       Visit Discharge/Physician Orders     Discharge condition: Stable     Assessment of pain at discharge: minimal      Anesthetic used: 4% lidocaine liquid solution      Discharge to: Home     Left via:Private automobile     Accompanied by: self     ECF/HHA:      Dressing Orders: in clinic, apply HYDRAGEL AND DRY DRESSING  Cleanse all toe  Wounds on right foot t  with normal saline solution and continue to apply small amt of  mupirocin ointment to all wounds and cover with a dry dressing and change every day. Leave covered at all times. [facility]. Limit pressure to these wounds. Elevate legs when seated. Keep blood sugars below 200.  begin to apply  Specialty lotion to dry skin on feet and toes; healed areas. [facility] followup visit ____________one week_________________  (Please note your next appointment above and if you are unable to keep, kindly give a 24 hour notice.  Thank you.)     Physician signature:__________________________        If you experience any of the following, please call the [facility] during business hours:     * Increase in Pain  * Temperature over 101  * Increase in drainage from your wound  * Drainage with a foul odor  * Bleeding  * Increase in swelling  * Need for compression bandage changes due to slippage, breakthrough drainage.     If you need medical attention outside of the business hours of the [facility] please contact your PCP or go to the nearest emergency room.                                Electronically signed by Erin Benitez DPM on 1/31/2022 at 3:21 PM

## 2022-01-31 NOTE — PROGRESS NOTES
Wound Healing Center Followup Visit Note    Referring Physician : Maddie Zavala MD  [facility] RECORD NUMBER:  [id number]  AGE: 64 y.o. GENDER: male  : 1965  EPISODE DATE:  2021    Subjective:     Chief Complaint   Patient presents with    Wound Check     left and right great toes      HISTORY of PRESENT ILLNESS HPI   Janet Osborn is a 64 y.o. male who presents today in regards to follow up evaluation and treatment of wound/ulcer. That patient's past medical, family and social hx were reviewed and changes were made if present. History of Wound Context:  Patient was swimming and did not have protective water shoes on. He went home and realized he had multiple full thickness ulcerations to the bottom of his right and left foot. 21: Patient's right and left digital wounds appear to be improving. No signs of infection noted to right or left foot. Patient to continue changing dressings as directed.  Patient to f/u in 1 week     Wound/Ulcer Pain Timing/Severity: none  Quality of pain: N/A  Severity:  0 / 10   Modifying Factors: None  Associated Signs/Symptoms: none    Ulcer Identification:  Ulcer Type: diabetic  Contributing Factors: none    Diabetic/Pressure/Non Pressure Ulcers only:  Ulcer: Non-Pressure ulcer, fat layer exposed    Wound: Abrasion        PAST MEDICAL HISTORY      Diagnosis Date    Atrial fibrillation (HCC)     Atrial fibrillation (HCC) 2018    lexiscan stress test    Cerebral artery occlusion with cerebral infarction ([facility] [address]     COPD (chronic obstructive pulmonary disease) (HCC)     Diabetes mellitus (HCC)     Hyperlipidemia     Hypertension     Neuropathy     feet and legs    Psychiatric problem     Sleep apnea     cpap 12    TIA (transient ischemic attack)          Past Surgical History:   Procedure Laterality Date    CARDIAC SURGERY      HEART CATHETERIZATION    CORONARY ARTERY BYPASS GRAFT N/A 2020    CORONARY ARTERY BYPASS, MAZE Wheezing or Shortness of Breath      pantoprazole (PROTONIX) 40 MG tablet Take 1 tablet by mouth every morning (before breakfast) 30 tablet 3    vitamin B-12 (CYANOCOBALAMIN) 1000 MCG tablet Take 1,000 mcg by mouth daily      Ascorbic Acid (VITAMIN C) 250 MG tablet Take 1,000 mg by mouth daily      glyBURIDE (DIABETA) 5 MG tablet Take 1 tablet by mouth daily (with breakfast) (Patient taking differently: Take 5 mg by mouth nightly ) 30 tablet 0    Multiple Vitamins-Minerals (THERAPEUTIC MULTIVITAMIN-MINERALS) tablet Take 1 tablet by mouth daily      aspirin 81 MG tablet Take 81 mg by mouth daily       No current facility-administered medications on file prior to encounter.        REVIEW OF SYSTEMS See HPI    Objective:    /74   Pulse 78   Temp 96.8 °F (36 °C) (Temporal)   Resp 16   Ht 5' 11\" (1.803 m)   Wt 248 lb (112.5 kg)   BMI 34.59 kg/m²   Wt Readings from Last 3 Encounters:   08/23/21 250 lb (113.4 kg)   08/10/21 248 lb (112.5 kg)   07/27/21 248 lb (112.5 kg)     PHYSICAL EXAM  CONSTITUTIONAL:   Awake, alert, cooperative   EYES:  lids and lashes normal   ENT: external ears and nose without lesions   NECK:  supple, symmetrical, trachea midline   SKIN:  Open wound Absent     Assessment:     Problem List Items Addressed This Visit     Right foot ulcer, with fat layer exposed ([address]    Foot ulcer, left, with fat layer exposed ([address]          Pre Debridement Measurements:  Are located in the [facility]  Documentation Flow Sheet  Post Debridement Measurements:  Wound/Ulcer Descriptions are Pre Debridement except measurements:     Wound 06/15/21 Toe (Comment  which one) Right;Plantar;Distal #2 Right Great Toe Plantar Distal (Active)   Wound Image   08/10/21 0923   Dressing Status New dressing applied 07/06/21 1041   Wound Cleansed Cleansed with saline 07/06/21 1041   Dressing/Treatment Collagen with Ag;Dry dressing 07/06/21 1041   Offloading for Diabetic Foot Ulcers No offloading required 07/06/21 1041 Wound Length (cm) 0 cm 08/10/21 0923   Wound Width (cm) 0 cm 08/10/21 0923   Wound Depth (cm) 0 cm 08/10/21 0923   Wound Surface Area (cm^2) 0 cm^2 08/10/21 0923   Change in Wound Size % (l*w) 100 08/10/21 0923   Wound Volume (cm^3) 0 cm^3 08/10/21 0923   Wound Healing % 100 08/10/21 0923   Post-Procedure Length (cm) 0.5 cm 07/27/21 1022   Post-Procedure Width (cm) 0.3 cm 07/27/21 1022   Post-Procedure Depth (cm) 0.2 cm 07/27/21 1022   Post-Procedure Surface Area (cm^2) 0.15 cm^2 07/27/21 1022   Post-Procedure Volume (cm^3) 0.03 cm^3 07/27/21 1022   Wound Assessment Dry;Fibrin 08/10/21 0923   Drainage Amount None 08/10/21 0923   Drainage Description Serosanguinous 07/06/21 0942   Odor None 08/10/21 0923   Mima-wound Assessment Hyperkeratosis (callous) 08/10/21 0923   Number of days: 230          Procedure Note  Indications:  Based on my examination of this patient's wound(s)/ulcer(s) today, debridement is required to promote healing and evaluate the wound base. Performed by: Lukas Trujillo DPM    Consent obtained:  Yes    Time out taken:  Yes    Sharp debridement to subcutaneous tissue with #15 blade. Minor bleeding controlled with pressure. Well tolerated by patient. Pain Control: Anesthetic  Anesthetic: 4% Lidocaine Liquid Topical     Procedural Pain:  0  / 10   Post Procedural Pain:  0 / 10     Response to treatment:  Well tolerated by patient. Plan:   Treatment Note please see attached Discharge Instructions    Written patient dismissal instructions given to patient and signed by patient or POA.          Discharge Instructions       Visit Discharge/Physician Orders     Discharge condition: Stable     Assessment of pain at discharge: minimal      Anesthetic used: 4% lidocaine liquid solution      Discharge to: Home     Left via:Private automobile     Accompanied by: self     ECF/HHA:      Dressing Orders: Cleanse all toe  Wounds on right and left  with normal saline solution and continue to apply small

## 2022-02-02 NOTE — PROGRESS NOTES
[facility] Followup Visit Note    Referring Physician : Cleo Jose MD  [facility] RECORD NUMBER:  [id number]  AGE: 64 y.o. GENDER: male  : 1965  EPISODE DATE:  6/15/2021    Subjective:     Chief Complaint   Patient presents with    Wound Check     RIGHT AND LEFT FOOT      HISTORY of PRESENT ILLNESS HPI   Janice Richard is a 64 y.o. male who presents today in regards to follow up evaluation and treatment of wound/ulcer. That patient's past medical, family and social hx were reviewed and changes were made if present. History of Wound Context:  Patient was swimming and did not have protective water shoes on. He went home and realized he had multiple full thickness ulcerations to the bottom of his right and left foot. 6/15/21: Patient's right and left digital wounds appear to be improving. No signs of infection noted to right or left foot. Patient to continue changing dressings as directed.  Patient to f/u in 1 week     Wound/Ulcer Pain Timing/Severity: none  Quality of pain: N/A  Severity:  0 / 10   Modifying Factors: None  Associated Signs/Symptoms: none    Ulcer Identification:  Ulcer Type: diabetic  Contributing Factors: none    Diabetic/Pressure/Non Pressure Ulcers only:  Ulcer: Non-Pressure ulcer, fat layer exposed    Wound: Abrasion        PAST MEDICAL HISTORY      Diagnosis Date    Atrial fibrillation (HCC)     Atrial fibrillation (HCC) 2018    lexiscan stress test    Cerebral artery occlusion with cerebral infarction ([facility] [address]     COPD (chronic obstructive pulmonary disease) (HCC)     Diabetes mellitus (HCC)     Hyperlipidemia     Hypertension     Neuropathy     feet and legs    Psychiatric problem     Sleep apnea     cpap 12    TIA (transient ischemic attack)          Past Surgical History:   Procedure Laterality Date    CARDIAC SURGERY      HEART CATHETERIZATION    CORONARY ARTERY BYPASS GRAFT N/A 2020    CORONARY ARTERY BYPASS, MAZE Wheezing or Shortness of Breath      pantoprazole (PROTONIX) 40 MG tablet Take 1 tablet by mouth every morning (before breakfast) 30 tablet 3    vitamin B-12 (CYANOCOBALAMIN) 1000 MCG tablet Take 1,000 mcg by mouth daily      Ascorbic Acid (VITAMIN C) 250 MG tablet Take 1,000 mg by mouth daily      glyBURIDE (DIABETA) 5 MG tablet Take 1 tablet by mouth daily (with breakfast) (Patient taking differently: Take 5 mg by mouth nightly ) 30 tablet 0    Multiple Vitamins-Minerals (THERAPEUTIC MULTIVITAMIN-MINERALS) tablet Take 1 tablet by mouth daily      aspirin 81 MG tablet Take 81 mg by mouth daily       No current facility-administered medications on file prior to encounter.        REVIEW OF SYSTEMS See HPI    Objective:    BP (!) 158/76   Pulse 71   Temp 97.8 °F (36.6 °C) (Temporal)   Resp 18   Ht 5' 11\" (1.803 m)   Wt 248 lb (112.5 kg)   BMI 34.59 kg/m²   Wt Readings from Last 3 Encounters:   08/23/21 250 lb (113.4 kg)   08/10/21 248 lb (112.5 kg)   07/27/21 248 lb (112.5 kg)     PHYSICAL EXAM  CONSTITUTIONAL:   Awake, alert, cooperative   EYES:  lids and lashes normal   ENT: external ears and nose without lesions   NECK:  supple, symmetrical, trachea midline   SKIN:  Open wound Absent     Assessment:     Problem List Items Addressed This Visit     Right foot ulcer, with fat layer exposed ([address]    Foot ulcer, left, with fat layer exposed ([address]          Pre Debridement Measurements:  Are located in the [facility]  Documentation Flow Sheet  Post Debridement Measurements:  Wound/Ulcer Descriptions are Pre Debridement except measurements:     Wound 06/15/21 Toe (Comment  which one) Right;Plantar;Distal #2 Right Great Toe Plantar Distal (Active)   Wound Image   08/10/21 0923   Dressing Status New dressing applied 07/06/21 1041   Wound Cleansed Cleansed with saline 07/06/21 1041   Dressing/Treatment Collagen with Ag;Dry dressing 07/06/21 1041   Offloading for Diabetic Foot Ulcers No offloading required 07/06/21 1041   Wound Length (cm) 0 cm 08/10/21 0923   Wound Width (cm) 0 cm 08/10/21 0923   Wound Depth (cm) 0 cm 08/10/21 0923   Wound Surface Area (cm^2) 0 cm^2 08/10/21 0923   Change in Wound Size % (l*w) 100 08/10/21 0923   Wound Volume (cm^3) 0 cm^3 08/10/21 0923   Wound Healing % 100 08/10/21 0923   Post-Procedure Length (cm) 0.5 cm 07/27/21 1022   Post-Procedure Width (cm) 0.3 cm 07/27/21 1022   Post-Procedure Depth (cm) 0.2 cm 07/27/21 1022   Post-Procedure Surface Area (cm^2) 0.15 cm^2 07/27/21 1022   Post-Procedure Volume (cm^3) 0.03 cm^3 07/27/21 1022   Wound Assessment Dry;Fibrin 08/10/21 0923   Drainage Amount None 08/10/21 0923   Drainage Description Serosanguinous 07/06/21 0942   Odor None 08/10/21 0923   Mima-wound Assessment Hyperkeratosis (callous) 08/10/21 0923   Number of days: 232          Procedure Note  Indications:  Based on my examination of this patient's wound(s)/ulcer(s) today, debridement is required to promote healing and evaluate the wound base. Performed by: Claudine Vasquez DPM    Consent obtained:  Yes    Time out taken:  Yes    Sharp debridement to subcutaneous tissue with #15 blade. Minor bleeding controlled with pressure. Well tolerated by patient. Pain Control: Anesthetic  Anesthetic: 4% Lidocaine Liquid Topical     Procedural Pain:  0  / 10   Post Procedural Pain:  0 / 10     Response to treatment:  Well tolerated by patient. Plan:   Treatment Note please see attached Discharge Instructions    Written patient dismissal instructions given to patient and signed by patient or POA.          Discharge Instructions       Visit Discharge/Physician Orders    Discharge condition: Stable    Assessment of pain at discharge: minimal     Anesthetic used: 4% lidocaine liquid solution     Discharge to: Home    Left via:Private automobile    Accompanied by: self    ECF/HHA:     Dressing Orders: Cleanse wounds with normal saline solution and apply mupirocin ointment to all wounds and cover with a dry dressing and change every day. Leave covered at all times. Treatment Orders: Limit pressure to these wounds. Elevate legs when seated. Watch blood sugars. [address] [facility] followup visit ____________one week_________________  (Please note your next appointment above and if you are unable to keep, kindly give a 24 hour notice. Thank you.)    Physician signature:__________________________      If you experience any of the following, please call the [facility] during business hours:    * Increase in Pain  * Temperature over 101  * Increase in drainage from your wound  * Drainage with a foul odor  * Bleeding  * Increase in swelling  * Need for compression bandage changes due to slippage, breakthrough drainage. If you need medical attention outside of the business hours of the [facility] please contact your PCP or go to the nearest emergency room.         Electronically signed by Ginger Sevilla DPM on 2/2/2022 at 4:36 PM

## 2022-04-23 ENCOUNTER — HOSPITAL ENCOUNTER (OUTPATIENT)
Dept: CT IMAGING | Age: 57
Discharge: HOME | End: 2022-04-23
Payer: OTHER GOVERNMENT

## 2022-04-23 DIAGNOSIS — Z12.2 ENCOUNTER FOR SCREENING FOR MALIGNANT NEOPLASM OF RESPIRATORY ORGANS: ICD-10-CM

## 2022-04-23 PROCEDURE — 71271 CT THORAX LUNG CANCER SCR C-: CPT

## 2022-04-28 NOTE — PROGRESS NOTES
[facility] Cardiology Progress Note  Dr. Carlton Melissa      Referring Physician: Eden Herrera MD  CHIEF COMPLAINT:   Chief Complaint   Patient presents with   Coleen Armstrong Atrial Fibrillation     consult per dr Adri Finley, chest pain and post hosp. Patient c/o intermittent chest pains        HISTORY OF PRESENT ILLNESS:   Patient is a 62years old patient with history of paroxysmal atrial fibrillation, hypertension, diabetes mellitus, tobacco abuse, hepatitis B is here for follow up appointment. Sharp chest pain that comes and goes, sharp in nature, nonexertional, patient denies any shortness of breath, no lightheadedness, no dizziness, no palpitations, no pedal edema, no PND, no orthopnea, no syncope, no presyncopal episodes.     Functional capacity is at baseline    Past Medical History:   Diagnosis Date    Atrial fibrillation ([address]     Atrial fibrillation ([address] 02/20/2018    lexiscan stress test    Cerebral artery occlusion with cerebral infarction ([address]     COPD (chronic obstructive pulmonary disease) ([address]     Diabetes mellitus ([address]     Hyperlipidemia     Hypertension     Neuropathy     feet and legs    Psychiatric problem     Sleep apnea     cpap 12    TIA (transient ischemic attack)     2015         Past Surgical History:   Procedure Laterality Date    CARDIAC SURGERY      HEART CATHETERIZATION    CORONARY ARTERY BYPASS GRAFT N/A 5/28/2020    CORONARY ARTERY BYPASS, MAZE PROCEDURE, BERTHA performed by Nelson Luna DO at [address] [place] [address] ECHOCARDIOGRAM COMPLETE WITH BUBBLE STUDY  7/7/2015         FRACTURE SURGERY      HERNIA REPAIR      age child    OTHER SURGICAL HISTORY  02/15/2018    incision and drainage bone biopsy of right great toe    PERICARDIUM SURGERY N/A 6/4/2020    PERICARDIAL WINDOW CREATION performed by [facility] DO at [facility] OR         Current Outpatient Medications   Medication Sig Dispense Refill    glipiZIDE (GLUCOTROL) 10 MG tablet 10 mg      rosuvastatin (CRESTOR) 20 MG tablet       insulin glargine (LANTUS) 100 UNIT/ML injection vial Inject into the skin nightly 35 units BID      JARDIANCE 25 MG tablet       ammonium lactate (LAC-HYDRIN) 12 % lotion Apply topically daily to dry skin on right and left foot. Do not apply to open skin. 1 Bottle 1    gabapentin (NEURONTIN) 100 MG capsule Take 300 mg by mouth 3 times daily.        DULoxetine (CYMBALTA) 20 MG extended release capsule Take 20 mg by mouth daily      bumetanide (BUMEX) 1 MG tablet Take 1 tablet by mouth daily 30 tablet 3    apixaban (ELIQUIS) 5 MG TABS tablet Take 1 tablet by mouth 2 times daily 60 tablet 2    metoprolol tartrate (LOPRESSOR) 50 MG tablet Take 1 tablet by mouth 2 times daily 60 tablet 2    dilTIAZem (CARDIZEM CD) 120 MG extended release capsule Take 1 capsule by mouth daily 30 capsule 2    digoxin (LANOXIN) 125 MCG tablet Take 1 tablet by mouth daily 30 tablet 1    metFORMIN (GLUCOPHAGE) 500 MG tablet Take 1,000 mg by mouth 2 times daily (with meals)       albuterol sulfate  (90 BASE) MCG/ACT inhaler Inhale 2 puffs into the lungs every 6 hours as needed for Wheezing or Shortness of Breath      pantoprazole (PROTONIX) 40 MG tablet Take 1 tablet by mouth every morning (before breakfast) 30 tablet 3    vitamin B-12 (CYANOCOBALAMIN) 1000 MCG tablet Take 1,000 mcg by mouth daily      Ascorbic Acid (VITAMIN C) 250 MG tablet Take 1,000 mg by mouth daily      Multiple Vitamins-Minerals (THERAPEUTIC MULTIVITAMIN-MINERALS) tablet Take 1 tablet by mouth daily      aspirin 81 MG tablet Take 81 mg by mouth daily      atorvastatin (LIPITOR) 40 MG tablet Take 1 tablet by mouth nightly (Patient not taking: Reported on 4/29/2022) 30 tablet 2    Insulin Glargine, 1 Unit Dial, (TOUJEO SOLOSTAR) 300 UNIT/ML SOPN Inject into the skin 20 units in am and 20 hs (Patient not taking: Reported on 4/29/2022)      glyBURIDE (DIABETA) 5 MG tablet Take 1 tablet by mouth daily (with breakfast) (Patient not taking: Reported on 4/29/2022) 30 tablet 0     No current facility-administered medications for this visit. Allergies as of 04/29/2022 - Fully Reviewed 04/29/2022   Allergen Reaction Noted    Other  07/07/2015       Social History     Socioeconomic History    Marital status:      Spouse name: Not on file    Number of children: Not on file    Years of education: Not on file    Highest education level: Not on file   Occupational History    Not on file   Tobacco Use    Smoking status: Heavy Tobacco Smoker     Packs/day: 1.00     Years: 35.00     Pack years: 35.00     Types: Cigarettes    Smokeless tobacco: Former User     Types: Chew   Vaping Use    Vaping Use: Not on file   Substance and Sexual Activity    Alcohol use: No     Comment: STOPPED;2 cups of coffee a day     Drug use: No    Sexual activity: Not Currently   Other Topics Concern    Not on file   Social History Narrative    Not on file     Social Determinants of Health     Financial Resource Strain:     Difficulty of Paying Living Expenses: Not on file   Food Insecurity:     Worried About [address] in the Last Year: Not on file    [address] in the Last Year: Not on file   Transportation Needs:     Lack of Transportation (Medical): Not on file    Lack of Transportation (Non-Medical):  Not on file   Physical Activity:     Days of Exercise per Week: Not on file    Minutes of Exercise per Session: Not on file   Stress:     Feeling of Stress : Not on file   Social Connections:     Frequency of Communication with Friends and Family: Not on file    Frequency of Social Gatherings with Friends and Family: Not on file    Attends Yazidism Services: Not on file    Active Member of Clubs or Organizations: Not on file    Attends Club or Organization Meetings: Not on file    Marital Status: Not on file   Intimate Partner Violence:     Fear of Current or Ex-Partner: Not on file    Emotionally Abused: Not on file   Luciana Harman Physically Abused: Not on file    Sexually Abused: Not on file   Housing Stability:     Unable to Pay for Housing in the Last Year: Not on file    Number of Places Lived in the Last Year: Not on file    Unstable Housing in the Last Year: Not on file       Family History   Problem Relation Age of Onset    Cancer Mother     Heart Disease Father        REVIEW OF SYSTEMS:     CONSTITUTIONAL:  negative for  fevers, chills, sweats, + fatigue  HEENT:  negative for  tinnitus, earaches, nasal congestion and epistaxis  RESPIRATORY:  negative for  dry cough, cough with sputum, wheezing and hemoptysis  GASTROINTESTINAL:  negative for nausea, vomiting, diarrhea, constipation, pruritus and jaundice  HEMATOLOGIC/LYMPHATIC:  negative for easy bruising, bleeding, lymphadenopathy and petechiae  ENDOCRINE:  negative for heat intolerance, cold intolerance, tremor, hair loss and diabetic symptoms including neither polyuria nor polydipsia nor blurred vision  MUSCULOSKELETAL:  negative for  myalgias, arthralgias, joint swelling, stiff joints and decreased range of motion  NEUROLOGICAL:  negative for memory problems, speech problems, visual disturbance, dysphagia, + peripheral neuropathy      PHYSICAL EXAM:    CONST:  Awake, alert cooperative, no apparent distress, and appears stated age. HEENT:  Moist and pink mucous membranes, normocephalic, without obvious abnormality, atraumatic, normal ears and nose. NECK:  Supple, symmetrical, trachea midline, no JVD, no adenopathy, thyroid symmetric, not enlarged and no tenderness, good carotid upstroke bilaterally, no carotid bruit, skin normal.    LUNGS: No increased work of breathing, good air exchange, clear to auscultation bilaterally, no crackles or wheezing. CV: Normal apical impulse, regular rate and rhythm, normal S1 and S2, no S3 or S4, no murmur, no pedal edema, good carotid upstroke bilaterally, no carotid bruit, no JVD, no abdominal pulsating masses.    ABDOMEN: Soft, nontender, no hepatomegaly, no splenomegaly, bowel sound positive. CHEST:  Expands symmetrically, nontender to palpation. MUSCU:  No clubbing or cyanosis. No redness, warmth, or swelling of the joints. NEURO: Alert, awake, and oriented X3   SKIN: No bruises, no bleeding, normal skin color, texture, turgor and no redness, warmth or swelling. /62   Pulse 71   Resp 16   Ht 5' 11\" (1.803 m)   Wt 239 lb (108.4 kg)   BMI 33.33 kg/m²     DATA:   I personally reviewed the visit EKG with the following interpretation:     EKG 8/24/21     ECHO: 8/24/21     BERTHA 8/18/20 Summary   No intracardiac thrombus. Left atrial appendage is not completely isolated. Normal left ventricular chamber size. Normal left ventricular systolic function. Visually estimated LVEF is 60-65 %. Paradoxical septal wall motion. Normal right ventricle structure and function. No significant valvular abnormalities. Moderate atherosclerosis of the descending aorta. Stress Test: 5/18/20   FINDINGS:    Moderate-size reversible defect in the mid and basal anterior segments.         End diastolic volume is  ml.  The ejection fraction equals 50% with no focal    wall motion abnormalities. There is no left ventricular dilatation.              Impression    1. Moderate-sized reversible defect in the mid and basal anterior segments. 2. Ejection fraction of 50%. 3. No focal wall motion abnormality. The findings were sent to the Radiology Results [address] at 4:28    pm on 5/18/2020to be communicated to a licensed caregiver. Angiography: 5/20/20  RIGHT CORONARY ARTERY:  Dominant vessel. Proximal 30%, distal 30%. The  posterolateral branch then has a mid to distal 95% stenosis. The PDA  then has sequential 75% to 80% followed by 70% to 75% stenosis in the  mid vessel. There are right-to-left collaterals filling the LAD in a  retrograde fashion.   LEFT MAIN:  No angiographically significant stenosis. LAD:  Mid 100% chronic total occlusion. The mid and distal LAD are seen  filling from right-to-left collaterals. D1:  No angiographically significant stenosis. CIRCUMFLEX:  Mid bifurcating 70% stenosis. The stenosis then extends to  an 85% to 90% stenosis in the more posterior branch. In the more  lateral branch, there is a mid 50% stenosis.     Cardiology Labs: BMP:    Lab Results   Component Value Date     06/08/2020    K 4.6 06/08/2020    K 4.8 05/16/2020    CL 97 06/08/2020    CO2 28 06/08/2020    BUN 30 06/08/2020    CREATININE 0.9 06/08/2020     CMP:    Lab Results   Component Value Date     06/08/2020    K 4.6 06/08/2020    K 4.8 05/16/2020    CL 97 06/08/2020    CO2 28 06/08/2020    BUN 30 06/08/2020    CREATININE 0.9 06/08/2020    PROT 6.9 06/06/2020     CBC:    Lab Results   Component Value Date    WBC 14.8 06/08/2020    RBC 3.36 06/08/2020    HGB 10.1 06/08/2020    HCT 31.9 06/08/2020    MCV 94.9 06/08/2020    RDW 13.9 06/08/2020     06/08/2020     PT/INR:  No results found for: PTINR  PT/INR Warfarin:  No components found for: PTPATWAR, PTINRWAR  PTT:    Lab Results   Component Value Date    APTT 34.4 05/28/2020     PTT Heparin:  No components found for: APTTHEP  Magnesium:    Lab Results   Component Value Date    MG 1.8 06/08/2020     TSH:    Lab Results   Component Value Date    TSH 5.040 06/01/2020     TROPONIN:  No components found for: TROP  BNP:  No results found for: BNP  FASTING LIPID PANEL:    Lab Results   Component Value Date    CHOL 180 05/17/2020    HDL 24 05/17/2020    TRIG 447 05/17/2020     No orders to display     I have personally reviewed the laboratory, cardiac diagnostic and radiographic testing as outlined above:      IMPRESSION:  1.  Chest pain: Noncardiac, patient was reassured  2.   CAD: S/p CABG on 5/28/2020 with a LIMA to LAD, SVG to diagonal, SVG to posterior lateral branch, doing fine, will continue current treatment  3.  Paroxysmal atrial flutter: Now in sinus rhythm, continue current treatment, continue chronic anticoagulation with Xarelto  4.  History of TIA:   5. Hypertension: Controlled, continue current treatment  6. Tobacco abuse: Patient was counseled regarding smoking cessation  7. Type II diabetes mellitus with peripheral neuropathy    RECOMMENDATIONS:   1. Continue current treatment  2. Increase risk of bleeding due to being on anti-coagulation, symptoms and signs of bleeding discussed with patient, patient was advised to seek medical attention at the earliest symptoms or signs of bleeding. 3.  Preventive cardiology: Low-salt, low-cholesterol diet, daily exercise, total cholesterol of less than 200, LDL of less than 70, adherence to diabetic diet and diabetic medications, smoking cessation were all advised. 4.  Follow-up with Dr. Shawn Ling as scheduled  5. Follow-up with Dr. Theresa Austin in 6 months, sooner if symptomatic for any reason    I have reviewed my findings and recommendations with patient    Electronically signed by Maximus Rubio MD on 4/29/2022 at 5:31 PM    NOTE: This report was transcribed using voice recognition software.  Every effort was made to ensure accuracy; however, inadvertent computerized transcription errors may be present

## 2022-04-29 ENCOUNTER — OFFICE VISIT (OUTPATIENT)
Dept: CARDIOLOGY CLINIC | Age: 57
End: 2022-04-29
Payer: COMMERCIAL

## 2022-04-29 VITALS
HEART RATE: 71 BPM | DIASTOLIC BLOOD PRESSURE: 62 MMHG | BODY MASS INDEX: 33.46 KG/M2 | RESPIRATION RATE: 16 BRPM | WEIGHT: 239 LBS | HEIGHT: 71 IN | SYSTOLIC BLOOD PRESSURE: 108 MMHG

## 2022-04-29 DIAGNOSIS — I25.10 CORONARY ARTERY DISEASE INVOLVING NATIVE CORONARY ARTERY OF NATIVE HEART WITHOUT ANGINA PECTORIS: Primary | ICD-10-CM

## 2022-04-29 PROCEDURE — 93000 ELECTROCARDIOGRAM COMPLETE: CPT | Performed by: INTERNAL MEDICINE

## 2022-04-29 PROCEDURE — 99214 OFFICE O/P EST MOD 30 MIN: CPT | Performed by: INTERNAL MEDICINE

## 2022-04-29 RX ORDER — EMPAGLIFLOZIN 25 MG/1
TABLET, FILM COATED ORAL
COMMUNITY
Start: 2022-03-28

## 2022-04-29 RX ORDER — ROSUVASTATIN CALCIUM 20 MG/1
TABLET, COATED ORAL
COMMUNITY
Start: 2022-03-24

## 2022-04-29 RX ORDER — INSULIN GLARGINE 100 [IU]/ML
INJECTION, SOLUTION SUBCUTANEOUS NIGHTLY
COMMUNITY

## 2022-04-29 RX ORDER — GLIPIZIDE 10 MG/1
10 TABLET ORAL
COMMUNITY
Start: 2022-01-26

## 2022-05-19 ENCOUNTER — OFFICE VISIT (OUTPATIENT)
Dept: ONCOLOGY | Age: 57
End: 2022-05-19
Payer: COMMERCIAL

## 2022-05-19 ENCOUNTER — HOSPITAL ENCOUNTER (OUTPATIENT)
Dept: INFUSION THERAPY | Age: 57
Discharge: HOME | End: 2022-05-19
Payer: COMMERCIAL

## 2022-05-19 VITALS
SYSTOLIC BLOOD PRESSURE: 122 MMHG | OXYGEN SATURATION: 97 % | BODY MASS INDEX: 33.49 KG/M2 | HEIGHT: 71 IN | TEMPERATURE: 97.8 F | HEART RATE: 75 BPM | WEIGHT: 239.2 LBS | DIASTOLIC BLOOD PRESSURE: 83 MMHG

## 2022-05-19 DIAGNOSIS — C92.10 CML (CHRONIC MYELOID LEUKEMIA) (HCC): ICD-10-CM

## 2022-05-19 DIAGNOSIS — C92.10 CML (CHRONIC MYELOID LEUKEMIA) (HCC): Primary | ICD-10-CM

## 2022-05-19 LAB
ALBUMIN SERPL-MCNC: 3.9 G/DL (ref 3.5–5.2)
ALP BLD-CCNC: 81 U/L (ref 40–129)
ALT SERPL-CCNC: 20 U/L (ref 0–40)
ANION GAP SERPL CALCULATED.3IONS-SCNC: 14 MMOL/L (ref 7–16)
AST SERPL-CCNC: 17 U/L (ref 0–39)
BASOPHILS ABSOLUTE: 0 E9/L (ref 0–0.2)
BASOPHILS RELATIVE PERCENT: 0.4 % (ref 0–2)
BILIRUB SERPL-MCNC: 0.2 MG/DL (ref 0–1.2)
BUN BLDV-MCNC: 16 MG/DL (ref 6–20)
CALCIUM SERPL-MCNC: 9.7 MG/DL (ref 8.6–10.2)
CHLORIDE BLD-SCNC: 101 MMOL/L (ref 98–107)
CO2: 23 MMOL/L (ref 22–29)
CREAT SERPL-MCNC: 0.8 MG/DL (ref 0.7–1.2)
EOSINOPHILS ABSOLUTE: 0 E9/L (ref 0.05–0.5)
EOSINOPHILS RELATIVE PERCENT: 1.1 % (ref 0–6)
GFR AFRICAN AMERICAN: >60
GFR NON-AFRICAN AMERICAN: >60 ML/MIN/1.73
GLUCOSE BLD-MCNC: 326 MG/DL (ref 74–99)
HCT VFR BLD CALC: 47.8 % (ref 37–54)
HEMOGLOBIN: 15.7 G/DL (ref 12.5–16.5)
LYMPHOCYTES ABSOLUTE: 6.27 E9/L (ref 1.5–4)
LYMPHOCYTES RELATIVE PERCENT: 33 % (ref 20–42)
MCH RBC QN AUTO: 29.7 PG (ref 26–35)
MCHC RBC AUTO-ENTMCNC: 32.8 % (ref 32–34.5)
MCV RBC AUTO: 90.4 FL (ref 80–99.9)
METAMYELOCYTES RELATIVE PERCENT: 0.9 % (ref 0–1)
MONOCYTES ABSOLUTE: 1.33 E9/L (ref 0.1–0.95)
MONOCYTES RELATIVE PERCENT: 7 % (ref 2–12)
NEUTROPHILS ABSOLUTE: 11.4 E9/L (ref 1.8–7.3)
NEUTROPHILS RELATIVE PERCENT: 59.1 % (ref 43–80)
PDW BLD-RTO: 13.4 FL (ref 11.5–15)
PLATELET # BLD: 274 E9/L (ref 130–450)
PMV BLD AUTO: 10.7 FL (ref 7–12)
POTASSIUM SERPL-SCNC: 4 MMOL/L (ref 3.5–5)
RBC # BLD: 5.29 E12/L (ref 3.8–5.8)
SODIUM BLD-SCNC: 138 MMOL/L (ref 132–146)
TOTAL PROTEIN: 7.9 G/DL (ref 6.4–8.3)
WBC # BLD: 19 E9/L (ref 4.5–11.5)

## 2022-05-19 PROCEDURE — 36415 COLL VENOUS BLD VENIPUNCTURE: CPT

## 2022-05-19 PROCEDURE — 85025 COMPLETE CBC W/AUTO DIFF WBC: CPT

## 2022-05-19 PROCEDURE — 80053 COMPREHEN METABOLIC PANEL: CPT

## 2022-05-19 PROCEDURE — 81207 BCR/ABL1 GENE MINOR BP: CPT

## 2022-05-19 PROCEDURE — 99214 OFFICE O/P EST MOD 30 MIN: CPT

## 2022-05-19 PROCEDURE — 81206 BCR/ABL1 GENE MAJOR BP: CPT

## 2022-05-19 PROCEDURE — 81208 BCR/ABL1 GENE OTHER BP: CPT

## 2022-05-19 RX ORDER — ACETAMINOPHEN 325 MG/1
650 TABLET ORAL EVERY 6 HOURS PRN
COMMUNITY

## 2022-05-19 RX ORDER — CHLORAL HYDRATE 500 MG
3000 CAPSULE ORAL 3 TIMES DAILY
COMMUNITY

## 2022-05-19 NOTE — PROGRESS NOTES
Jared Cortez  1965 62 y.o. Referring Physician:     PCP: Delon Field MD    Vitals:    22 0909   BP: 122/83   Pulse: 75   Temp: 97.8 °F (36.6 °C)   SpO2: 97%        Wt Readings from Last 3 Encounters:   22 239 lb 3.2 oz (108.5 kg)   22 239 lb (108.4 kg)   21 250 lb (113.4 kg)        Body mass index is 33.36 kg/m². Chief Complaint:   Chief Complaint   Patient presents with    New Patient    Other     neutrophilia         Cancer Staging  No matching staging information was found for the patient. Prior Radiation Therapy? NO    Concurrent Chemo/radiation? NO    Prior Chemotherapy? NO    Prior Hormonal Therapy? NO    Head and Neck Cancer? No, patient does NOT have HN cancer. LMP: na    Age at first Menses: na    : na    Para: na          Current Outpatient Medications:     glipiZIDE (GLUCOTROL) 10 MG tablet, 10 mg, Disp: , Rfl:     rosuvastatin (CRESTOR) 20 MG tablet, , Disp: , Rfl:     insulin glargine (LANTUS) 100 UNIT/ML injection vial, Inject into the skin nightly 35 units BID, Disp: , Rfl:     JARDIANCE 25 MG tablet, , Disp: , Rfl:     ammonium lactate (LAC-HYDRIN) 12 % lotion, Apply topically daily to dry skin on right and left foot. Do not apply to open skin., Disp: 1 Bottle, Rfl: 1    gabapentin (NEURONTIN) 100 MG capsule, Take 300 mg by mouth 3 times daily.  , Disp: , Rfl:     DULoxetine (CYMBALTA) 20 MG extended release capsule, Take 20 mg by mouth daily, Disp: , Rfl:     bumetanide (BUMEX) 1 MG tablet, Take 1 tablet by mouth daily, Disp: 30 tablet, Rfl: 3    apixaban (ELIQUIS) 5 MG TABS tablet, Take 1 tablet by mouth 2 times daily, Disp: 60 tablet, Rfl: 2    metoprolol tartrate (LOPRESSOR) 50 MG tablet, Take 1 tablet by mouth 2 times daily, Disp: 60 tablet, Rfl: 2    dilTIAZem (CARDIZEM CD) 120 MG extended release capsule, Take 1 capsule by mouth daily, Disp: 30 capsule, Rfl: 2    digoxin (LANOXIN) 125 MCG tablet, Take 1 tablet by mouth daily, Disp: 30 tablet, Rfl: 1    Insulin Glargine, 1 Unit Dial, (TOUJEO SOLOSTAR) 300 UNIT/ML SOPN, Inject into the skin 20 units in am and 20 hs, Disp: , Rfl:     metFORMIN (GLUCOPHAGE) 500 MG tablet, Take 1,000 mg by mouth 2 times daily (with meals) , Disp: , Rfl:     albuterol sulfate  (90 BASE) MCG/ACT inhaler, Inhale 2 puffs into the lungs every 6 hours as needed for Wheezing or Shortness of Breath, Disp: , Rfl:     pantoprazole (PROTONIX) 40 MG tablet, Take 1 tablet by mouth every morning (before breakfast), Disp: 30 tablet, Rfl: 3    vitamin B-12 (CYANOCOBALAMIN) 1000 MCG tablet, Take 1,000 mcg by mouth daily, Disp: , Rfl:     Ascorbic Acid (VITAMIN C) 250 MG tablet, Take 1,000 mg by mouth daily, Disp: , Rfl:     Multiple Vitamins-Minerals (THERAPEUTIC MULTIVITAMIN-MINERALS) tablet, Take 1 tablet by mouth daily, Disp: , Rfl:     aspirin 81 MG tablet, Take 81 mg by mouth daily, Disp: , Rfl:     glyBURIDE (DIABETA) 5 MG tablet, Take 1 tablet by mouth daily (with breakfast) (Patient not taking: Reported on 5/19/2022), Disp: 30 tablet, Rfl: 0       Past Medical History:   Diagnosis Date    Atrial fibrillation (HCC)     Atrial fibrillation (HCC) 02/20/2018    lexiscan stress test    Cerebral artery occlusion with cerebral infarction (HCC)     COPD (chronic obstructive pulmonary disease) (HCC)     Diabetes mellitus (HCC)     Hyperlipidemia     Hypertension     Neuropathy     feet and legs    Psychiatric problem     Sleep apnea     cpap 12    TIA (transient ischemic attack)     2015       Past Surgical History:   Procedure Laterality Date    CARDIAC SURGERY      HEART CATHETERIZATION    CORONARY ARTERY BYPASS GRAFT N/A 5/28/2020    CORONARY ARTERY BYPASS, MAZE PROCEDURE, BERTHA performed by Alba Flannery DO at [address] [place] [address] ECHOCARDIOGRAM COMPLETE WITH BUBBLE STUDY  7/7/2015         FRACTURE SURGERY      HERNIA REPAIR      age child    OTHER SURGICAL HISTORY 02/15/2018    incision and drainage bone biopsy of right great toe    PERICARDIUM SURGERY N/A 6/4/2020    PERICARDIAL WINDOW CREATION performed [facility] DO at [facility] OR       Family History   Problem Relation Age of Onset    Cancer Mother         lung    Heart Disease Father        Social History     Socioeconomic History    Marital status:      Spouse name: Not on file    Number of children: Not on file    Years of education: Not on file    Highest education level: Not on file   Occupational History    Not on file   Tobacco Use    Smoking status: Current Every Day Smoker     Packs/day: 1.00     Years: 35.00     Pack years: 35.00     Types: Cigarettes    Smokeless tobacco: Former User     Types: Chew   Vaping Use    Vaping Use: Never used   Substance and Sexual Activity    Alcohol use: No     Comment: STOPPED;2 cups of coffee a day     Drug use: No    Sexual activity: Not Currently   Other Topics Concern    Not on file   Social History Narrative    Not on file     Social Determinants of Health     Financial Resource Strain:     Difficulty of Paying Living Expenses: Not on file   Food Insecurity:     Worried About [address] in the Last Year: Not on file    [address] in the Last Year: Not on file   Transportation Needs:     Lack of Transportation (Medical): Not on file    Lack of Transportation (Non-Medical):  Not on file   Physical Activity:     Days of Exercise per Week: Not on file    Minutes of Exercise per Session: Not on file   Stress:     Feeling of Stress : Not on file   Social Connections:     Frequency of Communication with Friends and Family: Not on file    Frequency of Social Gatherings with Friends and Family: Not on file    Attends Scientology Services: Not on file    Active Member of Clubs or Organizations: Not on file    Attends Club or Organization Meetings: Not on file    Marital Status: Not on file   Intimate Partner Violence:     Fear of Current or Ex-Partner: Not on file    Emotionally Abused: Not on file    Physically Abused: Not on file    Sexually Abused: Not on file   Housing Stability:     Unable to Pay for Housing in the Last Year: Not on file    Number of Places Lived in the Last Year: Not on file    Unstable Housing in the Last Year: Not on file           Occupation: retired  Retired:  YES: Patient is retired from [employer]. REVIEW OF SYSTEMS:    Pacemaker/Defibulator/ICD:  No    Mediport: No           FALLS RISK SCREENING ASSESSMENT    Instructions:  Assess the patient and [place] the appropriate indicators that are present for fall risk identification. Total the numbers circled and assign a fall risk score from Table 2.  Reassess patient at a minimum every 12 weeks or with status change. Assessment   Date  5/19/2022     1. Mental Ability: confusion/cognitively impaired No - 0       2. Elimination Issues: incontinence, frequency No - 0       3. Ambulatory: use of assistive devices (walker, cane, off-loading devices), attached to equipment (IV pole, oxygen) No - 0     4. Sensory Limitations: dizziness, vertigo, impaired vision No - 0       5. Age Less than 65 years - 0       6. Medication: diuretics, strong analgesics, hypnotics, sedatives, antihypertensive agents   Yes - 3   7. Falls:  recent history of falls within the last 3 months (not to include slipping or tripping)   No - 0   TOTAL 3    If score of 4 or greater was education given? No       TABLE 2   Risk Score Risk Level Plan of Care   0-3 Little or  No Risk 1. Provide assistance as indicated for ambulation activities  2. Reorient confused/cognitively impaired patient  3. Call-light/bell within patient's reach  4. Chair/bed in low position, stretcher/bed with siderails up except when performing patient care activities  5.   Educate patient/family/caregiver on falls prevention  6.  Reassess in 12 weeks or with any noted change in patient condition which places them at a risk for a fall   4-6 Moderate Risk 1. Provide assistance as indicated for ambulation activities  2. Reorient confused/cognitively impaired patient  3. Call-light/bell within patient's reach  4. Chair/bed in low position, stretcher/bed with siderails up except when performing patient care activities  5. Educate patient/family/caregiver on falls prevention  6. Falls risk precaution (Yellow sticker Level II) placed on patient chart   7 or   Higher High Risk 1. Place patient in easily observable treatment room  2. Patient attended at all times by family member or staff  3. Provide assistance as indicated for ambulation activities  4. Reorient confused/cognitively impaired patient  5. Call-light/bell within patient's reach  6. Chair/bed in low position, stretcher/bed with siderails up except when performing patient care activities  7. Educate patient/family/caregiver on falls prevention  8. Falls risk precaution (Yellow sticker Level III) placed on patient chart           MALNUTRITION RISK SCREENING ASSESSMENT    Instructions:  Assess the patient and enter the appropriate indicators that are present for nutrition risk identification. Total the numbers entered and assign a risk score. Follow the appropriate action for total score listed below. Assessment   Date  5/19/2022     1. Have you lost weight without trying? 1- Yes, 0.5 kg to 5 kg     2. Have you been eating poorly because of a decreased appetite? 2- Yes   3. Do you have a diagnosis of head and neck cancer?       0- No                                                                                    TOTAL 3        Score of 0-1: No action  Score 2 or greater:  · For Non-Diabetic Patient: Recommend adding Ensure Enlive 2 x daily and provide patient with Ensure wellness bag with coupons  · For Diabetic Patient: Recommend adding Glucerna Shake 2 x daily and provide patient with Glucerna Wellness bag with coupons Route to the dietitian via [facility]    Patient states he sees dietician at the [facility], RN

## 2022-05-19 NOTE — PROGRESS NOTES
[address]  [address]   Hematology/Oncology  Consult      Patient Name: Adwoa Nagel  YOB: 1965  PCP: Mendez Maurice MD   Referring Provider: [facility] [address] / [address] [place] [id number]     Reason for Consultation:   Chief Complaint   Patient presents with    New Patient    Other     neutrophilia        History of Present Illness:  62years old male  referred by Dr. Ruthann Leonard for management of CML. Patient has had mild leukocytosis ranging from 16-18,000 since 2020 with monocytosis and absolute lymphocytosis. BCR ABL was tested in April 2022 and is reported as positive. Most recent CBC from April 2022 showed a total white count of 18,000, hemoglobin 16, platelet count 371, absolute neutrophil count 9000, absolute lymphocyte count 8000, monocytes 1.2K. Absolute eosinophil count 2.3, absolute basophil count 0. Denies recent weight loss, loss of appetite, night sweats, enlarged lymph nodes, abdominal pain. Review of systems: Over 10 systems were reviewed and all were negative except as mentioned above.     Diagnostic Data:     Past Medical History:   Diagnosis Date    Atrial fibrillation ([address]     Atrial fibrillation ([address] 02/20/2018    lexiscan stress test    Cerebral artery occlusion with cerebral infarction ([address]     COPD (chronic obstructive pulmonary disease) (HCC)     Diabetes mellitus ([address]     Hyperlipidemia     Hypertension     Neuropathy     feet and legs    Psychiatric problem     Sleep apnea     cpap 12    TIA (transient ischemic attack)     2015       Patient Active Problem List    Diagnosis Date Noted    Right foot ulcer, with fat layer exposed ([address] 06/15/2021    Foot ulcer, left, with fat layer exposed ([address] 06/15/2021    CAD in native artery 05/28/2020    COVID-19     Acute postoperative respiratory insufficiency     Postoperative hypotension     Unstable angina ([address] 05/20/2020    Acute coronary syndrome ([address] 05/20/2020    History of TIA (transient ischemic attack)     Chest pain 05/16/2020    HUMERA (obstructive sleep apnea) 06/18/2018    Atrial fibrillation with RVR ([address] 06/17/2018    Atrial flutter ([address] 06/16/2018    Cellulitis 02/11/2018    Abscess of foot 02/11/2018    Acute hepatitis B 02/01/2017    Acute liver failure without hepatic coma 01/31/2017    Sepsis ([address] 01/31/2017    Diabetes mellitus ([address]     Acute renal failure (ARF) ([address] 07/15/2016    Diverticulitis 07/15/2016    Type 2 diabetes mellitus ([address] 07/15/2016    Essential hypertension 07/15/2016    Hyperlipidemia 07/15/2016    TIA (transient ischemic attack) 07/07/2015    New onset atrial fibrillation ([address] 07/07/2015        Past Surgical History:   Procedure Laterality Date    CARDIAC SURGERY      HEART CATHETERIZATION    CORONARY ARTERY BYPASS GRAFT N/A 5/28/2020    CORONARY ARTERY BYPASS, MAZE PROCEDURE, BERTHA performed by Nasim Jennings DO at [address] [place] [address] ECHOCARDIOGRAM COMPLETE WITH BUBBLE STUDY  7/7/2015         FRACTURE SURGERY      HERNIA REPAIR      age child    OTHER SURGICAL HISTORY  02/15/2018    incision and drainage bone biopsy of right great toe    PERICARDIUM SURGERY N/A 6/4/2020    PERICARDIAL WINDOW CREATION performed by Nasim Jennings DO at [facility] OR       Family History  Family History   Problem Relation Age of Onset    Cancer Mother         lung    Heart Disease Father        Social History    TOBACCO:   reports that he has been smoking cigarettes. He has a 35.00 pack-year smoking history. He has quit using smokeless tobacco.  His smokeless tobacco use included chew. ETOH:   reports no history of alcohol use. Home Medications  Prior to Admission medications    Medication Sig Start Date End Date Taking?  Authorizing Provider   diclofenac sodium (VOLTAREN) 1 % GEL Apply 2 g topically 2 times daily   Yes Historical Provider, MD   carboxymethylcellulose 1 % ophthalmic solution 1 drop 3 times daily   Yes Historical Provider, MD   Omega-3 Fatty Acids (FISH OIL) 1000 MG CAPS Take 3,000 mg by mouth 3 times daily   Yes Historical Provider, MD   acetaminophen (TYLENOL) 325 MG tablet Take 650 mg by mouth every 6 hours as needed for Pain   Yes Historical Provider, MD   vitamin D (CHOLECALCIFEROL) 25 MCG (1000 UT) TABS tablet Take 2,000 Units by mouth daily   Yes Historical Provider, MD   glipiZIDE (GLUCOTROL) 10 MG tablet 10 mg 1/26/22  Yes Historical Provider, MD   rosuvastatin (CRESTOR) 20 MG tablet  3/24/22  Yes Historical Provider, MD   insulin glargine (LANTUS) 100 UNIT/ML injection vial Inject into the skin nightly 35 units BID   Yes Historical Provider, MD   JARDIANCE 25 MG tablet  3/28/22  Yes Historical Provider, MD   ammonium lactate (LAC-HYDRIN) 12 % lotion Apply topically daily to dry skin on right and left foot. Do not apply to open skin. 6/29/21  Yes Jacqui Corrales DPCAROLINA   gabapentin (NEURONTIN) 100 MG capsule Take 300 mg by mouth 3 times daily.     Yes Historical Provider, MD   DULoxetine (CYMBALTA) 20 MG extended release capsule Take 30 mg by mouth daily    Yes Historical Provider, MD   bumetanide (BUMEX) 1 MG tablet Take 1 tablet by mouth daily 6/9/20  Yes Tino Gaffney MD   apixaban (ELIQUIS) 5 MG TABS tablet Take 1 tablet by mouth 2 times daily 6/7/20  Yes ANGIE Saleh CNP   metoprolol tartrate (LOPRESSOR) 50 MG tablet Take 1 tablet by mouth 2 times daily 6/7/20  Yes ANGIE Saleh CNP   dilTIAZem (CARDIZEM CD) 120 MG extended release capsule Take 1 capsule by mouth daily 6/7/20  Yes ANGIE Saleh CNP   digoxin (LANOXIN) 125 MCG tablet Take 1 tablet by mouth daily 6/7/20  Yes ANGIE Saleh CNP   Insulin Glargine, 1 Unit Dial, (TOUJEO SOLOSTAR) 300 UNIT/ML SOPN Inject into the skin 20 units in am and 20 hs   Yes Historical Provider, MD   metFORMIN (GLUCOPHAGE) 500 MG tablet Take 1,000 mg by mouth 2 times daily (with meals)    Yes Historical Provider, MD   albuterol sulfate  (90 BASE) MCG/ACT inhaler Inhale 2 puffs into the lungs every 6 hours as needed for Wheezing or Shortness of Breath   Yes Historical Provider, MD   pantoprazole (PROTONIX) 40 MG tablet Take 1 tablet by mouth every morning (before breakfast) 7/17/16  Yes Néstor Martinez MD   vitamin B-12 (CYANOCOBALAMIN) 1000 MCG tablet Take 1,000 mcg by mouth daily   Yes Historical Provider, MD   Ascorbic Acid (VITAMIN C) 250 MG tablet Take 1,000 mg by mouth daily   Yes Historical Provider, MD   Multiple Vitamins-Minerals (THERAPEUTIC MULTIVITAMIN-MINERALS) tablet Take 1 tablet by mouth daily   Yes Historical Provider, MD   aspirin 81 MG tablet Take 81 mg by mouth daily   Yes Historical Provider, MD       Allergies  Allergies   Allergen Reactions    Other      pollen       Review of Systems:      Objective  /83   Pulse 75   Temp 97.8 °F (36.6 °C)   Ht 5' 11\" (1.803 m)   Wt 239 lb 3.2 oz (108.5 kg)   SpO2 97%   BMI 33.36 kg/m²     Physical Performance Status    Physical Exam:  General: AAO to person, place, time, and purpose, appears stated age, cooperative, no acute distress, pleasant   Head and neck : PERRLA, EOMI . Sclera non icteric. Oropharynx : Clear  Neck: no JVD,  no adenopathy, no carotid bruit  LYMPHATICS : No peripheral lymphadenopathy. Lungs: Clear to auscultation   Heart: Regular rate and regular rhythm, no murmur, normal S1, S2  Abdomen: Soft, non-tender;no masses, no organomegaly  Extremities: No edema,no cyanosis, no clubbing. Skin:  No rash. Neurologic:Cranial nerves grossly intact. No focal motor or sensory deficits .     Recent Laboratory Data-   Lab Results   Component Value Date    WBC 14.8 (H) 06/08/2020    HGB 10.1 (L) 06/08/2020    HCT 31.9 (L) 06/08/2020    MCV 94.9 06/08/2020     (H) 06/08/2020    LYMPHOPCT 53.4 (H) 05/16/2020    RBC 3.36 (L) 06/08/2020    MCH 30.1 06/08/2020    MCHC 31.7 (L) 06/08/2020    RDW 13.9 06/08/2020    NEUTOPHILPCT 36.2 (L) 05/16/2020    MONOPCT 10.3 05/16/2020    BASOPCT 0.5 05/16/2020    NEUTROABS 4.39 05/16/2020    LYMPHSABS 6.47 (H) 05/16/2020    MONOSABS 1.22 (H) 05/16/2020    EOSABS 0.00 (L) 05/16/2020    BASOSABS 0.00 05/16/2020       Lab Results   Component Value Date     06/08/2020    K 4.6 06/08/2020    CL 97 (L) 06/08/2020    CO2 28 06/08/2020    BUN 30 (H) 06/08/2020    CREATININE 0.9 06/08/2020    GLUCOSE 164 (H) 06/08/2020    CALCIUM 9.5 06/08/2020    PROT 6.9 06/06/2020    LABALBU 3.4 (L) 06/06/2020    BILITOT 0.3 06/06/2020    ALKPHOS 58 06/06/2020    AST 16 06/06/2020    ALT 7 06/06/2020    LABGLOM >60 06/08/2020    GFRAA >60 06/08/2020       Lab Results   Component Value Date    IRON 103 01/30/2017    TIBC 229 (L) 01/30/2017    FERRITIN >10,000 01/30/2017           Radiology-    CT Lung Screen (Initial/Annual)    Result Date: 4/23/2022  EXAMINATION: LOW DOSE SCREENING CT OF THE CHEST WITHOUT CONTRAST 4/23/2022 7:29 am TECHNIQUE: Low dose lung cancer screening CT of the chest was performed without the administration of intravenous contrast.  Multiplanar reformatted images are provided for review. Dose modulation, iterative reconstruction, and/or weight based adjustment of the mA/kV was utilized to reduce the radiation dose to as low as reasonably achievable. COMPARISON: None. HISTORY: ORDERING SYSTEM PROVIDED HISTORY: Encounter for screening for malignant neoplasm of respiratory organs TECHNOLOGIST PROVIDED HISTORY: Age: 62 y.o. Smoking History: Social History Tobacco Use Smoking status: Heavy Tobacco Smoker Packs/day: 1.00 Years: 35.00 Pack years: 35 Types: Cigarettes Smokeless tobacco: Former User Types: Chew Vaping Use Vaping Use: Not on file Alcohol use: No Comment: STOPPED;2 cups of coffee a day Drug use: No Pack years: 35 Last CT lung screen: No previous lung cancer screening exam Is there documentation of shared decision making? ->Yes Does the patient show any signs or symptoms of lung cancer? ->No Is this the first (baseline) CT or an annual exam?->Annual Is this a low dose CT or a routine CT?->Low Dose CT Smoking Status? ->Heavy Tobacco Smoker Smoking packs per day?->1 Years smoking?->35 FINDINGS: Mediastinum: No abnormal lymphadenopathy. The pulmonary trunk is normal in size Lungs/pleura: No pleural effusions. No pneumothorax. No abnormal pleural thickening. 2 mm nodule in right upper lobe, image 53 series 4. 4 mm nodule in the middle lobe image 65 series 4. Upper Abdomen: No acute process identified Soft Tissues/Bones: No aggressive osseous lesions. Pulmonary nodules measuring up to 4 mm in the right middle lobe. LUNG RADS: Per ACR Lung-RADS Version 1.1 Lung rads 2. Continue annual low-dose CT screening of the chest. RECOMMENDATIONS: If you would like to register your patient with the [place] [place] [address], please contact the Nurse Navigator at [phone]. ASSESSMENT/PLAN :    Everett Lopez was seen today for new patient and other. Diagnoses and all orders for this visit:    CML (chronic myeloid leukemia) ([facility]  -     CBC with Auto Differential; Future  -     Comprehensive Metabolic Panel; Future  -     Path Review, Smear; Future  -     BCR-ABL1, Qualitative with Quant Reflex; Future  -     CBC with Auto Differential; Future  -     US LIVER SPLEEN; Future    62years old male diagnosed with CML at [place] in April 2022. Will repeat CBC along with peripheral smear, CMP and quantitative PCR BCR ABL. Abdominal ultrasound. Discussed diagnosis, prognosis and treatment options. Patient has coronary artery disease s/p CABG, history of atrial fibrillation, history of TIA. Will avoid Nilotinib. Likely start with Dasatinib next week. Dasatinib, due to its platelet dysfunction effects would compound the bleeding risk from Eliquis. Will monitor. Return to clinic in a week. Thank you for the consult. Return in about 1 week (around 5/26/2022).      Omaira Salazar MD Electronically signed 5/19/2022 at 9:36 AM

## 2022-05-20 LAB — PATHOLOGIST REVIEW: NORMAL

## 2022-05-25 ENCOUNTER — HOSPITAL ENCOUNTER (OUTPATIENT)
Dept: INFUSION THERAPY | Age: 57
Discharge: HOME | End: 2022-05-25
Payer: COMMERCIAL

## 2022-05-25 DIAGNOSIS — C92.10 CML (CHRONIC MYELOID LEUKEMIA) (HCC): ICD-10-CM

## 2022-05-25 LAB
BASOPHILS ABSOLUTE: 0.16 E9/L (ref 0–0.2)
BASOPHILS RELATIVE PERCENT: 0.9 % (ref 0–2)
EOSINOPHILS ABSOLUTE: 0.48 E9/L (ref 0.05–0.5)
EOSINOPHILS RELATIVE PERCENT: 2.6 % (ref 0–6)
HCT VFR BLD CALC: 46.9 % (ref 37–54)
HEMOGLOBIN: 15.5 G/DL (ref 12.5–16.5)
LYMPHOCYTES ABSOLUTE: 6.77 E9/L (ref 1.5–4)
LYMPHOCYTES RELATIVE PERCENT: 36.8 % (ref 20–42)
MCH RBC QN AUTO: 29.6 PG (ref 26–35)
MCHC RBC AUTO-ENTMCNC: 33 % (ref 32–34.5)
MCV RBC AUTO: 89.7 FL (ref 80–99.9)
MONOCYTES ABSOLUTE: 0.92 E9/L (ref 0.1–0.95)
MONOCYTES RELATIVE PERCENT: 5.3 % (ref 2–12)
NEUTROPHILS ABSOLUTE: 9.88 E9/L (ref 1.8–7.3)
NEUTROPHILS RELATIVE PERCENT: 54.4 % (ref 43–80)
PDW BLD-RTO: 13.4 FL (ref 11.5–15)
PLATELET # BLD: 269 E9/L (ref 130–450)
PMV BLD AUTO: 10.7 FL (ref 7–12)
RBC # BLD: 5.23 E12/L (ref 3.8–5.8)
WBC # BLD: 18.3 E9/L (ref 4.5–11.5)

## 2022-05-25 PROCEDURE — 36415 COLL VENOUS BLD VENIPUNCTURE: CPT

## 2022-05-25 PROCEDURE — 85025 COMPLETE CBC W/AUTO DIFF WBC: CPT

## 2022-05-26 ENCOUNTER — OFFICE VISIT (OUTPATIENT)
Dept: ONCOLOGY | Age: 57
End: 2022-05-26
Payer: COMMERCIAL

## 2022-05-26 VITALS
TEMPERATURE: 97.7 F | SYSTOLIC BLOOD PRESSURE: 114 MMHG | HEIGHT: 71 IN | WEIGHT: 234 LBS | BODY MASS INDEX: 32.76 KG/M2 | DIASTOLIC BLOOD PRESSURE: 75 MMHG | HEART RATE: 65 BPM | OXYGEN SATURATION: 99 %

## 2022-05-26 DIAGNOSIS — C92.10 CML (CHRONIC MYELOID LEUKEMIA) (HCC): Primary | ICD-10-CM

## 2022-05-26 PROCEDURE — 99213 OFFICE O/P EST LOW 20 MIN: CPT

## 2022-05-26 NOTE — PROGRESS NOTES
[address]  [facility]   Hematology/Oncology  Consult      Patient Name: Tania Anderson  YOB: 1965  PCP: Neil Fountain MD   Referring Provider:      Reason for Consultation:   Chief Complaint   Patient presents with    Leukemia      Interval history: No new complaints. History of Present Illness:  62years old male  referred by Dr. Nadya Bolaños for management of CML. Patient has had mild leukocytosis ranging from 16-18,000 since 2020 with monocytosis and absolute lymphocytosis. BCR ABL was tested in April 2022 and is reported as positive. Most recent CBC from April 2022 showed a total white count of 18,000, hemoglobin 16, platelet count 422, absolute neutrophil count 9000, absolute lymphocyte count 8000, monocytes 1.2K. Absolute eosinophil count 2.3, absolute basophil count 0. Denies recent weight loss, loss of appetite, night sweats, enlarged lymph nodes, abdominal pain. Review of systems: Over 10 systems were reviewed and all were negative except as mentioned above.     Diagnostic Data:     Past Medical History:   Diagnosis Date    Atrial fibrillation ([address]     Atrial fibrillation ([address] 02/20/2018    lexiscan stress test    Cerebral artery occlusion with cerebral infarction ([address]     COPD (chronic obstructive pulmonary disease) (HCC)     Diabetes mellitus ([address]     Hyperlipidemia     Hypertension     Neuropathy     feet and legs    Psychiatric problem     Sleep apnea     cpap 12    TIA (transient ischemic attack)     2015       Patient Active Problem List    Diagnosis Date Noted    Right foot ulcer, with fat layer exposed ([address] 06/15/2021    Foot ulcer, left, with fat layer exposed ([address] 06/15/2021    CAD in native artery 05/28/2020    COVID-19     Acute postoperative respiratory insufficiency     Postoperative hypotension     Unstable angina ([address] 05/20/2020    Acute coronary syndrome ([address] 05/20/2020    History of TIA (transient ischemic attack)     Chest pain 05/16/2020    HUMERA (obstructive sleep apnea) 06/18/2018    Atrial fibrillation with RVR ([facility] 06/17/2018    Atrial flutter ([facility] 06/16/2018    Cellulitis 02/11/2018    Abscess of foot 02/11/2018    Acute hepatitis B 02/01/2017    Acute liver failure without hepatic coma 01/31/2017    Sepsis ([facility] 01/31/2017    Diabetes mellitus ([facility]     Acute renal failure (ARF) ([facility] 07/15/2016    Diverticulitis 07/15/2016    Type 2 diabetes mellitus ([facility] 07/15/2016    Essential hypertension 07/15/2016    Hyperlipidemia 07/15/2016    TIA (transient ischemic attack) 07/07/2015    New onset atrial fibrillation ([facility] 07/07/2015        Past Surgical History:   Procedure Laterality Date    CARDIAC SURGERY      HEART CATHETERIZATION    CORONARY ARTERY BYPASS GRAFT N/A 5/28/2020    CORONARY ARTERY BYPASS, MAZE PROCEDURE, BERTHA performed by Edilberto Gil DO at [address] [place] [address] ECHOCARDIOGRAM COMPLETE WITH BUBBLE STUDY  7/7/2015         FRACTURE SURGERY      HERNIA REPAIR      age child    OTHER SURGICAL HISTORY  02/15/2018    incision and drainage bone biopsy of right great toe    PERICARDIUM SURGERY N/A 6/4/2020    PERICARDIAL WINDOW CREATION performed by Edilberto Gil DO at [facility] OR       Family History  Family History   Problem Relation Age of Onset    Cancer Mother         lung    Heart Disease Father        Social History    TOBACCO:   reports that he has been smoking cigarettes. He has a 35.00 pack-year smoking history. He has quit using smokeless tobacco.  His smokeless tobacco use included chew. ETOH:   reports no history of alcohol use. Home Medications  Prior to Admission medications    Medication Sig Start Date End Date Taking?  Authorizing Provider   diclofenac sodium (VOLTAREN) 1 % GEL Apply 2 g topically 2 times daily   Yes Historical Provider, MD   carboxymethylcellulose 1 % ophthalmic solution 1 drop 3 times daily   Yes Historical (90 BASE) MCG/ACT inhaler Inhale 2 puffs into the lungs every 6 hours as needed for Wheezing or Shortness of Breath   Yes Historical Provider, MD   pantoprazole (PROTONIX) 40 MG tablet Take 1 tablet by mouth every morning (before breakfast) 7/17/16  Yes Isamar Hong MD   vitamin B-12 (CYANOCOBALAMIN) 1000 MCG tablet Take 1,000 mcg by mouth daily   Yes Historical Provider, MD   Ascorbic Acid (VITAMIN C) 250 MG tablet Take 1,000 mg by mouth daily   Yes Historical Provider, MD   Multiple Vitamins-Minerals (THERAPEUTIC MULTIVITAMIN-MINERALS) tablet Take 1 tablet by mouth daily   Yes Historical Provider, MD   aspirin 81 MG tablet Take 81 mg by mouth daily   Yes Historical Provider, MD       Allergies  Allergies   Allergen Reactions    Other      pollen       Review of Systems:      Objective  /75   Pulse 65   Temp 97.7 °F (36.5 °C)   Ht 5' 11\" (1.803 m)   Wt 234 lb (106.1 kg)   SpO2 99%   BMI 32.64 kg/m²     Physical Performance Status    Physical Exam:  General: AAO to person, place, time, and purpose, appears stated age, cooperative, no acute distress, pleasant   Head and neck : PERRLA, EOMI . Sclera non icteric. Oropharynx : Clear  Neck: no JVD,  no adenopathy, no carotid bruit  LYMPHATICS : No peripheral lymphadenopathy. Lungs: Clear to auscultation   Heart: Regular rate and regular rhythm, no murmur, normal S1, S2  Abdomen: Soft, non-tender;no masses, no organomegaly  Extremities: No edema,no cyanosis, no clubbing. Skin:  No rash. Neurologic:Cranial nerves grossly intact. No focal motor or sensory deficits .     Recent Laboratory Data-   Lab Results   Component Value Date    WBC 18.3 (H) 05/25/2022    HGB 15.5 05/25/2022    HCT 46.9 05/25/2022    MCV 89.7 05/25/2022     05/25/2022    LYMPHOPCT 36.8 05/25/2022    RBC 5.23 05/25/2022    MCH 29.6 05/25/2022    MCHC 33.0 05/25/2022    RDW 13.4 05/25/2022    NEUTOPHILPCT 54.4 05/25/2022    MONOPCT 5.3 05/25/2022    BASOPCT 0.9 05/25/2022    NEUTROABS 9.88 (H) 05/25/2022    LYMPHSABS 6.77 (H) 05/25/2022    MONOSABS 0.92 05/25/2022    EOSABS 0.48 05/25/2022    BASOSABS 0.16 05/25/2022       Lab Results   Component Value Date     05/19/2022    K 4.0 05/19/2022     05/19/2022    CO2 23 05/19/2022    BUN 16 05/19/2022    CREATININE 0.8 05/19/2022    GLUCOSE 326 (H) 05/19/2022    CALCIUM 9.7 05/19/2022    PROT 7.9 05/19/2022    LABALBU 3.9 05/19/2022    BILITOT 0.2 05/19/2022    ALKPHOS 81 05/19/2022    AST 17 05/19/2022    ALT 20 05/19/2022    LABGLOM >60 05/19/2022    GFRAA >60 05/19/2022       Lab Results   Component Value Date    IRON 103 01/30/2017    TIBC 229 (L) 01/30/2017    FERRITIN >10,000 01/30/2017           Radiology-    CT Lung Screen (Initial/Annual)    Result Date: 4/23/2022  EXAMINATION: LOW DOSE SCREENING CT OF THE CHEST WITHOUT CONTRAST 4/23/2022 7:29 am TECHNIQUE: Low dose lung cancer screening CT of the chest was performed without the administration of intravenous contrast.  Multiplanar reformatted images are provided for review. Dose modulation, iterative reconstruction, and/or weight based adjustment of the mA/kV was utilized to reduce the radiation dose to as low as reasonably achievable. COMPARISON: None. HISTORY: ORDERING SYSTEM PROVIDED HISTORY: Encounter for screening for malignant neoplasm of respiratory organs TECHNOLOGIST PROVIDED HISTORY: Age: 62 y.o. Smoking History: Social History Tobacco Use Smoking status: Heavy Tobacco Smoker Packs/day: 1.00 Years: 35.00 Pack years: 35 Types: Cigarettes Smokeless tobacco: Former User Types: Chew Vaping Use Vaping Use: Not on file Alcohol use: No Comment: STOPPED;2 cups of coffee a day Drug use: No Pack years: 35 Last CT lung screen: No previous lung cancer screening exam Is there documentation of shared decision making? ->Yes Does the patient show any signs or symptoms of lung cancer? ->No Is this the first (baseline) CT or an annual exam?->Annual Is this a low dose CT or a routine CT?->Low Dose CT Smoking Status? ->Heavy Tobacco Smoker Smoking packs per day?->1 Years smoking?->35 FINDINGS: Mediastinum: No abnormal lymphadenopathy. The pulmonary trunk is normal in size Lungs/pleura: No pleural effusions. No pneumothorax. No abnormal pleural thickening. 2 mm nodule in right upper lobe, image 53 series 4. 4 mm nodule in the middle lobe image 65 series 4. Upper Abdomen: No acute process identified Soft Tissues/Bones: No aggressive osseous lesions. Pulmonary nodules measuring up to 4 mm in the right middle lobe. LUNG RADS: Per ACR Lung-RADS Version 1.1 Lung rads 2. Continue annual low-dose CT screening of the chest. RECOMMENDATIONS: If you would like to register your patient with the [employer] [facility] , please contact the Nurse Navigator at [phone]. ASSESSMENT/PLAN :    Frannie Andrade was seen today for leukemia. Diagnoses and all orders for this visit:    CML (chronic myeloid leukemia) ([facility]  -     IR BIOPSY BONE MARROW; Future  -     EKG 12 lead; Future  -     CBC with Auto Differential; Future  -     Comprehensive Metabolic Panel; Future    62years old male diagnosed with CML at [place] in April 2022. Quantitative PCR BCR ABL, ultrasound abdomen pending. Discussed bone marrow aspiration biopsy. He agreed. Referral to radiology was made. EKG ordered. CBC stable with total white count of 18,000 hemoglobin 15 platelet count 289. Peripheral smear with left shift up to metamyelocytes. Patient has coronary artery disease s/p CABG, history of atrial fibrillation, history of TIA. Will avoid Nilotinib. Likely start with Dasatinib next week. Dasatinib, due to its platelet dysfunction effects would compound the bleeding risk from Eliquis. Will monitor. Intentional 30 pound weight loss last 6 months. Return to clinic in 2 weeks. Return in about 2 weeks (around 6/9/2022).      Beatrice Pastrana MD   Electronically signed 5/26/2022 at 11:14 AM

## 2022-06-01 ENCOUNTER — HOSPITAL ENCOUNTER (OUTPATIENT)
Age: 57
Discharge: HOME | End: 2022-06-01
Payer: COMMERCIAL

## 2022-06-01 ENCOUNTER — HOSPITAL ENCOUNTER (OUTPATIENT)
Dept: ULTRASOUND IMAGING | Age: 57
Discharge: HOME | End: 2022-06-01
Payer: COMMERCIAL

## 2022-06-01 DIAGNOSIS — C92.10 CML (CHRONIC MYELOID LEUKEMIA) (HCC): ICD-10-CM

## 2022-06-01 LAB
EKG ATRIAL RATE: 52 BPM
EKG P AXIS: 74 DEGREES
EKG P-R INTERVAL: 210 MS
EKG Q-T INTERVAL: 416 MS
EKG QRS DURATION: 96 MS
EKG QTC CALCULATION (BAZETT): 386 MS
EKG R AXIS: -16 DEGREES
EKG T AXIS: 112 DEGREES
EKG VENTRICULAR RATE: 52 BPM

## 2022-06-01 PROCEDURE — 76705 ECHO EXAM OF ABDOMEN: CPT

## 2022-06-01 PROCEDURE — 93005 ELECTROCARDIOGRAM TRACING: CPT | Performed by: INTERNAL MEDICINE

## 2022-06-02 LAB
BCR-ABL1,  T(9;22) SOURCE: NORMAL
BCR/ABL T(9,22): NOT DETECTED

## 2022-06-03 ENCOUNTER — HOSPITAL ENCOUNTER (OUTPATIENT)
Dept: PREADMISSION TESTING | Age: 57
Discharge: HOME | End: 2022-06-03

## 2022-06-03 RX ORDER — SODIUM CHLORIDE 0.9 % (FLUSH) 0.9 %
5-40 SYRINGE (ML) INJECTION 2 TIMES DAILY
Status: CANCELLED | OUTPATIENT
Start: 2022-06-03

## 2022-06-03 NOTE — PROGRESS NOTES
[address]        Date: 6/3/2022    Date of surgery: 6/6/22   Arrival Time: 0700 am     1. Nothing by mouth (NPO) as instructed.____nothing solid after midnight, clear liquids up to 4 hours prior________________________________________________________________    2. Take the following medications with a small sip of water on the morning of Surgery: all am meds, no diuretics or insulins/oral hypoglycemics     3. Diabetics may take evening dose of insulin but none after midnight. If you feel symptomatic or low blood sugar morning of surgery drink 1-2 ounces of apple juice only. 4. Aspirin, Ibuprofen, Advil, Naproxen, Vitamin E and other Anti-inflammatory products should be stopped  before surgery  as directed by your physician. Take Tylenol only unless instructed otherwise by your surgeon. 5. Check with your Doctor regarding stopping Plavix, Coumadin, Lovenox, Eliquis, Effient, or other blood thinners. 6. Do not smoke,use illicit drugs and do not drink any alcoholic beverages 24 hours prior to surgery. 7. You may brush your teeth the morning of surgery. DO NOT SWALLOW WATER    8. You MUST make arrangements for a responsible adult to take you home after your surgery. You will not be allowed to leave alone or drive yourself home. It is strongly suggested someone stay with you the first 24 hrs. Your surgery will be cancelled if you do not have a ride home. 9. PEDIATRIC PATIENTS ONLY:  A parent/legal guardian must accompany a child scheduled for surgery and plan to stay at the hospital until the child is discharged. Please do not bring other children with you.     10. Please wear simple, loose fitting clothing to the hospital.  Suzie Primus not bring valuables (money, credit cards, checkbooks, etc.) Do not wear any makeup (including no eye makeup) or nail polish on your fingers or toes. 11. DO NOT wear any jewelry or piercings on day of surgery. All body piercing jewelry must be removed. 12. Shower the night before surgery with __x_Antibacterial soap /ROMI WIPES________    13. TOTAL JOINT REPLACEMENT/HYSTERECTOMY PATIENTS ONLY---Remember to bring Blood Bank bracelet to the hospital on the day of surgery. 14. If you have a Living Will and Durable Power of  for Healthcare, please bring in a copy. 15. If appropriate bring crutches, inspirex, WALKER, CANE etc... 12. Notify your Surgeon if you develop any illness between now and surgery time, cough, cold, fever, sore throat, nausea, vomiting, etc.  Please notify your surgeon if you experience dizziness, shortness of breath or blurred vision between now & the time of your surgery. 17. If you have ___dentures, they will be removed before going to the OR; we will provide you a container. If you wear ___contact lenses or ___glasses, they will be removed; please bring a case for them. 18. To provide excellent care visitors will be limited to 2 in the room at any given time. 19. Please bring picture ID and insurance card. 20. During flu season no children under the age of 15 are permitted in the hospital for the safety of all patients. 21. Other please check in at the information desk/main lobby. Please wear a mask. Please call AMBULATORY CARE if you have any further questions.    [address] [facility]     [address]

## 2022-06-06 ENCOUNTER — HOSPITAL ENCOUNTER (OUTPATIENT)
Dept: CT IMAGING | Age: 57
Discharge: HOME | End: 2022-06-06
Payer: OTHER GOVERNMENT

## 2022-06-06 VITALS
OXYGEN SATURATION: 96 % | HEIGHT: 71 IN | HEART RATE: 71 BPM | BODY MASS INDEX: 32.79 KG/M2 | RESPIRATION RATE: 20 BRPM | WEIGHT: 234.25 LBS | SYSTOLIC BLOOD PRESSURE: 116 MMHG | TEMPERATURE: 97.7 F | DIASTOLIC BLOOD PRESSURE: 70 MMHG

## 2022-06-06 DIAGNOSIS — C92.10 CML (CHRONIC MYELOID LEUKEMIA) (HCC): ICD-10-CM

## 2022-06-06 LAB
APTT: 32.7 SEC (ref 24.5–35.1)
HCT VFR BLD CALC: 49.7 % (ref 37–54)
HEMOGLOBIN: 16 G/DL (ref 12.5–16.5)
INR BLD: 1
MCH RBC QN AUTO: 29.7 PG (ref 26–35)
MCHC RBC AUTO-ENTMCNC: 32.2 % (ref 32–34.5)
MCV RBC AUTO: 92.4 FL (ref 80–99.9)
METER GLUCOSE: 105 MG/DL (ref 74–99)
PDW BLD-RTO: 13.9 FL (ref 11.5–15)
PLATELET # BLD: 255 E9/L (ref 130–450)
PMV BLD AUTO: 11 FL (ref 7–12)
PROTHROMBIN TIME: 11.2 SEC (ref 9.3–12.4)
RBC # BLD: 5.38 E12/L (ref 3.8–5.8)
WBC # BLD: 20.5 E9/L (ref 4.5–11.5)

## 2022-06-06 PROCEDURE — 7100000011 HC PHASE II RECOVERY - ADDTL 15 MIN

## 2022-06-06 PROCEDURE — 85610 PROTHROMBIN TIME: CPT

## 2022-06-06 PROCEDURE — 82962 GLUCOSE BLOOD TEST: CPT

## 2022-06-06 PROCEDURE — 20220 BONE BIOPSY TROCAR/NDL SUPFC: CPT

## 2022-06-06 PROCEDURE — 85027 COMPLETE CBC AUTOMATED: CPT

## 2022-06-06 PROCEDURE — 7100000010 HC PHASE II RECOVERY - FIRST 15 MIN

## 2022-06-06 PROCEDURE — 77012 CT SCAN FOR NEEDLE BIOPSY: CPT

## 2022-06-06 PROCEDURE — 2580000003 HC RX 258: Performed by: RADIOLOGY

## 2022-06-06 PROCEDURE — 85730 THROMBOPLASTIN TIME PARTIAL: CPT

## 2022-06-06 PROCEDURE — 36415 COLL VENOUS BLD VENIPUNCTURE: CPT

## 2022-06-06 PROCEDURE — 6360000002 HC RX W HCPCS: Performed by: RADIOLOGY

## 2022-06-06 PROCEDURE — 2709999900 IR BIOPSY BONE MARROW

## 2022-06-06 RX ORDER — MIDAZOLAM HYDROCHLORIDE 1 MG/ML
INJECTION INTRAMUSCULAR; INTRAVENOUS
Status: COMPLETED | OUTPATIENT
Start: 2022-06-06 | End: 2022-06-06

## 2022-06-06 RX ORDER — FENTANYL CITRATE 0.05 MG/ML
INJECTION, SOLUTION INTRAMUSCULAR; INTRAVENOUS
Status: COMPLETED | OUTPATIENT
Start: 2022-06-06 | End: 2022-06-06

## 2022-06-06 RX ORDER — SODIUM CHLORIDE 0.9 % (FLUSH) 0.9 %
5-40 SYRINGE (ML) INJECTION 2 TIMES DAILY
Status: DISCONTINUED | OUTPATIENT
Start: 2022-06-06 | End: 2022-06-07 | Stop reason: HOSPADM

## 2022-06-06 RX ORDER — ACETAMINOPHEN 325 MG/1
650 TABLET ORAL ONCE
Status: DISCONTINUED | OUTPATIENT
Start: 2022-06-06 | End: 2022-06-07 | Stop reason: HOSPADM

## 2022-06-06 RX ADMIN — SODIUM CHLORIDE, PRESERVATIVE FREE 10 ML: 5 INJECTION INTRAVENOUS at 07:43

## 2022-06-06 RX ADMIN — FENTANYL CITRATE 50 MCG: 50 INJECTION INTRAMUSCULAR; INTRAVENOUS at 08:57

## 2022-06-06 RX ADMIN — FENTANYL CITRATE 50 MCG: 50 INJECTION INTRAMUSCULAR; INTRAVENOUS at 08:50

## 2022-06-06 RX ADMIN — MIDAZOLAM 1 MG: 1 INJECTION INTRAMUSCULAR; INTRAVENOUS at 08:50

## 2022-06-06 ASSESSMENT — PAIN - FUNCTIONAL ASSESSMENT: PAIN_FUNCTIONAL_ASSESSMENT: 0-10

## 2022-06-06 ASSESSMENT — PAIN SCALES - GENERAL
PAINLEVEL_OUTOF10: 7

## 2022-06-06 ASSESSMENT — PAIN DESCRIPTION - LOCATION
LOCATION: BACK

## 2022-06-06 ASSESSMENT — PAIN DESCRIPTION - DESCRIPTORS
DESCRIPTORS: ACHING;DISCOMFORT;SORE
DESCRIPTORS: SORE
DESCRIPTORS: SORE

## 2022-06-06 NOTE — PROGRESS NOTES
Patient came down to special procedures for bone marrow biopsy and aspiration. Patient taken to Cat Scan. Patient positioned prone , secured on Cat Scan table with O2 and monitoring devices attached. Patient scanned and images reviewed by Dr Lafonda Kayser     Patient prepped and draped. Emotional support given. 3733 - sedation medication given     0853 - Procedure start /57 66 27 100% on 2 liters nasal canula EtCO2 47    With the guidance of Cat Scan, needle inserted and core biopsy taken by Dr. Lafonda Kayser     Patient re-scanned and images reviewed by     Puncture site cleansed and dry sterile dressing applied to right lower back. 4332  - Procedure completed /64 72 25 100% on 2 liters nasal canula EtCO2 47    Biopsy sample taken to laboratory.      4745 - 15 minutes post procedure /72 69 20 94% on room air no complaints of pain at puncture site    Total amount of sedation medication given during procedure: 1 mg of IV Versed and 100 mcg IV of Fentanyl    Nurse to nurse called, spoke with Brittany Kirby RN, notified nurse of above information    Patient transported back to [facility]

## 2022-06-06 NOTE — PROGRESS NOTES
6/6/22 1020 reviewed discharge instructions with pt.  Pt verbalized understanding,signed in agreement and given copy. kmo rn

## 2022-06-08 ENCOUNTER — HOSPITAL ENCOUNTER (OUTPATIENT)
Dept: INFUSION THERAPY | Age: 57
Discharge: HOME | End: 2022-06-08
Payer: COMMERCIAL

## 2022-06-08 DIAGNOSIS — C92.10 CML (CHRONIC MYELOID LEUKEMIA) (HCC): ICD-10-CM

## 2022-06-08 LAB
ALBUMIN SERPL-MCNC: 3.8 G/DL (ref 3.5–5.2)
ALP BLD-CCNC: 74 U/L (ref 40–129)
ALT SERPL-CCNC: 14 U/L (ref 0–40)
ANION GAP SERPL CALCULATED.3IONS-SCNC: 12 MMOL/L (ref 7–16)
AST SERPL-CCNC: 21 U/L (ref 0–39)
BASOPHILS ABSOLUTE: 0.07 E9/L (ref 0–0.2)
BASOPHILS RELATIVE PERCENT: 0.4 % (ref 0–2)
BILIRUB SERPL-MCNC: <0.2 MG/DL (ref 0–1.2)
BUN BLDV-MCNC: 16 MG/DL (ref 6–20)
CALCIUM SERPL-MCNC: 9.2 MG/DL (ref 8.6–10.2)
CHLORIDE BLD-SCNC: 98 MMOL/L (ref 98–107)
CO2: 26 MMOL/L (ref 22–29)
CREAT SERPL-MCNC: 0.7 MG/DL (ref 0.7–1.2)
EOSINOPHILS ABSOLUTE: 0.35 E9/L (ref 0.05–0.5)
EOSINOPHILS RELATIVE PERCENT: 2.2 % (ref 0–6)
GFR AFRICAN AMERICAN: >60
GFR NON-AFRICAN AMERICAN: >60 ML/MIN/1.73
GLUCOSE BLD-MCNC: 374 MG/DL (ref 74–99)
HCT VFR BLD CALC: 45.4 % (ref 37–54)
HEMOGLOBIN: 15 G/DL (ref 12.5–16.5)
IMMATURE GRANULOCYTES #: 0.09 E9/L
IMMATURE GRANULOCYTES %: 0.6 % (ref 0–5)
LYMPHOCYTES ABSOLUTE: 4.49 E9/L (ref 1.5–4)
LYMPHOCYTES RELATIVE PERCENT: 28.2 % (ref 20–42)
MCH RBC QN AUTO: 29.3 PG (ref 26–35)
MCHC RBC AUTO-ENTMCNC: 33 % (ref 32–34.5)
MCV RBC AUTO: 88.7 FL (ref 80–99.9)
MONOCYTES ABSOLUTE: 1.15 E9/L (ref 0.1–0.95)
MONOCYTES RELATIVE PERCENT: 7.2 % (ref 2–12)
NEUTROPHILS ABSOLUTE: 9.76 E9/L (ref 1.8–7.3)
NEUTROPHILS RELATIVE PERCENT: 61.4 % (ref 43–80)
PDW BLD-RTO: 13.6 FL (ref 11.5–15)
PLATELET # BLD: 230 E9/L (ref 130–450)
PMV BLD AUTO: 10.6 FL (ref 7–12)
POTASSIUM SERPL-SCNC: 4.5 MMOL/L (ref 3.5–5)
RBC # BLD: 5.12 E12/L (ref 3.8–5.8)
SODIUM BLD-SCNC: 136 MMOL/L (ref 132–146)
TOTAL PROTEIN: 7 G/DL (ref 6.4–8.3)
WBC # BLD: 15.9 E9/L (ref 4.5–11.5)

## 2022-06-08 PROCEDURE — 80053 COMPREHEN METABOLIC PANEL: CPT

## 2022-06-08 PROCEDURE — 85025 COMPLETE CBC W/AUTO DIFF WBC: CPT

## 2022-06-08 PROCEDURE — 36415 COLL VENOUS BLD VENIPUNCTURE: CPT

## 2022-06-09 ENCOUNTER — OFFICE VISIT (OUTPATIENT)
Dept: ONCOLOGY | Age: 57
End: 2022-06-09
Payer: COMMERCIAL

## 2022-06-09 VITALS
OXYGEN SATURATION: 95 % | DIASTOLIC BLOOD PRESSURE: 80 MMHG | WEIGHT: 236 LBS | HEIGHT: 71 IN | TEMPERATURE: 97.6 F | BODY MASS INDEX: 33.04 KG/M2 | HEART RATE: 64 BPM | SYSTOLIC BLOOD PRESSURE: 136 MMHG

## 2022-06-09 DIAGNOSIS — C92.10 CML (CHRONIC MYELOID LEUKEMIA) (HCC): Primary | ICD-10-CM

## 2022-06-09 PROCEDURE — 99212 OFFICE O/P EST SF 10 MIN: CPT

## 2022-06-09 NOTE — PROGRESS NOTES
[address]  [address] [place] [address]   Hematology/Oncology  Consult      Patient Name: Brannon Driver  YOB: 1965  PCP: Abdullahi Underwood MD   Referring Provider:      Reason for Consultation:   Chief Complaint   Patient presents with    Leukemia      Interval history: No new complaints. S/p bone marrow biopsy. History of Present Illness:  62years old male  referred by Dr. Kay Farmer for management of CML. Patient has had mild leukocytosis ranging from 16-18,000 since 2020 with monocytosis and absolute lymphocytosis. BCR ABL was tested in April 2022 and is reported as positive. Most recent CBC from April 2022 showed a total white count of 18,000, hemoglobin 16, platelet count 790, absolute neutrophil count 9000, absolute lymphocyte count 8000, monocytes 1.2K. Absolute eosinophil count 2.3, absolute basophil count 0. Denies recent weight loss, loss of appetite, night sweats, enlarged lymph nodes, abdominal pain. Review of systems: Over 10 systems were reviewed and all were negative except as mentioned above.     Diagnostic Data:     Past Medical History:   Diagnosis Date    Atrial fibrillation ([address]     Atrial fibrillation ([address] 02/20/2018    lexiscan stress test    Cerebral artery occlusion with cerebral infarction ([address]     COPD (chronic obstructive pulmonary disease) (HCC)     Diabetes mellitus ([address]     Hyperlipidemia     Hypertension     Neuropathy     feet and legs    Psychiatric problem     Sleep apnea     cpap 12    TIA (transient ischemic attack)     2015       Patient Active Problem List    Diagnosis Date Noted    Right foot ulcer, with fat layer exposed ([address] 06/15/2021    Foot ulcer, left, with fat layer exposed ([address] 06/15/2021    CAD in native artery 05/28/2020    COVID-19     Acute postoperative respiratory insufficiency     Postoperative hypotension     Unstable angina ([address] 05/20/2020    Acute coronary syndrome ([address] 05/20/2020    History of TIA (transient ischemic attack)     Chest pain 05/16/2020    HUMERA (obstructive sleep apnea) 06/18/2018    Atrial fibrillation with RVR ([address] 06/17/2018    Atrial flutter ([address] 06/16/2018    Cellulitis 02/11/2018    Abscess of foot 02/11/2018    Acute hepatitis B 02/01/2017    Acute liver failure without hepatic coma 01/31/2017    Sepsis ([address] 01/31/2017    Diabetes mellitus ([address]     Acute renal failure (ARF) ([address] 07/15/2016    Diverticulitis 07/15/2016    Type 2 diabetes mellitus ([address] 07/15/2016    Essential hypertension 07/15/2016    Hyperlipidemia 07/15/2016    TIA (transient ischemic attack) 07/07/2015    New onset atrial fibrillation ([address] 07/07/2015        Past Surgical History:   Procedure Laterality Date    CARDIAC SURGERY      HEART CATHETERIZATION    CORONARY ARTERY BYPASS GRAFT N/A 5/28/2020    CORONARY ARTERY BYPASS, MAZE PROCEDURE, BERTHA performed by Perico Spann DO at [address] [place] [address] ECHOCARDIOGRAM COMPLETE WITH BUBBLE STUDY  7/7/2015         FRACTURE SURGERY      HERNIA REPAIR      age child    IR BIOPSY PERC SUPERF BONE  6/6/2022    IR BIOPSY PERC SUPERF BONE 6/6/2022 [facility] CT    OTHER SURGICAL HISTORY  02/15/2018    incision and drainage bone biopsy of right great toe    PERICARDIUM SURGERY N/A 6/4/2020    PERICARDIAL WINDOW CREATION performed by Perico Spann DO at [facility]       Family History  Family History   Problem Relation Age of Onset    Cancer Mother         lung    Heart Disease Father        Social History    TOBACCO:   reports that he has been smoking cigarettes. He has a 35.00 pack-year smoking history. He has quit using smokeless tobacco.  His smokeless tobacco use included chew. ETOH:   reports no history of alcohol use. Home Medications  Prior to Admission medications    Medication Sig Start Date End Date Taking?  Authorizing Provider   diclofenac sodium (VOLTAREN) 1 % GEL Apply 2 g topically 2 times daily Yes Historical Provider, MD   carboxymethylcellulose 1 % ophthalmic solution 1 drop 3 times daily   Yes Historical Provider, MD   Omega-3 Fatty Acids (FISH OIL) 1000 MG CAPS Take 3,000 mg by mouth 3 times daily   Yes Historical Provider, MD   acetaminophen (TYLENOL) 325 MG tablet Take 650 mg by mouth every 6 hours as needed for Pain   Yes Historical Provider, MD   vitamin D (CHOLECALCIFEROL) 25 MCG (1000 UT) TABS tablet Take 2,000 Units by mouth daily   Yes Historical Provider, MD   glipiZIDE (GLUCOTROL) 10 MG tablet 10 mg 2 times daily (before meals)  1/26/22  Yes Historical Provider, MD   rosuvastatin (CRESTOR) 20 MG tablet  3/24/22  Yes Historical Provider, MD   insulin glargine (LANTUS) 100 UNIT/ML injection vial Inject into the skin nightly 35 units BID   Yes Historical Provider, MD   JARDIANCE 25 MG tablet  3/28/22  Yes Historical Provider, MD   ammonium lactate (LAC-HYDRIN) 12 % lotion Apply topically daily to dry skin on right and left foot. Do not apply to open skin. 6/29/21  Yes Ainsley Ware DPM   gabapentin (NEURONTIN) 100 MG capsule Take 300 mg by mouth 3 times daily.     Yes Historical Provider, MD   DULoxetine (CYMBALTA) 20 MG extended release capsule Take 30 mg by mouth daily    Yes Historical Provider, MD   bumetanide (BUMEX) 1 MG tablet Take 1 tablet by mouth daily 6/9/20  Yes Rosalina Urena MD   apixaban (ELIQUIS) 5 MG TABS tablet Take 1 tablet by mouth 2 times daily 6/7/20  Yes ANGIE Hernández CNP   metoprolol tartrate (LOPRESSOR) 50 MG tablet Take 1 tablet by mouth 2 times daily 6/7/20  Yes ANGIE Hernández CNP   dilTIAZem (CARDIZEM CD) 120 MG extended release capsule Take 1 capsule by mouth daily 6/7/20  Yes ANGIE Hernández CNP   digoxin (LANOXIN) 125 MCG tablet Take 1 tablet by mouth daily 6/7/20  Yes ANGIE Hernández CNP   metFORMIN (GLUCOPHAGE) 500 MG tablet Take 1,000 mg by mouth 2 times daily (with meals)    Yes Historical Provider, MD   albuterol sulfate HFA 108 (90 BASE) MCG/ACT inhaler Inhale 2 puffs into the lungs every 6 hours as needed for Wheezing or Shortness of Breath   Yes Historical Provider, MD   pantoprazole (PROTONIX) 40 MG tablet Take 1 tablet by mouth every morning (before breakfast) 7/17/16  Yes Isamar Hong MD   vitamin B-12 (CYANOCOBALAMIN) 1000 MCG tablet Take 1,000 mcg by mouth daily   Yes Historical Provider, MD   Ascorbic Acid (VITAMIN C) 250 MG tablet Take 1,000 mg by mouth daily   Yes Historical Provider, MD   Multiple Vitamins-Minerals (THERAPEUTIC MULTIVITAMIN-MINERALS) tablet Take 1 tablet by mouth daily   Yes Historical Provider, MD   aspirin 81 MG tablet Take 81 mg by mouth daily   Yes Historical Provider, MD       Allergies  Allergies   Allergen Reactions    Other      pollen       Review of Systems:      Objective  /80   Pulse 64   Temp 97.6 °F (36.4 °C)   Ht 5' 11\" (1.803 m)   Wt 236 lb (107 kg)   SpO2 95%   BMI 32.92 kg/m²     Physical Performance Status    Physical Exam:  General: AAO to person, place, time, and purpose, appears stated age, cooperative, no acute distress, pleasant   Head and neck : PERRLA, EOMI . Sclera non icteric. Oropharynx : Clear  Neck: no JVD,  no adenopathy, no carotid bruit  LYMPHATICS : No peripheral lymphadenopathy. Lungs: Clear to auscultation   Heart: Regular rate and regular rhythm, no murmur, normal S1, S2  Abdomen: Soft, non-tender;no masses, no organomegaly  Extremities: No edema,no cyanosis, no clubbing. Skin:  No rash. Neurologic:Cranial nerves grossly intact. No focal motor or sensory deficits .     Recent Laboratory Data-   Lab Results   Component Value Date    WBC 15.9 (H) 06/08/2022    HGB 15.0 06/08/2022    HCT 45.4 06/08/2022    MCV 88.7 06/08/2022     06/08/2022    LYMPHOPCT 28.2 06/08/2022    RBC 5.12 06/08/2022    MCH 29.3 06/08/2022    MCHC 33.0 06/08/2022    RDW 13.6 06/08/2022    NEUTOPHILPCT 61.4 06/08/2022    MONOPCT 7.2 06/08/2022    BASOPCT 0.4 06/08/2022    NEUTROABS 9.76 (H) 06/08/2022    LYMPHSABS 4.49 (H) 06/08/2022    MONOSABS 1.15 (H) 06/08/2022    EOSABS 0.35 06/08/2022    BASOSABS 0.07 06/08/2022       Lab Results   Component Value Date     06/08/2022    K 4.5 06/08/2022    CL 98 06/08/2022    CO2 26 06/08/2022    BUN 16 06/08/2022    CREATININE 0.7 06/08/2022    GLUCOSE 374 (H) 06/08/2022    CALCIUM 9.2 06/08/2022    PROT 7.0 06/08/2022    LABALBU 3.8 06/08/2022    BILITOT <0.2 06/08/2022    ALKPHOS 74 06/08/2022    AST 21 06/08/2022    ALT 14 06/08/2022    LABGLOM >60 06/08/2022    GFRAA >60 06/08/2022       Lab Results   Component Value Date    IRON 103 01/30/2017    TIBC 229 (L) 01/30/2017    FERRITIN >10,000 01/30/2017           Radiology-    CT Lung Screen (Initial/Annual)    Result Date: 4/23/2022  EXAMINATION: LOW DOSE SCREENING CT OF THE CHEST WITHOUT CONTRAST 4/23/2022 7:29 am TECHNIQUE: Low dose lung cancer screening CT of the chest was performed without the administration of intravenous contrast.  Multiplanar reformatted images are provided for review. Dose modulation, iterative reconstruction, and/or weight based adjustment of the mA/kV was utilized to reduce the radiation dose to as low as reasonably achievable. COMPARISON: None. HISTORY: ORDERING SYSTEM PROVIDED HISTORY: Encounter for screening for malignant neoplasm of respiratory organs TECHNOLOGIST PROVIDED HISTORY: Age: 62 y.o. Smoking History: Social History Tobacco Use Smoking status: Heavy Tobacco Smoker Packs/day: 1.00 Years: 35.00 Pack years: 35 Types: Cigarettes Smokeless tobacco: Former User Types: Chew Vaping Use Vaping Use: Not on file Alcohol use: No Comment: STOPPED;2 cups of coffee a day Drug use: No Pack years: 35 Last CT lung screen: No previous lung cancer screening exam Is there documentation of shared decision making? ->Yes Does the patient show any signs or symptoms of lung cancer? ->No Is this the first (baseline) CT or an annual exam?->Annual Is this a low dose CT or a routine CT?->Low Dose CT Smoking Status? ->Heavy Tobacco Smoker Smoking packs per day?->1 Years smoking?->35 FINDINGS: Mediastinum: No abnormal lymphadenopathy. The pulmonary trunk is normal in size Lungs/pleura: No pleural effusions. No pneumothorax. No abnormal pleural thickening. 2 mm nodule in right upper lobe, image 53 series 4. 4 mm nodule in the middle lobe image 65 series 4. Upper Abdomen: No acute process identified Soft Tissues/Bones: No aggressive osseous lesions. Pulmonary nodules measuring up to 4 mm in the right middle lobe. LUNG RADS: Per ACR Lung-RADS Version 1.1 Lung rads 2. Continue annual low-dose CT screening of the chest. RECOMMENDATIONS: If you would like to register your patient with the [facility], please contact the Nurse Navigator at [phone]. ASSESSMENT/PLAN :    Lou Das was seen today for leukemia. Diagnoses and all orders for this visit:    CML (chronic myeloid leukemia) ([facility]  -     CBC with Auto Differential; Future    62years old male diagnosed with CML at [place] in April 2022. RT-PCR for BCR ABL from last month was negative. This is surprising. Patient also underwent bone marrow biopsy 2 days ago. We will follow final report before starting treatment. Ultrasound abdomen showed mild splenomegaly. Peripheral smear with left shift up to metamyelocytes. We will get report of BCR ABL from [place]. CBC stable with total white count of 15,000 hemoglobin 15 platelet count 664. Peripheral smear with left shift up to metamyelocytes. Patient has coronary artery disease s/p CABG, history of atrial fibrillation, history of TIA. Will avoid Nilotinib. Likely start with Dasatinib after confirming diagnosis on bone marrow aspiration biopsy. Dasatinib, due to its platelet dysfunction effects would compound the bleeding risk from Eliquis. Intentional 30 pound weight loss last 6 months.     Return to clinic in 2- 3 weeks. Return in about 3 weeks (around 6/30/2022).      Carlton Arauz MD   Electronically signed 6/9/2022 at 10:46 AM

## 2022-06-16 ENCOUNTER — TELEPHONE (OUTPATIENT)
Dept: CASE MANAGEMENT | Age: 57
End: 2022-06-16

## 2022-06-16 NOTE — TELEPHONE ENCOUNTER
No call, encounter opened to process CT Lung Screening. CT Lung Screen: 4/23/2022    Impression   Pulmonary nodules measuring up to 4 mm in the right middle lobe.       LUNG RADS:   Per ACR Lung-RADS Version 1.1       Lung rads 2.  Continue annual low-dose CT screening of the chest.       RECOMMENDATIONS:   If you would like to [facility] your patient [facility], please contact the Nurse Navigator at   [phone]. Pack years: 28    Social History     Tobacco Use  Smoking Status: Current Every Day Smoker    Start Date:    Quit Date:    Types: Cigarettes   Packs/Day: 1   Years: 28   Pack Years: 28   Smokeless Tobacco: Former User         Results letter sent to patient via my chart or mailed.      [facility]

## 2022-06-29 ENCOUNTER — HOSPITAL ENCOUNTER (OUTPATIENT)
Dept: INFUSION THERAPY | Age: 57
Discharge: HOME | End: 2022-06-29
Payer: COMMERCIAL

## 2022-06-29 DIAGNOSIS — C92.10 CML (CHRONIC MYELOID LEUKEMIA) (HCC): ICD-10-CM

## 2022-06-29 LAB
BASOPHILS ABSOLUTE: 0 E9/L (ref 0–0.2)
BASOPHILS RELATIVE PERCENT: 0.5 % (ref 0–2)
EOSINOPHILS ABSOLUTE: 0.54 E9/L (ref 0.05–0.5)
EOSINOPHILS RELATIVE PERCENT: 2.6 % (ref 0–6)
HCT VFR BLD CALC: 48.9 % (ref 37–54)
HEMOGLOBIN: 16.1 G/DL (ref 12.5–16.5)
LYMPHOCYTES ABSOLUTE: 9.78 E9/L (ref 1.5–4)
LYMPHOCYTES RELATIVE PERCENT: 46.5 % (ref 20–42)
MCH RBC QN AUTO: 29.2 PG (ref 26–35)
MCHC RBC AUTO-ENTMCNC: 32.9 % (ref 32–34.5)
MCV RBC AUTO: 88.6 FL (ref 80–99.9)
MONOCYTES ABSOLUTE: 0.83 E9/L (ref 0.1–0.95)
MONOCYTES RELATIVE PERCENT: 4.4 % (ref 2–12)
NEUTROPHILS ABSOLUTE: 9.78 E9/L (ref 1.8–7.3)
NEUTROPHILS RELATIVE PERCENT: 46.5 % (ref 43–80)
PDW BLD-RTO: 13.7 FL (ref 11.5–15)
PLATELET # BLD: 362 E9/L (ref 130–450)
PMV BLD AUTO: 10.3 FL (ref 7–12)
RBC # BLD: 5.52 E12/L (ref 3.8–5.8)
RBC # BLD: NORMAL 10*6/UL
WBC # BLD: 20.8 E9/L (ref 4.5–11.5)

## 2022-06-29 PROCEDURE — 85025 COMPLETE CBC W/AUTO DIFF WBC: CPT

## 2022-06-29 PROCEDURE — 36415 COLL VENOUS BLD VENIPUNCTURE: CPT

## 2022-07-01 LAB
Lab: NORMAL
REPORT: NORMAL
THIS TEST SENT TO: NORMAL

## 2022-07-06 ENCOUNTER — APPOINTMENT (OUTPATIENT)
Dept: INFUSION THERAPY | Age: 57
End: 2022-07-06
Payer: COMMERCIAL

## 2022-07-07 ENCOUNTER — OFFICE VISIT (OUTPATIENT)
Dept: ONCOLOGY | Age: 57
End: 2022-07-07
Payer: COMMERCIAL

## 2022-07-07 VITALS
DIASTOLIC BLOOD PRESSURE: 77 MMHG | OXYGEN SATURATION: 100 % | BODY MASS INDEX: 32.75 KG/M2 | HEART RATE: 77 BPM | HEIGHT: 71 IN | SYSTOLIC BLOOD PRESSURE: 130 MMHG | TEMPERATURE: 97.7 F | WEIGHT: 233.9 LBS

## 2022-07-07 DIAGNOSIS — C92.10 CML (CHRONIC MYELOID LEUKEMIA) (HCC): Primary | ICD-10-CM

## 2022-07-07 PROCEDURE — 99212 OFFICE O/P EST SF 10 MIN: CPT

## 2022-07-07 NOTE — PROGRESS NOTES
[id number]  [address] [place] [address]   Hematology/Oncology  Consult      Patient Name: Junito Cochran  YOB: 1965  PCP: Deepak Mahajan MD   Referring Provider:      Reason for Consultation:   Chief Complaint   Patient presents with    Cancer     CML (chronic myeloid leukemia) ([facility] [address]    Follow-up      Interval history: No new complaints. S/p bone marrow biopsy. History of Present Illness:  62years old male  referred by Dr. Kerry Castillo for management of CML. Patient has had mild leukocytosis ranging from 16-18,000 since 2020 with monocytosis and absolute lymphocytosis. BCR ABL was tested in April 2022 and is reported as positive. Most recent CBC from April 2022 showed a total white count of 18,000, hemoglobin 16, platelet count 032, absolute neutrophil count 9000, absolute lymphocyte count 8000, monocytes 1.2K. Absolute eosinophil count 2.3, absolute basophil count 0. Denies recent weight loss, loss of appetite, night sweats, enlarged lymph nodes, abdominal pain. Review of systems: Over 10 systems were reviewed and all were negative except as mentioned above.     Diagnostic Data:     Past Medical History:   Diagnosis Date    Atrial fibrillation [facility])     Atrial fibrillation ([facility] [address] 02/20/2018    lexiscan stress test    Cerebral artery occlusion with cerebral infarction [facility])     COPD (chronic obstructive pulmonary disease) (HCC)     Diabetes mellitus ([facility] [address]     Hyperlipidemia     Hypertension     Neuropathy     feet and legs    Psychiatric problem     Sleep apnea     cpap 12    TIA (transient ischemic attack)     2015       Patient Active Problem List    Diagnosis Date Noted    Right foot ulcer, with fat layer exposed ([facility] [address] 06/15/2021    Foot ulcer, left, with fat layer exposed ([facility] [address] 06/15/2021    CAD in native artery 05/28/2020    COVID-19     Acute postoperative respiratory insufficiency     Postoperative hypotension     Unstable angina ([address] 05/20/2020 Acute coronary syndrome ([facility] [address] 05/20/2020    History of TIA (transient ischemic attack)     Chest pain 05/16/2020    HUMERA (obstructive sleep apnea) 06/18/2018    Atrial fibrillation with RVR ([address] 06/17/2018    Atrial flutter ([address] 06/16/2018    Cellulitis 02/11/2018    Abscess of foot 02/11/2018    Acute hepatitis B 02/01/2017    Acute liver failure without hepatic coma 01/31/2017    Sepsis ([address] 01/31/2017    Diabetes mellitus ([address]     Acute renal failure (ARF) ([address] 07/15/2016    Diverticulitis 07/15/2016    Type 2 diabetes mellitus ([address] 07/15/2016    Essential hypertension 07/15/2016    Hyperlipidemia 07/15/2016    TIA (transient ischemic attack) 07/07/2015    New onset atrial fibrillation ([facility] [address] 07/07/2015        Past Surgical History:   Procedure Laterality Date    CARDIAC SURGERY      HEART CATHETERIZATION    CORONARY ARTERY BYPASS GRAFT N/A 5/28/2020    CORONARY ARTERY BYPASS, MAZE PROCEDURE, [facility] [place] [address]      ECHOCARDIOGRAM COMPLETE WITH BUBBLE STUDY  7/7/2015         FRACTURE SURGERY      HERNIA REPAIR      age child    IR BIOPSY PERC SUPERF BONE  6/6/2022    IR BIOPSY PERC SUPERF BONE 6/6/2022 [facility] CT    OTHER SURGICAL HISTORY  02/15/2018    incision and drainage bone biopsy of right great toe    PERICARDIUM SURGERY N/A 6/4/2020    PERICARDIAL WINDOW CREATION [facility]       Family History  Family History   Problem Relation Age of Onset    Cancer Mother         lung    Heart Disease Father        Social History    TOBACCO:   reports that he has been smoking cigarettes. He has a 35.00 pack-year smoking history. He has quit using smokeless tobacco.  His smokeless tobacco use included chew. ETOH:   reports no history of alcohol use. Home Medications  Prior to Admission medications    Medication Sig Start Date End Date Taking?  Authorizing Provider   diclofenac sodium (VOLTAREN) 1 % GEL Apply 2 g topically 2 times daily Yes Historical Provider, MD   carboxymethylcellulose 1 % ophthalmic solution 1 drop 3 times daily   Yes Historical Provider, MD   Omega-3 Fatty Acids (FISH OIL) 1000 MG CAPS Take 3,000 mg by mouth 3 times daily   Yes Historical Provider, MD   acetaminophen (TYLENOL) 325 MG tablet Take 650 mg by mouth every 6 hours as needed for Pain   Yes Historical Provider, MD   vitamin D (CHOLECALCIFEROL) 25 MCG (1000 UT) TABS tablet Take 2,000 Units by mouth daily   Yes Historical Provider, MD   glipiZIDE (GLUCOTROL) 10 MG tablet 10 mg 2 times daily (before meals)  1/26/22  Yes Historical Provider, MD   rosuvastatin (CRESTOR) 20 MG tablet  3/24/22  Yes Historical Provider, MD   insulin glargine (LANTUS) 100 UNIT/ML injection vial Inject into the skin nightly 35 units BID   Yes Historical Provider, MD   JARDIANCE 25 MG tablet  3/28/22  Yes Historical Provider, MD   ammonium lactate (LAC-HYDRIN) 12 % lotion Apply topically daily to dry skin on right and left foot. Do not apply to open skin. 6/29/21  Yes Rakesh Barrow DPM   gabapentin (NEURONTIN) 100 MG capsule Take 300 mg by mouth 3 times daily.     Yes Historical Provider, MD   DULoxetine (CYMBALTA) 20 MG extended release capsule Take 30 mg by mouth daily    Yes Historical Provider, MD   bumetanide (BUMEX) 1 MG tablet Take 1 tablet by mouth daily 6/9/20  Yes Coleman Oliva MD   apixaban (ELIQUIS) 5 MG TABS tablet Take 1 tablet by mouth 2 times daily 6/7/20  Yes ANGIE Ny CNP   metoprolol tartrate (LOPRESSOR) 50 MG tablet Take 1 tablet by mouth 2 times daily 6/7/20  Yes ANGIE Ny CNP   dilTIAZem (CARDIZEM CD) 120 MG extended release capsule Take 1 capsule by mouth daily 6/7/20  Yes ANGIE Ny CNP   digoxin (LANOXIN) 125 MCG tablet Take 1 tablet by mouth daily 6/7/20  Yes ANGIE Ny CNP   metFORMIN (GLUCOPHAGE) 500 MG tablet Take 1,000 mg by mouth 2 times daily (with meals)    Yes Historical Provider, MD   albuterol sulfate HFA 108 (90 BASE) MCG/ACT inhaler Inhale 2 puffs into the lungs every 6 hours as needed for Wheezing or Shortness of Breath   Yes Historical Provider, MD   pantoprazole (PROTONIX) 40 MG tablet Take 1 tablet by mouth every morning (before breakfast) 7/17/16  Yes Jessie Hill MD   vitamin B-12 (CYANOCOBALAMIN) 1000 MCG tablet Take 1,000 mcg by mouth daily   Yes Historical Provider, MD   Ascorbic Acid (VITAMIN C) 250 MG tablet Take 1,000 mg by mouth daily   Yes Historical Provider, MD   Multiple Vitamins-Minerals (THERAPEUTIC MULTIVITAMIN-MINERALS) tablet Take 1 tablet by mouth daily   Yes Historical Provider, MD   aspirin 81 MG tablet Take 81 mg by mouth daily   Yes Historical Provider, MD       Allergies  Allergies   Allergen Reactions    Other      pollen       Review of Systems:      Objective  /77   Pulse 77   Temp 97.7 °F (36.5 °C)   Ht 5' 11\" (1.803 m)   Wt 233 lb 14.4 oz (106.1 kg)   SpO2 100%   BMI 32.62 kg/m²     Physical Performance Status    Physical Exam:  General: AAO to person, place, time, and purpose, appears stated age, cooperative, no acute distress, pleasant   Head and neck : PERRLA, EOMI . Sclera non icteric. Oropharynx : Clear  Neck: no JVD,  no adenopathy, no carotid bruit  LYMPHATICS : No peripheral lymphadenopathy. Lungs: Clear to auscultation   Heart: Regular rate and regular rhythm, no murmur, normal S1, S2  Abdomen: Soft, non-tender;no masses, no organomegaly  Extremities: No edema,no cyanosis, no clubbing. Skin:  No rash. Neurologic:Cranial nerves grossly intact. No focal motor or sensory deficits .     Recent Laboratory Data-   Lab Results   Component Value Date    WBC 20.8 (H) 06/29/2022    HGB 16.1 06/29/2022    HCT 48.9 06/29/2022    MCV 88.6 06/29/2022     06/29/2022    LYMPHOPCT 46.5 (H) 06/29/2022    RBC 5.52 06/29/2022    MCH 29.2 06/29/2022    MCHC 32.9 06/29/2022    RDW 13.7 06/29/2022    NEUTOPHILPCT 46.5 06/29/2022    MONOPCT 4.4 06/29/2022 BASOPCT 0.5 06/29/2022    NEUTROABS 9.78 (H) 06/29/2022    LYMPHSABS 9.78 (H) 06/29/2022    MONOSABS 0.83 06/29/2022    EOSABS 0.54 (H) 06/29/2022    BASOSABS 0.00 06/29/2022       Lab Results   Component Value Date     06/08/2022    K 4.5 06/08/2022    CL 98 06/08/2022    CO2 26 06/08/2022    BUN 16 06/08/2022    CREATININE 0.7 06/08/2022    GLUCOSE 374 (H) 06/08/2022    CALCIUM 9.2 06/08/2022    PROT 7.0 06/08/2022    LABALBU 3.8 06/08/2022    BILITOT <0.2 06/08/2022    ALKPHOS 74 06/08/2022    AST 21 06/08/2022    ALT 14 06/08/2022    LABGLOM >60 06/08/2022    GFRAA >60 06/08/2022       Lab Results   Component Value Date    IRON 103 01/30/2017    TIBC 229 (L) 01/30/2017    FERRITIN >10,000 01/30/2017           Radiology-    CT Lung Screen (Initial/Annual)    Result Date: 4/23/2022  EXAMINATION: LOW DOSE SCREENING CT OF THE CHEST WITHOUT CONTRAST 4/23/2022 7:29 am TECHNIQUE: Low dose lung cancer screening CT of the chest was performed without the administration of intravenous contrast.  Multiplanar reformatted images are provided for review. Dose modulation, iterative reconstruction, and/or weight based adjustment of the mA/kV was utilized to reduce the radiation dose to as low as reasonably achievable. COMPARISON: None. HISTORY: ORDERING SYSTEM PROVIDED HISTORY: Encounter for screening for malignant neoplasm of respiratory organs TECHNOLOGIST PROVIDED HISTORY: Age: 62 y.o. Smoking History: Social History Tobacco Use Smoking status: Heavy Tobacco Smoker Packs/day: 1.00 Years: 35.00 Pack years: 35 Types: Cigarettes Smokeless tobacco: Former User Types: Chew Vaping Use Vaping Use: Not on file Alcohol use: No Comment: STOPPED;2 cups of coffee a day Drug use: No Pack years: 35 Last CT lung screen: No previous lung cancer screening exam Is there documentation of shared decision making? ->Yes Does the patient show any signs or symptoms of lung cancer? ->No Is this the first (baseline) CT or an annual exam?->Annual Is this a low dose CT or a routine CT?->Low Dose CT Smoking Status? ->Heavy Tobacco Smoker Smoking packs per day?->1 Years smoking?->35 FINDINGS: Mediastinum: No abnormal lymphadenopathy. The pulmonary trunk is normal in size Lungs/pleura: No pleural effusions. No pneumothorax. No abnormal pleural thickening. 2 mm nodule in right upper lobe, image 53 series 4. 4 mm nodule in the middle lobe image 65 series 4. Upper Abdomen: No acute process identified Soft Tissues/Bones: No aggressive osseous lesions. Pulmonary nodules measuring up to 4 mm in the right middle lobe. LUNG RADS: Per ACR Lung-RADS Version 1.1 Lung rads 2. Continue annual low-dose CT screening of the chest. RECOMMENDATIONS: If you would like to register your patient with the [facility], please contact the Nurse Navigator at [phone]. ASSESSMENT/PLAN :    Rachel Ramos was seen today for cancer and follow-up. Diagnoses and all orders for this visit:    CML (chronic myeloid leukemia) ([facility] [address]  -     CBC with Auto Differential; Future  -     Comprehensive Metabolic Panel; Future  -     Lactate Dehydrogenase; Future    62years old male diagnosed with CML at [place] in April 2022. RT-PCR for BCR ABL from May 2022 was negative. S/p bone marrow biopsy in June 2022. No evidence of clonal hematopoiesis noted. BCR able was negative. Trilineage hypoplasia was noted. BCR ABL was negative. Patient does not have CML. 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 NGS from bone marrow was negative for JAK2, MPL, AVANI R. We will continue to monitor. We will repeat CBC in a month. Ultrasound abdomen showed mild splenomegaly and peripheral smear with left shift up to metamyelocytes. We will get report of BCR ABL from [place]. Return to clinic in 4 weeks. Addendum: 7/28/2022- reviewed bcr/abl pcr from [place]. 4/2022. e13a2 and e14a2 were detected- 0.85 AND 0.89 % IS respectively.  Will continue to monitor closely off therapy since bcr/abl was not detected from recent peripheral blood and bone marrow without him being on any therapy. Return in about 4 weeks (around 8/4/2022).      Darshan Magana MD   Electronically signed 7/7/2022 at 3:34 PM

## 2022-08-03 ENCOUNTER — HOSPITAL ENCOUNTER (OUTPATIENT)
Dept: INFUSION THERAPY | Age: 57
Discharge: HOME | End: 2022-08-03
Payer: COMMERCIAL

## 2022-08-03 DIAGNOSIS — C92.10 CML (CHRONIC MYELOID LEUKEMIA) (HCC): ICD-10-CM

## 2022-08-03 LAB
ALBUMIN SERPL-MCNC: 4.1 G/DL (ref 3.5–5.2)
ALP BLD-CCNC: 77 U/L (ref 40–129)
ALT SERPL-CCNC: 12 U/L (ref 0–40)
ANION GAP SERPL CALCULATED.3IONS-SCNC: 13 MMOL/L (ref 7–16)
AST SERPL-CCNC: 13 U/L (ref 0–39)
BASOPHILS ABSOLUTE: 0 E9/L (ref 0–0.2)
BASOPHILS RELATIVE PERCENT: 0.4 % (ref 0–2)
BILIRUB SERPL-MCNC: <0.2 MG/DL (ref 0–1.2)
BUN BLDV-MCNC: 22 MG/DL (ref 6–20)
CALCIUM SERPL-MCNC: 9.4 MG/DL (ref 8.6–10.2)
CHLORIDE BLD-SCNC: 96 MMOL/L (ref 98–107)
CO2: 25 MMOL/L (ref 22–29)
CREAT SERPL-MCNC: 0.9 MG/DL (ref 0.7–1.2)
EOSINOPHILS ABSOLUTE: 0 E9/L (ref 0.05–0.5)
EOSINOPHILS RELATIVE PERCENT: 1.3 % (ref 0–6)
GFR AFRICAN AMERICAN: >60
GFR NON-AFRICAN AMERICAN: >60 ML/MIN/1.73
GLUCOSE BLD-MCNC: 354 MG/DL (ref 74–99)
HCT VFR BLD CALC: 43.3 % (ref 37–54)
HEMOGLOBIN: 14.3 G/DL (ref 12.5–16.5)
LACTATE DEHYDROGENASE: 138 U/L (ref 135–225)
LYMPHOCYTES ABSOLUTE: 6.59 E9/L (ref 1.5–4)
LYMPHOCYTES RELATIVE PERCENT: 35.7 % (ref 20–42)
MCH RBC QN AUTO: 29.5 PG (ref 26–35)
MCHC RBC AUTO-ENTMCNC: 33 % (ref 32–34.5)
MCV RBC AUTO: 89.3 FL (ref 80–99.9)
MONOCYTES ABSOLUTE: 0.73 E9/L (ref 0.1–0.95)
MONOCYTES RELATIVE PERCENT: 4.3 % (ref 2–12)
NEUTROPHILS ABSOLUTE: 10.98 E9/L (ref 1.8–7.3)
NEUTROPHILS RELATIVE PERCENT: 60 % (ref 43–80)
PDW BLD-RTO: 14.2 FL (ref 11.5–15)
PLATELET # BLD: 236 E9/L (ref 130–450)
PMV BLD AUTO: 10.9 FL (ref 7–12)
POTASSIUM SERPL-SCNC: 3.9 MMOL/L (ref 3.5–5)
RBC # BLD: 4.85 E12/L (ref 3.8–5.8)
SODIUM BLD-SCNC: 134 MMOL/L (ref 132–146)
TOTAL PROTEIN: 7.1 G/DL (ref 6.4–8.3)
WBC # BLD: 18.3 E9/L (ref 4.5–11.5)

## 2022-08-03 PROCEDURE — 36415 COLL VENOUS BLD VENIPUNCTURE: CPT

## 2022-08-03 PROCEDURE — 85025 COMPLETE CBC W/AUTO DIFF WBC: CPT

## 2022-08-03 PROCEDURE — 83615 LACTATE (LD) (LDH) ENZYME: CPT

## 2022-08-03 PROCEDURE — 80053 COMPREHEN METABOLIC PANEL: CPT

## 2022-08-04 ENCOUNTER — OFFICE VISIT (OUTPATIENT)
Dept: ONCOLOGY | Age: 57
End: 2022-08-04
Payer: COMMERCIAL

## 2022-08-04 VITALS
DIASTOLIC BLOOD PRESSURE: 71 MMHG | HEART RATE: 76 BPM | HEIGHT: 71 IN | TEMPERATURE: 97.2 F | OXYGEN SATURATION: 99 % | WEIGHT: 241.4 LBS | SYSTOLIC BLOOD PRESSURE: 148 MMHG | BODY MASS INDEX: 33.8 KG/M2

## 2022-08-04 DIAGNOSIS — D72.829 LEUKOCYTOSIS, UNSPECIFIED TYPE: Primary | ICD-10-CM

## 2022-08-04 PROCEDURE — 99212 OFFICE O/P EST SF 10 MIN: CPT

## 2022-08-04 NOTE — PROGRESS NOTES
[address]  [id number]   Hematology/Oncology  Consult      Patient Name: Mary Burgess  YOB: 1965  PCP: Ariane Jim MD   Referring Provider:      Reason for Consultation:   Chief Complaint   Patient presents with    Follow-up    Cancer     CML (chronic myeloid leukemia) ([facility] [address]      Interval history: No new complaints. History of Present Illness:  62years old male  referred by Dr. Isaura Manning for management of CML. Patient has had mild leukocytosis ranging from 16-18,000 since 2020 with monocytosis and absolute lymphocytosis. BCR ABL was tested in April 2022 and is reported as positive. Most recent CBC from April 2022 showed a total white count of 18,000, hemoglobin 16, platelet count 222, absolute neutrophil count 9000, absolute lymphocyte count 8000, monocytes 1.2K. Absolute eosinophil count 2.3, absolute basophil count 0. Denies recent weight loss, loss of appetite, night sweats, enlarged lymph nodes, abdominal pain. Review of systems: Over 10 systems were reviewed and all were negative except as mentioned above.     Diagnostic Data:     Past Medical History:   Diagnosis Date    Atrial fibrillation [facility])     Atrial fibrillation ([facility] [address] 02/20/2018    lexiscan stress test    Cerebral artery occlusion with cerebral infarction [facility])     COPD (chronic obstructive pulmonary disease) (HCC)     Diabetes mellitus ([facility] [address]     Hyperlipidemia     Hypertension     Neuropathy     feet and legs    Psychiatric problem     Sleep apnea     cpap 12    TIA (transient ischemic attack)     2015       Patient Active Problem List    Diagnosis Date Noted    Right foot ulcer, with fat layer exposed ([facility] [address] 06/15/2021    Foot ulcer, left, with fat layer exposed ([facility] [address] 06/15/2021    CAD in native artery 05/28/2020    COVID-19     Acute postoperative respiratory insufficiency     Postoperative hypotension     Unstable angina ([address] 05/20/2020    Acute coronary syndrome ([facility] [address] 05/20/2020    History of TIA (transient ischemic attack)     Chest pain 05/16/2020    HUMERA (obstructive sleep apnea) 06/18/2018    Atrial fibrillation with RVR ([address] 06/17/2018    Atrial flutter ([address] 06/16/2018    Cellulitis 02/11/2018    Abscess of foot 02/11/2018    Acute hepatitis B 02/01/2017    Acute liver failure without hepatic coma 01/31/2017    Sepsis ([facility] [address] 01/31/2017    Diabetes mellitus ([facility] [address]     Acute renal failure (ARF) ([facility] [address] 07/15/2016    Diverticulitis 07/15/2016    Type 2 diabetes mellitus ([facility] [address] 07/15/2016    Essential hypertension 07/15/2016    Hyperlipidemia 07/15/2016    TIA (transient ischemic attack) 07/07/2015    New onset atrial fibrillation ([facility] [address] 07/07/2015        Past Surgical History:   Procedure Laterality Date    CARDIAC SURGERY      HEART CATHETERIZATION    CORONARY ARTERY BYPASS GRAFT N/A 5/28/2020    CORONARY ARTERY BYPASS, MAZE PROCEDURE, BERTHA performed by Solo Martinez DO at [place] [id number]      ECHOCARDIOGRAM COMPLETE WITH BUBBLE STUDY  7/7/2015         FRACTURE SURGERY      HERNIA REPAIR      age child    IR BIOPSY PERC SUPERF BONE  6/6/2022    IR BIOPSY PERC SUPERF BONE 6/6/2022 [facility] CT    OTHER SURGICAL HISTORY  02/15/2018    incision and drainage bone biopsy of right great toe    PERICARDIUM SURGERY N/A 6/4/2020    PERICARDIAL WINDOW CREATION performed by Solo Martinez DO at [facility] OR       Family History  Family History   Problem Relation Age of Onset    Cancer Mother         lung    Heart Disease Father        Social History    TOBACCO:   reports that he has been smoking cigarettes. He has a 35.00 pack-year smoking history. He has quit using smokeless tobacco.  His smokeless tobacco use included chew. ETOH:   reports no history of alcohol use. Home Medications  Prior to Admission medications    Medication Sig Start Date End Date Taking?  Authorizing Provider   diclofenac sodium (VOLTAREN) 1 % GEL Apply 2 g topically 2 times daily   Yes Historical Provider, MD   carboxymethylcellulose 1 % ophthalmic solution 1 drop 3 times daily   Yes Historical Provider, MD   Omega-3 Fatty Acids (FISH OIL) 1000 MG CAPS Take 3,000 mg by mouth 3 times daily   Yes Historical Provider, MD   acetaminophen (TYLENOL) 325 MG tablet Take 650 mg by mouth every 6 hours as needed for Pain   Yes Historical Provider, MD   vitamin D (CHOLECALCIFEROL) 25 MCG (1000 UT) TABS tablet Take 2,000 Units by mouth daily   Yes Historical Provider, MD   glipiZIDE (GLUCOTROL) 10 MG tablet 10 mg 2 times daily (before meals)  1/26/22  Yes Historical Provider, MD   rosuvastatin (CRESTOR) 20 MG tablet  3/24/22  Yes Historical Provider, MD   insulin glargine (LANTUS) 100 UNIT/ML injection vial Inject into the skin nightly 35 units BID   Yes Historical Provider, MD   JARDIANCE 25 MG tablet  3/28/22  Yes Historical Provider, MD   ammonium lactate (LAC-HYDRIN) 12 % lotion Apply topically daily to dry skin on right and left foot. Do not apply to open skin. 6/29/21  Yes Heriberto Ball DPM   gabapentin (NEURONTIN) 100 MG capsule Take 300 mg by mouth 3 times daily.     Yes Historical Provider, MD   DULoxetine (CYMBALTA) 20 MG extended release capsule Take 30 mg by mouth daily    Yes Historical Provider, MD   bumetanide (BUMEX) 1 MG tablet Take 1 tablet by mouth daily 6/9/20  Yes Swathi Johnson MD   apixaban (ELIQUIS) 5 MG TABS tablet Take 1 tablet by mouth 2 times daily 6/7/20  Yes ANGIE Ndiaye CNP   metoprolol tartrate (LOPRESSOR) 50 MG tablet Take 1 tablet by mouth 2 times daily 6/7/20  Yes ANGIE Ndiaye CNP   dilTIAZem (CARDIZEM CD) 120 MG extended release capsule Take 1 capsule by mouth daily 6/7/20  Yes ANGIE Ndiaye CNP   digoxin (LANOXIN) 125 MCG tablet Take 1 tablet by mouth daily 6/7/20  Yes ANGIE Ndiaye CNP   metFORMIN (GLUCOPHAGE) 500 MG tablet Take 1,000 mg by mouth 2 times daily (with meals)    Yes Historical Provider, MD   albuterol sulfate  (90 BASE) MCG/ACT inhaler Inhale 2 puffs into the lungs every 6 hours as needed for Wheezing or Shortness of Breath   Yes Historical Provider, MD   pantoprazole (PROTONIX) 40 MG tablet Take 1 tablet by mouth every morning (before breakfast) 7/17/16  Yes Rylan Rice MD   vitamin B-12 (CYANOCOBALAMIN) 1000 MCG tablet Take 1,000 mcg by mouth daily   Yes Historical Provider, MD   Ascorbic Acid (VITAMIN C) 250 MG tablet Take 1,000 mg by mouth daily   Yes Historical Provider, MD   Multiple Vitamins-Minerals (THERAPEUTIC MULTIVITAMIN-MINERALS) tablet Take 1 tablet by mouth daily   Yes Historical Provider, MD   aspirin 81 MG tablet Take 81 mg by mouth daily   Yes Historical Provider, MD       Allergies  Allergies   Allergen Reactions    Other      pollen       Review of Systems:      Objective  BP (!) 148/71   Pulse 76   Temp 97.2 °F (36.2 °C)   Ht 5' 11\" (1.803 m)   Wt 241 lb 6.4 oz (109.5 kg)   SpO2 99%   BMI 33.67 kg/m²     Physical Performance Status    Physical Exam:  General: AAO to person, place, time, and purpose, appears stated age, cooperative, no acute distress, pleasant   Head and neck : PERRLA, EOMI . Sclera non icteric. Oropharynx : Clear  Neck: no JVD,  no adenopathy, no carotid bruit  LYMPHATICS : No peripheral lymphadenopathy. Lungs: Clear to auscultation   Heart: Regular rate and regular rhythm, no murmur, normal S1, S2  Abdomen: Soft, non-tender;no masses, no organomegaly  Extremities: No edema,no cyanosis, no clubbing. Skin:  No rash. Neurologic:Cranial nerves grossly intact. No focal motor or sensory deficits .     Recent Laboratory Data-   Lab Results   Component Value Date    WBC 18.3 (H) 08/03/2022    HGB 14.3 08/03/2022    HCT 43.3 08/03/2022    MCV 89.3 08/03/2022     08/03/2022    LYMPHOPCT 35.7 08/03/2022    RBC 4.85 08/03/2022    MCH 29.5 08/03/2022    MCHC 33.0 08/03/2022    RDW 14.2 08/03/2022    NEUTOPHILPCT 60.0 08/03/2022    MONOPCT 4.3 08/03/2022    BASOPCT 0.4 08/03/2022 NEUTROABS 10.98 (H) 08/03/2022    LYMPHSABS 6.59 (H) 08/03/2022    MONOSABS 0.73 08/03/2022    EOSABS 0.00 (L) 08/03/2022    BASOSABS 0.00 08/03/2022       Lab Results   Component Value Date     08/03/2022    K 3.9 08/03/2022    CL 96 (L) 08/03/2022    CO2 25 08/03/2022    BUN 22 (H) 08/03/2022    CREATININE 0.9 08/03/2022    GLUCOSE 354 (H) 08/03/2022    CALCIUM 9.4 08/03/2022    PROT 7.1 08/03/2022    LABALBU 4.1 08/03/2022    BILITOT <0.2 08/03/2022    ALKPHOS 77 08/03/2022    AST 13 08/03/2022    ALT 12 08/03/2022    LABGLOM >60 08/03/2022    GFRAA >60 08/03/2022       Lab Results   Component Value Date    IRON 103 01/30/2017    TIBC 229 (L) 01/30/2017    FERRITIN >10,000 01/30/2017           Radiology-    CT Lung Screen (Initial/Annual)    Result Date: 4/23/2022  EXAMINATION: LOW DOSE SCREENING CT OF THE CHEST WITHOUT CONTRAST 4/23/2022 7:29 am TECHNIQUE: Low dose lung cancer screening CT of the chest was performed without the administration of intravenous contrast.  Multiplanar reformatted images are provided for review. Dose modulation, iterative reconstruction, and/or weight based adjustment of the mA/kV was utilized to reduce the radiation dose to as low as reasonably achievable. COMPARISON: None. HISTORY: ORDERING SYSTEM PROVIDED HISTORY: Encounter for screening for malignant neoplasm of respiratory organs TECHNOLOGIST PROVIDED HISTORY: Age: 62 y.o. Smoking History: Social History Tobacco Use Smoking status: Heavy Tobacco Smoker Packs/day: 1.00 Years: 35.00 Pack years: 35 Types: Cigarettes Smokeless tobacco: Former User Types: Chew Vaping Use Vaping Use: Not on file Alcohol use: No Comment: STOPPED;2 cups of coffee a day Drug use: No Pack years: 35 Last CT lung screen: No previous lung cancer screening exam Is there documentation of shared decision making? ->Yes Does the patient show any signs or symptoms of lung cancer? ->No Is this the first (baseline) CT or an annual exam?->Annual Is this a low dose CT or a routine CT?->Low Dose CT Smoking Status? ->Heavy Tobacco Smoker Smoking packs per day?->1 Years smoking?->35 FINDINGS: Mediastinum: No abnormal lymphadenopathy. The pulmonary trunk is normal in size Lungs/pleura: No pleural effusions. No pneumothorax. No abnormal pleural thickening. 2 mm nodule in right upper lobe, image 53 series 4. 4 mm nodule in the middle lobe image 65 series 4. Upper Abdomen: No acute process identified Soft Tissues/Bones: No aggressive osseous lesions. Pulmonary nodules measuring up to 4 mm in the right middle lobe. LUNG RADS: Per ACR Lung-RADS Version 1.1 Lung rads 2. Continue annual low-dose CT screening of the chest. RECOMMENDATIONS: If you would like to register your patient with the [employer], please contact the Nurse Navigator at [phone]. ASSESSMENT/PLAN :    Nathalie Magallanes was seen today for follow-up and cancer. Diagnoses and all orders for this visit:    Leukocytosis, unspecified type  -     CBC with Auto Differential; Future  -     BCR-ABL1, Qualitative with Quant Reflex; Future  62years old male diagnosed with CML at [facility] in April 2022. Reviewed bcr/abl pcr from [facility]. 4/2022. e13a2 and e14a2 were detected- 0.85 AND 0.89 % IS respectively. RT-PCR for BCR ABL from May 2022 was negative. S/p bone marrow biopsy in June 2022. No evidence of clonal hematopoiesis noted. BCR able was negative. Trilineage hypoplasia was noted. 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 NGS from bone marrow was negative for JAK2, MPL, AVANI R. Ultrasound abdomen showed mild splenomegaly and peripheral smear with left shift up to metamyelocytes. Will continue to monitor closely off therapy since bcr/abl was not detected from recent peripheral blood and bone marrow without him being on any therapy. RTC in a month with repeat CBC and BCR ABL Q PCR. Return in about 1 month (around 9/4/2022).      Ya Kaufman MD Electronically signed 8/4/2022 at 8:35 AM

## 2022-09-14 ENCOUNTER — HOSPITAL ENCOUNTER (OUTPATIENT)
Dept: INFUSION THERAPY | Age: 57
Discharge: HOME | End: 2022-09-14
Payer: COMMERCIAL

## 2022-09-14 DIAGNOSIS — D72.829 LEUKOCYTOSIS, UNSPECIFIED TYPE: ICD-10-CM

## 2022-09-14 LAB
BASOPHILS ABSOLUTE: 0.08 E9/L (ref 0–0.2)
BASOPHILS RELATIVE PERCENT: 0.5 % (ref 0–2)
EOSINOPHILS ABSOLUTE: 0.17 E9/L (ref 0.05–0.5)
EOSINOPHILS RELATIVE PERCENT: 1 % (ref 0–6)
HCT VFR BLD CALC: 47.4 % (ref 37–54)
HEMOGLOBIN: 15.7 G/DL (ref 12.5–16.5)
IMMATURE GRANULOCYTES #: 0.08 E9/L
IMMATURE GRANULOCYTES %: 0.5 % (ref 0–5)
LYMPHOCYTES ABSOLUTE: 6.28 E9/L (ref 1.5–4)
LYMPHOCYTES RELATIVE PERCENT: 37.1 % (ref 20–42)
MCH RBC QN AUTO: 30.2 PG (ref 26–35)
MCHC RBC AUTO-ENTMCNC: 33.1 % (ref 32–34.5)
MCV RBC AUTO: 91.2 FL (ref 80–99.9)
MONOCYTES ABSOLUTE: 1.5 E9/L (ref 0.1–0.95)
MONOCYTES RELATIVE PERCENT: 8.9 % (ref 2–12)
NEUTROPHILS ABSOLUTE: 8.83 E9/L (ref 1.8–7.3)
NEUTROPHILS RELATIVE PERCENT: 52 % (ref 43–80)
PDW BLD-RTO: 14.6 FL (ref 11.5–15)
PLATELET # BLD: 289 E9/L (ref 130–450)
PMV BLD AUTO: 10.8 FL (ref 7–12)
RBC # BLD: 5.2 E12/L (ref 3.8–5.8)
WBC # BLD: 16.9 E9/L (ref 4.5–11.5)

## 2022-09-14 PROCEDURE — 85025 COMPLETE CBC W/AUTO DIFF WBC: CPT

## 2022-09-14 PROCEDURE — 81206 BCR/ABL1 GENE MAJOR BP: CPT

## 2022-09-14 PROCEDURE — 36415 COLL VENOUS BLD VENIPUNCTURE: CPT

## 2022-09-14 PROCEDURE — 81207 BCR/ABL1 GENE MINOR BP: CPT

## 2022-09-14 PROCEDURE — 81208 BCR/ABL1 GENE OTHER BP: CPT

## 2022-09-15 ENCOUNTER — OFFICE VISIT (OUTPATIENT)
Dept: ONCOLOGY | Age: 57
End: 2022-09-15
Payer: COMMERCIAL

## 2022-09-15 VITALS
HEART RATE: 75 BPM | SYSTOLIC BLOOD PRESSURE: 121 MMHG | HEIGHT: 71 IN | DIASTOLIC BLOOD PRESSURE: 71 MMHG | WEIGHT: 232.6 LBS | BODY MASS INDEX: 32.56 KG/M2 | TEMPERATURE: 96.4 F | OXYGEN SATURATION: 99 %

## 2022-09-15 DIAGNOSIS — C92.10 CML (CHRONIC MYELOID LEUKEMIA) (HCC): Primary | ICD-10-CM

## 2022-09-15 PROCEDURE — 99212 OFFICE O/P EST SF 10 MIN: CPT

## 2022-09-15 NOTE — PROGRESS NOTES
[address]  [address]   Hematology/Oncology  Consult      Patient Name: Rudy Espitia  YOB: 1965  PCP: Farhana Lozano MD   Referring Provider:      Reason for Consultation:   Chief Complaint   Patient presents with    Follow-up     neutrophilia      Interval history: No new complaints. History of Present Illness:  62years old male  referred by Dr. Divya Baez for management of CML. Patient has had mild leukocytosis ranging from 16-18,000 since 2020 with monocytosis and absolute lymphocytosis. BCR ABL was tested in April 2022 and is reported as positive. Most recent CBC from April 2022 showed a total white count of 18,000, hemoglobin 16, platelet count 731, absolute neutrophil count 9000, absolute lymphocyte count 8000, monocytes 1.2K. Absolute eosinophil count 2.3, absolute basophil count 0. Denies recent weight loss, loss of appetite, night sweats, enlarged lymph nodes, abdominal pain. Review of systems: Over 10 systems were reviewed and all were negative except as mentioned above.     Diagnostic Data:     Past Medical History:   Diagnosis Date    Atrial fibrillation [facility])     Atrial fibrillation ([facility] [address] 02/20/2018    lexiscan stress test    Cerebral artery occlusion with cerebral infarction [facility])     COPD (chronic obstructive pulmonary disease) (HCC)     Diabetes mellitus ([address]     Hyperlipidemia     Hypertension     Neuropathy     feet and legs    Psychiatric problem     Sleep apnea     cpap 12    TIA (transient ischemic attack)     2015       Patient Active Problem List    Diagnosis Date Noted    Right foot ulcer, with fat layer exposed ([address] 06/15/2021    Foot ulcer, left, with fat layer exposed ([address] 06/15/2021    CAD in native artery 05/28/2020    COVID-19     Acute postoperative respiratory insufficiency     Postoperative hypotension     Unstable angina ([address] 05/20/2020    Acute coronary syndrome ([address] 05/20/2020    History of TIA (transient ischemic attack)     Chest pain 05/16/2020    HUMERA (obstructive sleep apnea) 06/18/2018    Atrial fibrillation with RVR ([facility] [address] 06/17/2018    Atrial flutter ([address] 06/16/2018    Cellulitis 02/11/2018    Abscess of foot 02/11/2018    Acute hepatitis B 02/01/2017    Acute liver failure without hepatic coma 01/31/2017    Sepsis ([address] 01/31/2017    Diabetes mellitus ([facility] [address]     Acute renal failure (ARF) ([address] 07/15/2016    Diverticulitis 07/15/2016    Type 2 diabetes mellitus ([facility] [address] 07/15/2016    Essential hypertension 07/15/2016    Hyperlipidemia 07/15/2016    TIA (transient ischemic attack) 07/07/2015    New onset atrial fibrillation ([facility] [address] 07/07/2015        Past Surgical History:   Procedure Laterality Date    CARDIAC SURGERY      HEART CATHETERIZATION    CORONARY ARTERY BYPASS GRAFT N/A 5/28/2020    CORONARY ARTERY BYPASS, MAZE PROCEDURE, BERTHA performed by Steff Abel DO at [place] [id number]      ECHOCARDIOGRAM COMPLETE WITH BUBBLE STUDY  7/7/2015         FRACTURE SURGERY      HERNIA REPAIR      age child    IR BIOPSY PERC SUPERF BONE  6/6/2022    IR BIOPSY PERC SUPERF BONE 6/6/2022 [facility] CT    OTHER SURGICAL HISTORY  02/15/2018    incision and drainage bone biopsy of right great toe    PERICARDIUM SURGERY N/A 6/4/2020    PERICARDIAL WINDOW CREATION performed by Steff Abel DO at [facility] OR       Family History  Family History   Problem Relation Age of Onset    Cancer Mother         lung    Heart Disease Father        Social History    TOBACCO:   reports that he has been smoking cigarettes. He has a 35.00 pack-year smoking history. He has quit using smokeless tobacco.  His smokeless tobacco use included chew. ETOH:   reports no history of alcohol use. Home Medications  Prior to Admission medications    Medication Sig Start Date End Date Taking?  Authorizing Provider   diclofenac sodium (VOLTAREN) 1 % GEL Apply 2 g topically 2 times daily   Yes Historical Provider, MD   carboxymethylcellulose 1 % ophthalmic solution 1 drop 3 times daily   Yes Historical Provider, MD   Omega-3 Fatty Acids (FISH OIL) 1000 MG CAPS Take 3,000 mg by mouth 3 times daily   Yes Historical Provider, MD   acetaminophen (TYLENOL) 325 MG tablet Take 650 mg by mouth every 6 hours as needed for Pain   Yes Historical Provider, MD   vitamin D (CHOLECALCIFEROL) 25 MCG (1000 UT) TABS tablet Take 2,000 Units by mouth daily   Yes Historical Provider, MD   glipiZIDE (GLUCOTROL) 10 MG tablet 10 mg 2 times daily (before meals)  1/26/22  Yes Historical Provider, MD   rosuvastatin (CRESTOR) 20 MG tablet  3/24/22  Yes Historical Provider, MD   insulin glargine (LANTUS) 100 UNIT/ML injection vial Inject into the skin nightly 35 units BID   Yes Historical Provider, MD   JARDIANCE 25 MG tablet  3/28/22  Yes Historical Provider, MD   ammonium lactate (LAC-HYDRIN) 12 % lotion Apply topically daily to dry skin on right and left foot. Do not apply to open skin. 6/29/21  Yes Melissa Zarate DPM   gabapentin (NEURONTIN) 100 MG capsule Take 300 mg by mouth 3 times daily.     Yes Historical Provider, MD   DULoxetine (CYMBALTA) 20 MG extended release capsule Take 30 mg by mouth daily    Yes Historical Provider, MD   bumetanide (BUMEX) 1 MG tablet Take 1 tablet by mouth daily 6/9/20  Yes Carol Rice MD   apixaban (ELIQUIS) 5 MG TABS tablet Take 1 tablet by mouth 2 times daily 6/7/20  Yes ANGIE Whiting CNP   metoprolol tartrate (LOPRESSOR) 50 MG tablet Take 1 tablet by mouth 2 times daily 6/7/20  Yes ANGIE Whiting CNP   dilTIAZem (CARDIZEM CD) 120 MG extended release capsule Take 1 capsule by mouth daily 6/7/20  Yes ANGIE Whiting CNP   digoxin (LANOXIN) 125 MCG tablet Take 1 tablet by mouth daily 6/7/20  Yes ANGIE Whiting CNP   metFORMIN (GLUCOPHAGE) 500 MG tablet Take 1,000 mg by mouth 2 times daily (with meals)    Yes Historical Provider, MD   albuterol sulfate  (90 BASE) MCG/ACT inhaler Inhale 2 puffs into the lungs every 6 hours as needed for Wheezing or Shortness of Breath   Yes Historical Provider, MD   pantoprazole (PROTONIX) 40 MG tablet Take 1 tablet by mouth every morning (before breakfast) 7/17/16  Yes Flako Shoemaker MD   vitamin B-12 (CYANOCOBALAMIN) 1000 MCG tablet Take 1,000 mcg by mouth daily   Yes Historical Provider, MD   Ascorbic Acid (VITAMIN C) 250 MG tablet Take 1,000 mg by mouth daily   Yes Historical Provider, MD   Multiple Vitamins-Minerals (THERAPEUTIC MULTIVITAMIN-MINERALS) tablet Take 1 tablet by mouth daily   Yes Historical Provider, MD   aspirin 81 MG tablet Take 81 mg by mouth daily   Yes Historical Provider, MD       Allergies  Allergies   Allergen Reactions    Other      pollen       Review of Systems:      Objective  /71   Pulse 75   Temp (!) 96.4 °F (35.8 °C)   Ht 5' 11\" (1.803 m)   Wt 232 lb 9.6 oz (105.5 kg)   SpO2 99%   BMI 32.44 kg/m²     Physical Performance Status    Physical Exam:  General: AAO to person, place, time, and purpose, appears stated age, cooperative, no acute distress, pleasant   Head and neck : PERRLA, EOMI . Sclera non icteric. Oropharynx : Clear  Neck: no JVD,  no adenopathy, no carotid bruit  LYMPHATICS : No peripheral lymphadenopathy. Lungs: Clear to auscultation   Heart: Regular rate and regular rhythm, no murmur, normal S1, S2  Abdomen: Soft, non-tender;no masses, no organomegaly  Extremities: No edema,no cyanosis, no clubbing. Skin:  No rash. Neurologic:Cranial nerves grossly intact. No focal motor or sensory deficits .     Recent Laboratory Data-   Lab Results   Component Value Date    WBC 16.9 (H) 09/14/2022    HGB 15.7 09/14/2022    HCT 47.4 09/14/2022    MCV 91.2 09/14/2022     09/14/2022    LYMPHOPCT 37.1 09/14/2022    RBC 5.20 09/14/2022    MCH 30.2 09/14/2022    MCHC 33.1 09/14/2022    RDW 14.6 09/14/2022    NEUTOPHILPCT 52.0 09/14/2022    MONOPCT 8.9 09/14/2022    BASOPCT 0.5 09/14/2022    NEUTROABS 8.83 (H) 09/14/2022 LYMPHSABS 6.28 (H) 09/14/2022    MONOSABS 1.50 (H) 09/14/2022    EOSABS 0.17 09/14/2022    BASOSABS 0.08 09/14/2022       Lab Results   Component Value Date     08/03/2022    K 3.9 08/03/2022    CL 96 (L) 08/03/2022    CO2 25 08/03/2022    BUN 22 (H) 08/03/2022    CREATININE 0.9 08/03/2022    GLUCOSE 354 (H) 08/03/2022    CALCIUM 9.4 08/03/2022    PROT 7.1 08/03/2022    LABALBU 4.1 08/03/2022    BILITOT <0.2 08/03/2022    ALKPHOS 77 08/03/2022    AST 13 08/03/2022    ALT 12 08/03/2022    LABGLOM >60 08/03/2022    GFRAA >60 08/03/2022       Lab Results   Component Value Date    IRON 103 01/30/2017    TIBC 229 (L) 01/30/2017    FERRITIN >10,000 01/30/2017           Radiology-    CT Lung Screen (Initial/Annual)    Result Date: 4/23/2022  EXAMINATION: LOW DOSE SCREENING CT OF THE CHEST WITHOUT CONTRAST 4/23/2022 7:29 am TECHNIQUE: Low dose lung cancer screening CT of the chest was performed without the administration of intravenous contrast.  Multiplanar reformatted images are provided for review. Dose modulation, iterative reconstruction, and/or weight based adjustment of the mA/kV was utilized to reduce the radiation dose to as low as reasonably achievable. COMPARISON: None. HISTORY: ORDERING SYSTEM PROVIDED HISTORY: Encounter for screening for malignant neoplasm of respiratory organs TECHNOLOGIST PROVIDED HISTORY: Age: 62 y.o. Smoking History: Social History Tobacco Use Smoking status: Heavy Tobacco Smoker Packs/day: 1.00 Years: 35.00 Pack years: 35 Types: Cigarettes Smokeless tobacco: Former User Types: Chew Vaping Use Vaping Use: Not on file Alcohol use: No Comment: STOPPED;2 cups of coffee a day Drug use: No Pack years: 35 Last CT lung screen: No previous lung cancer screening exam Is there documentation of shared decision making? ->Yes Does the patient show any signs or symptoms of lung cancer? ->No Is this the first (baseline) CT or an annual exam?->Annual Is this a low dose CT or a routine CT?->Low Dose CT Funmi Zamarripa MD   Electronically signed 9/15/2022 at 2:56 PM

## 2022-09-19 LAB
BCR-ABL1,  T(9;22) SOURCE: NORMAL
BCR/ABL T(9,22): NOT DETECTED

## 2022-10-13 ENCOUNTER — APPOINTMENT (OUTPATIENT)
Dept: GENERAL RADIOLOGY | Age: 57
End: 2022-10-13
Payer: OTHER GOVERNMENT

## 2022-10-13 ENCOUNTER — HOSPITAL ENCOUNTER (EMERGENCY)
Age: 57
Discharge: HOME | End: 2022-10-13
Payer: OTHER GOVERNMENT

## 2022-10-13 VITALS
DIASTOLIC BLOOD PRESSURE: 77 MMHG | TEMPERATURE: 98.3 F | HEIGHT: 71 IN | WEIGHT: 231 LBS | RESPIRATION RATE: 18 BRPM | SYSTOLIC BLOOD PRESSURE: 117 MMHG | HEART RATE: 97 BPM | OXYGEN SATURATION: 95 % | BODY MASS INDEX: 32.34 KG/M2

## 2022-10-13 DIAGNOSIS — M25.422 ELBOW SWELLING, LEFT: Primary | ICD-10-CM

## 2022-10-13 PROCEDURE — 36415 COLL VENOUS BLD VENIPUNCTURE: CPT

## 2022-10-13 PROCEDURE — 99211 OFF/OP EST MAY X REQ PHY/QHP: CPT

## 2022-10-13 PROCEDURE — 73080 X-RAY EXAM OF ELBOW: CPT

## 2022-10-13 PROCEDURE — 84550 ASSAY OF BLOOD/URIC ACID: CPT

## 2022-10-13 RX ORDER — HYDROCODONE BITARTRATE AND ACETAMINOPHEN 5; 325 MG/1; MG/1
1 TABLET ORAL EVERY 6 HOURS PRN
Qty: 12 TABLET | Refills: 0 | Status: SHIPPED | OUTPATIENT
Start: 2022-10-13 | End: 2022-10-16

## 2022-10-13 RX ORDER — CEPHALEXIN 500 MG/1
500 CAPSULE ORAL 4 TIMES DAILY
Qty: 40 CAPSULE | Refills: 0 | Status: SHIPPED | OUTPATIENT
Start: 2022-10-13 | End: 2022-10-23

## 2022-10-13 ASSESSMENT — PAIN SCALES - GENERAL: PAINLEVEL_OUTOF10: 10

## 2022-10-13 ASSESSMENT — PAIN - FUNCTIONAL ASSESSMENT: PAIN_FUNCTIONAL_ASSESSMENT: 0-10

## 2022-10-13 ASSESSMENT — PAIN DESCRIPTION - LOCATION: LOCATION: ARM

## 2022-10-13 NOTE — DISCHARGE INSTRUCTIONS
Call primary for results of uric acid level (gout test)  Recommend close follow up with your family doctor  If your symptoms worsen go to the emergency department.

## 2022-10-13 NOTE — ED PROVIDER NOTES
[facility]  Department of Emergency Medicine  UC Encounter Note  10/13/22   6:12 PM EDT      NAME: Jeremy England  :  1965  MRN:  [id number]    Chief Complaint: Joint Swelling (Bumped left elbow 3 days ago and it is hurting hard to move)      This is a 66-year-old male the presents to urgent care complaining of left elbow pain redness and swelling for the past 3 days. He does admit he hit his left elbow against something several days ago but is not sure if that is the reason why it so swollen. He does state a history of gout in the past.  Also he does state that he had a flu shot in the left arm last week. However at that time he had no problems with the arm. He is on Eliquis. He does have a history of CML. He normally has elevated white blood count. But he denies any fevers or chills here. Patient does state that he was on a pain management program but he is out of that now. He does have a few of his old pain medications at home. I first contact patient he appears to be in no acute distress. Review of Systems  Pertinent positives and negatives are stated within HPI, all other systems reviewed and are negative. Physical Exam  Vitals and nursing note reviewed. Constitutional:       Appearance: He is well-developed. HENT:      Head: Normocephalic and atraumatic. Jaw: No trismus. Right Ear: Hearing, tympanic membrane, ear canal and external ear normal.      Left Ear: Hearing, tympanic membrane, ear canal and external ear normal.      Nose: Nose normal.      Right Sinus: No maxillary sinus tenderness or frontal sinus tenderness. Left Sinus: No maxillary sinus tenderness or frontal sinus tenderness. Mouth/Throat:      Pharynx: Uvula midline. No uvula swelling. Eyes:      General: Lids are normal.      Conjunctiva/sclera: Conjunctivae normal.      Pupils: Pupils are equal, round, and reactive to light.    Cardiovascular:      Rate and Rhythm: Normal rate and regular rhythm. Heart sounds: Normal heart sounds. No murmur heard. Pulmonary:      Effort: Pulmonary effort is normal.      Breath sounds: Normal breath sounds. Abdominal:      General: Bowel sounds are normal.      Palpations: Abdomen is soft. Abdomen is not rigid. Tenderness: There is no abdominal tenderness. There is no guarding or rebound. Musculoskeletal:      Cervical back: Normal range of motion and neck supple. Comments: Left lateral elbow is tender mildly erythematous and swollen. Range of motion is limited due to pain. He does have palpable radial pulse. No tenderness in the left shoulder or left wrist.  No cyanosis. Skin:     General: Skin is warm and dry. Findings: No abrasion or rash. Neurological:      General: No focal deficit present. Mental Status: He is alert and oriented to person, place, and time. GCS: GCS eye subscore is 4. GCS verbal subscore is 5. GCS motor subscore is 6. Cranial Nerves: No cranial nerve deficit. Sensory: No sensory deficit. Coordination: Coordination normal.      Gait: Gait normal.       Procedures    MDM  Number of Diagnoses or Management Options  Elbow swelling, left  Diagnosis management comments: Patient in no acute distress. X-rays reviewed. Uric acid level pending. I will place him on an antibiotic also I will give him some pain medication. I do want him to follow-up with his primary care provider closely. This could be a gout problem or an infection could be just because he hit his elbow and bruised it. But he is diabetic he has CML.   And I did tell him if symptoms worsen go to the ER.             --------------------------------------------- PAST HISTORY ---------------------------------------------  Past Medical History:  has a past medical history of Atrial fibrillation ([facility] [address] [facility] Cerebral artery occlusion with cerebral infarction [facility]), COPD (chronic obstructive pulmonary disease) ([facility] [address] Diabetes mellitus ([facility] Hyperlipidemia, Hypertension, Neuropathy, Psychiatric problem, Sleep apnea, and TIA (transient ischemic attack). Past Surgical History:  has a past surgical history that includes fracture surgery; Cosmetic surgery; ECHO Complete With Bubble Study (7/7/2015); other surgical history (02/15/2018); Cardiac surgery; Coronary artery bypass graft (N/A, 5/28/2020); Pericardium surgery (N/A, 6/4/2020); hernia repair; and IR BIOPSY BONE MARROW (6/6/2022). Social History:  reports that he has been smoking cigarettes. He has a 35.00 pack-year smoking history. He has quit using smokeless tobacco.  His smokeless tobacco use included chew. He reports that he does not drink alcohol and does not use drugs. Family History: family history includes Cancer in his mother; Heart Disease in his father. The patients home medications have been reviewed. Allergies: Other    -------------------------------------------------- RESULTS -------------------------------------------------  No results found for this visit on 10/13/22. XR ELBOW LEFT (MIN 3 VIEWS)   Final Result   No acute abnormality.             ------------------------- NURSING NOTES AND VITALS REVIEWED ---------------------------   The nursing notes within the ED encounter and vital signs as below have been reviewed. /77   Pulse 97   Temp 98.3 °F (36.8 °C)   Resp 18   Ht 5' 11\" (1.803 m)   Wt 231 lb (104.8 kg)   SpO2 95%   BMI 32.22 kg/m²   Oxygen Saturation Interpretation: Normal      ------------------------------------------ PROGRESS NOTES ------------------------------------------   I have spoken with the patient and discussed todays results, in addition to providing specific details for the plan of care and counseling regarding the diagnosis and prognosis.   Their questions are answered at this time and they are agreeable with the plan.      --------------------------------- ADDITIONAL PROVIDER NOTES ---------------------------------     This patient is stable for discharge. I have shared the specific conditions for return, as well as the importance of follow-up. * NOTE: This report was transcribed using voice recognition software.  Every effort was made to ensure accuracy; however, inadvertent computerized transcription errors may be present.    --------------------------------- IMPRESSION AND DISPOSITION ---------------------------------    IMPRESSION  1. Elbow swelling, left        DISPOSITION  Disposition: Discharge to home  Patient condition is good       Marti Puckett PA-C  10/13/22 1926

## 2022-10-14 LAB — URIC ACID, SERUM: 8 MG/DL (ref 3.4–7)

## 2022-11-10 ENCOUNTER — OFFICE VISIT (OUTPATIENT)
Dept: ONCOLOGY | Age: 57
End: 2022-11-10
Payer: COMMERCIAL

## 2022-11-10 ENCOUNTER — HOSPITAL ENCOUNTER (OUTPATIENT)
Dept: INFUSION THERAPY | Age: 57
Discharge: HOME | End: 2022-11-10
Payer: OTHER GOVERNMENT

## 2022-11-10 VITALS
TEMPERATURE: 96.7 F | WEIGHT: 231.5 LBS | HEART RATE: 80 BPM | HEIGHT: 71 IN | OXYGEN SATURATION: 99 % | DIASTOLIC BLOOD PRESSURE: 78 MMHG | SYSTOLIC BLOOD PRESSURE: 117 MMHG | BODY MASS INDEX: 32.41 KG/M2

## 2022-11-10 DIAGNOSIS — C92.10 CML (CHRONIC MYELOID LEUKEMIA) (HCC): ICD-10-CM

## 2022-11-10 DIAGNOSIS — C92.10 CML (CHRONIC MYELOID LEUKEMIA) (HCC): Primary | ICD-10-CM

## 2022-11-10 LAB
BASOPHILS ABSOLUTE: 0.07 E9/L (ref 0–0.2)
BASOPHILS RELATIVE PERCENT: 0.4 % (ref 0–2)
EOSINOPHILS ABSOLUTE: 0.12 E9/L (ref 0.05–0.5)
EOSINOPHILS RELATIVE PERCENT: 0.7 % (ref 0–6)
HCT VFR BLD CALC: 45.6 % (ref 37–54)
HEMOGLOBIN: 15.2 G/DL (ref 12.5–16.5)
IMMATURE GRANULOCYTES #: 0.16 E9/L
IMMATURE GRANULOCYTES %: 0.9 % (ref 0–5)
LYMPHOCYTES ABSOLUTE: 4.78 E9/L (ref 1.5–4)
LYMPHOCYTES RELATIVE PERCENT: 27.6 % (ref 20–42)
MCH RBC QN AUTO: 29.9 PG (ref 26–35)
MCHC RBC AUTO-ENTMCNC: 33.3 % (ref 32–34.5)
MCV RBC AUTO: 89.8 FL (ref 80–99.9)
MONOCYTES ABSOLUTE: 1.38 E9/L (ref 0.1–0.95)
MONOCYTES RELATIVE PERCENT: 8 % (ref 2–12)
NEUTROPHILS ABSOLUTE: 10.83 E9/L (ref 1.8–7.3)
NEUTROPHILS RELATIVE PERCENT: 62.4 % (ref 43–80)
PDW BLD-RTO: 14.2 FL (ref 11.5–15)
PLATELET # BLD: 257 E9/L (ref 130–450)
PMV BLD AUTO: 10.4 FL (ref 7–12)
RBC # BLD: 5.08 E12/L (ref 3.8–5.8)
WBC # BLD: 17.3 E9/L (ref 4.5–11.5)

## 2022-11-10 PROCEDURE — 85025 COMPLETE CBC W/AUTO DIFF WBC: CPT

## 2022-11-10 PROCEDURE — 99212 OFFICE O/P EST SF 10 MIN: CPT

## 2022-11-10 PROCEDURE — 36415 COLL VENOUS BLD VENIPUNCTURE: CPT

## 2022-11-10 NOTE — PROGRESS NOTES
[address]  [address] [place] [address]   Hematology/Oncology  Consult      Patient Name: Dg Zarco  YOB: 1965  PCP: Diana Jamil MD   Referring Provider:      Reason for Consultation:   Chief Complaint   Patient presents with    Follow-up     neutrophilia      Interval history: No new complaints. History of Present Illness:  62years old male  referred by Dr. Eugenie Elder for management of CML. Patient has had mild leukocytosis ranging from 16-18,000 since 2020 with monocytosis and absolute lymphocytosis. BCR ABL was tested in April 2022 and is reported as positive. Most recent CBC from April 2022 showed a total white count of 18,000, hemoglobin 16, platelet count 482, absolute neutrophil count 9000, absolute lymphocyte count 8000, monocytes 1.2K. Absolute eosinophil count 2.3, absolute basophil count 0. Denies recent weight loss, loss of appetite, night sweats, enlarged lymph nodes, abdominal pain. Review of systems: Over 10 systems were reviewed and all were negative except as mentioned above.     Diagnostic Data:     Past Medical History:   Diagnosis Date    Atrial fibrillation [facility])     Atrial fibrillation ([facility] [address] 02/20/2018    lexiscan stress test    Cerebral artery occlusion with cerebral infarction [facility])     COPD (chronic obstructive pulmonary disease) (HCC)     Diabetes mellitus ([address]     Hyperlipidemia     Hypertension     Neuropathy     feet and legs    Psychiatric problem     Sleep apnea     cpap 12    TIA (transient ischemic attack)     2015       Patient Active Problem List    Diagnosis Date Noted    Right foot ulcer, with fat layer exposed ([address] 06/15/2021    Foot ulcer, left, with fat layer exposed ([address] 06/15/2021    CAD in native artery 05/28/2020    COVID-19     Acute postoperative respiratory insufficiency     Postoperative hypotension     Unstable angina ([address] 05/20/2020    Acute coronary syndrome ([address] 05/20/2020    History of TIA (transient ischemic attack)     Chest pain 05/16/2020    HUMERA (obstructive sleep apnea) 06/18/2018    Atrial fibrillation with RVR ([facility] [address] 06/17/2018    Atrial flutter ([address] 06/16/2018    Cellulitis 02/11/2018    Abscess of foot 02/11/2018    Acute hepatitis B 02/01/2017    Acute liver failure without hepatic coma 01/31/2017    Sepsis ([address] 01/31/2017    Diabetes mellitus ([facility] [address]     Acute renal failure (ARF) ([address] 07/15/2016    Diverticulitis 07/15/2016    Type 2 diabetes mellitus ([facility] [address] 07/15/2016    Essential hypertension 07/15/2016    Hyperlipidemia 07/15/2016    TIA (transient ischemic attack) 07/07/2015    New onset atrial fibrillation ([facility] [address] 07/07/2015        Past Surgical History:   Procedure Laterality Date    CARDIAC SURGERY      HEART CATHETERIZATION    CORONARY ARTERY BYPASS GRAFT N/A 5/28/2020    CORONARY ARTERY BYPASS, MAZE PROCEDURE, BERTHA performed by Trudi Hawkins DO at [place] [address]      ECHOCARDIOGRAM COMPLETE WITH BUBBLE STUDY  7/7/2015         FRACTURE SURGERY      HERNIA REPAIR      age child    IR BIOPSY PERC SUPERF BONE  6/6/2022    IR BIOPSY PERC SUPERF BONE 6/6/2022 [facility] CT    OTHER SURGICAL HISTORY  02/15/2018    incision and drainage bone biopsy of right great toe    PERICARDIUM SURGERY N/A 6/4/2020    PERICARDIAL WINDOW CREATION performed by Trudi Hawkins DO at [facility]       Family History  Family History   Problem Relation Age of Onset    Cancer Mother         lung    Heart Disease Father        Social History    TOBACCO:   reports that he has been smoking cigarettes. He has a 35.00 pack-year smoking history. He has quit using smokeless tobacco.  His smokeless tobacco use included chew. ETOH:   reports no history of alcohol use. Home Medications  Prior to Admission medications    Medication Sig Start Date End Date Taking?  Authorizing Provider   diclofenac sodium (VOLTAREN) 1 % GEL Apply 2 g topically 2 times daily   Yes Historical Provider, MD   carboxymethylcellulose 1 % ophthalmic solution 1 drop 3 times daily   Yes Historical Provider, MD   Omega-3 Fatty Acids (FISH OIL) 1000 MG CAPS Take 3,000 mg by mouth 3 times daily   Yes Historical Provider, MD   acetaminophen (TYLENOL) 325 MG tablet Take 650 mg by mouth every 6 hours as needed for Pain   Yes Historical Provider, MD   vitamin D (CHOLECALCIFEROL) 25 MCG (1000 UT) TABS tablet Take 2,000 Units by mouth daily   Yes Historical Provider, MD   glipiZIDE (GLUCOTROL) 10 MG tablet 10 mg 2 times daily (before meals)  1/26/22  Yes Historical Provider, MD   rosuvastatin (CRESTOR) 20 MG tablet  3/24/22  Yes Historical Provider, MD   insulin glargine (LANTUS) 100 UNIT/ML injection vial Inject into the skin nightly 35 units BID   Yes Historical Provider, MD   JARDIANCE 25 MG tablet  3/28/22  Yes Historical Provider, MD   ammonium lactate (LAC-HYDRIN) 12 % lotion Apply topically daily to dry skin on right and left foot. Do not apply to open skin. 6/29/21  Yes Kelley Caceres DPM   gabapentin (NEURONTIN) 100 MG capsule Take 300 mg by mouth 3 times daily.     Yes Historical Provider, MD   DULoxetine (CYMBALTA) 20 MG extended release capsule Take 30 mg by mouth daily    Yes Historical Provider, MD   bumetanide (BUMEX) 1 MG tablet Take 1 tablet by mouth daily 6/9/20  Yes Adithya Lynch MD   apixaban (ELIQUIS) 5 MG TABS tablet Take 1 tablet by mouth 2 times daily 6/7/20  Yes ANGIE Baires CNP   metoprolol tartrate (LOPRESSOR) 50 MG tablet Take 1 tablet by mouth 2 times daily 6/7/20  Yes ANGIE Baires CNP   dilTIAZem (CARDIZEM CD) 120 MG extended release capsule Take 1 capsule by mouth daily 6/7/20  Yes ANGIE Baires CNP   digoxin (LANOXIN) 125 MCG tablet Take 1 tablet by mouth daily 6/7/20  Yes ANGIE Baires CNP   metFORMIN (GLUCOPHAGE) 500 MG tablet Take 1,000 mg by mouth 2 times daily (with meals)    Yes Historical Provider, MD   albuterol sulfate  (90 BASE) MCG/ACT inhaler Inhale 2 puffs into the lungs every 6 hours as needed for Wheezing or Shortness of Breath   Yes Historical Provider, MD   pantoprazole (PROTONIX) 40 MG tablet Take 1 tablet by mouth every morning (before breakfast) 7/17/16  Yes Monique Arias MD   vitamin B-12 (CYANOCOBALAMIN) 1000 MCG tablet Take 1,000 mcg by mouth daily   Yes Historical Provider, MD   Ascorbic Acid (VITAMIN C) 250 MG tablet Take 1,000 mg by mouth daily   Yes Historical Provider, MD   Multiple Vitamins-Minerals (THERAPEUTIC MULTIVITAMIN-MINERALS) tablet Take 1 tablet by mouth daily   Yes Historical Provider, MD   aspirin 81 MG tablet Take 81 mg by mouth daily   Yes Historical Provider, MD       Allergies  Allergies   Allergen Reactions    Other      pollen       Review of Systems:      Objective  /78   Pulse 80   Temp (!) 96.7 °F (35.9 °C)   Ht 5' 11\" (1.803 m)   Wt 231 lb 8 oz (105 kg)   SpO2 99%   BMI 32.29 kg/m²     Physical Performance Status    Physical Exam:  General: AAO to person, place, time, and purpose, appears stated age, cooperative, no acute distress, pleasant   Head and neck : PERRLA, EOMI . Sclera non icteric. Oropharynx : Clear  Neck: no JVD,  no adenopathy, no carotid bruit  LYMPHATICS : No peripheral lymphadenopathy. Lungs: Clear to auscultation   Heart: Regular rate and regular rhythm, no murmur, normal S1, S2  Abdomen: Soft, non-tender;no masses, no organomegaly  Extremities: No edema,no cyanosis, no clubbing. Skin:  No rash. Neurologic:Cranial nerves grossly intact. No focal motor or sensory deficits .     Recent Laboratory Data-   Lab Results   Component Value Date    WBC 17.3 (H) 11/10/2022    HGB 15.2 11/10/2022    HCT 45.6 11/10/2022    MCV 89.8 11/10/2022     11/10/2022    LYMPHOPCT 27.6 11/10/2022    RBC 5.08 11/10/2022    MCH 29.9 11/10/2022    MCHC 33.3 11/10/2022    RDW 14.2 11/10/2022    NEUTOPHILPCT 62.4 11/10/2022    MONOPCT 8.0 11/10/2022    BASOPCT 0.4 11/10/2022    NEUTROABS 10.83 (H) 11/10/2022    LYMPHSABS 4.78 (H) 11/10/2022    MONOSABS 1.38 (H) 11/10/2022    EOSABS 0.12 11/10/2022    BASOSABS 0.07 11/10/2022       Lab Results   Component Value Date     08/03/2022    K 3.9 08/03/2022    CL 96 (L) 08/03/2022    CO2 25 08/03/2022    BUN 22 (H) 08/03/2022    CREATININE 0.9 08/03/2022    GLUCOSE 354 (H) 08/03/2022    CALCIUM 9.4 08/03/2022    PROT 7.1 08/03/2022    LABALBU 4.1 08/03/2022    BILITOT <0.2 08/03/2022    ALKPHOS 77 08/03/2022    AST 13 08/03/2022    ALT 12 08/03/2022    LABGLOM >60 08/03/2022    GFRAA >60 08/03/2022       Lab Results   Component Value Date    IRON 103 01/30/2017    TIBC 229 (L) 01/30/2017    FERRITIN >10,000 01/30/2017           Radiology-    CT Lung Screen (Initial/Annual)    Result Date: 4/23/2022  EXAMINATION: LOW DOSE SCREENING CT OF THE CHEST WITHOUT CONTRAST 4/23/2022 7:29 am TECHNIQUE: Low dose lung cancer screening CT of the chest was performed without the administration of intravenous contrast.  Multiplanar reformatted images are provided for review. Dose modulation, iterative reconstruction, and/or weight based adjustment of the mA/kV was utilized to reduce the radiation dose to as low as reasonably achievable. COMPARISON: None. HISTORY: ORDERING SYSTEM PROVIDED HISTORY: Encounter for screening for malignant neoplasm of respiratory organs TECHNOLOGIST PROVIDED HISTORY: Age: 62 y.o. Smoking History: Social History Tobacco Use Smoking status: Heavy Tobacco Smoker Packs/day: 1.00 Years: 35.00 Pack years: 35 Types: Cigarettes Smokeless tobacco: Former User Types: Chew Vaping Use Vaping Use: Not on file Alcohol use: No Comment: STOPPED;2 cups of coffee a day Drug use: No Pack years: 35 Last CT lung screen: No previous lung cancer screening exam Is there documentation of shared decision making? ->Yes Does the patient show any signs or symptoms of lung cancer? ->No Is this the first (baseline) CT or an annual exam?->Annual Is this a low dose CT or a routine CT?->Low Dose CT Smoking Status? ->Heavy Tobacco Smoker Smoking packs per day?->1 Years smoking?->35 FINDINGS: Mediastinum: No abnormal lymphadenopathy. The pulmonary trunk is normal in size Lungs/pleura: No pleural effusions. No pneumothorax. No abnormal pleural thickening. 2 mm nodule in right upper lobe, image 53 series 4. 4 mm nodule in the middle lobe image 65 series 4. Upper Abdomen: No acute process identified Soft Tissues/Bones: No aggressive osseous lesions. Pulmonary nodules measuring up to 4 mm in the right middle lobe. LUNG RADS: Per ACR Lung-RADS Version 1.1 Lung rads 2. Continue annual low-dose CT screening of the chest. RECOMMENDATIONS: If you would like to register your patient with [facility], please contact the Nurse Navigator at [phone]. ASSESSMENT/PLAN :    Lucia Rdz was seen today for follow-up. Diagnoses and all orders for this visit:    CML (chronic myeloid leukemia) ([facility] [address]  -     CBC with Auto Differential; Future  -     Comprehensive Metabolic Panel; Future  -     BCR-ABL1, Qualitative with Quant Reflex; Future    62years old male diagnosed with CML at [address] [facility] in April 2022. Reviewed bcr/abl pcr from [facility]. 4/2022. e13a2 and e14a2 were detected- 0.85 AND 0.89 % IS respectively. RT-PCR for BCR ABL from May 2022 was negative. S/p bone marrow biopsy in June 2022. No evidence of clonal hematopoiesis noted. BCR able was negative. Trilineage hypoplasia was noted. 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 NGS from bone marrow was negative for JAK2, MPL, AVANI R. Ultrasound abdomen showed mild splenomegaly and peripheral smear with left shift up to metamyelocytes. Will continue to monitor closely off therapy since bcr/abl was not detected from recent peripheral blood and bone marrow without him being on any therapy. Persistent leukocytosis of 17,000 with normal hemoglobin and platelet count.   WBC differential normal.    Persistent lymphocytosis more than 5000. Flow cytometry and immunohistochemistry from bone marrow biopsy did not show evidence of CLL. RTC in 6 weeks with repeat CBC and BCR ABL PCR. Return in about 6 weeks (around 12/22/2022).      Melanie Ramos MD   Electronically signed 11/10/2022 at 9:58 AM

## 2022-12-02 ENCOUNTER — HOSPITAL ENCOUNTER (OUTPATIENT)
Age: 57
Discharge: HOME | End: 2022-12-02
Payer: COMMERCIAL

## 2022-12-02 LAB
ALBUMIN SERPL-MCNC: 3.8 G/DL (ref 3.5–5.2)
ALP BLD-CCNC: 76 U/L (ref 40–129)
ALT SERPL-CCNC: 21 U/L (ref 0–40)
AMYLASE: 49 U/L (ref 20–100)
ANION GAP SERPL CALCULATED.3IONS-SCNC: 14 MMOL/L (ref 7–16)
AST SERPL-CCNC: 22 U/L (ref 0–39)
BASOPHILS ABSOLUTE: 0 E9/L (ref 0–0.2)
BASOPHILS RELATIVE PERCENT: 0.3 % (ref 0–2)
BILIRUB SERPL-MCNC: 0.4 MG/DL (ref 0–1.2)
BUN BLDV-MCNC: 7 MG/DL (ref 6–20)
CALCIUM SERPL-MCNC: 9.2 MG/DL (ref 8.6–10.2)
CHLORIDE BLD-SCNC: 97 MMOL/L (ref 98–107)
CHOLESTEROL, FASTING: 169 MG/DL (ref 0–199)
CO2: 23 MMOL/L (ref 22–29)
CREAT SERPL-MCNC: 0.9 MG/DL (ref 0.7–1.2)
EOSINOPHILS ABSOLUTE: 0 E9/L (ref 0.05–0.5)
EOSINOPHILS RELATIVE PERCENT: 0.7 % (ref 0–6)
GFR SERPL CREATININE-BSD FRML MDRD: >60 ML/MIN/1.73
GLUCOSE FASTING: 314 MG/DL (ref 74–99)
HCT VFR BLD CALC: 49.8 % (ref 37–54)
HDLC SERPL-MCNC: 36 MG/DL
HEMOGLOBIN: 16.9 G/DL (ref 12.5–16.5)
LDL CHOLESTEROL CALCULATED: 72 MG/DL (ref 0–99)
LIPASE: 34 U/L (ref 13–60)
LYMPHOCYTES ABSOLUTE: 6.77 E9/L (ref 1.5–4)
LYMPHOCYTES RELATIVE PERCENT: 40.9 % (ref 20–42)
MCH RBC QN AUTO: 30.1 PG (ref 26–35)
MCHC RBC AUTO-ENTMCNC: 33.9 % (ref 32–34.5)
MCV RBC AUTO: 88.6 FL (ref 80–99.9)
MONOCYTES ABSOLUTE: 0.82 E9/L (ref 0.1–0.95)
MONOCYTES RELATIVE PERCENT: 5.2 % (ref 2–12)
NEUTROPHILS ABSOLUTE: 8.91 E9/L (ref 1.8–7.3)
NEUTROPHILS RELATIVE PERCENT: 53.9 % (ref 43–80)
PDW BLD-RTO: 14.6 FL (ref 11.5–15)
PLATELET # BLD: 347 E9/L (ref 130–450)
PMV BLD AUTO: 10.1 FL (ref 7–12)
POTASSIUM SERPL-SCNC: 4.6 MMOL/L (ref 3.5–5)
RBC # BLD: 5.62 E12/L (ref 3.8–5.8)
SODIUM BLD-SCNC: 134 MMOL/L (ref 132–146)
TOTAL PROTEIN: 6.8 G/DL (ref 6.4–8.3)
TRIGLYCERIDE, FASTING: 307 MG/DL (ref 0–149)
VLDLC SERPL CALC-MCNC: 61 MG/DL
WBC # BLD: 16.5 E9/L (ref 4.5–11.5)

## 2022-12-02 PROCEDURE — 83690 ASSAY OF LIPASE: CPT

## 2022-12-02 PROCEDURE — 82150 ASSAY OF AMYLASE: CPT

## 2022-12-02 PROCEDURE — 85025 COMPLETE CBC W/AUTO DIFF WBC: CPT

## 2022-12-02 PROCEDURE — 36415 COLL VENOUS BLD VENIPUNCTURE: CPT

## 2022-12-02 PROCEDURE — 80061 LIPID PANEL: CPT

## 2022-12-02 PROCEDURE — 80053 COMPREHEN METABOLIC PANEL: CPT

## 2022-12-15 ENCOUNTER — HOSPITAL ENCOUNTER (OUTPATIENT)
Dept: INFUSION THERAPY | Age: 57
Discharge: HOME | End: 2022-12-15

## 2022-12-15 DIAGNOSIS — C92.10 CML (CHRONIC MYELOID LEUKEMIA) (HCC): ICD-10-CM

## 2022-12-21 ENCOUNTER — HOSPITAL ENCOUNTER (OUTPATIENT)
Dept: INFUSION THERAPY | Age: 57
Discharge: HOME | End: 2022-12-21
Payer: COMMERCIAL

## 2022-12-21 DIAGNOSIS — K72.00 ACUTE LIVER FAILURE WITHOUT HEPATIC COMA: Primary | ICD-10-CM

## 2022-12-21 LAB
ALBUMIN SERPL-MCNC: 4.1 G/DL (ref 3.5–5.2)
ALP BLD-CCNC: 66 U/L (ref 40–129)
ALT SERPL-CCNC: 14 U/L (ref 0–40)
ANION GAP SERPL CALCULATED.3IONS-SCNC: 16 MMOL/L (ref 7–16)
AST SERPL-CCNC: 20 U/L (ref 0–39)
BASOPHILS ABSOLUTE: 0.08 E9/L (ref 0–0.2)
BASOPHILS RELATIVE PERCENT: 0.5 % (ref 0–2)
BILIRUB SERPL-MCNC: 0.4 MG/DL (ref 0–1.2)
BUN BLDV-MCNC: 9 MG/DL (ref 6–20)
CALCIUM SERPL-MCNC: 9.8 MG/DL (ref 8.6–10.2)
CHLORIDE BLD-SCNC: 99 MMOL/L (ref 98–107)
CO2: 21 MMOL/L (ref 22–29)
CREAT SERPL-MCNC: 0.7 MG/DL (ref 0.7–1.2)
EOSINOPHILS ABSOLUTE: 0.23 E9/L (ref 0.05–0.5)
EOSINOPHILS RELATIVE PERCENT: 1.3 % (ref 0–6)
GFR SERPL CREATININE-BSD FRML MDRD: >60 ML/MIN/1.73
GLUCOSE BLD-MCNC: 207 MG/DL (ref 74–99)
HCT VFR BLD CALC: 51.3 % (ref 37–54)
HEMOGLOBIN: 17.1 G/DL (ref 12.5–16.5)
IMMATURE GRANULOCYTES #: 0.12 E9/L
IMMATURE GRANULOCYTES %: 0.7 % (ref 0–5)
LYMPHOCYTES ABSOLUTE: 5.5 E9/L (ref 1.5–4)
LYMPHOCYTES RELATIVE PERCENT: 31.6 % (ref 20–42)
MCH RBC QN AUTO: 29.9 PG (ref 26–35)
MCHC RBC AUTO-ENTMCNC: 33.3 % (ref 32–34.5)
MCV RBC AUTO: 89.7 FL (ref 80–99.9)
MONOCYTES ABSOLUTE: 1.31 E9/L (ref 0.1–0.95)
MONOCYTES RELATIVE PERCENT: 7.5 % (ref 2–12)
NEUTROPHILS ABSOLUTE: 10.14 E9/L (ref 1.8–7.3)
NEUTROPHILS RELATIVE PERCENT: 58.4 % (ref 43–80)
PDW BLD-RTO: 15 FL (ref 11.5–15)
PLATELET # BLD: 278 E9/L (ref 130–450)
PMV BLD AUTO: 10.8 FL (ref 7–12)
POTASSIUM SERPL-SCNC: 4.4 MMOL/L (ref 3.5–5)
RBC # BLD: 5.72 E12/L (ref 3.8–5.8)
SODIUM BLD-SCNC: 136 MMOL/L (ref 132–146)
TOTAL PROTEIN: 7 G/DL (ref 6.4–8.3)
WBC # BLD: 17.4 E9/L (ref 4.5–11.5)

## 2022-12-21 PROCEDURE — 85025 COMPLETE CBC W/AUTO DIFF WBC: CPT

## 2022-12-21 PROCEDURE — 80053 COMPREHEN METABOLIC PANEL: CPT

## 2022-12-21 PROCEDURE — 36415 COLL VENOUS BLD VENIPUNCTURE: CPT

## 2022-12-29 ENCOUNTER — OFFICE VISIT (OUTPATIENT)
Dept: ONCOLOGY | Age: 57
End: 2022-12-29

## 2022-12-29 ENCOUNTER — HOSPITAL ENCOUNTER (OUTPATIENT)
Dept: INFUSION THERAPY | Age: 57
Discharge: HOME | End: 2022-12-29
Payer: COMMERCIAL

## 2022-12-29 VITALS
BODY MASS INDEX: 30.97 KG/M2 | SYSTOLIC BLOOD PRESSURE: 123 MMHG | HEART RATE: 105 BPM | TEMPERATURE: 98 F | DIASTOLIC BLOOD PRESSURE: 82 MMHG | HEIGHT: 71 IN | WEIGHT: 221.2 LBS | OXYGEN SATURATION: 97 %

## 2022-12-29 DIAGNOSIS — D72.829 LEUKOCYTOSIS, UNSPECIFIED TYPE: ICD-10-CM

## 2022-12-29 DIAGNOSIS — D72.829 LEUKOCYTOSIS, UNSPECIFIED TYPE: Primary | ICD-10-CM

## 2022-12-29 PROCEDURE — 36415 COLL VENOUS BLD VENIPUNCTURE: CPT

## 2022-12-29 NOTE — PROGRESS NOTES
[address]  [address], [facility]   Hematology/Oncology  Consult      Patient Name: Beto Reis  YOB: 1965  PCP: Davida Bella MD   Referring Provider:      Reason for Consultation:   Chief Complaint   Patient presents with    Follow-up    Cancer     neutrophilia      Interval history: No new complaints. History of Present Illness:  62years old male  referred by Dr. Wiley Moy for management of CML. Patient has had mild leukocytosis ranging from 16-18,000 since 2020 with monocytosis and absolute lymphocytosis. BCR ABL was tested in April 2022 and is reported as positive. Most recent CBC from April 2022 showed a total white count of 18,000, hemoglobin 16, platelet count 957, absolute neutrophil count 9000, absolute lymphocyte count 8000, monocytes 1.2K. Absolute eosinophil count 2.3, absolute basophil count 0. Denies recent weight loss, loss of appetite, night sweats, enlarged lymph nodes, abdominal pain. Review of systems: Over 10 systems were reviewed and all were negative except as mentioned above.     Diagnostic Data:     Past Medical History:   Diagnosis Date    Atrial fibrillation [facility])     Atrial fibrillation ([facility] [address] 02/20/2018    lexiscan stress test    Cerebral artery occlusion with cerebral infarction [facility])     COPD (chronic obstructive pulmonary disease) (HCC)     Diabetes mellitus ([address]     Hyperlipidemia     Hypertension     Neuropathy     feet and legs    Psychiatric problem     Sleep apnea     cpap 12    TIA (transient ischemic attack)     2015       Patient Active Problem List    Diagnosis Date Noted    Right foot ulcer, with fat layer exposed ([address] 06/15/2021    Foot ulcer, left, with fat layer exposed ([address] 06/15/2021    CAD in native artery 05/28/2020    COVID-19     Acute postoperative respiratory insufficiency     Postoperative hypotension     Unstable angina ([address] 05/20/2020    Acute coronary syndrome ([address] 05/20/2020 History of TIA (transient ischemic attack)     Chest pain 05/16/2020    HUMERA (obstructive sleep apnea) 06/18/2018    Atrial fibrillation with RVR ([address] 06/17/2018    Atrial flutter ([address] 06/16/2018    Cellulitis 02/11/2018    Abscess of foot 02/11/2018    Acute hepatitis B 02/01/2017    Acute liver failure without hepatic coma 01/31/2017    Sepsis ([address] 01/31/2017    Diabetes mellitus ([address]     Acute renal failure (ARF) ([address] 07/15/2016    Diverticulitis 07/15/2016    Type 2 diabetes mellitus ([address] 07/15/2016    Essential hypertension 07/15/2016    Hyperlipidemia 07/15/2016    TIA (transient ischemic attack) 07/07/2015    New onset atrial fibrillation ([address] 07/07/2015        Past Surgical History:   Procedure Laterality Date    CARDIAC SURGERY      HEART CATHETERIZATION    CORONARY ARTERY BYPASS GRAFT N/A 5/28/2020    CORONARY ARTERY BYPASS, MAZE PROCEDURE, BERTHA performed by Maddie Ruiz DO at [place] [id number]      ECHOCARDIOGRAM COMPLETE WITH BUBBLE STUDY  7/7/2015         FRACTURE SURGERY      HERNIA REPAIR      age child    IR BIOPSY PERC SUPERF BONE  6/6/2022    IR BIOPSY PERC SUPERF BONE 6/6/2022 [facility] CT    OTHER SURGICAL HISTORY  02/15/2018    incision and drainage bone biopsy of right great toe    PERICARDIUM SURGERY N/A 6/4/2020    PERICARDIAL WINDOW CREATION performed by Maddie Ruiz DO at [facility] OR       Family History  Family History   Problem Relation Age of Onset    Cancer Mother         lung    Heart Disease Father        Social History    TOBACCO:   reports that he has been smoking cigarettes. He has a 35.00 pack-year smoking history. He has quit using smokeless tobacco.  His smokeless tobacco use included chew. ETOH:   reports no history of alcohol use. Home Medications  Prior to Admission medications    Medication Sig Start Date End Date Taking?  Authorizing Provider   diclofenac sodium (VOLTAREN) 1 % GEL Apply 2 g topically 2 times daily   Yes Historical Provider, MD carboxymethylcellulose 1 % ophthalmic solution 1 drop 3 times daily   Yes Historical Provider, MD   Omega-3 Fatty Acids (FISH OIL) 1000 MG CAPS Take 3,000 mg by mouth 3 times daily   Yes Historical Provider, MD   acetaminophen (TYLENOL) 325 MG tablet Take 650 mg by mouth every 6 hours as needed for Pain   Yes Historical Provider, MD   vitamin D (CHOLECALCIFEROL) 25 MCG (1000 UT) TABS tablet Take 2,000 Units by mouth daily   Yes Historical Provider, MD   glipiZIDE (GLUCOTROL) 10 MG tablet 10 mg 2 times daily (before meals)  1/26/22  Yes Historical Provider, MD   rosuvastatin (CRESTOR) 20 MG tablet  3/24/22  Yes Historical Provider, MD   insulin glargine (LANTUS) 100 UNIT/ML injection vial Inject into the skin nightly 35 units BID   Yes Historical Provider, MD   JARDIANCE 25 MG tablet  3/28/22  Yes Historical Provider, MD   ammonium lactate (LAC-HYDRIN) 12 % lotion Apply topically daily to dry skin on right and left foot. Do not apply to open skin. 6/29/21  Yes Miguelito Hermosillo DPM   gabapentin (NEURONTIN) 100 MG capsule Take 300 mg by mouth 3 times daily.     Yes Historical Provider, MD   DULoxetine (CYMBALTA) 20 MG extended release capsule Take 30 mg by mouth daily    Yes Historical Provider, MD   bumetanide (BUMEX) 1 MG tablet Take 1 tablet by mouth daily 6/9/20  Yes Kiana Kaminski MD   apixaban (ELIQUIS) 5 MG TABS tablet Take 1 tablet by mouth 2 times daily 6/7/20  Yes ANGIE Oreilly CNP   metoprolol tartrate (LOPRESSOR) 50 MG tablet Take 1 tablet by mouth 2 times daily 6/7/20  Yes ANGIE Oreilly CNP   dilTIAZem (CARDIZEM CD) 120 MG extended release capsule Take 1 capsule by mouth daily 6/7/20  Yes ANGIE Oreilly CNP   digoxin (LANOXIN) 125 MCG tablet Take 1 tablet by mouth daily 6/7/20  Yes ANGIE Oreilly CNP   metFORMIN (GLUCOPHAGE) 500 MG tablet Take 1,000 mg by mouth 2 times daily (with meals)    Yes Historical Provider, MD   albuterol sulfate  (90 BASE) MCG/ACT inhaler Inhale 2 puffs into the lungs every 6 hours as needed for Wheezing or Shortness of Breath   Yes Historical Provider, MD   pantoprazole (PROTONIX) 40 MG tablet Take 1 tablet by mouth every morning (before breakfast) 7/17/16  Yes Elisha Crowley MD   vitamin B-12 (CYANOCOBALAMIN) 1000 MCG tablet Take 1,000 mcg by mouth daily   Yes Historical Provider, MD   Ascorbic Acid (VITAMIN C) 250 MG tablet Take 1,000 mg by mouth daily   Yes Historical Provider, MD   Multiple Vitamins-Minerals (THERAPEUTIC MULTIVITAMIN-MINERALS) tablet Take 1 tablet by mouth daily   Yes Historical Provider, MD   aspirin 81 MG tablet Take 81 mg by mouth daily   Yes Historical Provider, MD       Allergies  Allergies   Allergen Reactions    Other      pollen       Review of Systems:      Objective  /82   Pulse (!) 105   Temp 98 °F (36.7 °C)   Ht 5' 11\" (1.803 m)   Wt 221 lb 3.2 oz (100.3 kg)   SpO2 97%   BMI 30.85 kg/m²     Physical Performance Status    Physical Exam:  General: AAO to person, place, time, and purpose, appears stated age, cooperative, no acute distress, pleasant   Head and neck : PERRLA, EOMI . Sclera non icteric. Oropharynx : Clear  Neck: no JVD,  no adenopathy, no carotid bruit  LYMPHATICS : No peripheral lymphadenopathy. Lungs: Clear to auscultation   Heart: Regular rate and regular rhythm, no murmur, normal S1, S2  Abdomen: Soft, non-tender;no masses, no organomegaly  Extremities: No edema,no cyanosis, no clubbing. Skin:  No rash. Neurologic:Cranial nerves grossly intact. No focal motor or sensory deficits .     Recent Laboratory Data-   Lab Results   Component Value Date    WBC 17.4 (H) 12/21/2022    HGB 17.1 (H) 12/21/2022    HCT 51.3 12/21/2022    MCV 89.7 12/21/2022     12/21/2022    LYMPHOPCT 31.6 12/21/2022    RBC 5.72 12/21/2022    MCH 29.9 12/21/2022    MCHC 33.3 12/21/2022    RDW 15.0 12/21/2022    NEUTOPHILPCT 58.4 12/21/2022    MONOPCT 7.5 12/21/2022    BASOPCT 0.5 12/21/2022 NEUTROABS 10.14 (H) 12/21/2022    LYMPHSABS 5.50 (H) 12/21/2022    MONOSABS 1.31 (H) 12/21/2022    EOSABS 0.23 12/21/2022    BASOSABS 0.08 12/21/2022       Lab Results   Component Value Date     12/21/2022    K 4.4 12/21/2022    CL 99 12/21/2022    CO2 21 (L) 12/21/2022    BUN 9 12/21/2022    CREATININE 0.7 12/21/2022    GLUCOSE 207 (H) 12/21/2022    CALCIUM 9.8 12/21/2022    PROT 7.0 12/21/2022    LABALBU 4.1 12/21/2022    BILITOT 0.4 12/21/2022    ALKPHOS 66 12/21/2022    AST 20 12/21/2022    ALT 14 12/21/2022    LABGLOM >60 12/21/2022    GFRAA >60 08/03/2022       Lab Results   Component Value Date    IRON 103 01/30/2017    TIBC 229 (L) 01/30/2017    FERRITIN >10,000 01/30/2017           Radiology-    CT Lung Screen (Initial/Annual)    Result Date: 4/23/2022  EXAMINATION: LOW DOSE SCREENING CT OF THE CHEST WITHOUT CONTRAST 4/23/2022 7:29 am TECHNIQUE: Low dose lung cancer screening CT of the chest was performed without the administration of intravenous contrast.  Multiplanar reformatted images are provided for review. Dose modulation, iterative reconstruction, and/or weight based adjustment of the mA/kV was utilized to reduce the radiation dose to as low as reasonably achievable. COMPARISON: None. HISTORY: ORDERING SYSTEM PROVIDED HISTORY: Encounter for screening for malignant neoplasm of respiratory organs TECHNOLOGIST PROVIDED HISTORY: Age: 62 y.o. Smoking History: Social History Tobacco Use Smoking status: Heavy Tobacco Smoker Packs/day: 1.00 Years: 35.00 Pack years: 35 Types: Cigarettes Smokeless tobacco: Former User Types: Chew Vaping Use Vaping Use: Not on file Alcohol use: No Comment: STOPPED;2 cups of coffee a day Drug use: No Pack years: 35 Last CT lung screen: No previous lung cancer screening exam Is there documentation of shared decision making? ->Yes Does the patient show any signs or symptoms of lung cancer? ->No Is this the first (baseline) CT or an annual exam?->Annual Is this a low dose CT or a routine CT?->Low Dose CT Smoking Status? ->Heavy Tobacco Smoker Smoking packs per day?->1 Years smoking?->35 FINDINGS: Mediastinum: No abnormal lymphadenopathy. The pulmonary trunk is normal in size Lungs/pleura: No pleural effusions. No pneumothorax. No abnormal pleural thickening. 2 mm nodule in right upper lobe, image 53 series 4. 4 mm nodule in the middle lobe image 65 series 4. Upper Abdomen: No acute process identified Soft Tissues/Bones: No aggressive osseous lesions. Pulmonary nodules measuring up to 4 mm in the right middle lobe. LUNG RADS: Per ACR Lung-RADS Version 1.1 Lung rads 2. Continue annual low-dose CT screening of the chest. RECOMMENDATIONS: If you would like to register your patient with the [facility] [employer] [facility] [employer] , please contact the Nurse Navigator at [phone]. ASSESSMENT/PLAN :    Dalia Gar was seen today for follow-up and cancer. Diagnoses and all orders for this visit:    Leukocytosis, unspecified type  -     MISCELLANEOUS SENDOUT 2; Future  -     CBC with Auto Differential; Future  -     ERYTHROPOIETIN; Future    62years old male diagnosed with CML at [place] in April 2022. Reviewed bcr/abl pcr from [place]. 4/2022. e13a2 and e14a2 were detected- 0.85 AND 0.89 % IS respectively. RT-PCR for BCR ABL from May 2022 was negative. S/p bone marrow biopsy in June 2022. No evidence of clonal hematopoiesis noted. BCR able was negative. Trilineage hypoplasia was noted. 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 NGS from bone marrow was negative for JAK2, MPL, AVANI R. Ultrasound abdomen showed mild splenomegaly and peripheral smear with left shift up to metamyelocytes. Will continue to monitor closely off therapy since bcr/abl was not detected from recent peripheral blood and bone marrow without him being on any therapy. Persistent leukocytosis of 17,000 with normal hemoglobin and platelet count. WBC differential normal. Bcr/abl drawn today. Persistent lymphocytosis more than 5000. Flow cytometry and immunohistochemistry from bone marrow biopsy did not show evidence of CLL. RTC in 6 weeks with repeat CBC. Return in about 1 month (around 1/29/2023).      Nidia Lu MD   Electronically signed 12/29/2022 at 2:42 PM

## 2022-12-30 LAB
Lab: NORMAL
THIS TEST SENT TO: NORMAL

## 2023-01-05 LAB
Lab: NORMAL
REPORT: NORMAL
THIS TEST SENT TO: NORMAL

## 2023-01-26 ENCOUNTER — HOSPITAL ENCOUNTER (OUTPATIENT)
Dept: INFUSION THERAPY | Age: 58
Discharge: HOME | End: 2023-01-26
Payer: COMMERCIAL

## 2023-01-26 ENCOUNTER — OFFICE VISIT (OUTPATIENT)
Dept: ONCOLOGY | Age: 58
End: 2023-01-26
Payer: COMMERCIAL

## 2023-01-26 VITALS
BODY MASS INDEX: 30.94 KG/M2 | OXYGEN SATURATION: 99 % | HEIGHT: 71 IN | DIASTOLIC BLOOD PRESSURE: 74 MMHG | HEART RATE: 69 BPM | SYSTOLIC BLOOD PRESSURE: 109 MMHG | WEIGHT: 221 LBS

## 2023-01-26 DIAGNOSIS — D72.829 LEUKOCYTOSIS, UNSPECIFIED TYPE: Primary | ICD-10-CM

## 2023-01-26 DIAGNOSIS — D72.829 LEUKOCYTOSIS, UNSPECIFIED TYPE: ICD-10-CM

## 2023-01-26 LAB
BASOPHILS ABSOLUTE: 0 E9/L (ref 0–0.2)
BASOPHILS RELATIVE PERCENT: 0.3 % (ref 0–2)
EOSINOPHILS ABSOLUTE: 0 E9/L (ref 0.05–0.5)
EOSINOPHILS RELATIVE PERCENT: 0.8 % (ref 0–6)
HCT VFR BLD CALC: 49 % (ref 37–54)
HEMOGLOBIN: 16 G/DL (ref 12.5–16.5)
LYMPHOCYTES ABSOLUTE: 7.14 E9/L (ref 1.5–4)
LYMPHOCYTES RELATIVE PERCENT: 34.8 % (ref 20–42)
MCH RBC QN AUTO: 28.9 PG (ref 26–35)
MCHC RBC AUTO-ENTMCNC: 32.7 % (ref 32–34.5)
MCV RBC AUTO: 88.4 FL (ref 80–99.9)
MONOCYTES ABSOLUTE: 2.04 E9/L (ref 0.1–0.95)
MONOCYTES RELATIVE PERCENT: 10.4 % (ref 2–12)
NEUTROPHILS ABSOLUTE: 11.22 E9/L (ref 1.8–7.3)
NEUTROPHILS RELATIVE PERCENT: 54.8 % (ref 43–80)
PDW BLD-RTO: 14.3 FL (ref 11.5–15)
PLATELET # BLD: 287 E9/L (ref 130–450)
PMV BLD AUTO: 11.1 FL (ref 7–12)
RBC # BLD: 5.54 E12/L (ref 3.8–5.8)
RBC # BLD: NORMAL 10*6/UL
WBC # BLD: 20.4 E9/L (ref 4.5–11.5)

## 2023-01-26 PROCEDURE — 99213 OFFICE O/P EST LOW 20 MIN: CPT

## 2023-01-26 PROCEDURE — 82668 ASSAY OF ERYTHROPOIETIN: CPT

## 2023-01-26 PROCEDURE — 85025 COMPLETE CBC W/AUTO DIFF WBC: CPT

## 2023-01-26 PROCEDURE — 36415 COLL VENOUS BLD VENIPUNCTURE: CPT

## 2023-01-26 NOTE — PROGRESS NOTES
[address]  [address], [employer]   Hematology/Oncology  Consult      Patient Name: Karin Brown  YOB: 1965  PCP: Vianney Mendoza MD   Referring Provider:      Reason for Consultation:   Chief Complaint   Patient presents with    Follow-up     NEUTROPHILIA      Interval history: No new complaints. History of Present Illness:  62years old male  referred by Dr. Lexis Li for management of CML. Patient has had mild leukocytosis ranging from 16-18,000 since 2020 with monocytosis and absolute lymphocytosis. BCR ABL was tested in April 2022 and is reported as positive. Most recent CBC from April 2022 showed a total white count of 18,000, hemoglobin 16, platelet count 100, absolute neutrophil count 9000, absolute lymphocyte count 8000, monocytes 1.2K. Absolute eosinophil count 2.3, absolute basophil count 0. Denies recent weight loss, loss of appetite, night sweats, enlarged lymph nodes, abdominal pain. Review of systems: Over 10 systems were reviewed and all were negative except as mentioned above.     Diagnostic Data:     Past Medical History:   Diagnosis Date    Atrial fibrillation [facility])     Atrial fibrillation ([facility] 02/20/2018    lexiscan stress test    Cerebral artery occlusion with cerebral infarction [facility])     COPD (chronic obstructive pulmonary disease) (HCC)     Diabetes mellitus ([address]     Hyperlipidemia     Hypertension     Neuropathy     feet and legs    Psychiatric problem     Sleep apnea     cpap 12    TIA (transient ischemic attack)     2015       Patient Active Problem List    Diagnosis Date Noted    Right foot ulcer, with fat layer exposed ([address] 06/15/2021    Foot ulcer, left, with fat layer exposed ([address] 06/15/2021    CAD in native artery 05/28/2020    COVID-19     Acute postoperative respiratory insufficiency     Postoperative hypotension     Unstable angina ([address] 05/20/2020    Acute coronary syndrome ([address] 05/20/2020    History of TIA (transient ischemic attack)     Chest pain 05/16/2020    HUMERA (obstructive sleep apnea) 06/18/2018    Atrial fibrillation with RVR ([facility] [address] 06/17/2018    Atrial flutter ([address] 06/16/2018    Cellulitis 02/11/2018    Abscess of foot 02/11/2018    Acute hepatitis B 02/01/2017    Acute liver failure without hepatic coma 01/31/2017    Sepsis ([address] 01/31/2017    Diabetes mellitus ([facility]     Acute renal failure (ARF) ([address] 07/15/2016    Diverticulitis 07/15/2016    Type 2 diabetes mellitus ([facility] 07/15/2016    Essential hypertension 07/15/2016    Hyperlipidemia 07/15/2016    TIA (transient ischemic attack) 07/07/2015    New onset atrial fibrillation ([facility] [address] 07/07/2015        Past Surgical History:   Procedure Laterality Date    CARDIAC SURGERY      HEART CATHETERIZATION    CORONARY ARTERY BYPASS GRAFT N/A 5/28/2020    CORONARY ARTERY BYPASS, MAZE PROCEDURE, BERTHA performed by Abebe Reeves DO at [place] [address]      ECHOCARDIOGRAM COMPLETE WITH BUBBLE STUDY  7/7/2015         FRACTURE SURGERY      HERNIA REPAIR      age child    IR BIOPSY PERC SUPERF BONE  6/6/2022    IR BIOPSY PERC SUPERF BONE 6/6/2022 [facility] CT    OTHER SURGICAL HISTORY  02/15/2018    incision and drainage bone biopsy of right great toe    PERICARDIUM SURGERY N/A 6/4/2020    PERICARDIAL WINDOW CREATION performed by Abebe Reeves DO at [facility] OR       Family History  Family History   Problem Relation Age of Onset    Cancer Mother         lung    Heart Disease Father        Social History    TOBACCO:   reports that he has been smoking cigarettes. He has a 35.00 pack-year smoking history. He has quit using smokeless tobacco.  His smokeless tobacco use included chew. ETOH:   reports no history of alcohol use. Home Medications  Prior to Admission medications    Medication Sig Start Date End Date Taking?  Authorizing Provider   diclofenac sodium (VOLTAREN) 1 % GEL Apply 2 g topically 2 times daily   Yes Historical Provider, MD   carboxymethylcellulose 1 % ophthalmic solution 1 drop 3 times daily   Yes Historical Provider, MD   Omega-3 Fatty Acids (FISH OIL) 1000 MG CAPS Take 3,000 mg by mouth 3 times daily   Yes Historical Provider, MD   acetaminophen (TYLENOL) 325 MG tablet Take 650 mg by mouth every 6 hours as needed for Pain   Yes Historical Provider, MD   vitamin D (CHOLECALCIFEROL) 25 MCG (1000 UT) TABS tablet Take 2,000 Units by mouth daily   Yes Historical Provider, MD   glipiZIDE (GLUCOTROL) 10 MG tablet 10 mg 2 times daily (before meals)  1/26/22  Yes Historical Provider, MD   rosuvastatin (CRESTOR) 20 MG tablet  3/24/22  Yes Historical Provider, MD   insulin glargine (LANTUS) 100 UNIT/ML injection vial Inject into the skin nightly 35 units BID   Yes Historical Provider, MD   JARDIANCE 25 MG tablet  3/28/22  Yes Historical Provider, MD   ammonium lactate (LAC-HYDRIN) 12 % lotion Apply topically daily to dry skin on right and left foot. Do not apply to open skin. 6/29/21  Yes Estrella Lujan DPM   gabapentin (NEURONTIN) 100 MG capsule Take 300 mg by mouth 3 times daily.     Yes Historical Provider, MD   DULoxetine (CYMBALTA) 20 MG extended release capsule Take 30 mg by mouth daily    Yes Historical Provider, MD   bumetanide (BUMEX) 1 MG tablet Take 1 tablet by mouth daily 6/9/20  Yes Jayce Holm MD   apixaban (ELIQUIS) 5 MG TABS tablet Take 1 tablet by mouth 2 times daily 6/7/20  Yes ANGIE Whiteside CNP   metoprolol tartrate (LOPRESSOR) 50 MG tablet Take 1 tablet by mouth 2 times daily 6/7/20  Yes ANGIE Whiteside CNP   dilTIAZem (CARDIZEM CD) 120 MG extended release capsule Take 1 capsule by mouth daily 6/7/20  Yes ANGIE Whiteside CNP   digoxin (LANOXIN) 125 MCG tablet Take 1 tablet by mouth daily 6/7/20  Yes ANGIE Whiteside CNP   metFORMIN (GLUCOPHAGE) 500 MG tablet Take 1,000 mg by mouth 2 times daily (with meals)    Yes Historical Provider, MD   albuterol sulfate  (90 BASE) MCG/ACT inhaler Inhale 2 puffs into the lungs every 6 hours as needed for Wheezing or Shortness of Breath   Yes Historical Provider, MD   pantoprazole (PROTONIX) 40 MG tablet Take 1 tablet by mouth every morning (before breakfast) 7/17/16  Yes Cara Hoffman MD   vitamin B-12 (CYANOCOBALAMIN) 1000 MCG tablet Take 1,000 mcg by mouth daily   Yes Historical Provider, MD   Ascorbic Acid (VITAMIN C) 250 MG tablet Take 1,000 mg by mouth daily   Yes Historical Provider, MD   Multiple Vitamins-Minerals (THERAPEUTIC MULTIVITAMIN-MINERALS) tablet Take 1 tablet by mouth daily   Yes Historical Provider, MD   aspirin 81 MG tablet Take 81 mg by mouth daily   Yes Historical Provider, MD       Allergies  Allergies   Allergen Reactions    Other      pollen       Review of Systems:      Objective  /74   Pulse 69   Ht 5' 11\" (1.803 m)   Wt 221 lb (100.2 kg)   SpO2 99%   BMI 30.82 kg/m²     Physical Performance Status    Physical Exam:  General: AAO to person, place, time, and purpose, appears stated age, cooperative, no acute distress, pleasant   Head and neck : PERRLA, EOMI . Sclera non icteric. Oropharynx : Clear  Neck: no JVD,  no adenopathy, no carotid bruit  LYMPHATICS : No peripheral lymphadenopathy. Lungs: Clear to auscultation   Heart: Regular rate and regular rhythm, no murmur, normal S1, S2  Abdomen: Soft, non-tender;no masses, no organomegaly  Extremities: No edema,no cyanosis, no clubbing. Skin:  No rash. Neurologic:Cranial nerves grossly intact. No focal motor or sensory deficits .     Recent Laboratory Data-   Lab Results   Component Value Date    WBC 20.4 (H) 01/26/2023    HGB 16.0 01/26/2023    HCT 49.0 01/26/2023    MCV 88.4 01/26/2023     01/26/2023    LYMPHOPCT 31.6 12/21/2022    RBC 5.54 01/26/2023    MCH 28.9 01/26/2023    MCHC 32.7 01/26/2023    RDW 14.3 01/26/2023    NEUTOPHILPCT 58.4 12/21/2022    MONOPCT 7.5 12/21/2022    BASOPCT 0.5 12/21/2022    NEUTROABS 11.22 (H) 01/26/2023    LYMPHSABS 5.50 (H) 12/21/2022    MONOSABS 1.31 (H) 12/21/2022    EOSABS 0.23 12/21/2022    BASOSABS 0.08 12/21/2022       Lab Results   Component Value Date     12/21/2022    K 4.4 12/21/2022    CL 99 12/21/2022    CO2 21 (L) 12/21/2022    BUN 9 12/21/2022    CREATININE 0.7 12/21/2022    GLUCOSE 207 (H) 12/21/2022    CALCIUM 9.8 12/21/2022    PROT 7.0 12/21/2022    LABALBU 4.1 12/21/2022    BILITOT 0.4 12/21/2022    ALKPHOS 66 12/21/2022    AST 20 12/21/2022    ALT 14 12/21/2022    LABGLOM >60 12/21/2022    GFRAA >60 08/03/2022       Lab Results   Component Value Date    IRON 103 01/30/2017    TIBC 229 (L) 01/30/2017    FERRITIN >10,000 01/30/2017           Radiology-    CT Lung Screen (Initial/Annual)    Result Date: 4/23/2022  EXAMINATION: LOW DOSE SCREENING CT OF THE CHEST WITHOUT CONTRAST 4/23/2022 7:29 am TECHNIQUE: Low dose lung cancer screening CT of the chest was performed without the administration of intravenous contrast.  Multiplanar reformatted images are provided for review. Dose modulation, iterative reconstruction, and/or weight based adjustment of the mA/kV was utilized to reduce the radiation dose to as low as reasonably achievable. COMPARISON: None. HISTORY: ORDERING SYSTEM PROVIDED HISTORY: Encounter for screening for malignant neoplasm of respiratory organs TECHNOLOGIST PROVIDED HISTORY: Age: 62 y.o. Smoking History: Social History Tobacco Use Smoking status: Heavy Tobacco Smoker Packs/day: 1.00 Years: 35.00 Pack years: 35 Types: Cigarettes Smokeless tobacco: Former User Types: Chew Vaping Use Vaping Use: Not on file Alcohol use: No Comment: STOPPED;2 cups of coffee a day Drug use: No Pack years: 35 Last CT lung screen: No previous lung cancer screening exam Is there documentation of shared decision making? ->Yes Does the patient show any signs or symptoms of lung cancer? ->No Is this the first (baseline) CT or an annual exam?->Annual Is this a low dose CT or a routine CT?->Low Dose CT Smoking Status? ->Heavy Tobacco Smoker Smoking packs per day?->1 Years smoking?->35 FINDINGS: Mediastinum: No abnormal lymphadenopathy. The pulmonary trunk is normal in size Lungs/pleura: No pleural effusions. No pneumothorax. No abnormal pleural thickening. 2 mm nodule in right upper lobe, image 53 series 4. 4 mm nodule in the middle lobe image 65 series 4. Upper Abdomen: No acute process identified Soft Tissues/Bones: No aggressive osseous lesions. Pulmonary nodules measuring up to 4 mm in the right middle lobe. LUNG RADS: Per ACR Lung-RADS Version 1.1 Lung rads 2. Continue annual low-dose CT screening of the chest. RECOMMENDATIONS: If you would like to register your patient with the [facility], please contact the Nurse Navigator at [phone]. ASSESSMENT/PLAN :    Michael Carrasco was seen today for follow-up. Diagnoses and all orders for this visit:    Leukocytosis, unspecified type  -     CBC with Auto Differential; Future  -     Comprehensive Metabolic Panel; Future  -     BCR-ABL1, Qualitative with Quant Reflex; Future  -     External Referral To Hematology Oncology    62years old male diagnosed with CML at [place] in April 2022. Reviewed bcr/abl pcr from [place]. 4/2022. e13a2 and e14a2 were detected- 0.85 AND 0.89 % IS respectively. RT-PCR for BCR ABL from May 2022 was negative. S/p bone marrow biopsy in June 2022. No evidence of clonal hematopoiesis noted. BCR able was negative. Trilineage hyperplasia was noted. 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 NGS from bone marrow was negative for JAK2, MPL, AVANI R. Ultrasound abdomen showed mild splenomegaly and peripheral smear with left shift up to metamyelocytes. Will continue to monitor closely off therapy since bcr/abl was not detected from recent peripheral blood and bone marrow without him being on any therapy. Persistent leukocytosis of 20,000 with normal hemoglobin and platelet count.   WBC differential normal. Repeat Bcr/abl FISH from 12/2022 was again negative. Persistent lymphocytosis more than 5000. Flow cytometry and immunohistochemistry from bone marrow biopsy did not show evidence of CLL. Patient is nervous about not being on treatment and requested a second opinion. Referal to Dr Lázaro Osorio was made at [facility]. RTC in 3 months with cbc. Return in about 3 months (around 4/26/2023).      Charlette Govea MD   Electronically signed 1/26/2023 at 3:01 PM

## 2023-01-28 LAB — ERYTHROPOIETIN: 12 MU/ML (ref 4–27)

## 2023-02-08 ENCOUNTER — TELEPHONE (OUTPATIENT)
Dept: INFUSION THERAPY | Age: 58
End: 2023-02-08

## 2023-02-08 NOTE — TELEPHONE ENCOUNTER
Left two messages for the [employer], [phone],  one for [address] [place] [address] and another for Harinder Miller [address], with regards to an updated prior authorization for patient's treatment here at [facility] with Dr. Malena Downs. Awaiting a return fax to [phone] or a return call to [phone].

## 2023-02-13 ENCOUNTER — TELEPHONE (OUTPATIENT)
Dept: INFUSION THERAPY | Age: 58
End: 2023-02-13

## 2023-02-13 NOTE — TELEPHONE ENCOUNTER
Called the [facility], [phone], ext. [phone], Wong Lipscomb, with regards to renewing patient's prior authorization for community care from the [facility]. Wong Lipscomb voiced understanding and will work on this renewal. Once it is obtained, she will fax to [phone]. Voiced understanding.

## 2023-05-16 ENCOUNTER — TELEPHONE (OUTPATIENT)
Dept: INFUSION THERAPY | Age: 58
End: 2023-05-16

## 2023-05-16 NOTE — TELEPHONE ENCOUNTER
Left a message for Marco Antonio Diaz, from the [employer]s association, [phone] ext. [phone] with regards to renewing patient's prior authorization. Awaiting a renewal faxed to [phone].

## 2023-05-18 ENCOUNTER — TELEPHONE (OUTPATIENT)
Dept: INFUSION THERAPY | Age: 58
End: 2023-05-18

## 2023-05-18 NOTE — TELEPHONE ENCOUNTER
Left a message for Jaquan Mckeon, at the [employer], [phone] #[id number], in order to renew patient's prior authorization. Awaiting a return call to [phone] or a new approval faxed to [phone].

## 2023-06-05 ENCOUNTER — TELEPHONE (OUTPATIENT)
Dept: INFUSION THERAPY | Age: 58
End: 2023-06-05

## 2023-06-05 NOTE — TELEPHONE ENCOUNTER
Left a message for Tonetta from the [employer] to please renew patient's prior authorization. Awaiting a return communication.

## 2023-07-20 ENCOUNTER — HOSPITAL ENCOUNTER (OUTPATIENT)
Dept: INFUSION THERAPY | Age: 58
Discharge: HOME | End: 2023-07-20
Payer: COMMERCIAL

## 2023-07-20 ENCOUNTER — OFFICE VISIT (OUTPATIENT)
Dept: ONCOLOGY | Age: 58
End: 2023-07-20
Payer: COMMERCIAL

## 2023-07-20 VITALS
HEART RATE: 72 BPM | DIASTOLIC BLOOD PRESSURE: 72 MMHG | HEIGHT: 71 IN | WEIGHT: 237.4 LBS | BODY MASS INDEX: 33.23 KG/M2 | SYSTOLIC BLOOD PRESSURE: 111 MMHG | OXYGEN SATURATION: 97 % | TEMPERATURE: 95.9 F

## 2023-07-20 DIAGNOSIS — D72.829 LEUKOCYTOSIS, UNSPECIFIED TYPE: ICD-10-CM

## 2023-07-20 DIAGNOSIS — C92.10 CML (CHRONIC MYELOID LEUKEMIA) (HCC): ICD-10-CM

## 2023-07-20 DIAGNOSIS — C92.10 CML (CHRONIC MYELOID LEUKEMIA) (HCC): Primary | ICD-10-CM

## 2023-07-20 DIAGNOSIS — D72.829 LEUKOCYTOSIS, UNSPECIFIED TYPE: Primary | ICD-10-CM

## 2023-07-20 LAB
ALBUMIN SERPL-MCNC: 4.1 G/DL (ref 3.5–5.2)
ALP SERPL-CCNC: 62 U/L (ref 40–129)
ALT SERPL-CCNC: 17 U/L (ref 0–40)
ANION GAP SERPL CALCULATED.3IONS-SCNC: 15 MMOL/L (ref 7–16)
AST SERPL-CCNC: 22 U/L (ref 0–39)
BASOPHILS # BLD: 0.06 K/UL (ref 0–0.2)
BASOPHILS NFR BLD: 0 % (ref 0–2)
BILIRUB SERPL-MCNC: 0.2 MG/DL (ref 0–1.2)
BUN SERPL-MCNC: 16 MG/DL (ref 6–20)
CALCIUM SERPL-MCNC: 9.5 MG/DL (ref 8.6–10.2)
CHLORIDE SERPL-SCNC: 102 MMOL/L (ref 98–107)
CO2 SERPL-SCNC: 23 MMOL/L (ref 22–29)
CREAT SERPL-MCNC: 0.9 MG/DL (ref 0.7–1.2)
EOSINOPHIL # BLD: 0.2 K/UL (ref 0.05–0.5)
EOSINOPHILS RELATIVE PERCENT: 1 % (ref 0–6)
ERYTHROCYTE [DISTWIDTH] IN BLOOD BY AUTOMATED COUNT: 13.3 % (ref 11.5–15)
GFR SERPL CREATININE-BSD FRML MDRD: >60 ML/MIN/1.73M2
GLUCOSE SERPL-MCNC: 185 MG/DL (ref 74–99)
HCT VFR BLD AUTO: 43.7 % (ref 37–54)
HGB BLD-MCNC: 14.9 G/DL (ref 12.5–16.5)
IMM GRANULOCYTES # BLD AUTO: 0.12 K/UL (ref 0–0.58)
IMM GRANULOCYTES NFR BLD: 1 % (ref 0–5)
LYMPHOCYTES NFR BLD: 4.9 K/UL (ref 1.5–4)
LYMPHOCYTES RELATIVE PERCENT: 35 % (ref 20–42)
MCH RBC QN AUTO: 31 PG (ref 26–35)
MCHC RBC AUTO-ENTMCNC: 34.1 G/DL (ref 32–34.5)
MCV RBC AUTO: 90.9 FL (ref 80–99.9)
MONOCYTES NFR BLD: 1.34 K/UL (ref 0.1–0.95)
MONOCYTES NFR BLD: 10 % (ref 2–12)
NEUTROPHILS NFR BLD: 53 % (ref 43–80)
NEUTS SEG NFR BLD: 7.37 K/UL (ref 1.8–7.3)
PLATELET # BLD AUTO: 296 K/UL (ref 130–450)
PMV BLD AUTO: 10.3 FL (ref 7–12)
POTASSIUM SERPL-SCNC: 3.7 MMOL/L (ref 3.5–5)
PROT SERPL-MCNC: 7.3 G/DL (ref 6.4–8.3)
RBC # BLD AUTO: 4.81 M/UL (ref 3.8–5.8)
SODIUM SERPL-SCNC: 140 MMOL/L (ref 132–146)
WBC OTHER # BLD: 14 K/UL (ref 4.5–11.5)

## 2023-07-20 PROCEDURE — 99212 OFFICE O/P EST SF 10 MIN: CPT

## 2023-07-20 PROCEDURE — 36415 COLL VENOUS BLD VENIPUNCTURE: CPT

## 2023-07-20 PROCEDURE — 85027 COMPLETE CBC AUTOMATED: CPT

## 2023-07-20 PROCEDURE — 80053 COMPREHEN METABOLIC PANEL: CPT

## 2023-07-20 NOTE — PROGRESS NOTES
(transient ischemic attack)     Chest pain 05/16/2020    HUMERA (obstructive sleep apnea) 06/18/2018    Atrial fibrillation with RVR ([address]) 06/17/2018    Atrial flutter ([address]) 06/16/2018    Cellulitis 02/11/2018    Abscess of foot 02/11/2018    Acute hepatitis B 02/01/2017    Acute liver failure without hepatic coma 01/31/2017    Sepsis ([address]) 01/31/2017    Diabetes mellitus ([address])     Acute renal failure (ARF) ([address]) 07/15/2016    Diverticulitis 07/15/2016    Type 2 diabetes mellitus ([address]) 07/15/2016    Essential hypertension 07/15/2016    Hyperlipidemia 07/15/2016    TIA (transient ischemic attack) 07/07/2015    New onset atrial fibrillation ([address]) 07/07/2015        Past Surgical History:   Procedure Laterality Date    CARDIAC SURGERY      HEART CATHETERIZATION    CORONARY ARTERY BYPASS GRAFT N/A 5/28/2020    CORONARY ARTERY BYPASS, MAZE PROCEDURE, BERTHA performed by Adeline Cavanaugh DO at [address]      ECHOCARDIOGRAM COMPLETE WITH BUBBLE STUDY  7/7/2015         FRACTURE SURGERY      HERNIA REPAIR      age child    IR BIOPSY PERC SUPERF BONE  6/6/2022    IR BIOPSY PERC SUPERF BONE 6/6/2022 [facility] CT    OTHER SURGICAL HISTORY  02/15/2018    incision and drainage bone biopsy of right great toe    PERICARDIUM SURGERY N/A 6/4/2020    PERICARDIAL WINDOW CREATION performed by Adeline Cavanaugh DO at [facility]       Family History  Family History   Problem Relation Age of Onset    Cancer Mother         lung    Heart Disease Father        Social History    TOBACCO:   reports that he has been smoking cigarettes. He has a 35.00 pack-year smoking history. He has quit using smokeless tobacco.  His smokeless tobacco use included chew. ETOH:   reports no history of alcohol use. Home Medications  Prior to Admission medications    Medication Sig Start Date End Date Taking?  Authorizing Provider   diclofenac sodium (VOLTAREN) 1 % GEL Apply 2 g topically 2 times daily   Yes Historical Provider, MD   carboxymethylcellulose 1 %

## 2023-07-28 LAB
SEND OUT REPORT: NORMAL
TEST NAME: NORMAL

## 2023-08-18 NOTE — DISCHARGE INSTRUCTIONS
Visit Discharge/Physician Orders    Discharge condition: {STABLE/UNSTABLE:935214405}    Assessment of pain at discharge:    Anesthetic used:     Discharge to: {[facility] Wound Discharge To:46013}    Left via:{Left DJC:04878}    Accompanied by: accompanied by {:[id number]}    ECF/HHA:     Dressing Orders:    Treatment Orders:    [facility] visit _____________________________  (Please note your next appointment above and if you are unable to keep, kindly give a 24 hour notice. Thank you.)    Physician signature:__________________________      If you experience any of the following, please call the [facility] during business hours:    * Increase in Pain  * Temperature over 101  * Increase in drainage from your wound  * Drainage with a foul odor  * Bleeding  * Increase in swelling  * Need for compression bandage changes due to slippage, breakthrough drainage. If you need medical attention outside of the business hours of the [address] [place], [place] [address]i [place] please contact your PCP or go to the nearest emergency room.

## 2023-08-22 ENCOUNTER — HOSPITAL ENCOUNTER (OUTPATIENT)
Dept: WOUND CARE | Age: 58
Discharge: HOME | End: 2023-08-22

## 2024-03-16 ENCOUNTER — HOSPITAL ENCOUNTER (EMERGENCY)
Age: 59
Discharge: HOME | End: 2024-03-16
Attending: FAMILY MEDICINE
Payer: OTHER GOVERNMENT

## 2024-03-16 VITALS
DIASTOLIC BLOOD PRESSURE: 78 MMHG | SYSTOLIC BLOOD PRESSURE: 138 MMHG | RESPIRATION RATE: 16 BRPM | TEMPERATURE: 97.2 F | HEART RATE: 72 BPM | OXYGEN SATURATION: 99 %

## 2024-03-16 DIAGNOSIS — L02.215 ABSCESS, PERINEUM: Primary | ICD-10-CM

## 2024-03-16 PROCEDURE — 2500000003 HC RX 250 WO HCPCS

## 2024-03-16 PROCEDURE — 10060 I&D ABSCESS SIMPLE/SINGLE: CPT

## 2024-03-16 PROCEDURE — 99283 EMERGENCY DEPT VISIT LOW MDM: CPT

## 2024-03-16 RX ORDER — CEPHALEXIN 500 MG/1
500 CAPSULE ORAL 3 TIMES DAILY
Qty: 30 CAPSULE | Refills: 0 | Status: SHIPPED | OUTPATIENT
Start: 2024-03-16 | End: 2024-03-26

## 2024-03-16 RX ORDER — LIDOCAINE HYDROCHLORIDE AND EPINEPHRINE 10; 10 MG/ML; UG/ML
INJECTION, SOLUTION INFILTRATION; PERINEURAL
Status: COMPLETED
Start: 2024-03-16 | End: 2024-03-16

## 2024-03-16 RX ORDER — CLINDAMYCIN HYDROCHLORIDE 150 MG/1
300 CAPSULE ORAL 4 TIMES DAILY
Qty: 80 CAPSULE | Refills: 0 | Status: SHIPPED | OUTPATIENT
Start: 2024-03-16 | End: 2024-03-26

## 2024-03-16 RX ADMIN — LIDOCAINE HYDROCHLORIDE AND EPINEPHRINE 3 ML: 10; 10 INJECTION, SOLUTION INFILTRATION; PERINEURAL at 12:20

## 2024-03-16 ASSESSMENT — PAIN - FUNCTIONAL ASSESSMENT: PAIN_FUNCTIONAL_ASSESSMENT: NONE - DENIES PAIN

## 2024-03-16 NOTE — ED PROVIDER NOTES
HPI:  3/16/24,   Time: 12:16 PM EDT         Edinson Agudelo is a 58 y.o. male presenting to the ED for acute onset of pain and drainage from a lump in his perineal area.  He has had a little small lump present for several months, without any discomfort or drainage.  He has previously had problems with abscesses but not recently.  He is diabetic, originally on insulin but now he is using Trulicity.  He does complain of intermittent sweating in the groin area but not recently.  He denies fever or chills or bodyaches.      ROS:   Pertinent positives and negatives are stated within HPI, all other systems reviewed and are negative.  --------------------------------------------- PAST HISTORY ---------------------------------------------  Past Medical History:  has a past medical history of Atrial fibrillation (HCC), Atrial fibrillation (HCC), Cerebral artery occlusion with cerebral infarction (HCC), COPD (chronic obstructive pulmonary disease) (HCC), Diabetes mellitus (HCC), Hyperlipidemia, Hypertension, Neuropathy, Psychiatric problem, Sleep apnea, and TIA (transient ischemic attack).    Past Surgical History:  has a past surgical history that includes fracture surgery; Cosmetic surgery; ECHO Complete With Bubble Study (7/7/2015); other surgical history (02/15/2018); Cardiac surgery; Coronary artery bypass graft (N/A, 5/28/2020); Pericardium surgery (N/A, 6/4/2020); hernia repair; and IR BIOPSY BONE MARROW (6/6/2022).    Social History:  reports that he has been smoking cigarettes. He has a 35.0 pack-year smoking history. He has quit using smokeless tobacco.  His smokeless tobacco use included chew. He reports that he does not drink alcohol and does not use drugs.    Family History: family history includes Cancer in his mother; Heart Disease in his father.     The patient’s home medications have been reviewed.    Allergies: Other    -------------------------------------------------- RESULTS

## 2024-03-16 NOTE — ED NOTES
Dressing applied to right groin/testicle are S/P I&D and packing placed by Dr. Ramos. No active bleeding noted.

## 2024-03-18 ENCOUNTER — TELEPHONE (OUTPATIENT)
Dept: SURGERY | Age: 59
End: 2024-03-18

## 2024-03-18 NOTE — TELEPHONE ENCOUNTER
Patient phoned the office to schedule ER follow up.  Patients abscess lanced in the ER.  Patient also started on antibiotics.  Patient informed that he will need to follow up with his PCP first and if surgical intervention is needed he will then follow up with our office.  Patient got upset with the information provided and hung up the phone.  Electronically signed by Yady Crowley on 3/18/24 at 11:45 AM EDT

## 2024-09-24 ENCOUNTER — HOSPITAL ENCOUNTER (EMERGENCY)
Age: 59
Discharge: HOME | End: 2024-09-24
Payer: OTHER GOVERNMENT

## 2024-09-24 VITALS
HEART RATE: 78 BPM | TEMPERATURE: 97.2 F | OXYGEN SATURATION: 97 % | WEIGHT: 252 LBS | RESPIRATION RATE: 18 BRPM | SYSTOLIC BLOOD PRESSURE: 141 MMHG | BODY MASS INDEX: 35.28 KG/M2 | DIASTOLIC BLOOD PRESSURE: 90 MMHG | HEIGHT: 71 IN

## 2024-09-24 DIAGNOSIS — M25.461 EFFUSION, RIGHT KNEE: ICD-10-CM

## 2024-09-24 DIAGNOSIS — S70.01XA CONTUSION OF RIGHT HIP, INITIAL ENCOUNTER: Primary | ICD-10-CM

## 2024-09-24 PROCEDURE — 99211 OFF/OP EST MAY X REQ PHY/QHP: CPT

## 2024-09-24 RX ORDER — HYDROCODONE BITARTRATE AND ACETAMINOPHEN 5; 325 MG/1; MG/1
1 TABLET ORAL
Qty: 15 TABLET | Refills: 0 | Status: SHIPPED | OUTPATIENT
Start: 2024-09-24 | End: 2024-09-29

## 2024-09-24 ASSESSMENT — PAIN - FUNCTIONAL ASSESSMENT: PAIN_FUNCTIONAL_ASSESSMENT: 0-10

## 2024-09-24 ASSESSMENT — PAIN DESCRIPTION - LOCATION: LOCATION: LEG;KNEE

## 2024-09-24 ASSESSMENT — PAIN DESCRIPTION - DESCRIPTORS: DESCRIPTORS: ACHING;DISCOMFORT

## 2024-09-24 ASSESSMENT — PAIN DESCRIPTION - ORIENTATION: ORIENTATION: RIGHT

## 2024-09-24 ASSESSMENT — PAIN SCALES - GENERAL: PAINLEVEL_OUTOF10: 10

## 2025-01-14 ENCOUNTER — HOSPITAL ENCOUNTER (EMERGENCY)
Age: 60
Discharge: HOME | End: 2025-01-14
Payer: OTHER GOVERNMENT

## 2025-01-14 ENCOUNTER — APPOINTMENT (OUTPATIENT)
Dept: GENERAL RADIOLOGY | Age: 60
End: 2025-01-14
Payer: OTHER GOVERNMENT

## 2025-01-14 VITALS
DIASTOLIC BLOOD PRESSURE: 82 MMHG | BODY MASS INDEX: 35.43 KG/M2 | TEMPERATURE: 98.2 F | WEIGHT: 254 LBS | OXYGEN SATURATION: 99 % | SYSTOLIC BLOOD PRESSURE: 112 MMHG | RESPIRATION RATE: 18 BRPM | HEART RATE: 77 BPM

## 2025-01-14 DIAGNOSIS — R07.81 RIB PAIN: ICD-10-CM
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
## 2025-01-14 PROCEDURE — 99211 OFF/OP EST MAY X REQ PHY/QHP: CPT

## 2025-01-14 PROCEDURE — 71101 X-RAY EXAM UNILAT RIBS/CHEST: CPT

## 2025-01-14 RX ORDER — LIDOCAINE 50 MG/G
1 PATCH TOPICAL DAILY
Qty: 10 PATCH | Refills: 0 | Status: SHIPPED | OUTPATIENT
Start: 2025-01-14 | End: 2025-01-24

## 2025-01-14 RX ORDER — OXYCODONE AND ACETAMINOPHEN 5; 325 MG/1; MG/1
1 TABLET ORAL EVERY 6 HOURS PRN
Qty: 12 TABLET | Refills: 0 | Status: SHIPPED | OUTPATIENT
Start: 2025-01-14 | End: 2025-01-17

## 2025-01-14 ASSESSMENT — PAIN DESCRIPTION - DESCRIPTORS: DESCRIPTORS: ACHING;DISCOMFORT;SORE

## 2025-01-14 ASSESSMENT — PAIN - FUNCTIONAL ASSESSMENT: PAIN_FUNCTIONAL_ASSESSMENT: 0-10

## 2025-01-14 ASSESSMENT — PAIN SCALES - GENERAL: PAINLEVEL_OUTOF10: 6

## 2025-01-14 ASSESSMENT — PAIN DESCRIPTION - ORIENTATION: ORIENTATION: LEFT

## 2025-01-14 ASSESSMENT — PAIN DESCRIPTION - LOCATION: LOCATION: RIB CAGE

## 2025-01-14 NOTE — DISCHARGE INSTR - COC
Continuity of Care Form    Patient Name: Edinson Osorio   :  1965  MRN:  [id number]    Admit date:  2025  Discharge date:  ***    Code Status Order: Prior   Advance Directives:   Advance Care Flowsheet Documentation             Admitting Physician:  No admitting provider for patient encounter.  PCP: Savage Billingsley MD    Discharging Nurse: ***  Discharging Hospital Unit/Room#:   Discharging Unit Phone Number: ***    Emergency Contact:   Extended Emergency Contact Information  Primary Emergency Contact: katey osorio  Home Phone: [phone]  Mobile Phone: [phone]  Relation: Child   needed? No  Secondary Emergency Contact: misha abad  Mobile Phone: [phone]  Relation: Spouse    Past Surgical History:  Past Surgical History:   Procedure Laterality Date    CARDIAC SURGERY      HEART CATHETERIZATION    CORONARY ARTERY BYPASS GRAFT N/A 2020    CORONARY ARTERY BYPASS, MAZE PROCEDURE, BERTHA performed by Shimon Vega DO at [facility] [place]    COSMETIC SURGERY      ECHOCARDIOGRAM COMPLETE WITH BUBBLE STUDY  2015         FRACTURE SURGERY      HERNIA REPAIR      age child    IR BIOPSY BONE MARROW  2022    IR BIOPSY PERC SUPERF BONE 2022 [facility] CT    OTHER SURGICAL HISTORY  02/15/2018    incision and drainage bone biopsy of right great toe    PERICARDIUM SURGERY N/A 2020    PERICARDIAL WINDOW CREATION performed by Shimon Vega DO at [facility] OR       Immunization History:   Immunization History   Administered Date(s) Administered    COVID-19, PFIZER GRAY top, DO NOT Dilute, (age 12 y+), IM, 30 mcg/0.3 mL 2022    COVID-19, PFIZER PURPLE top, DILUTE for use, (age 12 y+), 30mcg/0.3mL 2021, 2021, 2021    COVID-19, PFIZER, (age 12y+), IM, 30mcg/0.3mL 10/30/2023, 2025    Influenza Virus Vaccine 2002, 10/16/2002, 10/31/2019, 2020, 10/02/2021    Pneumococcal Vaccine 2002    Pneumococcal, PCV20, PREVNAR 20, (age 6w+), IM, 0.5mL 2022

## 2025-01-14 NOTE — ED PROVIDER NOTES
Independent [facility] Visit.    HPI:  1/14/25,   Time: 4:43 PM JOSÉ MIGUEL Agudelo is a 59 y.o. male presenting to the ED for rib pain.  Patient states 2 days ago he had a mechanical fall on the stairs.  Landed on the left side of his ribs.  He reports severe pain since then.  The pain is constant.  Aggravated by sneezing, coughing or deep inhalation.  Denies head injury or loss consciousness.  Has not taken anything for the pain.  Denies fever, chills, body aches, headache, dizziness, chest pain, shortness of breath, hemoptysis, abdominal pain, nausea vomiting or diarrhea.    ROS:   Pertinent positives and negatives are stated within HPI, all other systems reviewed and are negative.  --------------------------------------------- PAST HISTORY ---------------------------------------------  Past Medical History:  has a past medical history of Atrial fibrillation (HCC), Atrial fibrillation (HCC), Cerebral artery occlusion with cerebral infarction (HCC), COPD (chronic obstructive pulmonary disease) (HCC), Diabetes mellitus (HCC), Hyperlipidemia, Hypertension, Neuropathy, Psychiatric problem, Sleep apnea, and TIA (transient ischemic attack).    Past Surgical History:  has a past surgical history that includes fracture surgery; Cosmetic surgery; ECHO Complete With Bubble Study (7/7/2015); other surgical history (02/15/2018); Cardiac surgery; Coronary artery bypass graft (N/A, 5/28/2020); Pericardium surgery (N/A, 6/4/2020); hernia repair; and IR BIOPSY BONE MARROW (6/6/2022).    Social History:  reports that he has been smoking cigarettes. He has a 35 pack-year smoking history. He has quit using smokeless tobacco.  His smokeless tobacco use included chew. He reports that he does not drink alcohol and does not use drugs.    Family History: family history includes Cancer in his mother; Heart Disease in his father.     The patient’s home medications have been reviewed.    Allergies:

## 2025-02-13 ENCOUNTER — OFFICE VISIT (OUTPATIENT)
Dept: ONCOLOGY | Age: 60
End: 2025-02-13
Payer: OTHER GOVERNMENT

## 2025-02-13 ENCOUNTER — HOSPITAL ENCOUNTER (OUTPATIENT)
Dept: INFUSION THERAPY | Age: 60
Discharge: HOME | End: 2025-02-13
Payer: OTHER GOVERNMENT

## 2025-02-13 VITALS
BODY MASS INDEX: 36.68 KG/M2 | DIASTOLIC BLOOD PRESSURE: 76 MMHG | HEIGHT: 71 IN | TEMPERATURE: 97.5 F | WEIGHT: 262 LBS | HEART RATE: 59 BPM | SYSTOLIC BLOOD PRESSURE: 131 MMHG | OXYGEN SATURATION: 99 %

## 2025-02-13 DIAGNOSIS — D72.829 LEUKOCYTOSIS, UNSPECIFIED TYPE: Primary | ICD-10-CM

## 2025-02-13 LAB
ALBUMIN SERPL-MCNC: 3.9 G/DL (ref 3.5–5.2)
ALP SERPL-CCNC: 59 U/L (ref 40–129)
ALT SERPL-CCNC: 23 U/L (ref 0–40)
ANION GAP SERPL CALCULATED.3IONS-SCNC: 12 MMOL/L (ref 7–16)
AST SERPL-CCNC: 28 U/L (ref 0–39)
BASOPHILS # BLD: 0.04 K/UL (ref 0–0.2)
BASOPHILS NFR BLD: 0 % (ref 0–2)
BILIRUB SERPL-MCNC: 0.3 MG/DL (ref 0–1.2)
BUN SERPL-MCNC: 14 MG/DL (ref 6–20)
CALCIUM SERPL-MCNC: 9 MG/DL (ref 8.6–10.2)
CHLORIDE SERPL-SCNC: 102 MMOL/L (ref 98–107)
CO2 SERPL-SCNC: 25 MMOL/L (ref 22–29)
CREAT SERPL-MCNC: 0.9 MG/DL (ref 0.7–1.2)
EOSINOPHIL # BLD: 0.14 K/UL (ref 0.05–0.5)
EOSINOPHILS RELATIVE PERCENT: 1 % (ref 0–6)
ERYTHROCYTE [DISTWIDTH] IN BLOOD BY AUTOMATED COUNT: 13.9 % (ref 11.5–15)
GFR, ESTIMATED: >90 ML/MIN/1.73M2
GLUCOSE SERPL-MCNC: 245 MG/DL (ref 74–99)
HCT VFR BLD AUTO: 41.6 % (ref 37–54)
HGB BLD-MCNC: 14 G/DL (ref 12.5–16.5)
IMM GRANULOCYTES # BLD AUTO: 0.11 K/UL (ref 0–0.58)
IMM GRANULOCYTES NFR BLD: 1 % (ref 0–5)
LYMPHOCYTES NFR BLD: 3.52 K/UL (ref 1.5–4)
LYMPHOCYTES RELATIVE PERCENT: 33 % (ref 20–42)
MCH RBC QN AUTO: 31 PG (ref 26–35)
MCHC RBC AUTO-ENTMCNC: 33.7 G/DL (ref 32–34.5)
MCV RBC AUTO: 92.2 FL (ref 80–99.9)
MONOCYTES NFR BLD: 0.95 K/UL (ref 0.1–0.95)
MONOCYTES NFR BLD: 9 % (ref 2–12)
NEUTROPHILS NFR BLD: 56 % (ref 43–80)
NEUTS SEG NFR BLD: 6.08 K/UL (ref 1.8–7.3)
PLATELET # BLD AUTO: 222 K/UL (ref 130–450)
PMV BLD AUTO: 10.4 FL (ref 7–12)
POTASSIUM SERPL-SCNC: 3.9 MMOL/L (ref 3.5–5)
PROT SERPL-MCNC: 6.6 G/DL (ref 6.4–8.3)
RBC # BLD AUTO: 4.51 M/UL (ref 3.8–5.8)
SODIUM SERPL-SCNC: 139 MMOL/L (ref 132–146)
WBC OTHER # BLD: 10.8 K/UL (ref 4.5–11.5)

## 2025-02-13 PROCEDURE — 85025 COMPLETE CBC W/AUTO DIFF WBC: CPT

## 2025-02-13 PROCEDURE — 36415 COLL VENOUS BLD VENIPUNCTURE: CPT

## 2025-02-13 PROCEDURE — 99212 OFFICE O/P EST SF 10 MIN: CPT

## 2025-02-13 PROCEDURE — 80053 COMPREHEN METABOLIC PANEL: CPT

## 2025-02-13 NOTE — PROGRESS NOTES
[facility]  [address] [place] [id number]   Hematology/Oncology  Consult      Patient Name: Edinson Agudelo  YOB: 1965  PCP: Savage Billingsley MD   Referring Provider:      Reason for Consultation:   Chief Complaint   Patient presents with    NEUTROPHILIA    Follow-up      Interval history: No new complaints.      History of Present Illness:  59 years old male  referred by Dr. Alicia for management of CML.  Patient has had mild leukocytosis ranging from 16-18,000 since 2020 with monocytosis and absolute lymphocytosis.  BCR ABL was tested in April 2022 and is reported as positive.  Most recent CBC from April 2022 showed a total white count of 18,000, hemoglobin 16, platelet count 351, absolute neutrophil count 9000, absolute lymphocyte count 8000, monocytes 1.2K.  Absolute eosinophil count 2.3, absolute basophil count 0.    Denies recent weight loss, loss of appetite, night sweats, enlarged lymph nodes, abdominal pain.    Review of systems: Over 10 systems were reviewed and all were negative except as mentioned above.    Diagnostic Data:     Past Medical History:   Diagnosis Date    Atrial fibrillation (HCC)     Atrial fibrillation (HCC) 02/20/2018    lexiscan stress test    Cerebral artery occlusion with cerebral infarction (HCC)     COPD (chronic obstructive pulmonary disease) (HCC)     Diabetes mellitus (HCC)     Hyperlipidemia     Hypertension     Neuropathy     feet and legs    Psychiatric problem     Sleep apnea     cpap 12    TIA (transient ischemic attack)     2015       Patient Active Problem List    Diagnosis Date Noted    Right foot ulcer, with fat layer exposed (HCC) 06/15/2021    Foot ulcer, left, with fat layer exposed (HCC) 06/15/2021    CAD in native artery 05/28/2020    COVID-19     Acute postoperative respiratory insufficiency     Postoperative hypotension     Unstable angina (HCC) 05/20/2020    Acute coronary syndrome (HCC) 05/20/2020    History of TIA

## 2025-04-01 NOTE — PROGRESS NOTES
[facility] Sleep Medicine    Patient Name: Edinson Agudelo  Age: 60 y.o.   : 1965  Date of Visit: 25    Assessment and Plan:     1. HUMERA (obstructive sleep apnea)  Discussed sleep study results, emphasized severity of study and diagnosis including possible clinical sequelae of microvascular disease including higher incidence of stroke, atrial fibrillation, hypertension, or other cognitive microvascular damage. Counseled on appropriate use of the device, such as using distilled water, daily cleaning of mask and tube with gentle soap and water or non-toxic wipes. Counseled on troubleshooting device (such as rainout) and being aware of the ambient humidity of the room.      2. Obesity (BMI 30-39.9)  Rec'd 10-20% weight loss of total body weight (if feasible). Patient instructed on proper nutrition and estimated total daily caloric expenditure for their height and weight. Discussed that HUMERA may improve with weight loss, but there is no guarantee of reversal.       History:    HPI   Edinson Agudelo is a 60 y.o. male with  has a past medical history of Atrial fibrillation (HCC), Atrial fibrillation (HCC) (2018), Cerebral artery occlusion with cerebral infarction (HCC), COPD (chronic obstructive pulmonary disease) (HCC), Diabetes mellitus (HCC), Hyperlipidemia, Hypertension, Neuropathy, Psychiatric problem, Sleep apnea, and TIA (transient ischemic attack). who presents as a new patient to Sleep Clinic, referred by Dr. Heaton, for Sleep Apnea (/) and Snoring    - History of HUMERA  - Does not currently use old device due to recall  - Symptoms of snoring, witnessed apneas, fatigue  - Known atrial fibrillation  - Would like to get back on CPAP therapy  - No current alcohol, tobacco, or drug use    PMH:  Past Medical History:   Diagnosis Date    Atrial fibrillation (HCC)     Atrial fibrillation (HCC) 2018    lexiscan stress test    Cerebral artery occlusion with cerebral infarction (HCC)     COPD (chronic

## 2025-04-02 ENCOUNTER — OFFICE VISIT (OUTPATIENT)
Dept: SLEEP CENTER | Age: 60
End: 2025-04-02
Payer: OTHER GOVERNMENT

## 2025-04-02 VITALS
OXYGEN SATURATION: 96 % | BODY MASS INDEX: 36.57 KG/M2 | TEMPERATURE: 97.6 F | HEART RATE: 81 BPM | RESPIRATION RATE: 14 BRPM | HEIGHT: 71 IN | DIASTOLIC BLOOD PRESSURE: 85 MMHG | SYSTOLIC BLOOD PRESSURE: 146 MMHG | WEIGHT: 261.25 LBS

## 2025-04-02 DIAGNOSIS — G47.33 OSA (OBSTRUCTIVE SLEEP APNEA): Primary | ICD-10-CM

## 2025-04-02 DIAGNOSIS — E66.9 OBESITY (BMI 30-39.9): ICD-10-CM

## 2025-04-02 PROCEDURE — 3077F SYST BP >= 140 MM HG: CPT | Performed by: STUDENT IN AN ORGANIZED HEALTH CARE EDUCATION/TRAINING PROGRAM

## 2025-04-02 PROCEDURE — 99204 OFFICE O/P NEW MOD 45 MIN: CPT | Performed by: STUDENT IN AN ORGANIZED HEALTH CARE EDUCATION/TRAINING PROGRAM

## 2025-04-02 PROCEDURE — 3079F DIAST BP 80-89 MM HG: CPT | Performed by: STUDENT IN AN ORGANIZED HEALTH CARE EDUCATION/TRAINING PROGRAM

## 2025-04-02 ASSESSMENT — SLEEP AND FATIGUE QUESTIONNAIRES
HOW LIKELY ARE YOU TO NOD OFF OR FALL ASLEEP WHILE WATCHING TV: HIGH CHANCE OF DOZING
HOW LIKELY ARE YOU TO NOD OFF OR FALL ASLEEP WHILE SITTING QUIETLY AFTER LUNCH WITHOUT ALCOHOL: HIGH CHANCE OF DOZING
HOW LIKELY ARE YOU TO NOD OFF OR FALL ASLEEP WHILE SITTING AND TALKING TO SOMEONE: SLIGHT CHANCE OF DOZING
HOW LIKELY ARE YOU TO NOD OFF OR FALL ASLEEP WHILE SITTING AND READING: HIGH CHANCE OF DOZING
HOW LIKELY ARE YOU TO NOD OFF OR FALL ASLEEP IN A CAR, WHILE STOPPED FOR A FEW MINUTES IN TRAFFIC: WOULD NEVER DOZE
HOW LIKELY ARE YOU TO NOD OFF OR FALL ASLEEP WHEN YOU ARE A PASSENGER IN A CAR FOR AN HOUR WITHOUT A BREAK: MODERATE CHANCE OF DOZING
ESS TOTAL SCORE: 18
HOW LIKELY ARE YOU TO NOD OFF OR FALL ASLEEP WHILE SITTING INACTIVE IN A PUBLIC PLACE: HIGH CHANCE OF DOZING
HOW LIKELY ARE YOU TO NOD OFF OR FALL ASLEEP WHILE LYING DOWN TO REST IN THE AFTERNOON WHEN CIRCUMSTANCES PERMIT: HIGH CHANCE OF DOZING

## 2025-05-14 ENCOUNTER — HOSPITAL ENCOUNTER (OUTPATIENT)
Dept: INFUSION THERAPY | Age: 60
Discharge: HOME | End: 2025-05-14
Payer: OTHER GOVERNMENT

## 2025-05-14 DIAGNOSIS — D72.829 LEUKOCYTOSIS, UNSPECIFIED TYPE: ICD-10-CM

## 2025-05-14 LAB
BASOPHILS # BLD: 0.05 K/UL (ref 0–0.2)
BASOPHILS NFR BLD: 1 % (ref 0–2)
EOSINOPHIL # BLD: 0.13 K/UL (ref 0.05–0.5)
EOSINOPHILS RELATIVE PERCENT: 1 % (ref 0–6)
ERYTHROCYTE [DISTWIDTH] IN BLOOD BY AUTOMATED COUNT: 14.3 % (ref 11.5–15)
HCT VFR BLD AUTO: 44.1 % (ref 37–54)
HGB BLD-MCNC: 14.5 G/DL (ref 12.5–16.5)
IMM GRANULOCYTES # BLD AUTO: 0.08 K/UL (ref 0–0.58)
IMM GRANULOCYTES NFR BLD: 1 % (ref 0–5)
LYMPHOCYTES NFR BLD: 4.42 K/UL (ref 1.5–4)
LYMPHOCYTES RELATIVE PERCENT: 45 % (ref 20–42)
MCH RBC QN AUTO: 30.9 PG (ref 26–35)
MCHC RBC AUTO-ENTMCNC: 32.9 G/DL (ref 32–34.5)
MCV RBC AUTO: 93.8 FL (ref 80–99.9)
MONOCYTES NFR BLD: 0.87 K/UL (ref 0.1–0.95)
MONOCYTES NFR BLD: 9 % (ref 2–12)
NEUTROPHILS NFR BLD: 44 % (ref 43–80)
NEUTS SEG NFR BLD: 4.38 K/UL (ref 1.8–7.3)
PLATELET # BLD AUTO: 217 K/UL (ref 130–450)
PMV BLD AUTO: 11.2 FL (ref 7–12)
RBC # BLD AUTO: 4.7 M/UL (ref 3.8–5.8)
SEND OUT REPORT: NORMAL
TEST NAME: NORMAL
WBC OTHER # BLD: 9.9 K/UL (ref 4.5–11.5)

## 2025-05-14 PROCEDURE — 81479 UNLISTED MOLECULAR PATHOLOGY: CPT

## 2025-05-14 PROCEDURE — 85025 COMPLETE CBC W/AUTO DIFF WBC: CPT

## 2025-05-14 PROCEDURE — 36415 COLL VENOUS BLD VENIPUNCTURE: CPT

## 2025-05-15 ENCOUNTER — OFFICE VISIT (OUTPATIENT)
Dept: ONCOLOGY | Age: 60
End: 2025-05-15

## 2025-05-15 VITALS
HEIGHT: 71 IN | WEIGHT: 261.8 LBS | SYSTOLIC BLOOD PRESSURE: 156 MMHG | TEMPERATURE: 97.4 F | BODY MASS INDEX: 36.65 KG/M2 | HEART RATE: 83 BPM | OXYGEN SATURATION: 98 % | DIASTOLIC BLOOD PRESSURE: 86 MMHG

## 2025-05-15 DIAGNOSIS — D72.829 LEUKOCYTOSIS, UNSPECIFIED TYPE: Primary | ICD-10-CM

## 2025-05-15 NOTE — PROGRESS NOTES
[facility]  [address] [place] [id number]   Hematology/Oncology  Consult      Patient Name: Edinson Agudelo  YOB: 1965  PCP: Savage Billingsley MD   Referring Provider:      Reason for Consultation:   Chief Complaint   Patient presents with    NEUTROPHILIA     Follow-up      Interval history: No new complaints.      History of Present Illness:  59 years old male  referred by Dr. Alicia for management of CML.  Patient has had mild leukocytosis ranging from 16-18,000 since 2020 with monocytosis and absolute lymphocytosis.  BCR ABL was tested in April 2022 and is reported as positive.  Most recent CBC from April 2022 showed a total white count of 18,000, hemoglobin 16, platelet count 351, absolute neutrophil count 9000, absolute lymphocyte count 8000, monocytes 1.2K.  Absolute eosinophil count 2.3, absolute basophil count 0.    Denies recent weight loss, loss of appetite, night sweats, enlarged lymph nodes, abdominal pain.    Review of systems: Over 10 systems were reviewed and all were negative except as mentioned above.    Diagnostic Data:     Past Medical History:   Diagnosis Date    Atrial fibrillation (HCC)     Atrial fibrillation (HCC) 02/20/2018    lexiscan stress test    Cerebral artery occlusion with cerebral infarction (HCC)     COPD (chronic obstructive pulmonary disease) (HCC)     Diabetes mellitus (HCC)     Hyperlipidemia     Hypertension     Neuropathy     feet and legs    Psychiatric problem     Sleep apnea     cpap 12    TIA (transient ischemic attack)     2015       Patient Active Problem List    Diagnosis Date Noted    Right foot ulcer, with fat layer exposed (HCC) 06/15/2021    Foot ulcer, left, with fat layer exposed (HCC) 06/15/2021    CAD in native artery 05/28/2020    COVID-19     Acute postoperative respiratory insufficiency     Postoperative hypotension     Unstable angina (HCC) 05/20/2020    Acute coronary syndrome (HCC) 05/20/2020    History of TIA

## 2025-05-19 LAB
SEND OUT REPORT: NORMAL
TEST NAME: NORMAL

## 2025-06-18 ENCOUNTER — APPOINTMENT (OUTPATIENT)
Dept: GENERAL RADIOLOGY | Age: 60
End: 2025-06-18
Payer: OTHER GOVERNMENT

## 2025-06-18 ENCOUNTER — HOSPITAL ENCOUNTER (EMERGENCY)
Age: 60
Discharge: HOME | End: 2025-06-18
Attending: EMERGENCY MEDICINE
Payer: OTHER GOVERNMENT

## 2025-06-18 VITALS
HEART RATE: 70 BPM | RESPIRATION RATE: 23 BRPM | OXYGEN SATURATION: 97 % | SYSTOLIC BLOOD PRESSURE: 152 MMHG | TEMPERATURE: 97.4 F | DIASTOLIC BLOOD PRESSURE: 65 MMHG

## 2025-06-18 DIAGNOSIS — R00.2 PALPITATIONS: Primary | ICD-10-CM

## 2025-06-18 LAB
ALBUMIN SERPL-MCNC: 3.8 G/DL (ref 3.5–5.2)
ALP SERPL-CCNC: 41 U/L (ref 40–129)
ALT SERPL-CCNC: 22 U/L (ref 0–50)
ANION GAP SERPL CALCULATED.3IONS-SCNC: 14 MMOL/L (ref 7–16)
AST SERPL-CCNC: 31 U/L (ref 0–50)
BASOPHILS # BLD: 0.07 K/UL (ref 0–0.2)
BASOPHILS NFR BLD: 1 % (ref 0–2)
BILIRUB SERPL-MCNC: <0.2 MG/DL (ref 0–1.2)
BNP SERPL-MCNC: 167 PG/ML (ref 0–125)
BUN SERPL-MCNC: 25 MG/DL (ref 8–23)
CALCIUM SERPL-MCNC: 9.3 MG/DL (ref 8.8–10.2)
CHLORIDE SERPL-SCNC: 103 MMOL/L (ref 98–107)
CO2 SERPL-SCNC: 22 MMOL/L (ref 22–29)
CREAT SERPL-MCNC: 1.1 MG/DL (ref 0.7–1.2)
EOSINOPHIL # BLD: 0.12 K/UL (ref 0.05–0.5)
EOSINOPHILS RELATIVE PERCENT: 1 % (ref 0–6)
ERYTHROCYTE [DISTWIDTH] IN BLOOD BY AUTOMATED COUNT: 14.9 % (ref 11.5–15)
GFR, ESTIMATED: 78 ML/MIN/1.73M2
GLUCOSE SERPL-MCNC: 176 MG/DL (ref 74–99)
HCT VFR BLD AUTO: 41.4 % (ref 37–54)
HGB BLD-MCNC: 14.1 G/DL (ref 12.5–16.5)
IMM GRANULOCYTES # BLD AUTO: 0.1 K/UL (ref 0–0.58)
IMM GRANULOCYTES NFR BLD: 1 % (ref 0–5)
LYMPHOCYTES NFR BLD: 3.84 K/UL (ref 1.5–4)
LYMPHOCYTES RELATIVE PERCENT: 31 % (ref 20–42)
MAGNESIUM SERPL-MCNC: 1.5 MG/DL (ref 1.6–2.4)
MCH RBC QN AUTO: 31.5 PG (ref 26–35)
MCHC RBC AUTO-ENTMCNC: 34.1 G/DL (ref 32–34.5)
MCV RBC AUTO: 92.6 FL (ref 80–99.9)
MONOCYTES NFR BLD: 0.88 K/UL (ref 0.1–0.95)
MONOCYTES NFR BLD: 7 % (ref 2–12)
NEUTROPHILS NFR BLD: 59 % (ref 43–80)
NEUTS SEG NFR BLD: 7.25 K/UL (ref 1.8–7.3)
PLATELET # BLD AUTO: 199 K/UL (ref 130–450)
PMV BLD AUTO: 11.1 FL (ref 7–12)
POTASSIUM SERPL-SCNC: 3.9 MMOL/L (ref 3.5–5.1)
PROT SERPL-MCNC: 6.4 G/DL (ref 6.4–8.3)
RBC # BLD AUTO: 4.47 M/UL (ref 3.8–5.8)
SODIUM SERPL-SCNC: 139 MMOL/L (ref 136–145)
TROPONIN I SERPL HS-MCNC: 40 NG/L (ref 0–22)
TROPONIN I SERPL HS-MCNC: 41 NG/L (ref 0–22)
WBC OTHER # BLD: 12.3 K/UL (ref 4.5–11.5)

## 2025-06-18 PROCEDURE — 71045 X-RAY EXAM CHEST 1 VIEW: CPT

## 2025-06-18 PROCEDURE — 80053 COMPREHEN METABOLIC PANEL: CPT

## 2025-06-18 PROCEDURE — 84484 ASSAY OF TROPONIN QUANT: CPT

## 2025-06-18 PROCEDURE — 85025 COMPLETE CBC W/AUTO DIFF WBC: CPT

## 2025-06-18 PROCEDURE — 83880 ASSAY OF NATRIURETIC PEPTIDE: CPT

## 2025-06-18 PROCEDURE — 99285 EMERGENCY DEPT VISIT HI MDM: CPT

## 2025-06-18 PROCEDURE — 93005 ELECTROCARDIOGRAM TRACING: CPT | Performed by: EMERGENCY MEDICINE

## 2025-06-18 PROCEDURE — 83735 ASSAY OF MAGNESIUM: CPT

## 2025-06-18 ASSESSMENT — ENCOUNTER SYMPTOMS
CHEST TIGHTNESS: 0
SHORTNESS OF BREATH: 0
ABDOMINAL PAIN: 0

## 2025-06-18 NOTE — ED PROVIDER NOTES
67-year-old male presenting with palpitations and feeling that his heart was beating very rapidly.  He said this happened at home.  He had gone out for the day, [facility].  Had some breakfast after this and was at home.  Started to feel the rapid heart rate, began to feel nervous, started having tingling in both of his hands.  Came into the ED because he has a history of open heart surgery many years ago.         Family History   Problem Relation Age of Onset    Cancer Mother         lung    Heart Disease Father      Past Surgical History:   Procedure Laterality Date    CARDIAC SURGERY      HEART CATHETERIZATION    CORONARY ARTERY BYPASS GRAFT N/A 5/28/2020    CORONARY ARTERY BYPASS, MAZE PROCEDURE, BERTHA performed by Shimon Vega DO at [facility]    COSMETIC SURGERY      ECHOCARDIOGRAM COMPLETE WITH BUBBLE STUDY  7/7/2015         FRACTURE SURGERY      HERNIA REPAIR      age child    IR BIOPSY BONE MARROW  6/6/2022    IR BIOPSY PERC SUPERF BONE 6/6/2022 [facility] CT    OTHER SURGICAL HISTORY  02/15/2018    incision and drainage bone biopsy of right great toe    PERICARDIUM SURGERY N/A 6/4/2020    PERICARDIAL WINDOW CREATION performed by Shimon Vega DO at [facility]       Review of Systems   Constitutional:  Negative for diaphoresis.   HENT:  Negative for dental problem.    Respiratory:  Negative for chest tightness and shortness of breath.    Cardiovascular:  Positive for chest pain and palpitations.   Gastrointestinal:  Negative for abdominal pain.   Musculoskeletal:         Tingling to bilateral hands        Physical Exam  Constitutional:       General: He is not in acute distress.     Appearance: He is well-developed. He is obese.   HENT:      Head: Normocephalic and atraumatic.   Eyes:      Pupils: Pupils are equal, round, and reactive to light.   Neck:      Thyroid: No thyromegaly.   Cardiovascular:      Rate and Rhythm: Normal rate and regular rhythm.

## 2025-06-19 LAB
EKG ATRIAL RATE: 85 BPM
EKG P AXIS: 52 DEGREES
EKG P-R INTERVAL: 182 MS
EKG Q-T INTERVAL: 362 MS
EKG QRS DURATION: 106 MS
EKG QTC CALCULATION (BAZETT): 430 MS
EKG R AXIS: -47 DEGREES
EKG T AXIS: 49 DEGREES
EKG VENTRICULAR RATE: 85 BPM

## 2025-06-19 PROCEDURE — 93010 ELECTROCARDIOGRAM REPORT: CPT | Performed by: INTERNAL MEDICINE

## 2025-06-24 NOTE — PROGRESS NOTES
[facility] Sleep Medicine    Patient Name: Edinson Agudelo  Age: 60 y.o.   : 1965  Date of Visit: 25    Assessment and Plan:   1. HUMERA (obstructive sleep apnea)  - Chronic, stable, follow-up yearly.    2. Obesity (BMI 30-39.9)  Rec'd 10-20% weight loss of total body weight (if feasible). Patient instructed on proper nutrition and estimated total daily caloric expenditure for their height and weight. Discussed that HUMERA may improve with weight loss, but there is no guarantee of reversal.       History:    HPI   Edinson Agudelo is a 60 y.o. male with  has a past medical history of Atrial fibrillation (HCC), Atrial fibrillation (HCC) (2018), Cerebral artery occlusion with cerebral infarction (HCC), COPD (chronic obstructive pulmonary disease) (HCC), Diabetes mellitus (HCC), Hyperlipidemia, Hypertension, Neuropathy, Psychiatric problem, Sleep apnea, and TIA (transient ischemic attack). who presents as a follow-up patient to Sleep Clinic for Sleep Apnea (Compliance no issues or concerns)  .       - Here for adherence  - Doing very well with CPAP use  - Derives benefit from therapy  - Needs [facility] forwarded to [facility], re: disability claims.    Current Outpatient Medications   Medication Sig Dispense Refill    diclofenac sodium (VOLTAREN) 1 % GEL Apply 2 g topically 2 times daily      carboxymethylcellulose 1 % ophthalmic solution 1 drop 3 times daily      Omega-3 Fatty Acids (FISH OIL) 1000 MG CAPS Take 3 capsules by mouth 3 times daily      acetaminophen (TYLENOL) 325 MG tablet Take 2 tablets by mouth every 6 hours as needed for Pain      vitamin D (CHOLECALCIFEROL) 25 MCG (1000 UT) TABS tablet Take 2 tablets by mouth daily      glipiZIDE (GLUCOTROL) 10 MG tablet 1 tablet 2 times daily (before meals)      rosuvastatin (CRESTOR) 20 MG tablet       insulin glargine (LANTUS) 100 UNIT/ML injection vial Inject into the skin nightly 35 units BID      JARDIANCE 25 MG tablet       ammonium lactate (LAC-HYDRIN) 12

## 2025-06-25 ENCOUNTER — OFFICE VISIT (OUTPATIENT)
Dept: SLEEP CENTER | Age: 60
End: 2025-06-25
Payer: COMMERCIAL

## 2025-06-25 VITALS
SYSTOLIC BLOOD PRESSURE: 119 MMHG | OXYGEN SATURATION: 97 % | DIASTOLIC BLOOD PRESSURE: 77 MMHG | RESPIRATION RATE: 15 BRPM | BODY MASS INDEX: 36.67 KG/M2 | HEIGHT: 71 IN | WEIGHT: 261.91 LBS | HEART RATE: 63 BPM | TEMPERATURE: 97.1 F

## 2025-06-25 DIAGNOSIS — G47.33 OSA (OBSTRUCTIVE SLEEP APNEA): Primary | ICD-10-CM

## 2025-06-25 DIAGNOSIS — E66.9 OBESITY (BMI 30-39.9): ICD-10-CM

## 2025-06-25 PROCEDURE — 3074F SYST BP LT 130 MM HG: CPT | Performed by: STUDENT IN AN ORGANIZED HEALTH CARE EDUCATION/TRAINING PROGRAM

## 2025-06-25 PROCEDURE — 99214 OFFICE O/P EST MOD 30 MIN: CPT | Performed by: STUDENT IN AN ORGANIZED HEALTH CARE EDUCATION/TRAINING PROGRAM

## 2025-06-25 PROCEDURE — 3078F DIAST BP <80 MM HG: CPT | Performed by: STUDENT IN AN ORGANIZED HEALTH CARE EDUCATION/TRAINING PROGRAM

## 2025-06-25 ASSESSMENT — SLEEP AND FATIGUE QUESTIONNAIRES
HOW LIKELY ARE YOU TO NOD OFF OR FALL ASLEEP WHILE SITTING INACTIVE IN A PUBLIC PLACE: WOULD NEVER DOZE
HOW LIKELY ARE YOU TO NOD OFF OR FALL ASLEEP WHILE SITTING AND TALKING TO SOMEONE: WOULD NEVER DOZE
HOW LIKELY ARE YOU TO NOD OFF OR FALL ASLEEP WHILE WATCHING TV: MODERATE CHANCE OF DOZING
ESS TOTAL SCORE: 9
HOW LIKELY ARE YOU TO NOD OFF OR FALL ASLEEP IN A CAR, WHILE STOPPED FOR A FEW MINUTES IN TRAFFIC: WOULD NEVER DOZE
HOW LIKELY ARE YOU TO NOD OFF OR FALL ASLEEP WHEN YOU ARE A PASSENGER IN A CAR FOR AN HOUR WITHOUT A BREAK: SLIGHT CHANCE OF DOZING
HOW LIKELY ARE YOU TO NOD OFF OR FALL ASLEEP WHILE SITTING AND READING: MODERATE CHANCE OF DOZING
HOW LIKELY ARE YOU TO NOD OFF OR FALL ASLEEP WHILE SITTING QUIETLY AFTER LUNCH WITHOUT ALCOHOL: SLIGHT CHANCE OF DOZING
HOW LIKELY ARE YOU TO NOD OFF OR FALL ASLEEP WHILE LYING DOWN TO REST IN THE AFTERNOON WHEN CIRCUMSTANCES PERMIT: HIGH CHANCE OF DOZING

## (undated) DEVICE — BLADE CLIPPER GEN PURP NS

## (undated) DEVICE — GLOVE SURG SZ 65 THK91MIL LTX FREE SYN POLYISOPRENE

## (undated) DEVICE — CATHETER,URETHRAL,REDRUBBER,STRL,18FR: Brand: MEDLINE

## (undated) DEVICE — RETRACTOR SURG INSRT SUT HLD OCTOBASE

## (undated) DEVICE — DISCONTINUED USE 320496 BATTERY 50-800-01 OR 50-800-03 SNGL USE

## (undated) DEVICE — CLIP INT M L GRN TI TRNSVRS GRV CHEVRON SHP W/ PRECIS TIP

## (undated) DEVICE — TUBING PERF PMP L10FT OD0.25IN ID1/16IN MED CLASS VI PRECUT

## (undated) DEVICE — Z DUP USE 2257490 ADHESIVE SKIN CLSRE 036ML TPCL 2CTL CNCRLTE HIGH VSCSTY DRMB

## (undated) DEVICE — CANNULA PERF 7FR L5.5IN AORT ROOT RADPQ STD TIP W/ VENT LN

## (undated) DEVICE — Z INACTIVE USE 2641837 CLIP LIG M BLU TI HRT SHP WIRE HORZ 600 PER BX

## (undated) DEVICE — 3M™ IOBAN™ 2 ANTIMICROBIAL INCISE DRAPE 6650EZ: Brand: IOBAN™ 2

## (undated) DEVICE — TIP APPL GEL PLT ENDO 5MMX32CM

## (undated) DEVICE — GARMENT,MEDLINE,DVT,INT,CALF,MED, GEN2: Brand: MEDLINE

## (undated) DEVICE — Z INACTIVE USE 2662641 SOLUTION IV 1000ML 140MEQ/L SOD 5MEQ/L K 3MEQ/L MG 27MEQ/L

## (undated) DEVICE — SYRINGE MED 50ML LUERLOCK TIP

## (undated) DEVICE — SPONGE SURG WHT KTNR DISECT RADPQ ST

## (undated) DEVICE — MPS® PRESSURE LINE W/TRANSDUCER: Brand: MPS

## (undated) DEVICE — DRAIN SURG 19FR 100% SIL RADPQ RND CHN FULL FLUT

## (undated) DEVICE — PERFUSION PACK CUST OPN HRT

## (undated) DEVICE — SET SURG BASIN OPEN HEART NO  1 REUSABLE

## (undated) DEVICE — TRAP,MUCUS SPECIMEN,40CC: Brand: MEDLINE

## (undated) DEVICE — DUAL STAGE VENOUS RETURN CANNULA: Brand: EDWARDS LIFESCIENCES DUAL STAGE VENOUS DRAINAGE CANNULA

## (undated) DEVICE — SURGICAL PROCEDURE PACK CRD SEHC

## (undated) DEVICE — INTENDED FOR TISSUE SEPARATION, AND OTHER PROCEDURES THAT REQUIRE A SHARP SURGICAL BLADE TO PUNCTURE OR CUT.: Brand: BARD-PARKER ® STAINLESS STEEL BLADES

## (undated) DEVICE — SET CARDIAC I

## (undated) DEVICE — GLOVE SURG SZ 7.5 L11.73IN FNGR THK9.8MIL STRW LTX POLYMER

## (undated) DEVICE — PACK OPEN HRT DRP

## (undated) DEVICE — BLOOD TRANSFUSION FILTER: Brand: HAEMONETICS

## (undated) DEVICE — ALCOHOL RUBBING ISO 16OZ 70%

## (undated) DEVICE — CANNULA INJ L2.5IN BLNT TIP 3MM CLR BODY W/ 1 W VLV DLP

## (undated) DEVICE — BLADE ES L2.44IN S STL HEX LOK DISP VALLEYLAB

## (undated) DEVICE — CLEANER,CAUTERY TIP,2X2",STERILE: Brand: MEDLINE

## (undated) DEVICE — KIT PLT RATIO DISPNS KT 2IN CANN TIP SPRY TIP DISP MAGELLAN

## (undated) DEVICE — TOWEL,OR,DSP,ST,WHITE,DLX,4/PK,20PK/CS: Brand: MEDLINE

## (undated) DEVICE — CONNECTOR PERF W64XH64XL64MM W  LUERLOCK

## (undated) DEVICE — SOLUTION IV IRRIG WATER 1000ML POUR BRL 2F7114

## (undated) DEVICE — VASOVIEW 2 HEMOPRO MIN ORD 5 EA

## (undated) DEVICE — AGENT HEMSTAT W4XL8IN OXIDIZED REGENERATED CELOS ABSRB

## (undated) DEVICE — SOLUTION IV 50ML 0.9% SOD CHL PLAS CONT USP VIAFLX

## (undated) DEVICE — CLIP INT SM TI EZ LD LIG SYS WECK HORZ

## (undated) DEVICE — CLOTH SURG PREP PREOPERATIVE CHLORHEXIDINE GLUC 2% READYPREP

## (undated) DEVICE — AEGIS 1" DISK 4MM HOLE, PEEL OPEN: Brand: MEDLINE

## (undated) DEVICE — SET ACB VEIN

## (undated) DEVICE — ELECTRODE BLDE L6.5IN CAUT EXT DISP

## (undated) DEVICE — CLAMP ABLAT JAW L65MM L CRV 2 ELECTRD BPLR

## (undated) DEVICE — BLOWER COR ART L16.5CM PLAS SHFT MAL W/ MIST IV SET AXIUS

## (undated) DEVICE — DRESSING FOAM W22XL25CM FILVE LAYR FOAM DP DEF SAFETAC

## (undated) DEVICE — GLOVE ORANGE PI 7 1/2   MSG9075

## (undated) DEVICE — DRAIN SURG SGL COLL PT TB FOR ATS BG OASIS

## (undated) DEVICE — DRAIN SURG L3/8-1/2IN DIA3/16IN SIL CARD CONN 1:1 BLAK

## (undated) DEVICE — GLOVE SURG SZ 6 THK91MIL LTX FREE SYN POLYISOPRENE ANTI

## (undated) DEVICE — APPLICATOR MEDICATED 26 CC SOLUTION HI LT ORNG CHLORAPREP

## (undated) DEVICE — KIT SURG W7XL11IN 2 PKT UNTREATED NA

## (undated) DEVICE — SET CARDIAC II

## (undated) DEVICE — TOWEL,OR,DSP,ST,BLUE,STD,6/PK,12PK/CS: Brand: MEDLINE

## (undated) DEVICE — 1.5L THIN WALL CAN: Brand: CRD

## (undated) DEVICE — TAPE ADH W2INXL10YD WHT PAPR GENTLE BRTH FLX COMFORTABLE

## (undated) DEVICE — TTL1LYR 16FR10ML 100%SIL TMPST TR: Brand: MEDLINE

## (undated) DEVICE — GLOVE ORANGE PI 7   MSG9070

## (undated) DEVICE — CAMERA CARDIAC STRYKER 1488 HD

## (undated) DEVICE — BASIC SINGLE BASIN 1-LF: Brand: MEDLINE INDUSTRIES, INC.

## (undated) DEVICE — TAPE ADH W2INXL10YD PLAS TRNSPAR H2O RESIST HYPOALRG CURAD

## (undated) DEVICE — SYRINGE MED 20ML STD CLR PLAS LUERSLIP TIP N CTRL DISP

## (undated) DEVICE — SYRINGE MED 10ML POLYPR LUERSLIP TIP FLAT TOP W/O SFTY DISP

## (undated) DEVICE — SURGICAL PROCEDURE PACK BASIC

## (undated) DEVICE — 6 FOOT DISPOSABLE EXTENSION CABLE WITH SAFE CONNECT / SCREW-DOWN

## (undated) DEVICE — RETRACTOR RULTRACT INTERN MAMMARY ARTERY

## (undated) DEVICE — Device

## (undated) DEVICE — GOWN,SIRUS,FABRNF,L,20/CS: Brand: MEDLINE

## (undated) DEVICE — GOWN,SIRUS,FABRNF,XL,20/CS: Brand: MEDLINE

## (undated) DEVICE — EZ GLIDE AORTIC CANNULA: Brand: EDWARDS LIFESCIENCES EZ GLIDE AORTIC CANNULA

## (undated) DEVICE — CARDIAC STRYKER STERNAL SAW

## (undated) DEVICE — LANCET AORTOTOMY 3.3X10MM

## (undated) DEVICE — TUBING, SUCTION, 3/16" X 12', STRAIGHT: Brand: MEDLINE

## (undated) DEVICE — MEDI-TRACE CADENCE ADULT, PRE-CONNECT  DEFIBRILLATION ELECTRODE (10 PR/PK) - PHYSIO-CONTROL: Brand: MEDI-TRACE CADENCE

## (undated) DEVICE — INSUFFLATION TUBING SET WITH FILTER, FUNNEL CONNECTOR AND LUER LOCK: Brand: JOSNOE MEDICAL INC

## (undated) DEVICE — PADDLE INTERN DEFIB CHILD

## (undated) DEVICE — Z DUP USE 2701075 SYSTEM SKIN CLSR 42CM DERMBND PRINEO

## (undated) DEVICE — MPS® DELIVERY SET W/ARREST AGENT AND ADDITIVE CASSETTES, HEAT EXCHANGER & 10 FT. DELIVERY TUBING: Brand: MPS

## (undated) DEVICE — 3M™ MEDIPORE™ + PAD 3564: Brand: 3M™ MEDIPORE™

## (undated) DEVICE — DRAIN CHN 19FR L0.25MM DIA6.3MM SIL RND HUBLESS FULL FLUT

## (undated) DEVICE — PATIENT RETURN ELECTRODE, SINGLE-USE, CONTACT QUALITY MONITORING, ADULT, WITH 9FT CORD, FOR PATIENTS WEIGING OVER 33LBS. (15KG): Brand: MEGADYNE

## (undated) DEVICE — CONNECTOR DRNGE W3/8-0.5XH3/16XL3/16IN 2:1 SIL CARD STR

## (undated) DEVICE — RETROGRADE CARDIOPLEGIA CATHETER: Brand: EDWARDS LIFESCIENCES RETROGRADE CARDIOPLEGIA CATHETER

## (undated) DEVICE — Device: Brand: CLEANCUT ROTATING AORTIC PUNCH

## (undated) DEVICE — BLADE OPHTH 180DEG CUT SURF BLU STR SHRP DBL BVL GRINDLESS

## (undated) DEVICE — LABEL MED CARD SURG 4 IN PANEL STRL